# Patient Record
Sex: MALE | Race: WHITE | Employment: PART TIME | URBAN - METROPOLITAN AREA
[De-identification: names, ages, dates, MRNs, and addresses within clinical notes are randomized per-mention and may not be internally consistent; named-entity substitution may affect disease eponyms.]

---

## 2018-05-22 ENCOUNTER — OFFICE VISIT (OUTPATIENT)
Dept: FAMILY MEDICINE CLINIC | Age: 43
End: 2018-05-22

## 2018-05-22 VITALS
TEMPERATURE: 99.2 F | OXYGEN SATURATION: 96 % | DIASTOLIC BLOOD PRESSURE: 86 MMHG | HEART RATE: 98 BPM | HEIGHT: 69 IN | SYSTOLIC BLOOD PRESSURE: 130 MMHG | BODY MASS INDEX: 35.16 KG/M2 | RESPIRATION RATE: 12 BRPM | WEIGHT: 237.4 LBS

## 2018-05-22 DIAGNOSIS — M79.2 NEUROPATHIC PAIN: ICD-10-CM

## 2018-05-22 DIAGNOSIS — M51.26 HERNIATED LUMBAR INTERVERTEBRAL DISC: ICD-10-CM

## 2018-05-22 DIAGNOSIS — G89.21 CHRONIC PAIN DUE TO TRAUMA: ICD-10-CM

## 2018-05-22 DIAGNOSIS — M77.12 LATERAL EPICONDYLITIS OF LEFT ELBOW: ICD-10-CM

## 2018-05-22 DIAGNOSIS — R03.0 PRE-HYPERTENSION: Primary | ICD-10-CM

## 2018-05-22 DIAGNOSIS — Z00.00 ANNUAL PHYSICAL EXAM: ICD-10-CM

## 2018-05-22 LAB
ANION GAP SERPL CALCULATED.3IONS-SCNC: 14 MMOL/L (ref 3–16)
BUN BLDV-MCNC: 7 MG/DL (ref 7–20)
CALCIUM SERPL-MCNC: 9.6 MG/DL (ref 8.3–10.6)
CHLORIDE BLD-SCNC: 101 MMOL/L (ref 99–110)
CHOLESTEROL, TOTAL: 218 MG/DL (ref 0–199)
CO2: 27 MMOL/L (ref 21–32)
CREAT SERPL-MCNC: 0.7 MG/DL (ref 0.9–1.3)
GFR AFRICAN AMERICAN: >60
GFR NON-AFRICAN AMERICAN: >60
GLUCOSE BLD-MCNC: 107 MG/DL (ref 70–99)
HCT VFR BLD CALC: 47.9 % (ref 40.5–52.5)
HDLC SERPL-MCNC: 34 MG/DL (ref 40–60)
HEMOGLOBIN: 16.5 G/DL (ref 13.5–17.5)
LDL CHOLESTEROL CALCULATED: ABNORMAL MG/DL
LDL CHOLESTEROL DIRECT: 127 MG/DL
MCH RBC QN AUTO: 30.3 PG (ref 26–34)
MCHC RBC AUTO-ENTMCNC: 34.5 G/DL (ref 31–36)
MCV RBC AUTO: 87.9 FL (ref 80–100)
PDW BLD-RTO: 13.3 % (ref 12.4–15.4)
PLATELET # BLD: 233 K/UL (ref 135–450)
PMV BLD AUTO: 8.6 FL (ref 5–10.5)
POTASSIUM SERPL-SCNC: 4.7 MMOL/L (ref 3.5–5.1)
RBC # BLD: 5.45 M/UL (ref 4.2–5.9)
SODIUM BLD-SCNC: 142 MMOL/L (ref 136–145)
TRIGL SERPL-MCNC: 350 MG/DL (ref 0–150)
TSH SERPL DL<=0.05 MIU/L-ACNC: 1.13 UIU/ML (ref 0.27–4.2)
VLDLC SERPL CALC-MCNC: ABNORMAL MG/DL
WBC # BLD: 5.6 K/UL (ref 4–11)

## 2018-05-22 PROCEDURE — G8417 CALC BMI ABV UP PARAM F/U: HCPCS | Performed by: FAMILY MEDICINE

## 2018-05-22 PROCEDURE — 4004F PT TOBACCO SCREEN RCVD TLK: CPT | Performed by: FAMILY MEDICINE

## 2018-05-22 PROCEDURE — G8427 DOCREV CUR MEDS BY ELIG CLIN: HCPCS | Performed by: FAMILY MEDICINE

## 2018-05-22 PROCEDURE — 99204 OFFICE O/P NEW MOD 45 MIN: CPT | Performed by: FAMILY MEDICINE

## 2018-05-22 RX ORDER — MELOXICAM 15 MG/1
15 TABLET ORAL DAILY
Qty: 30 TABLET | Refills: 3 | Status: SHIPPED | OUTPATIENT
Start: 2018-05-22 | End: 2022-01-18

## 2018-05-22 RX ORDER — OXYCODONE AND ACETAMINOPHEN 7.5; 325 MG/1; MG/1
1 TABLET ORAL
COMMUNITY
Start: 2017-08-24 | End: 2022-01-18

## 2018-05-22 ASSESSMENT — PATIENT HEALTH QUESTIONNAIRE - PHQ9
SUM OF ALL RESPONSES TO PHQ QUESTIONS 1-9: 0
2. FEELING DOWN, DEPRESSED OR HOPELESS: 0
SUM OF ALL RESPONSES TO PHQ9 QUESTIONS 1 & 2: 0
1. LITTLE INTEREST OR PLEASURE IN DOING THINGS: 0

## 2018-05-23 LAB
ESTIMATED AVERAGE GLUCOSE: 116.9 MG/DL
HBA1C MFR BLD: 5.7 %

## 2018-05-24 ASSESSMENT — ENCOUNTER SYMPTOMS
RESPIRATORY NEGATIVE: 1
ALLERGIC/IMMUNOLOGIC NEGATIVE: 1
EYES NEGATIVE: 1
GASTROINTESTINAL NEGATIVE: 1
BACK PAIN: 1

## 2019-02-28 ENCOUNTER — OFFICE VISIT (OUTPATIENT)
Dept: FAMILY MEDICINE CLINIC | Age: 44
End: 2019-02-28
Payer: COMMERCIAL

## 2019-02-28 VITALS
OXYGEN SATURATION: 100 % | BODY MASS INDEX: 35 KG/M2 | DIASTOLIC BLOOD PRESSURE: 70 MMHG | HEART RATE: 104 BPM | WEIGHT: 237 LBS | SYSTOLIC BLOOD PRESSURE: 120 MMHG

## 2019-02-28 DIAGNOSIS — F41.0 PANIC ATTACK: Primary | ICD-10-CM

## 2019-02-28 DIAGNOSIS — R73.9 HYPERGLYCEMIA: ICD-10-CM

## 2019-02-28 PROCEDURE — G8417 CALC BMI ABV UP PARAM F/U: HCPCS | Performed by: FAMILY MEDICINE

## 2019-02-28 PROCEDURE — 99214 OFFICE O/P EST MOD 30 MIN: CPT | Performed by: FAMILY MEDICINE

## 2019-02-28 PROCEDURE — G8427 DOCREV CUR MEDS BY ELIG CLIN: HCPCS | Performed by: FAMILY MEDICINE

## 2019-02-28 PROCEDURE — G8484 FLU IMMUNIZE NO ADMIN: HCPCS | Performed by: FAMILY MEDICINE

## 2019-02-28 PROCEDURE — 4004F PT TOBACCO SCREEN RCVD TLK: CPT | Performed by: FAMILY MEDICINE

## 2019-02-28 RX ORDER — BUSPIRONE HYDROCHLORIDE 5 MG/1
5 TABLET ORAL 2 TIMES DAILY PRN
Qty: 60 TABLET | Refills: 1 | Status: SHIPPED | OUTPATIENT
Start: 2019-02-28 | End: 2019-03-30

## 2019-02-28 ASSESSMENT — PATIENT HEALTH QUESTIONNAIRE - PHQ9
SUM OF ALL RESPONSES TO PHQ QUESTIONS 1-9: 0
1. LITTLE INTEREST OR PLEASURE IN DOING THINGS: 0
2. FEELING DOWN, DEPRESSED OR HOPELESS: 0
SUM OF ALL RESPONSES TO PHQ QUESTIONS 1-9: 0
SUM OF ALL RESPONSES TO PHQ9 QUESTIONS 1 & 2: 0

## 2019-02-28 ASSESSMENT — ENCOUNTER SYMPTOMS
RESPIRATORY NEGATIVE: 1
GASTROINTESTINAL NEGATIVE: 1
EYES NEGATIVE: 1

## 2019-03-01 DIAGNOSIS — R73.9 HYPERGLYCEMIA: ICD-10-CM

## 2019-03-01 LAB
ANION GAP SERPL CALCULATED.3IONS-SCNC: 15 MMOL/L (ref 3–16)
BUN BLDV-MCNC: 11 MG/DL (ref 7–20)
CALCIUM SERPL-MCNC: 9.6 MG/DL (ref 8.3–10.6)
CHLORIDE BLD-SCNC: 101 MMOL/L (ref 99–110)
CO2: 26 MMOL/L (ref 21–32)
CREAT SERPL-MCNC: 0.9 MG/DL (ref 0.9–1.3)
GFR AFRICAN AMERICAN: >60
GFR NON-AFRICAN AMERICAN: >60
GLUCOSE BLD-MCNC: 111 MG/DL (ref 70–99)
POTASSIUM SERPL-SCNC: 5.1 MMOL/L (ref 3.5–5.1)
SODIUM BLD-SCNC: 142 MMOL/L (ref 136–145)

## 2019-03-01 PROCEDURE — 36415 COLL VENOUS BLD VENIPUNCTURE: CPT | Performed by: FAMILY MEDICINE

## 2019-03-02 LAB
ESTIMATED AVERAGE GLUCOSE: 105.4 MG/DL
HBA1C MFR BLD: 5.3 %

## 2019-05-02 NOTE — TELEPHONE ENCOUNTER
Physical therapist( [facility])     called   Stated that the patient has   Lateral epicondylitis ,left elbow needs iontophoresis and needs a script sent to pharmacy for dexamethasone liquid 120 mg.per 30 mil   Please advise.         [facility], [address] [place] [address] [address] [facility] [phone]

## 2020-06-02 ENCOUNTER — VIRTUAL VISIT (OUTPATIENT)
Dept: PRIMARY CARE CLINIC | Age: 45
End: 2020-06-02
Payer: COMMERCIAL

## 2020-06-02 VITALS — BODY MASS INDEX: 35.44 KG/M2 | WEIGHT: 240 LBS

## 2020-06-02 PROCEDURE — 4004F PT TOBACCO SCREEN RCVD TLK: CPT | Performed by: FAMILY MEDICINE

## 2020-06-02 PROCEDURE — 99212 OFFICE O/P EST SF 10 MIN: CPT | Performed by: FAMILY MEDICINE

## 2020-06-02 PROCEDURE — G8417 CALC BMI ABV UP PARAM F/U: HCPCS | Performed by: FAMILY MEDICINE

## 2020-06-02 PROCEDURE — G8427 DOCREV CUR MEDS BY ELIG CLIN: HCPCS | Performed by: FAMILY MEDICINE

## 2020-06-02 SDOH — ECONOMIC STABILITY: FOOD INSECURITY: WITHIN THE PAST 12 MONTHS, THE FOOD YOU BOUGHT JUST DIDN'T LAST AND YOU DIDN'T HAVE MONEY TO GET MORE.: NEVER TRUE

## 2020-06-02 SDOH — ECONOMIC STABILITY: FOOD INSECURITY: WITHIN THE PAST 12 MONTHS, YOU WORRIED THAT YOUR FOOD WOULD RUN OUT BEFORE YOU GOT MONEY TO BUY MORE.: NEVER TRUE

## 2020-06-02 SDOH — ECONOMIC STABILITY: INCOME INSECURITY: HOW HARD IS IT FOR YOU TO PAY FOR THE VERY BASICS LIKE FOOD, HOUSING, MEDICAL CARE, AND HEATING?: NOT HARD AT ALL

## 2020-06-02 SDOH — ECONOMIC STABILITY: TRANSPORTATION INSECURITY
IN THE PAST 12 MONTHS, HAS LACK OF TRANSPORTATION KEPT YOU FROM MEETINGS, WORK, OR FROM GETTING THINGS NEEDED FOR DAILY LIVING?: NO

## 2020-06-02 SDOH — ECONOMIC STABILITY: TRANSPORTATION INSECURITY
IN THE PAST 12 MONTHS, HAS THE LACK OF TRANSPORTATION KEPT YOU FROM MEDICAL APPOINTMENTS OR FROM GETTING MEDICATIONS?: NO

## 2020-06-02 ASSESSMENT — PATIENT HEALTH QUESTIONNAIRE - PHQ9
1. LITTLE INTEREST OR PLEASURE IN DOING THINGS: 0
SUM OF ALL RESPONSES TO PHQ QUESTIONS 1-9: 0
SUM OF ALL RESPONSES TO PHQ QUESTIONS 1-9: 0
2. FEELING DOWN, DEPRESSED OR HOPELESS: 0
SUM OF ALL RESPONSES TO PHQ9 QUESTIONS 1 & 2: 0

## 2020-06-02 ASSESSMENT — ENCOUNTER SYMPTOMS
DIARRHEA: 0
CONSTIPATION: 0
BLOOD IN STOOL: 0
ANAL BLEEDING: 0
ABDOMINAL PAIN: 1
EYES NEGATIVE: 1
NAUSEA: 0
VOMITING: 0
BACK PAIN: 1
ABDOMINAL DISTENTION: 0
RECTAL PAIN: 0

## 2020-06-02 NOTE — PROGRESS NOTES
Allergies   Allergen Reactions    Amoxicillin Hives    Penicillins Rash   ,   Past Medical History:   Diagnosis Date    Chronic back pain     Neuropathy    ,   Past Surgical History:   Procedure Laterality Date    APPENDECTOMY      BACK SURGERY  2007    L5-S1 discectomy    BACK SURGERY  2014    [facility]ERY  2010   ,   Social History     Tobacco Use    Smoking status: Former Smoker     Last attempt to quit: 3/28/1989     Years since quittin.2    Smokeless tobacco: Current User     Types: Snuff   Substance Use Topics    Alcohol use: Yes     Alcohol/week: 0.0 standard drinks     Comment: RARE    Drug use: No   ,   Family History   Adopted: Yes   ,   There is no immunization history on file for this patient.,   Health Maintenance   Topic Date Due    HIV screen  1988    DTaP/Tdap/Td vaccine (1 - Tdap) 1992    Flu vaccine (Season Ended) 2020    Lipid screen  2023    Hepatitis A vaccine  Aged Out    Hepatitis B vaccine  Aged Out    Hib vaccine  Aged Out    Meningococcal (ACWY) vaccine  Aged Out    Pneumococcal 0-64 years Vaccine  Aged Out       PHYSICAL EXAMINATION:  [ INSTRUCTIONS:  \"[x]\" Indicates a positive item  \"[]\" Indicates a negative item  -- DELETE ALL ITEMS NOT EXAMINED]  Vital Signs: (As obtained by patient/caregiver or practitioner observation)    Blood pressure-  Heart rate-    Respiratory rate-    Temperature-  Pulse oximetry-     Constitutional: [x] Appears well-developed and well-nourished [x] No apparent distress      [] Abnormal-   Mental status  [x] Alert and awake  [x] Oriented to person/place/time [x]Able to follow commands      Eyes:  EOM    [x]  Normal  [] Abnormal-  Sclera  [x]  Normal  [] Abnormal -         Discharge [x]  None visible  [] Abnormal -    HENT:   [x] Normocephalic, atraumatic.   [] Abnormal   [x] Mouth/Throat: Mucous membranes are moist.     External Ears [x] Normal  [] Abnormal-     Neck: [x] No

## 2022-01-18 ENCOUNTER — OFFICE VISIT (OUTPATIENT)
Dept: PRIMARY CARE CLINIC | Age: 47
End: 2022-01-18
Payer: COMMERCIAL

## 2022-01-18 VITALS
HEIGHT: 69 IN | WEIGHT: 254.2 LBS | SYSTOLIC BLOOD PRESSURE: 126 MMHG | HEART RATE: 110 BPM | OXYGEN SATURATION: 95 % | DIASTOLIC BLOOD PRESSURE: 86 MMHG | BODY MASS INDEX: 37.65 KG/M2 | TEMPERATURE: 97.5 F

## 2022-01-18 DIAGNOSIS — M25.521 RIGHT ELBOW PAIN: ICD-10-CM

## 2022-01-18 DIAGNOSIS — M25.511 CHRONIC RIGHT SHOULDER PAIN: Primary | ICD-10-CM

## 2022-01-18 DIAGNOSIS — G89.29 CHRONIC RIGHT SHOULDER PAIN: Primary | ICD-10-CM

## 2022-01-18 PROCEDURE — G8427 DOCREV CUR MEDS BY ELIG CLIN: HCPCS | Performed by: FAMILY MEDICINE

## 2022-01-18 PROCEDURE — 99214 OFFICE O/P EST MOD 30 MIN: CPT | Performed by: FAMILY MEDICINE

## 2022-01-18 PROCEDURE — 4004F PT TOBACCO SCREEN RCVD TLK: CPT | Performed by: FAMILY MEDICINE

## 2022-01-18 PROCEDURE — G8417 CALC BMI ABV UP PARAM F/U: HCPCS | Performed by: FAMILY MEDICINE

## 2022-01-18 PROCEDURE — G8484 FLU IMMUNIZE NO ADMIN: HCPCS | Performed by: FAMILY MEDICINE

## 2022-01-18 RX ORDER — CELECOXIB 200 MG/1
200 CAPSULE ORAL DAILY
Qty: 30 CAPSULE | Refills: 2 | Status: SHIPPED | OUTPATIENT
Start: 2022-01-18

## 2022-01-18 SDOH — ECONOMIC STABILITY: FOOD INSECURITY: WITHIN THE PAST 12 MONTHS, YOU WORRIED THAT YOUR FOOD WOULD RUN OUT BEFORE YOU GOT MONEY TO BUY MORE.: NEVER TRUE

## 2022-01-18 SDOH — ECONOMIC STABILITY: FOOD INSECURITY: WITHIN THE PAST 12 MONTHS, THE FOOD YOU BOUGHT JUST DIDN'T LAST AND YOU DIDN'T HAVE MONEY TO GET MORE.: NEVER TRUE

## 2022-01-18 ASSESSMENT — ENCOUNTER SYMPTOMS
RESPIRATORY NEGATIVE: 1
GASTROINTESTINAL NEGATIVE: 1
EYES NEGATIVE: 1

## 2022-01-18 ASSESSMENT — PATIENT HEALTH QUESTIONNAIRE - PHQ9
SUM OF ALL RESPONSES TO PHQ9 QUESTIONS 1 & 2: 0
SUM OF ALL RESPONSES TO PHQ QUESTIONS 1-9: 0
1. LITTLE INTEREST OR PLEASURE IN DOING THINGS: 0
SUM OF ALL RESPONSES TO PHQ QUESTIONS 1-9: 0
SUM OF ALL RESPONSES TO PHQ QUESTIONS 1-9: 0
2. FEELING DOWN, DEPRESSED OR HOPELESS: 0
SUM OF ALL RESPONSES TO PHQ QUESTIONS 1-9: 0

## 2022-01-18 ASSESSMENT — SOCIAL DETERMINANTS OF HEALTH (SDOH): HOW HARD IS IT FOR YOU TO PAY FOR THE VERY BASICS LIKE FOOD, HOUSING, MEDICAL CARE, AND HEATING?: NOT HARD AT ALL

## 2022-01-18 NOTE — PROGRESS NOTES
 Not on file   Social History Narrative    Not on file     Social Determinants of Health     Financial Resource Strain: Low Risk     Difficulty of Paying Living Expenses: Not hard at all   Food Insecurity: No Food Insecurity    Worried About Running Out of Food in the Last Year: Never true    [address] in the Last Year: Never true   Transportation Needs:     Lack of Transportation (Medical): Not on file    Lack of Transportation (Non-Medical): Not on file   Physical Activity:     Days of Exercise per Week: Not on file    Minutes of Exercise per Session: Not on file   Stress:     Feeling of Stress : Not on file   Social Connections:     Frequency of Communication with Friends and Family: Not on file    Frequency of Social Gatherings with Friends and Family: Not on file    Attends Orthodoxy Services: Not on file    Active Member of [address] [place] [place] [address] or Organizations: Not on file    Attends Club or Organization Meetings: Not on file    Marital Status: Not on file   Intimate Partner Violence:     Fear of Current or Ex-Partner: Not on file    Emotionally Abused: Not on file    Physically Abused: Not on file    Sexually Abused: Not on file   Housing Stability:     Unable to Pay for Housing in the Last Year: Not on file    Number of [place] in the Last Year: Not on file    Unstable Housing in the Last Year: Not on file     Current Outpatient Medications   Medication Sig Dispense Refill    oxyCODONE-acetaminophen (PERCOCET) 7.5-325 MG per tablet Take 1 tablet by mouth every 6-8 hours as needed. . (Patient not taking: Reported on 1/18/2022)      meloxicam (MOBIC) 15 MG tablet Take 1 tablet by mouth daily (Patient not taking: Reported on 6/2/2020) 30 tablet 3    oxyCODONE-acetaminophen (PERCOCET) 5-325 MG per tablet 1 PO BID for pain Syndrome, MED Reduction. (Patient not taking: Reported on 6/2/2020) 60 tablet 0     No current facility-administered medications for this visit.      Allergies   Allergen Reactions    Amoxicillin Hives    Penicillins Rash       Review of Systems   Constitutional: Negative. HENT: Negative. Eyes: Negative. Respiratory: Negative. Cardiovascular: Negative. Gastrointestinal: Negative. Musculoskeletal: Positive for arthralgias and myalgias. All other systems reviewed and are negative. OBJECTIVE:  /86 (Site: Right Upper Arm, Position: Sitting, Cuff Size: Large Adult)   Pulse 110   Temp 97.5 °F (36.4 °C) (Infrared)   Ht 5' 9\" (1.753 m)   Wt 254 lb 3.2 oz (115.3 kg)   SpO2 95%   BMI 37.54 kg/m²     Physical Exam  Vitals reviewed. Musculoskeletal:      Right shoulder: Tenderness present. No swelling, deformity, effusion or laceration. Decreased range of motion. Decreased strength. Right elbow: Decreased range of motion. Tenderness present. ASSESSMENT:   Diagnosis Orders   1. Chronic right shoulder pain  celecoxib (CELEBREX) 200 MG capsule    lAexis Jhaveri MD, Orthopedic Surgery, [facility]   2.  Right elbow pain  celecoxib (CELEBREX) 200 MG capsule         PLAN:  See orders

## 2022-01-18 NOTE — PATIENT INSTRUCTIONS
Patient Education        Shoulder Arthritis: Exercises  Introduction  Here are some examples of exercises for you to try. The exercises may be suggested for a condition or for rehabilitation. Start each exercise slowly. Ease off the exercises if you start to have pain. You will be told when to start these exercises and which ones will work best for you. How to do the exercises  Shoulder flexion (lying down)    To make a wand for this exercise, use a piece of PVC pipe or a broom handle with the broom removed. Make the wand about a foot wider than your shoulders. 1. Lie on your back, holding a wand with both hands. Your palms should face down as you hold the wand. 2. Keeping your elbows straight, slowly raise your arms over your head. Raise them until you feel a stretch in your shoulders, upper back, and chest.  3. Hold for 15 to 30 seconds. 4. Repeat 2 to 4 times. Shoulder rotation (lying down)    To make a wand for this exercise, use a piece of PVC pipe or a broom handle with the broom removed. Make the wand about a foot wider than your shoulders. 1. Lie on your back. Hold a wand with both hands with your elbows bent and palms up. 2. Keep your elbows close to your body, and move the wand across your body toward the sore arm. 3. Hold for 8 to 12 seconds. 4. Repeat 2 to 4 times. Shoulder internal rotation with towel    1. Hold a towel above and behind your head with the arm that is not sore. 2. With your sore arm, reach behind your back and grasp the towel. 3. With the arm above your head, pull the towel upward. Do this until you feel a stretch on the front and outside of your sore shoulder. 4. Hold 15 to 30 seconds. 5. Repeat 2 to 4 times. Shoulder blade squeeze    1. Stand with your arms at your sides, and squeeze your shoulder blades together. Do not raise your shoulders up as you squeeze. 2. Hold 6 seconds. 3. Repeat 8 to 12 times.   Resisted rows    For this exercise, you will need elastic exercise material, such as surgical tubing or Thera-Band. 1. Put the band around a solid object at about waist level. (A bedpost will work well.) Each hand should hold an end of the band. 2. With your elbows at your sides and bent to 90 degrees, pull the band back. Your shoulder blades should move toward each other. Return to the starting position. 3. Repeat 8 to 12 times. External rotator strengthening exercise    1. Start by tying a piece of elastic exercise material to a doorknob. You can use surgical tubing or Thera-Band. (You may also hold one end of the band in each hand.)  2. Stand or sit with your shoulder relaxed and your elbow bent 90 degrees. Your upper arm should rest comfortably against your side. Squeeze a rolled towel between your elbow and your body for comfort. This will help keep your arm at your side. 3. Hold one end of the elastic band with the hand of the painful arm. 4. Start with your forearm across your belly. Slowly rotate the forearm out away from your body. Keep your elbow and upper arm tucked against the towel roll or the side of your body until you begin to feel tightness in your shoulder. Slowly move your arm back to where you started. 5. Repeat 8 to 12 times. Internal rotator strengthening exercise    1. Start by tying a piece of elastic exercise material to a doorknob. You can use surgical tubing or Thera-Band. 2. Stand or sit with your shoulder relaxed and your elbow bent 90 degrees. Your upper arm should rest comfortably against your side. Squeeze a rolled towel between your elbow and your body for comfort. This will help keep your arm at your side. 3. Hold one end of the elastic band in the hand of the painful arm. 4. Slowly rotate your forearm toward your body until it touches your belly. Slowly move it back to where you started. 5. Keep your elbow and upper arm firmly tucked against the towel roll or at your side. 6. Repeat 8 to 12 times.   Pendulum swing    If you have pain. You will be told when to start these exercises and which ones will work best for you. How to do the exercises  Pendulum swing    If you have pain in your back, do not do this exercise. 1. Hold on to a table or the back of a chair with your good arm. Then bend forward a little and let your sore arm hang straight down. This exercise does not use the arm muscles. Rather, use your legs and your hips to create movement that makes your arm swing freely. 2. Use the movement from your hips and legs to guide the slightly swinging arm back and forth like a pendulum (or elephant trunk). Then guide it in circles that start small (about the size of a dinner plate). Make the circles a bit larger each day, as your pain allows. 3. Do this exercise for 5 minutes, 5 to 7 times each day. 4. As you have less pain, try bending over a little farther to do this exercise. This will increase the amount of movement at your shoulder. Posterior stretching exercise    1. Hold the elbow of your injured arm with your other hand. 2. Use your hand to pull your injured arm gently up and across your body. You will feel a gentle stretch across the back of your injured shoulder. 3. Hold for at least 15 to 30 seconds. Then slowly lower your arm. 4. Repeat 2 to 4 times. Up-the-back stretch    Your doctor or physical therapist may want you to wait to do this stretch until you have regained most of your range of motion and strength. You can do this stretch in different ways. Hold any of these stretches for at least 15 to 30 seconds. Repeat them 2 to 4 times. 1. Light stretch: Put your hand in your back pocket. Let it rest there to stretch your shoulder. 2. Moderate stretch: With your other hand, hold your injured arm (palm outward) behind your back by the wrist. Pull your arm up gently to stretch your shoulder. 3. Advanced stretch: Put a towel over your other shoulder. Put the hand of your injured arm behind your back.  Now hold the back end of the towel. With the other hand, hold the front end of the towel in front of your body. Pull gently on the front end of the towel. This will bring your hand farther up your back to stretch your shoulder. Overhead stretch    1. Standing about an arm's length away, grasp onto a solid surface. You could use a countertop, a doorknob, or the back of a sturdy chair. 2. With your knees slightly bent, bend forward with your arms straight. Lower your upper body, and let your shoulders stretch. 3. As your shoulders are able to stretch farther, you may need to take a step or two backward. 4. Hold for at least 15 to 30 seconds. Then stand up and relax. If you had stepped back during your stretch, step forward so you can keep your hands on the solid surface. 5. Repeat 2 to 4 times. Shoulder flexion (lying down)    To make a wand for this exercise, use a piece of PVC pipe or a broom handle with the broom removed. Make the wand about a foot wider than your shoulders. 1. Lie on your back, holding a wand with both hands. Your palms should face down as you hold the wand. 2. Keeping your elbows straight, slowly raise your arms over your head. Raise them until you feel a stretch in your shoulders, upper back, and chest.  3. Hold for 15 to 30 seconds. 4. Repeat 2 to 4 times. Shoulder rotation (lying down)    To make a wand for this exercise, use a piece of PVC pipe or a broom handle with the broom removed. Make the wand about a foot wider than your shoulders. 1. Lie on your back. Hold a wand with both hands with your elbows bent and palms up. 2. Keep your elbows close to your body, and move the wand across your body toward the sore arm. 3. Hold for 8 to 12 seconds. 4. Repeat 2 to 4 times. Wall climbing (to the side)    Avoid any movement that is straight to your side, and be careful not to arch your back. Your arm should stay about 30 degrees to the front of your side.   1. Stand with your side to a wall so that your fingers can just touch it at an angle about 30 degrees toward the front of your body. 2. Walk the fingers of your injured arm up the wall as high as pain permits. Try not to shrug your shoulder up toward your ear as you move your arm up. 3. Hold that position for a count of at least 15 to 20.  4. Walk your fingers back down to the starting position. 5. Repeat at least 2 to 4 times. Try to reach higher each time. Wall climbing (to the front)    During this stretching exercise, be careful not to arch your back. 1. Face a wall, and stand so your fingers can just touch it. 2. Keeping your shoulder down, walk the fingers of your injured arm up the wall as high as pain permits. (Don't shrug your shoulder up toward your ear.)  3. Hold your arm in that position for at least 15 to 30 seconds. 4. Slowly walk your fingers back down to where you started. 5. Repeat at least 2 to 4 times. Try to reach higher each time. Shoulder blade squeeze    1. Stand with your arms at your sides, and squeeze your shoulder blades together. Do not raise your shoulders up as you squeeze. 2. Hold 6 seconds. 3. Repeat 8 to 12 times. Scapular exercise: Arm reach    1. Lie flat on your back. This exercise is a very slight motion that starts with your arms raised (elbows straight, arms straight). 2. From this position, reach higher toward the jona or ceiling. Keep your elbows straight. All motion should be from your shoulder blade only. 3. Relax your arms back to where you started. 4. Repeat 8 to 12 times. Arm raise to the side    During this strengthening exercise, your arm should stay about 30 degrees to the front of your side. 1. Slowly raise your injured arm to the side, with your thumb facing up. Raise your arm 60 degrees at the most (shoulder level is 90 degrees). 2. Hold the position for 3 to 5 seconds. Then lower your arm back to your side.  If you need to, bring your \"good\" arm across your body and place it under the elbow as you lower your injured arm. Use your good arm to keep your injured arm from dropping down too fast.  3. Repeat 8 to 12 times. 4. When you first start out, don't hold any extra weight in your hand. As you get stronger, you may use a 1-pound to 2-pound dumbbell or a small can of food. Shoulder flexor and extensor exercise    These are isometric exercises. That means you contract your muscles without actually moving. 1. Push forward (flex): Stand facing a wall or doorjamb, about 6 inches or less back. Hold your injured arm against your body. Make a closed fist with your thumb on top. Then gently push your hand forward into the wall with about 25% to 50% of your strength. Don't let your body move backward as you push. Hold for about 6 seconds. Relax for a few seconds. Repeat 8 to 12 times. 2. Push backward (extend): Stand with your back flat against a wall. Your upper arm should be against the wall, with your elbow bent 90 degrees (your hand straight ahead). Push your elbow gently back against the wall with about 25% to 50% of your strength. Don't let your body move forward as you push. Hold for about 6 seconds. Relax for a few seconds. Repeat 8 to 12 times. Scapular exercise: Wall push-ups    This exercise is best done with your fingers somewhat turned out, rather than straight up and down. 1. Stand facing a wall, about 12 inches to 18 inches away. 2. Place your hands on the wall at shoulder height. 3. Slowly bend your elbows and bring your face to the wall. Keep your back and hips straight. 4. Push back to where you started. 5. Repeat 8 to 12 times. 6. When you can do this exercise against a wall comfortably, you can try it against a counter. You can then slowly progress to the end of a couch, then to a sturdy chair, and finally to the floor. Scapular exercise: Retraction    For this exercise, you will need elastic exercise material, such as surgical tubing or Thera-Band.   1. Put the band around a solid object at about waist level. (A bedpost will work well.) Each hand should hold an end of the band. 2. With your elbows at your sides and bent to 90 degrees, pull the band back. Your shoulder blades should move toward each other. Then move your arms back where you started. 3. Repeat 8 to 12 times. 4. If you have good range of motion in your shoulders, try this exercise with your arms lifted out to the sides. Keep your elbows at a 90-degree angle. Raise the elastic band up to about shoulder level. Pull the band back to move your shoulder blades toward each other. Then move your arms back where you started. Internal rotator strengthening exercise    1. Start by tying a piece of elastic exercise material to a doorknob. You can use surgical tubing or Thera-Band. 2. Stand or sit with your shoulder relaxed and your elbow bent 90 degrees. Your upper arm should rest comfortably against your side. Squeeze a rolled towel between your elbow and your body for comfort. This will help keep your arm at your side. 3. Hold one end of the elastic band in the hand of the painful arm. 4. Slowly rotate your forearm toward your body until it touches your belly. Slowly move it back to where you started. 5. Keep your elbow and upper arm firmly tucked against the towel roll or at your side. 6. Repeat 8 to 12 times. External rotator strengthening exercise    1. Start by tying a piece of elastic exercise material to a doorknob. You can use surgical tubing or Thera-Band. (You may also hold one end of the band in each hand.)  2. Stand or sit with your shoulder relaxed and your elbow bent 90 degrees. Your upper arm should rest comfortably against your side. Squeeze a rolled towel between your elbow and your body for comfort. This will help keep your arm at your side. 3. Hold one end of the elastic band with the hand of the painful arm. 4. Start with your forearm across your belly.  Slowly rotate the forearm out away from your body. Keep your elbow and upper arm tucked against the towel roll or the side of your body until you begin to feel tightness in your shoulder. Slowly move your arm back to where you started. 5. Repeat 8 to 12 times. Follow-up care is a key part of your treatment and safety. Be sure to make and go to all appointments, and call your doctor if you are having problems. It's also a good idea to know your test results and keep a list of the medicines you take. Where can you learn more? Go to https://[employer]. org and sign in to your [employer] account. Enter [employer] in the [employer] box to learn more about \"Rotator Cuff: Exercises. \"     If you do not have an account, please click on the \"Sign Up Now\" link. Current as of: July 1, 2021               Content Version: 13.1  © 9930-8941 [employer]. Care instructions adapted under license by [facility] ([facility]). If you have questions about a medical condition or this instruction, always ask your healthcare professional. [address] any warranty or liability for your use of this information.

## 2022-02-01 ENCOUNTER — OFFICE VISIT (OUTPATIENT)
Dept: ORTHOPEDIC SURGERY | Age: 47
End: 2022-02-01
Payer: COMMERCIAL

## 2022-02-01 VITALS — HEIGHT: 69 IN | WEIGHT: 254 LBS | BODY MASS INDEX: 37.62 KG/M2

## 2022-02-01 DIAGNOSIS — M75.21 BICEPS TENDINITIS OF RIGHT UPPER EXTREMITY: ICD-10-CM

## 2022-02-01 DIAGNOSIS — M75.41 IMPINGEMENT SYNDROME OF RIGHT SHOULDER: ICD-10-CM

## 2022-02-01 DIAGNOSIS — M25.511 RIGHT SHOULDER PAIN, UNSPECIFIED CHRONICITY: Primary | ICD-10-CM

## 2022-02-01 DIAGNOSIS — M19.011 ARTHRITIS OF RIGHT ACROMIOCLAVICULAR JOINT: ICD-10-CM

## 2022-02-01 PROCEDURE — 20551 NJX 1 TENDON ORIGIN/INSJ: CPT | Performed by: ORTHOPAEDIC SURGERY

## 2022-02-01 PROCEDURE — G8427 DOCREV CUR MEDS BY ELIG CLIN: HCPCS | Performed by: ORTHOPAEDIC SURGERY

## 2022-02-01 PROCEDURE — L3908 WHO COCK-UP NONMOLDE PRE OTS: HCPCS | Performed by: ORTHOPAEDIC SURGERY

## 2022-02-01 PROCEDURE — G8484 FLU IMMUNIZE NO ADMIN: HCPCS | Performed by: ORTHOPAEDIC SURGERY

## 2022-02-01 PROCEDURE — 99243 OFF/OP CNSLTJ NEW/EST LOW 30: CPT | Performed by: ORTHOPAEDIC SURGERY

## 2022-02-01 PROCEDURE — G8417 CALC BMI ABV UP PARAM F/U: HCPCS | Performed by: ORTHOPAEDIC SURGERY

## 2022-02-01 PROCEDURE — MISCD86 TENNIS ELBOW STRAP-BREG: Performed by: ORTHOPAEDIC SURGERY

## 2022-02-01 RX ORDER — LIDOCAINE HYDROCHLORIDE 10 MG/ML
1 INJECTION, SOLUTION INFILTRATION; PERINEURAL ONCE
Status: COMPLETED | OUTPATIENT
Start: 2022-02-01 | End: 2022-02-01

## 2022-02-01 RX ORDER — TRIAMCINOLONE ACETONIDE 40 MG/ML
40 INJECTION, SUSPENSION INTRA-ARTICULAR; INTRAMUSCULAR ONCE
Status: COMPLETED | OUTPATIENT
Start: 2022-02-01 | End: 2022-02-01

## 2022-02-01 RX ADMIN — LIDOCAINE HYDROCHLORIDE 1 ML: 10 INJECTION, SOLUTION INFILTRATION; PERINEURAL at 11:58

## 2022-02-01 RX ADMIN — TRIAMCINOLONE ACETONIDE 40 MG: 40 INJECTION, SUSPENSION INTRA-ARTICULAR; INTRAMUSCULAR at 11:59

## 2022-02-01 NOTE — PROGRESS NOTES
Chief Complaint    Shoulder Pain (NP RIGHT SHOULDER)      History of Present Illness:  David Chandler is a pleasant,  48 y.o. male here today on referral from Dr. Pat Layne for consultation and evaluation of right shoulder and elbow pain. The patient reports a few years history of right elbow pain, that was treated previously by pain specialist with an injection in his elbow a year ago and he has benefited from the steroid injection. He had recurrence of his symptoms over the past few months. He also started to have right shoulder pain of a couple of months duration. The pain is worse with overhead reaching out activities. 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 The right elbow pain is getting worse. He has been taking Celebrex without much benefit. He received a steroid injection into his biceps tendon a couple of months ago without much benefit. He denies numbness or tingling. Medical History:    No notes on file    Patient's medications, allergies, past medical, surgical, social and family histories were reviewed and updated as appropriate. Past Medical History:   Diagnosis Date    Chronic back pain     Neuropathy       Social History     Socioeconomic History    Marital status: Single     Spouse name: Not on file    Number of children: Not on file    Years of education: Not on file    Highest education level: Not on file   Occupational History    Occupation:      Employer: [employer]   Tobacco Use    Smoking status: Former Smoker     Quit date: 3/28/1989     Years since quittin.8    Smokeless tobacco: Current User     Types: Snuff   Substance and Sexual Activity    Alcohol use:  Yes     Alcohol/week: 0.0 standard drinks     Comment: RARE    Drug use: No    Sexual activity: Yes     Partners: Female   Other Topics Concern    Not on file   Social History Narrative    Not on file     Social Determinants of Health     Financial Resource Strain: Low Risk     Difficulty of Paying Living Expenses: Not hard at all   Food Insecurity: No Food Insecurity    Worried About Running Out of Food in the Last Year: Never true    Sundeep of Food in the Last Year: Never true   Transportation Needs:     Lack of Transportation (Medical): Not on file    Lack of Transportation (Non-Medical): Not on file   Physical Activity:     Days of Exercise per Week: Not on file    Minutes of Exercise per Session: Not on file   Stress:     Feeling of Stress : Not on file   Social Connections:     Frequency of Communication with Friends and Family: Not on file    Frequency of Social Gatherings with Friends and Family: Not on file    Attends Jehovah's witness Services: Not on file    Active Member of [address] [place] [address] [employer] or Organizations: Not on file    Attends Club or Organization Meetings: Not on file    Marital Status: Not on file   Intimate Partner Violence:     Fear of Current or Ex-Partner: Not on file    Emotionally Abused: Not on file    Physically Abused: Not on file    Sexually Abused: Not on file   Housing Stability:     Unable to Pay for Housing in the Last Year: Not on file    Number of [place] in the Last Year: Not on file    Unstable Housing in the Last Year: Not on file       Allergies   Allergen Reactions    Amoxicillin Hives    Penicillins Rash     Current Outpatient Medications on File Prior to Visit   Medication Sig Dispense Refill    celecoxib (CELEBREX) 200 MG capsule Take 1 capsule by mouth daily 30 capsule 2     No current facility-administered medications on file prior to visit. Review of Systems  A 14 point review of systems was completed by the patient on 2/1/2022 and is available in the media section of the scanned medical record and was reviewed on 2/1/2022. The review is negative with the exception of those things mentioned in the HPI and Past Medical History    Vital Signs: There were no vitals filed for this visit.     General Exam: Constitutional: Patient is adequately groomed with no evidence of malnutrition      Right shoulder Examination:    Inspection:  No skin lesions, no obvious deformity, no swelling. Palpation:  Tenderness over bicipital groove, AC joint    Active Range of Motion: Forward Elevation 90, Abduction 90, External Rotation 20, Internal Rotation L2    Passive Range of Motion: Forward Elevation 110, Abduction 110, External Rotation 20, Internal Rotation L2    Strength:  External Rotation 5/5, Internal Rotation 5/5, Champagne Toast 5/5, Supraspinatus 5/5    Special Tests: Positive Diana's, positive Hawkin's, No Aryan deformity. Neurovascular: Palpable radial pulse, normal sensation in the median, ulnar, radial nerve distributions        Comparison left shoulder Examination:    Inspection:  No skin lesions, no deformity, no swelling. Palpation: No tenderness    Active Range of Motion: Forward Elevation 150, Abduction 150, External Rotation 20, Internal Rotation L1    Passive Range of Motion: Forward Elevation 150, Abduction 150, External Rotation 20, Internal Rotation L1    Strength:  External Rotation 5/5, Internal Rotation 5/5, Champagne Toast 5/5, Supraspinatus 5/5    Special Tests:  No Aryan Deformity    Neurovascular:  Palpable radial pulse, normal sensation in the median, ulnar, radial nerve distributions    Right elbow Examination:    Inspection:    Normal alignment. No swelling. Palpation:     tenderness to palpation at the lateral epicondyle (tennis elbow)    Range of Motion:      0-135 degrees. Strength:       5/5 in all muscle groups. Pain with resisted wrist extension    Instability testing:  Stable to varus and valgus stress    Vascular exam:    Skin warm and well-perfused. Neurologic exam:     Sensation intact to light      Self assessment questionnaires were completed today.       Radiology:     Plain radiographs of the right shoulder, comprising 3 views: AP, Scapular Y and Axillary lateral were  obtained and reviewed in the office:    Impression: No signs of advanced osteoarthritis. Assessment :  Chidi Reid is a pleasant 48 y.o. male with pain related to right shoulder biceps tendinitis, subacromial impingement and AC joint arthritis. He also has recalcitrant right tennis elbow. Impression:  Encounter Diagnoses   Name Primary?  Right shoulder pain, unspecified chronicity Yes    Biceps tendinitis of right upper extremity     Arthritis of right acromioclavicular joint     Impingement syndrome of right shoulder        Office Procedures:  Orders Placed This Encounter   Procedures    TENNIS ELBOW STRAP-BREG     Patient was supplied a Aircast Arm Band. This retail item was supplied to provide functional support and assist in protecting the affected area. Verbal and written instructions for the use of and application of this item were provided. The patient was educated and fit by a healthcare professional with expert knowledge and specialization in brace application. They were instructed to contact the office immediately should the equipment result in increased pain, decreased sensation, increased swelling or worsening of the condition.  XR SHOULDER RIGHT (MIN 2 VIEWS)     Standing Status:   Future     Number of Occurrences:   1     Standing Expiration Date:   2/1/2023     Order Specific Question:   Reason for exam:     Answer:   PAIN    MRI SHOULDER RIGHT WO CONTRAST     Standing Status:   Future     Standing Expiration Date:   2/1/2023     Order Specific Question:   Reason for exam:     Answer:   [employer]scan    [address] - [place] DRAIN/INJECT INTERMEDIATE JOINT/BURSA   Jennifer Espinoza Quick Fit Wrist     Patient was prescribed a DJO Quick Fit Wrist brace. The right wrist will require stabilization / immobilization from this semi-rigid / rigid orthosis to improve their function.   The orthosis will assist in protecting the affected area, provide functional support and facilitate healing. The patient was educated and fit by a healthcare professional with expert knowledge and specialization in brace application while under the direct supervision of the treating physician. Verbal and written instructions for the use of and application of this item were provided. They were instructed to contact the office immediately should the brace result in increased pain, decreased sensation, increased swelling or worsening of the condition. Treatment Plan:  Pertinent imaging and exam findings was reviewed with Jack Skiff. The etiology, natural history, and treatment options for his elbow and shoulder were discussed. The roles of activity modifications, medications, cryotherapy and heat, injections, physical therapy, and surgical interventions were all described to the patient and questions were answered. We discussed the diagnosis of tennis elbow and the treatment options with the patient during today's encounter. Also, we discussed the right shoulder biceps tendinitis, AC joint arthritis and subacromial impingement. The patient has had a shoulder injection and anti-inflammatory treatment with and much benefit. We recommend MRI of the right shoulder to rule out rotator cuff tear. The patient has benefited from previous tennis elbow injection 1 year ago. We recommend a steroid injection for the tennis elbow, with tennis elbow band and wrist splint to be worn at night. The patient would like to proceed with the tennis elbow injection during today's visit. After discussing the risks and benefits of a corticosteroid injection, Jacqui Baker did state an understanding and gave verbal consent to proceed. After cleansing the injection site with Chlora-prep and using aseptic techniques,  2 CC of Kenalog 40mg/ml and 8 CC of 1% lidocaine were injected in the right elbow. He  tolerated the procedure well with no immediate adverse sequelae after the injection.   A bandage was placed over the injection site. Appropriate post injections instructions were given to the patient. Kendell Baker will follow up in after the right shoulder MRI, sooner if needed. They were in agreement with this plan and all questions were answered to the patient's satisfaction. They were encouraged to call with any questions. Carlos Prieto MD  [facility] Clinical fellow    10:05 AM  2/1/2022    During this examination, I, Carlos Prieto MD, functioned as a scribe for Dr. Nicole Daniels. The history taking and physical examination were performed by Dr. Patt Maya. All counseling during the appointment was performed between the patient and Dr. Patt Maya.   ______________________  I was physically present and personally supervised the Orthopaedic Sports Medicine Fellow in the evaluation and development of a treatment plan for this patient. I personally interviewed the patient and performed a physical examination. In addition, I discussed the patient's condition and treatment options with them. I have also reviewed and agree with the past medical, family and social history unless otherwise noted. All of the patient's questions were answered. Janee Maya MD, PhD  2/1/2022

## 2022-02-04 ENCOUNTER — TELEPHONE (OUTPATIENT)
Dept: ORTHOPEDIC SURGERY | Age: 47
End: 2022-02-04

## 2022-02-04 DIAGNOSIS — M25.511 RIGHT SHOULDER PAIN, UNSPECIFIED CHRONICITY: Primary | ICD-10-CM

## 2022-02-04 RX ORDER — DIAZEPAM 5 MG/1
5 TABLET ORAL PRN
Qty: 2 TABLET | Refills: 0 | Status: SHIPPED | OUTPATIENT
Start: 2022-02-04 | End: 2022-02-14

## 2022-02-23 ENCOUNTER — OFFICE VISIT (OUTPATIENT)
Dept: ORTHOPEDIC SURGERY | Age: 47
End: 2022-02-23
Payer: COMMERCIAL

## 2022-02-23 VITALS — BODY MASS INDEX: 37.62 KG/M2 | HEIGHT: 69 IN | WEIGHT: 254 LBS

## 2022-02-23 DIAGNOSIS — M75.21 BICEPS TENDINITIS OF RIGHT UPPER EXTREMITY: Primary | ICD-10-CM

## 2022-02-23 DIAGNOSIS — M75.111 NONTRAUMATIC INCOMPLETE TEAR OF RIGHT ROTATOR CUFF: ICD-10-CM

## 2022-02-23 DIAGNOSIS — M19.011 ARTHRITIS OF RIGHT ACROMIOCLAVICULAR JOINT: ICD-10-CM

## 2022-02-23 PROCEDURE — 99214 OFFICE O/P EST MOD 30 MIN: CPT | Performed by: ORTHOPAEDIC SURGERY

## 2022-02-23 PROCEDURE — G8427 DOCREV CUR MEDS BY ELIG CLIN: HCPCS | Performed by: ORTHOPAEDIC SURGERY

## 2022-02-23 PROCEDURE — 4004F PT TOBACCO SCREEN RCVD TLK: CPT | Performed by: ORTHOPAEDIC SURGERY

## 2022-02-23 PROCEDURE — 20610 DRAIN/INJ JOINT/BURSA W/O US: CPT | Performed by: ORTHOPAEDIC SURGERY

## 2022-02-23 PROCEDURE — G8484 FLU IMMUNIZE NO ADMIN: HCPCS | Performed by: ORTHOPAEDIC SURGERY

## 2022-02-23 PROCEDURE — G8417 CALC BMI ABV UP PARAM F/U: HCPCS | Performed by: ORTHOPAEDIC SURGERY

## 2022-02-23 RX ORDER — LIDOCAINE HYDROCHLORIDE 10 MG/ML
8 INJECTION, SOLUTION INFILTRATION; PERINEURAL ONCE
Status: COMPLETED | OUTPATIENT
Start: 2022-02-23 | End: 2022-02-23

## 2022-02-23 RX ORDER — METHYLPREDNISOLONE ACETATE 40 MG/ML
80 INJECTION, SUSPENSION INTRA-ARTICULAR; INTRALESIONAL; INTRAMUSCULAR; SOFT TISSUE ONCE
Status: COMPLETED | OUTPATIENT
Start: 2022-02-23 | End: 2022-02-23

## 2022-02-23 RX ADMIN — METHYLPREDNISOLONE ACETATE 80 MG: 40 INJECTION, SUSPENSION INTRA-ARTICULAR; INTRALESIONAL; INTRAMUSCULAR; SOFT TISSUE at 18:04

## 2022-02-23 RX ADMIN — LIDOCAINE HYDROCHLORIDE 8 ML: 10 INJECTION, SOLUTION INFILTRATION; PERINEURAL at 18:03

## 2022-02-23 NOTE — LETTER
Shoulder Elbow Rehabilitation Referral    Patient Name: Kassy Brewer      YOB: 1975    Diagnosis:   1. Biceps tendinitis of right upper extremity    2. Arthritis of right acromioclavicular joint    3. Nontraumatic incomplete tear of right rotator cuff        Precautions: None1    Post Op Instructions:  [] Continuous passive motion (CPM)  [] Elbow range of motion  [] Exercise in plane of scapula   []  Strengthening     [] Pulley and instruction    [x] Home exercise program (copy to patient)   [] Sling when arm at risk  [] Sling or brace at all times   [] AAROM: Forward elevation to 140            [] AAROM: External rotation to 100    [] Isometric external rotator strengthening [] AAROM: internal rotation: up the back  [] Isometric abductor strengthening  [] AAROM: Internal abduction     [] Isometric internal rotator strengthening [] AAROM: cross-body adduction             Stretching:     Strengthening:  [x] Four quadrant (FE, ER, IR, CBA)  [x] Rotator cuff (ER, IR, Abd)  [x] Forward Elevation    [] External Rotators     [x] External Rotation    [] Internal Rotators  [] Internal Rotation: up/back   [] Abductors     [] Internal Rotation: supine in abduction  [] Flexors  [] Cross-body abduction    [] Extensors  [] Pendulum (FE, Abd/Add, cw/ccw)  [x] Scapular Stabilizers   [] Wall-walking (FE, Abd)    [x] Shoulder shrugs     [] Table slides      [x] Rhomboid pinch  [] Elbow (flex, ext, pron, sup)    [] Lat.  Pull downs     [] Medial epicondylitis program    [] Forward punch   [x] Lateral epicondylitis program    [] Internal rotators     [] Progressive resistive exercises  [] Bench Press        [] Bench press plus  Activities:     [] Lateral pull-downs  [] Rowing     [] Progressive two-hand supine press  [] Stepper/Exercise bike   [] Biceps: curls/supination  [] Swimming  [] Water exercises    Modalities: PRN    Return to Sport:  [] Ultrasound     [] Plyometrics  [] Iontophoresis     [] Rhythmic stabilization  [] Moist heat     [] Core strengthening   [] Massage     [] Sports specific program:   [x] Cryotherapy      [] Electrical stimulation     [] Paraffin  [] Whirlpool  [] TENS    [x] Home exercise program (copy to patient). Perform exercises for:   15     minutes    2-3      times/day  [x] Supervised physical therapy  Frequency: [x]  1x week  [] 2x week  [] 3x week  [] Other:   Duration: [] 2 weeks   [] 4 weeks  [x] 6 weeks  [] Other:     Additional Instructions:          Janee Denson MD, PhD

## 2022-02-23 NOTE — PROGRESS NOTES
Chief Complaint    Follow-up (MRI results)      History of Present Illness: Follow-up for right shoulder MRI  Rudolph Meigs is a pleasant,  48 y.o. male here today on . He also started to have right shoulder pain of a couple of months duration. The pain is worse with overhead reaching out activities. 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 Last visit the patient was given steroid injection for lateral epicondylitis, we ordered shoulder MRI to assess his rotator cuff and biceps tendon. He is here today with MRI results. He also mentions that his right elbow pain improved remarkably after the injection. Medical History:    ROS done 20  Patient has had no medical changes since last evaluated          Patient's medications, allergies, past medical, surgical, social and family histories were reviewed and updated as appropriate. Past Medical History:   Diagnosis Date    Chronic back pain     Neuropathy       Social History     Socioeconomic History    Marital status: Single     Spouse name: Not on file    Number of children: Not on file    Years of education: Not on file    Highest education level: Not on file   Occupational History    Occupation:      Employer: [employer]   Tobacco Use    Smoking status: Former Smoker     Quit date: 3/28/1989     Years since quittin.9    Smokeless tobacco: Current User     Types: Snuff   Substance and Sexual Activity    Alcohol use:  Yes     Alcohol/week: 0.0 standard drinks     Comment: RARE    Drug use: No    Sexual activity: Yes     Partners: Female   Other Topics Concern    Not on file   Social History Narrative    Not on file     Social Determinants of Health     Financial Resource Strain: Low Risk     Difficulty of Paying Living Expenses: Not hard at all   Food Insecurity: No Food Insecurity    Worried About [address] in the Last Year: Never true    [address] [facility] in the Last Year: Never true Transportation Needs:     Lack of Transportation (Medical): Not on file    Lack of Transportation (Non-Medical): Not on file   Physical Activity:     Days of Exercise per Week: Not on file    Minutes of Exercise per Session: Not on file   Stress:     Feeling of Stress : Not on file   Social Connections:     Frequency of Communication with Friends and Family: Not on file    Frequency of Social Gatherings with Friends and Family: Not on file    Attends [employer] Services: Not on file    Active Member of [address] [place] [address] [employer] or Organizations: Not on file    Attends Club or Organization Meetings: Not on file    Marital Status: Not on file   Intimate Partner Violence:     Fear of Current or Ex-Partner: Not on file    Emotionally Abused: Not on file    Physically Abused: Not on file    Sexually Abused: Not on file   Housing Stability:     Unable to Pay for Housing in the Last Year: Not on file    Number of [place] in the Last Year: Not on file    Unstable Housing in the Last Year: Not on file       Allergies   Allergen Reactions    Amoxicillin Hives    Penicillins Rash     Current Outpatient Medications on File Prior to Visit   Medication Sig Dispense Refill    celecoxib (CELEBREX) 200 MG capsule Take 1 capsule by mouth daily 30 capsule 2     No current facility-administered medications on file prior to visit. Review of Systems  A 14 point review of systems was completed by the patient on 2/23/2022 and is available in the media section of the scanned medical record and was reviewed on 2/23/2022. The review is negative with the exception of those things mentioned in the HPI and Past Medical History    Vital Signs: There were no vitals filed for this visit. General Exam:   Constitutional: Patient is adequately groomed with no evidence of malnutrition      Right shoulder Examination:    Inspection:  No skin lesions, no obvious deformity, no swelling.     Palpation:  Tenderness over bicipital groove, AC joint    Active Range of Motion: Forward Elevation 90, Abduction 90, External Rotation 20, Internal Rotation L2    Passive Range of Motion: Forward Elevation 110, Abduction 110, External Rotation 20, Internal Rotation L2    Strength:  External Rotation 5/5, Internal Rotation 5/5, Champagne Toast 5/5, Supraspinatus 5/5    Special Tests: Positive Diana's, positive Hawkin's, No Aryan deformity. Neurovascular: Palpable radial pulse, normal sensation in the median, ulnar, radial nerve distributions        Comparison left shoulder Examination:    Inspection:  No skin lesions, no deformity, no swelling. Palpation: No tenderness    Active Range of Motion: Forward Elevation 150, Abduction 150, External Rotation 20, Internal Rotation L1    Passive Range of Motion: Forward Elevation 150, Abduction 150, External Rotation 20, Internal Rotation L1    Strength:  External Rotation 5/5, Internal Rotation 5/5, Champagne Toast 5/5, Supraspinatus 5/5    Special Tests:  No Aryan Deformity    Neurovascular:  Palpable radial pulse, normal sensation in the median, ulnar, radial nerve distributions    Radiology:     Right shoulder MRI reviewed with the patient today:  .       1. Cuff tendinopathy and peritendinobursitis. Under surface scuffing and partial-thickness    tearing distal supraspinatus tendon mid segment measuring 2.0 x 1.0 cm involving less than 50    percent depth of the tendon. 2. Moderate acromioclavicular joint arthrosis and osseous spurring with periarticular erosions.    Subacromial-subdeltoid bursal inflammation. 3. Biceps tendinitis and peritendinitis without macro tear. Assessment :  Izabel Merrill is a pleasant 48 y.o. male with pain related to right shoulder biceps tendinitis, subacromial impingement and AC joint arthritis. Impression:  Encounter Diagnoses   Name Primary?     Biceps tendinitis of right upper extremity Yes    Arthritis of right acromioclavicular joint     Nontraumatic incomplete tear of right rotator cuff        Office Procedures:  Orders Placed This Encounter   Procedures   [facility]     Referral Priority:   Routine     Referral Type:   Eval and Treat     Referral Reason:   Specialty Services Required     Requested Specialty:   Physical Therapy     Number of Visits Requested:   1    [place] ARTHROCENTESIS ASPIR&/INJ MAJOR JT/BURSA W/O US       Treatment Plan:    We discussed the MRI results with George Nunez today. Most of his pain is around his AC joint, biceps tendon. He still has good strength in the cuff, we will proceed with nonoperative treatment in the form of subacromial and AC steroid injection, followed by formal physical therapy. We will see the patient back after 4 weeks for reassessment, possible surgical intervention if nonoperative treatment will fail. After discussing the risks and benefits of a corticosteroid injection, Jose Denson did state an understanding and gave verbal consent to proceed. After cleansing the injection site with Chlora-prep and using aseptic techniques,  2 CC of Depo Medrol 40mg/ml and 8 CC of 1% lidocaine were injected in the right AC, subacromial space. He  tolerated the procedure well with no immediate adverse sequelae after the injection. A bandage was placed over the injection site. Appropriate post injections instructions were given to the patient. George Nunez will follow up after 4 weeks, sooner if needed. They were in agreement with this plan and all questions were answered to the patient's satisfaction. They were encouraged to call with any questions. Martine Angelucci, MD  [facility] Clinical fellow    5:47 PM  2/23/2022    During this examination, Sherre Lundborg, MD   functioned as a scribe for Dr. Maci Spencer. The history taking and physical examination were performed by Dr. Sylvain Field. All counseling during the appointment was performed between the patient and Dr. Sylvain Field. _______________  I was physically present and personally supervised the Orthopaedic Sports Medicine Fellow in the evaluation and development of a treatment plan for this patient. I personally interviewed the patient and performed a physical examination. In addition, I discussed the patient's condition and treatment options with them. I have also reviewed and agree with the past medical, family and social history unless otherwise noted. All of the patient's questions were answered. Janee Desai MD, PhD  2/23/2022

## 2022-02-28 ENCOUNTER — HOSPITAL ENCOUNTER (OUTPATIENT)
Dept: PHYSICAL THERAPY | Age: 47
Setting detail: THERAPIES SERIES
Discharge: HOME | End: 2022-02-28
Payer: COMMERCIAL

## 2022-02-28 PROCEDURE — 97140 MANUAL THERAPY 1/> REGIONS: CPT

## 2022-02-28 PROCEDURE — 97161 PT EVAL LOW COMPLEX 20 MIN: CPT

## 2022-02-28 NOTE — FLOWSHEET NOTE
[employer]    Physical Therapy Treatment Note/ Progress Report:     Date:  2022    Patient Name:  Rudolph Meigs    :  1975  MRN: [id number]  Medical/Treatment Diagnosis Information:  · Diagnosis: R shoulder pain M25.511  · Treatment Diagnosis: R shoulder pain  Insurance/Certification information:  PT Insurance Information: [address] [place] [address]  Physician Information:  Referring Practitioner: Phan Hicks MD  Plan of care signed (Y/N):     Date of Patient follow up with Physician:      Progress Report: []  Yes  [x]  No     Functional Scale:   22 SPADI = 46% disability    Date Range for reporting period:  Beginnin22  Ending:      Progress report due (10 Rx/or 30 days whichever is less):      Recertification due (POC duration/ or 90 days whichever is less): 22     Visit # Insurance Allowable Auth Needed   1 Per Kulwinder Arenas [x]Yes    []No     Pain level:  7/10 flexion     SUBJECTIVE:  See eval    OBJECTIVE: See eval   Observation:    Test measurements:      RESTRICTIONS/PRECAUTIONS: *agg signs = flexion/abduction, resisted ER/Ir    Exercises/Interventions:   Therapeutic Ex Wt / Resistance Sets/sec Reps Notes   IR iso  20\" 5x 1:2 work rest ratio   No money iso blue 20\" 5x                                                                                           Therapeutic Activities                                                                      Manual Intervention       Shld /GH Mobs  8 min Gr III Decreased pain with flexion to 3/10   Post Cap mobs       Thoracic/Rib manipulation       CT MT/Mobs       PROM MT                     NMR re-education                                                                   Therapeutic Exercise and NMR EXR  [x] (08574) Provided verbal/tactile cueing for activities related to strengthening, flexibility, endurance, ROM  for improvements in scapular, scapulothoracic and UE control with self care, reaching, carrying, lifting, house/yardwork, driving/computer work.    [] (62065) Provided verbal/tactile cueing for activities related to improving balance, coordination, kinesthetic sense, posture, motor skill, proprioception  to assist with  scapular, scapulothoracic and UE control with self care, reaching, carrying, lifting, house/yardwork, driving/computer work. Therapeutic Activities:    [x] (25764 or 17076) Provided verbal/tactile cueing for activities related to improving balance, coordination, kinesthetic sense, posture, motor skill, proprioception and motor activation to allow for proper function of scapular, scapulothoracic and UE control with self care, carrying, lifting, driving/computer work.      Home Exercise Program:    [x] (26670) Reviewed/Progressed HEP activities related to strengthening, flexibility, endurance, ROM of scapular, scapulothoracic and UE control with self care, reaching, carrying, lifting, house/yardwork, driving/computer work  [] (96770) Reviewed/Progressed HEP activities related to improving balance, coordination, kinesthetic sense, posture, motor skill, proprioception of scapular, scapulothoracic and UE control with self care, reaching, carrying, lifting, house/yardwork, driving/computer work      Manual Treatments:  PROM / STM / Oscillations-Mobs:  G-I, II, III, IV (PA's, Inf., Post.)  [x] (07580) Provided manual therapy to mobilize soft tissue/joints of cervical/CT, scapular GHJ and UE for the purpose of modulating pain, promoting relaxation,  increasing ROM, reducing/eliminating soft tissue swelling/inflammation/restriction, improving soft tissue extensibility and allowing for proper ROM for normal function with self care, reaching, carrying, lifting, house/yardwork, driving/computer work    Modalities:      Charges:  Timed Code Treatment Minutes: eval +15   Total Treatment Minutes: 45       [x] EVAL (LOW) 67579 (typically 20 minutes face-to-face)  [] EVAL (MOD) 02880 (typically 30 minutes face-to-face)  [] EVAL (HIGH) 09637 (typically 45 minutes face-to-face)  [] RE-EVAL     [] RZ(24310) x     [] IONTO (53649)  [] NMR (15247) x     [] VASO (85565)  [x] Manual (69438) x  1   [] Other:  [] TA (67551)x     [] Mech Traction (96335)  [] ES(attended) (41018)     [] ES (un) (59571):       GOALS:  Patient stated goal: decrease pain  []? Progressing: []? Met: []? Not Met: []? Adjusted     Therapist goals for Patient:   Short Term Goals: To be achieved in: 2 weeks  1. Independent in HEP and progression per patient tolerance, in order to prevent re-injury. []? Progressing: []? Met: []? Not Met: []? Adjusted  2. Patient will have a decrease in pain to facilitate improvement in movement, function, and ADLs as indicated by Functional Deficits. []? Progressing: []? Met: []? Not Met: []? Adjusted     Long Term Goals: To be achieved in: 6-8 weeks  1. Disability index score of 20% or less for the DASH to assist with reaching prior level of function. []? Progressing: []? Met: []? Not Met: []? Adjusted  2. Patient will demonstrate increased AROM to [facility] to allow for proper joint functioning as indicated by patients Functional Deficits. []? Progressing: []? Met: []? Not Met: []? Adjusted  3. Patient will demonstrate an increase in Strength to 4/5 to allow for proper functional mobility as indicated by patients Functional Deficits. []? Progressing: []? Met: []? Not Met: []? Adjusted  4. Patient will return to reaching [place] functional activities without increased symptoms or restriction. []? Progressing: []? Met: []? Not Met: []? Adjusted  5. Pt will tolerate working x 4 hours without pain greater than 4/10    []? Progressing: []? Met: []? Not Met: []? Adjusted         Progression Towards Functional goals:  [] Patient is progressing as expected towards functional goals listed. [] Progression is slowed due to complexities listed. [] Progression has been slowed due to co-morbidities.   [x] Plan just implemented, too soon to assess goals progression  [] Other:     ASSESSMENT:  See eval      Treatment/Activity Tolerance:  [x] Patient tolerated treatment well [] Patient limited by fatique  [] Patient limited by pain  [] Patient limited by other medical complications  [] Other:     Overall Progression Towards Functional goals/ Treatment Progress Update:  [] Patient is progressing as expected towards functional goals listed. [] Progression is slowed due to complexities/Impairments listed. [] Progression has been slowed due to co-morbidities. [x] Plan just implemented, too soon to assess goals progression <30days   [] Goals require adjustment due to lack of progress  [] Patient is not progressing as expected and requires additional follow up with physician  [] Other    Prognosis for POC: [x] Good [] Fair  [] Poor    Patient requires continued skilled intervention: [x] Yes  [] No      PLAN: See eval  [] Continue per plan of care [] Alter current plan (see comments)  [x] Plan of care initiated [] Hold pending MD visit [] Discharge    Electronically signed by: Cristobal Mari PT     Note: If patient does not return for scheduled/recommended follow up visits, this note will serve as a discharge from care along with the most recent update on progress.

## 2022-02-28 NOTE — PLAN OF CARE
[facility]  [address] [address]  Phone [phone]   Fax [phone]                                                       Physical Therapy Certification    Dear Referring Practitioner: Nish Billy MD,    We had the pleasure of evaluating the following patient for physical therapy services at [address] [place] [address]. A summary of our findings can be found in the initial assessment below. This includes our plan of care. If you have any questions or concerns regarding these findings, please do not hesitate to contact me at the office phone number checked above. Thank you for the referral.       Physician Signature:_______________________________Date:__________________  By signing above (or electronic signature), therapists plan is approved by physician      Patient: Carmel Sandoval   : 1975   MRN: [id number]  Referring Physician: Referring Practitioner: Nish Billy MD      Evaluation Date: 2022      Medical Diagnosis Information:  Diagnosis: R shoulder pain M25.511   Treatment Diagnosis: R shoulder pain                                         Insurance information: PT Insurance Information: [employer]    Precautions/ Contra-indications:     C-SSRS Triggered by Intake questionnaire (Past 2 wk assessment):   [x] No, Questionnaire did not trigger screening.   [] Yes, Patient intake triggered further evaluation      [] C-SSRS Screening completed  [] PCP notified via Plan of Care  [] Emergency services notified     Latex Allergy:  [x]NO      []YES  Preferred Language for Healthcare:   [x]English       []Other:      SUBJECTIVE: Patient stated complaint: Pt c/o pain over the superior/anterior/ and lateral GH joint line. Described as sharp pain with movement. Recently had cortisone injection in two locations, lasted two days.      Relevant Medical History: R tennis elbow, recent injection helping  Functional Disability Index: SPADI = 46% disability    Pain Scale: 0/10 at rest, all others see #s under provocative factors  Easing factors: rest, meloxicam for one week no noticeable change. Provocative factors: reaching forward (1/10), reaching overhead (7/10 but lingers at 1-2/10 NPRS for 30 sec). Working one hour (7/10), lying on the right side. 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 Golfing. OBJECTIVE:     CERV ROM     Cervical Flexion WFL    Cervical Extension WFL    Cervical SB WFL    Cervical rotation WFL         ROM Left Right   Shoulder Flex WFL 75%   Shoulder Abd WFL 50%   Functional  [facility] W[facility]   Functional IR WFL WFL*                  Strength  Left Right   Shoulder Flex 5 3-*   Shoulder Scap 5 3-*   Shoulder ER 5 4*   Shoulder IR 5 4*               Reflexes/Sensation:    [x]Dermatomes/Myotomes intact    [x]Reflexes equal and normal bilaterally   []Other:    Joint mobility: hypo AP glides on R   []Normal    []Hypo   []Hyper    Palpation: TTP AC joint, RTC insertions. No tenderness biceps mm belly or RTC muscle bellies    Functional Mobility/Transfers: WNL    Posture: rounded shoulders    Bandages/Dressings/Incisions: n/a    Gait: (include devices/WB status): WNL    Orthopedic Special Tests:    Normal Abnormal N/A Comments   Painful Arc [] [x] []    Resisted ER [] [x] []    V[place]ales-Geoffrey [] [x] []    Champagne Toast test [] [] []    Drop arm test [] [] []    ER lag sign [] [] []    IR lift off [] [] []    Jessy Test [] [] []    [place] [] [] []    Speed's [] [] []    Apprehension [] [] []                                  [x] Patient history, allergies, meds reviewed. Medical chart reviewed. See intake form.      Review Of Systems (ROS):  [x]Performed Review of systems (Integumentary, CardioPulmonary, Neurological) by intake and observation. Intake form has been scanned into medical record. Patient has been instructed to contact their primary care physician regarding ROS issues if not already being addressed at this time. Co-morbidities/Complexities (which will affect course of rehabilitation):   [x]None           Arthritic conditions   []Rheumatoid arthritis (M05.9)  []Osteoarthritis (M19.91)   Cardiovascular conditions   []Hypertension (I10)  []Hyperlipidemia (E78.5)  []Angina pectoris (I20)  []Atherosclerosis (I70)   Musculoskeletal conditions   []Disc pathology   []Congenital spine pathologies   []Prior surgical intervention  []Osteoporosis (M81.8)  []Osteopenia (M85.8)   Endocrine conditions   []Hypothyroid (E03.9)  []Hyperthyroid Gastrointestinal conditions   []Constipation (J04.73)   Metabolic conditions   []Morbid obesity (E66.01)  []Diabetes type 1(E10.65) or 2 (E11.65)   []Neuropathy (G60.9)     Pulmonary conditions   []Asthma (J45)  []Coughing   []COPD (J44.9)   Psychological Disorders  []Anxiety (F41.9)  []Depression (F32.9)   []Other:   []Other:          Barriers to/and or personal factors that will affect rehab potential:              []Age  []Sex              []Motivation/Lack of Motivation                        []Co-Morbidities              []Cognitive Function, education/learning barriers              []Environmental, home barriers              [x]profession/work barriers  []past PT/medical experience  []other:  Justification: work is an aggravating factor     Falls Risk Assessment (30 days):   [x] Falls Risk assessed and no intervention required.   [] Falls Risk assessed and Patient requires intervention due to being higher risk   TUG score (>12s at risk):     [] Falls education provided, including        ASSESSMENT: s/s consistent with rotator cuff tendinopathy  Functional Impairments   [x]Noted spinal or UE joint hypomobility   []Noted spinal or UE joint hypermobility   [x]Decreased UE functional ROM   [x]Decreased UE functional strength   []Abnormal reflexes/sensation/myotomal/dermatomal deficits   [x]Decreased RC/scapular/core strength and neuromuscular control   []other:      Functional Activity Limitations (from functional questionnaire and intake)   []Reduced ability to tolerate prolonged functional positions   [x]Reduced ability or difficulty with changes of positions or transfers between positions   []Reduced ability to maintain good posture and demonstrate good body mechanics with sitting, bending, and lifting   [] Reduced ability or tolerance with driving and/or computer work   []Reduced ability to sleep   [x]Reduced ability to perform lifting, reaching, carrying tasks   [x]Reduced ability to tolerate impact through UE   [x]Reduced ability to reach behind back   []Reduced ability to  or hold objects   []Reduced ability to throw or toss an object   []other:    Participation Restrictions   [x]Reduced participation in self care activities   [x]Reduced participation in home management activities   [x]Reduced participation in work activities   []Reduced participation in social activities. []Reduced participation in sport/recreation activities. Classification:   []Signs/symptoms consistent with post-surgical status including decreased ROM, strength and function.   []Signs/symptoms consistent with joint sprain/strain   []Signs/symptoms consistent with shoulder impingement   [x]Signs/symptoms consistent with shoulder/elbow/wrist tendinopathy   []Signs/symptoms consistent with Rotator cuff tear   []Signs/symptoms consistent with labral tear   []Signs/symptoms consistent with postural dysfunction    []Signs/symptoms consistent with Glenohumeral IR Deficit - <45 degrees   []Signs/symptoms consistent with facet dysfunction of cervical/thoracic spine    [x]Signs/symptoms consistent with pathology which may benefit from Dry needling     []other: Prognosis/Rehab Potential:      []Excellent   [x]Good    []Fair   []Poor    Tolerance of evaluation/treatment:    []Excellent   [x]Good    []Fair   []Poor    Physical Therapy Evaluation Complexity Justification  [x] A history of present problem with:  [x] no personal factors and/or comorbidities that impact the plan of care;  []1-2 personal factors and/or comorbidities that impact the plan of care  []3 personal factors and/or comorbidities that impact the plan of care  [x] An examination of body systems using standardized tests and measures addressing any of the following: body structures and functions (impairments), activity limitations, and/or participation restrictions;:  [x] a total of 1-2 or more elements   [] a total of 3 or more elements   [] a total of 4 or more elements   [x] A clinical presentation with:  [x] stable and/or uncomplicated characteristics   [] evolving clinical presentation with changing characteristics  [] unstable and unpredictable characteristics;   [x] Clinical decision making of [x] low, [] moderate, [] high complexity using standardized patient assessment instrument and/or measurable assessment of functional outcome. [x] EVAL (LOW) 74008 (typically 30 minutes face-to-face)  [] EVAL (MOD) 75488 (typically 30 minutes face-to-face)  [] EVAL (HIGH) 78908 (typically 45 minutes face-to-face)  [] RE-EVAL     PLAN:  Frequency/Duration:  1-2 days per week for 6-8 Weeks:  INTERVENTIONS:  [x] Therapeutic exercise including: strength training, ROM, for Upper extremity and core   [x]  NMR activation and proprioception for UE, scap and Core   [x] Manual therapy as indicated for shoulder, scapula and spine to include: Dry Needling/IASTM, STM, PROM, Gr I-IV mobilizations, manipulation.    [x] Modalities as needed that may include: thermal agents, E-stim, Biofeedback, US, iontophoresis as indicated  [x] Patient education on joint protection, postural re-education, activity modification, progression of HEP. HEP instruction: Patient instructed in, and demonstrated proper form of, exercises. Copy of exercises scanned into media file  (see scanned forms)    GOALS:  Patient stated goal: decrease pain  [] Progressing: [] Met: [] Not Met: [] Adjusted    Therapist goals for Patient:   Short Term Goals: To be achieved in: 2 weeks  1. Independent in HEP and progression per patient tolerance, in order to prevent re-injury. [] Progressing: [] Met: [] Not Met: [] Adjusted  2. Patient will have a decrease in pain to facilitate improvement in movement, function, and ADLs as indicated by Functional Deficits. [] Progressing: [] Met: [] Not Met: [] Adjusted    Long Term Goals: To be achieved in: 6-8 weeks  1. Disability index score of 20% or less for the DASH to assist with reaching prior level of function. [] Progressing: [] Met: [] Not Met: [] Adjusted  2. Patient will demonstrate increased AROM to [facility] to allow for proper joint functioning as indicated by patients Functional Deficits. [] Progressing: [] Met: [] Not Met: [] Adjusted  3. Patient will demonstrate an increase in Strength to 4/5 to allow for proper functional mobility as indicated by patients Functional Deficits. [] Progressing: [] Met: [] Not Met: [] Adjusted  4. Patient will return to reaching [place] functional activities without increased symptoms or restriction. [] Progressing: [] Met: [] Not Met: [] Adjusted  5. Pt will tolerate working x 4 hours without pain greater than 4/10    [] Progressing: [] Met: [] Not Met: [] Adjusted     Electronically signed by:   Barrett De Jesus PT

## 2022-03-07 ENCOUNTER — HOSPITAL ENCOUNTER (OUTPATIENT)
Dept: PHYSICAL THERAPY | Age: 47
Setting detail: THERAPIES SERIES
Discharge: HOME | End: 2022-03-07
Payer: COMMERCIAL

## 2022-03-07 PROCEDURE — 97140 MANUAL THERAPY 1/> REGIONS: CPT

## 2022-03-07 PROCEDURE — 97110 THERAPEUTIC EXERCISES: CPT

## 2022-03-07 NOTE — FLOWSHEET NOTE
[address], [facility]    Physical Therapy Treatment Note/ Progress Report:     Date:  3/7/2022    Patient Name:  Consuelo Huertas    :  1975  MRN: [id number]  Medical/Treatment Diagnosis Information:  · Diagnosis: R shoulder pain M25.511  · Treatment Diagnosis: R shoulder pain  Insurance/Certification information:  PT Insurance Information: [employer] [place]  Physician Information:  Referring Practitioner: Frank Perez MD  Plan of care signed (Y/N):     Date of Patient follow up with Physician:      Progress Report: []  Yes  [x]  No     Functional Scale:   22 SPADI = 46% disability    Date Range for reporting period:  Beginnin22  Ending:      Progress report due (10 Rx/or 30 days whichever is less): 04     Recertification due (POC duration/ or 90 days whichever is less): 22     Visit # Insurance Allowable Auth Needed   2 Per Jessica Vazquez [x]Yes    []No     Pain level:  5/10 flexion/abduction,      SUBJECTIVE:  Patient report ongoing right shoulder pain with work work related movements, forward elevation and abduction. OBJECTIVE: See eval   Observation: scapular substitution with elevation past 90 deg. Mod posterior cuff restriction present. Pain present during thera-ex. Verbal and tactile cueing provided to decrease pain and improving mechanics.     Test measurements:      RESTRICTIONS/PRECAUTIONS: *agg signs = flexion/abduction, resisted ER/Ir    Exercises/Interventions:   Therapeutic Ex  30 min Wt / Resistance Sets/sec Reps Notes    20\" 5x 1:2 work rest ratio   blue 20\" 5x    SL ER 3# 3 8    SL flexion 2# 2 8    Prone row 10# 3 12    Prone ext 5# 3 10                                                               Therapeutic Activities                                                                      Manual Intervention  15min       Shld /GH Mobs  10 min Gr III Decreased pain with flexion to 2/10   Myofascial decompression with MWM  5 min Thoracic/Rib manipulation       CT MT/Mobs       PROM MT                     NMR re-education                                                                   Therapeutic Exercise and NMR EXR  [x] (05593) Provided verbal/tactile cueing for activities related to strengthening, flexibility, endurance, ROM  for improvements in scapular, scapulothoracic and UE control with self care, reaching, carrying, lifting, house/yardwork, driving/computer work.    [] (83676) Provided verbal/tactile cueing for activities related to improving balance, coordination, kinesthetic sense, posture, motor skill, proprioception  to assist with  scapular, scapulothoracic and UE control with self care, reaching, carrying, lifting, house/yardwork, driving/computer work. Therapeutic Activities:    [x] (22801 or 78226) Provided verbal/tactile cueing for activities related to improving balance, coordination, kinesthetic sense, posture, motor skill, proprioception and motor activation to allow for proper function of scapular, scapulothoracic and UE control with self care, carrying, lifting, driving/computer work.      Home Exercise Program:    [x] (23828) Reviewed/Progressed HEP activities related to strengthening, flexibility, endurance, ROM of scapular, scapulothoracic and UE control with self care, reaching, carrying, lifting, house/yardwork, driving/computer work  [] (61020) Reviewed/Progressed HEP activities related to improving balance, coordination, kinesthetic sense, posture, motor skill, proprioception of scapular, scapulothoracic and UE control with self care, reaching, carrying, lifting, house/yardwork, driving/computer work      Manual Treatments:  PROM / STM / Oscillations-Mobs:  G-I, II, III, IV (PA's, Inf., Post.)  [x] (53542) Provided manual therapy to mobilize soft tissue/joints of cervical/CT, scapular GHJ and UE for the purpose of modulating pain, promoting relaxation,  increasing ROM, reducing/eliminating soft tissue swelling/inflammation/restriction, improving soft tissue extensibility and allowing for proper ROM for normal function with self care, reaching, carrying, lifting, house/yardwork, driving/computer work    Modalities:      Charges:  Timed Code Treatment Minutes: 45   Total Treatment Minutes: 45       [] EVAL (LOW) 78198 (typically 20 minutes face-to-face)  [] EVAL (MOD) 92743 (typically 30 minutes face-to-face)  [] EVAL (HIGH) 72188 (typically 45 minutes face-to-face)  [] RE-EVAL     [x] EH(38917) x   2  [] IONTO (31113)  [] NMR (20565) x     [] VASO (71778)  [x] Manual (85350) x  1   [] Other:  [] TA (16420)x     [] Mech Traction (72553)  [] ES(attended) (94068)     [] ES (un) (13119):       GOALS:  Patient stated goal: decrease pain  []? Progressing: []? Met: []? Not Met: []? Adjusted     Therapist goals for Patient:   Short Term Goals: To be achieved in: 2 weeks  1. Independent in HEP and progression per patient tolerance, in order to prevent re-injury. []? Progressing: []? Met: []? Not Met: []? Adjusted  2. Patient will have a decrease in pain to facilitate improvement in movement, function, and ADLs as indicated by Functional Deficits. []? Progressing: []? Met: []? Not Met: []? Adjusted     Long Term Goals: To be achieved in: 6-8 weeks  1. Disability index score of 20% or less for the DASH to assist with reaching prior level of function. []? Progressing: []? Met: []? Not Met: []? Adjusted  2. Patient will demonstrate increased AROM to [facility] to allow for proper joint functioning as indicated by patients Functional Deficits. []? Progressing: []? Met: []? Not Met: []? Adjusted  3. Patient will demonstrate an increase in Strength to 4/5 to allow for proper functional mobility as indicated by patients Functional Deficits. []? Progressing: []? Met: []? Not Met: []? Adjusted  4. Patient will return to reaching [place] functional activities without increased symptoms or restriction. []? Progressing: []? Met: []?  Not Met: []? Adjusted  5. Pt will tolerate working x 4 hours without pain greater than 4/10    []? Progressing: []? Met: []? Not Met: []? Adjusted         Progression Towards Functional goals:  [] Patient is progressing as expected towards functional goals listed. [] Progression is slowed due to complexities listed. [] Progression has been slowed due to co-morbidities. [x] Plan just implemented, too soon to assess goals progression  [] Other:     ASSESSMENT:  Good within session change noted with shoulder flexion, but still very painful at 90 deg abduction. Tolerated loading work well but requires tactile cues for scapular mechanics. Treatment/Activity Tolerance:  [x] Patient tolerated treatment well [] Patient limited by fatique  [] Patient limited by pain  [] Patient limited by other medical complications  [] Other:     Overall Progression Towards Functional goals/ Treatment Progress Update:  [] Patient is progressing as expected towards functional goals listed. [] Progression is slowed due to complexities/Impairments listed. [] Progression has been slowed due to co-morbidities. [x] Plan just implemented, too soon to assess goals progression <30days   [] Goals require adjustment due to lack of progress  [] Patient is not progressing as expected and requires additional follow up with physician  [] Other    Prognosis for POC: [x] Good [] Fair  [] Poor    Patient requires continued skilled intervention: [x] Yes  [] No      PLAN:   [x] Continue per plan of care [] Alter current plan (see comments)  [] Plan of care initiated [] Hold pending MD visit [] Discharge    Electronically signed by: Adama Urban PT     Note: If patient does not return for scheduled/recommended follow up visits, this note will serve as a discharge from care along with the most recent update on progress.

## 2022-03-16 ENCOUNTER — HOSPITAL ENCOUNTER (OUTPATIENT)
Dept: PHYSICAL THERAPY | Age: 47
Setting detail: THERAPIES SERIES
Discharge: HOME | End: 2022-03-16
Payer: COMMERCIAL

## 2022-03-16 PROCEDURE — 97110 THERAPEUTIC EXERCISES: CPT

## 2022-03-16 PROCEDURE — 97016 VASOPNEUMATIC DEVICE THERAPY: CPT

## 2022-03-16 PROCEDURE — 97140 MANUAL THERAPY 1/> REGIONS: CPT

## 2022-03-16 NOTE — FLOWSHEET NOTE
[employer]    Physical Therapy Treatment Note/ Progress Report:     Date:  3/16/2022    Patient Name:  David Chandler    :  1975  MRN: [id number]  Medical/Treatment Diagnosis Information:  · Diagnosis: R shoulder pain M25.511  · Treatment Diagnosis: R shoulder pain  Insurance/Certification information:  PT Insurance Information: [facility]  Physician Information:  Referring Practitioner: Sania Butler MD  Plan of care signed (Y/N):     Date of Patient follow up with Physician:      Progress Report: []  Yes  [x]  No     Functional Scale:   22 SPADI = 46% disability    Date Range for reporting period:  Beginnin22  Ending:      Progress report due (10 Rx/or 30 days whichever is less):      Recertification due (POC duration/ or 90 days whichever is less): 22     Visit # Insurance Allowable Auth Needed   3 Per Ed Begin [x]Yes    []No     Pain level:  6-7/10 with movement      SUBJECTIVE:  Patient reports on going right shoulder pain which increases with activity. States acute improvement in symptoms following last therapy session. OBJECTIVE: See eval   Observation: [address] mobility to PA: 2+, inferior: 2+/3+   Empty end feels    TTP throughout larger muscle groups of the shoulder. Difficulty with all there-ex due to pain   Poor scapular and GH control during active shoulder motion.        Test measurements:    DATE 3/16/22         PROM Flex: 158  Abd: 150  ER: 90  IR: 75             RESTRICTIONS/PRECAUTIONS: *agg signs = flexion/abduction, resisted ER/Ir    Exercises/Interventions:   Therapeutic Ex  20 min Wt / Resistance Sets/sec Reps Notes               SL ER 0# 2 10    SL abd 0# 2 10    Supine DB press up 3# 2 x10    Supine DB Oh flexion 2# 2 x10                                                 Therapeutic Activities                                                                      Manual Intervention  24 min       Shld /GH Mobs  8 min Gr III- IV Inferior and PA, while in neutral, ER, IR, flex and abd   Myofascial release, STM  8 min  Pec, anterior deltoid, long head of biceps, teres, subscap   Thoracic/Rib manipulation       CT MT/Mobs       Shoulder PROM   8 min  All planes                  NMR re-education                                                                   Therapeutic Exercise and NMR EXR  [x] (48569) Provided verbal/tactile cueing for activities related to strengthening, flexibility, endurance, ROM  for improvements in scapular, scapulothoracic and UE control with self care, reaching, carrying, lifting, house/yardwork, driving/computer work.    [] (06220) Provided verbal/tactile cueing for activities related to improving balance, coordination, kinesthetic sense, posture, motor skill, proprioception  to assist with  scapular, scapulothoracic and UE control with self care, reaching, carrying, lifting, house/yardwork, driving/computer work. Therapeutic Activities:    [x] (34419 or 87405) Provided verbal/tactile cueing for activities related to improving balance, coordination, kinesthetic sense, posture, motor skill, proprioception and motor activation to allow for proper function of scapular, scapulothoracic and UE control with self care, carrying, lifting, driving/computer work.      Home Exercise Program:    [x] (54944) Reviewed/Progressed HEP activities related to strengthening, flexibility, endurance, ROM of scapular, scapulothoracic and UE control with self care, reaching, carrying, lifting, house/yardwork, driving/computer work  [] (97309) Reviewed/Progressed HEP activities related to improving balance, coordination, kinesthetic sense, posture, motor skill, proprioception of scapular, scapulothoracic and UE control with self care, reaching, carrying, lifting, house/yardwork, driving/computer work      Manual Treatments:  PROM / STM / Oscillations-Mobs:  G-I, II, III, IV (PA's, Inf., Post.)  [x] (98630) Provided manual therapy to mobilize soft tissue/joints of cervical/CT, scapular GHJ and UE for the purpose of modulating pain, promoting relaxation,  increasing ROM, reducing/eliminating soft tissue swelling/inflammation/restriction, improving soft tissue extensibility and allowing for proper ROM for normal function with self care, reaching, carrying, lifting, house/yardwork, driving/computer work    Modalities: vasopneumatic compress for pain and swelling: 15 min    Charges:  Timed Code Treatment Minutes: 44   Total Treatment Minutes: 59       [] EVAL (LOW) 70297 (typically 20 minutes face-to-face)  [] EVAL (MOD) 92240 (typically 30 minutes face-to-face)  [] EVAL (HIGH) 59224 (typically 45 minutes face-to-face)  [] RE-EVAL     [x] LG(13569) x   2  [] IONTO (88195)  [] NMR (79003) x     [] VASO (91135) x1  [x] Manual (88340) x  1   [] Other:  [] TA (94489)x     [] Mech Traction (71615)  [] ES(attended) (69057)     [] ES (un) (36487):     GOALS:  Patient stated goal: decrease pain  []? Progressing: []? Met: []? Not Met: []? Adjusted     Therapist goals for Patient:   Short Term Goals: To be achieved in: 2 weeks  1. Independent in HEP and progression per patient tolerance, in order to prevent re-injury. []? Progressing: []? Met: []? Not Met: []? Adjusted  2. Patient will have a decrease in pain to facilitate improvement in movement, function, and ADLs as indicated by Functional Deficits. []? Progressing: []? Met: []? Not Met: []? Adjusted     Long Term Goals: To be achieved in: 6-8 weeks  1. Disability index score of 20% or less for the DASH to assist with reaching prior level of function. []? Progressing: []? Met: []? Not Met: []? Adjusted  2. Patient will demonstrate increased AROM to [facility] to allow for proper joint functioning as indicated by patients Functional Deficits. []? Progressing: []? Met: []? Not Met: []? Adjusted  3.  Patient will demonstrate an increase in Strength to 4/5 to allow for proper functional mobility as indicated by patients Functional Deficits. []? Progressing: []? Met: []? Not Met: []? Adjusted  4. Patient will return to reaching [place] functional activities without increased symptoms or restriction. []? Progressing: []? Met: []? Not Met: []? Adjusted  5. Pt will tolerate working x 4 hours without pain greater than 4/10    []? Progressing: []? Met: []? Not Met: []? Adjusted         Progression Towards Functional goals:  [] Patient is progressing as expected towards functional goals listed. [] Progression is slowed due to complexities listed. [] Progression has been slowed due to co-morbidities. [x] Plan just implemented, too soon to assess goals progression  [] Other:     ASSESSMENT:  Patient with great difficult controlling humeral head and scapula during active ROM. GH hypomobility present. Manual therapy provided followed by low load RTC strengthening. Patient with intermittent periods of pain during session. Ended session with vaso due to pain and mild swelling. Treatment/Activity Tolerance:  [x] Patient tolerated treatment well [] Patient limited by fatique  [] Patient limited by pain  [] Patient limited by other medical complications  [] Other:     Overall Progression Towards Functional goals/ Treatment Progress Update:  [] Patient is progressing as expected towards functional goals listed. [] Progression is slowed due to complexities/Impairments listed. [] Progression has been slowed due to co-morbidities. [x] Plan just implemented, too soon to assess goals progression <30days   [] Goals require adjustment due to lack of progress  [] Patient is not progressing as expected and requires additional follow up with physician  [] Other    Prognosis for POC: [x] Good [] Fair  [] Poor    Patient requires continued skilled intervention: [x] Yes  [] No      PLAN: Continue R shoulder PROM and [address] mobs. Progress RTC strengthening per patient tolerance. Continue low load.    [x] Continue per plan of care [] Alter current plan (see comments)  [] Plan of care initiated [] Hold pending MD visit [] Discharge    Electronically signed by: Mesha Burton PT     Note: If patient does not return for scheduled/recommended follow up visits, this note will serve as a discharge from care along with the most recent update on progress.

## 2022-03-23 ENCOUNTER — HOSPITAL ENCOUNTER (OUTPATIENT)
Dept: PHYSICAL THERAPY | Age: 47
Setting detail: THERAPIES SERIES
Discharge: HOME | End: 2022-03-23
Payer: COMMERCIAL

## 2022-03-23 PROCEDURE — 97110 THERAPEUTIC EXERCISES: CPT

## 2022-03-23 PROCEDURE — 97140 MANUAL THERAPY 1/> REGIONS: CPT

## 2022-03-23 NOTE — FLOWSHEET NOTE
[employer]    Physical Therapy Treatment Note/ Progress Report:     Date:  3/23/2022    Patient Name:  Rudolph Meigs  \"BRADY\"  :  1975  MRN: [id number]  Medical/Treatment Diagnosis Information:  · Diagnosis: R shoulder pain M25.511  · Treatment Diagnosis: R shoulder pain  Insurance/Certification information:  PT Insurance Information: [employer]  Physician Information:  Referring Practitioner: Phan Hicks MD  Plan of care signed (Y/N):      Date of Patient follow up with Physician:      Progress Report: []  Yes  [x]  No     Functional Scale:   22 SPADI = 46% disability    Date Range for reporting period:  Beginnin22  Ending:      Progress report due (10 Rx/or 30 days whichever is less): 28     Recertification due (POC duration/ or 90 days whichever is less): 22     Visit # Insurance Allowable Auth Needed   3 Per [facility] [x]Yes    []No     Pain level:  6-7/10 with movement      SUBJECTIVE:  Patient continues to complain of pain during forward flexion or reaching outside his ZO[place]. Symptoms are especially painful when forward flexion is paired with rotation    OBJECTIVE: See eval   Observation: On going [address] mobility deficits. Decreased PROM. Instability during thera-ex.  Discussed HEP progression and provided handout     Test measurements:    DATE 3/16/22 3/23/22        PROM Flex: 158  Abd: 150  ER: 90  IR: 75 Flex: 150  Abd: 145  ER: 85  IR: 70            RESTRICTIONS/PRECAUTIONS: *agg signs = flexion/abduction, resisted ER/Ir    Home exercises Program:  3/23/2022: tband row, supine DB press, supine DB OH raise, SL ER, standing doorway pec stretch (Needs to be provided HEP)    Exercises/Interventions:   Therapeutic Ex  x8 min Wt / Resistance Sets/sec Reps Notes   Tband row Black 3 x15    Standing doorway stretch  1 min 3                                   Therapeutic Activities Manual Intervention  x30 min       Shld /GH Mobs  8 min Gr 3 Inferior and PA, while in neutral, ER, IR, flex and abd   Myofascial release, STM  10 min  STM to pec, anterior deltoid, long head of biceps, MFR to teres, subscap   Thoracic/Rib manipulation  5 Gr 3-5 Prone and supine mid and upper thoracic manip, prone thoracic PA's   CT MT/Mobs  2 min Gr 3-4 Bilateral CT junction mobs   Shoulder PROM   5 min  All planes                  NMR re-education                                                                   Therapeutic Exercise and NMR EXR  [x] (94753) Provided verbal/tactile cueing for activities related to strengthening, flexibility, endurance, ROM  for improvements in scapular, scapulothoracic and UE control with self care, reaching, carrying, lifting, house/yardwork, driving/computer work.    [] (28961) Provided verbal/tactile cueing for activities related to improving balance, coordination, kinesthetic sense, posture, motor skill, proprioception  to assist with  scapular, scapulothoracic and UE control with self care, reaching, carrying, lifting, house/yardwork, driving/computer work. Therapeutic Activities:    [x] (69207 or 71121) Provided verbal/tactile cueing for activities related to improving balance, coordination, kinesthetic sense, posture, motor skill, proprioception and motor activation to allow for proper function of scapular, scapulothoracic and UE control with self care, carrying, lifting, driving/computer work.      Home Exercise Program:    [x] (64080) Reviewed/Progressed HEP activities related to strengthening, flexibility, endurance, ROM of scapular, scapulothoracic and UE control with self care, reaching, carrying, lifting, house/yardwork, driving/computer work  [] (22938) Reviewed/Progressed HEP activities related to improving balance, coordination, kinesthetic sense, posture, motor skill, proprioception of scapular, scapulothoracic and UE control with self care, reaching, carrying, lifting, house/yardwork, driving/computer work      Manual Treatments:  PROM / STM / Oscillations-Mobs:  G-I, II, III, IV (PA's, Inf., Post.)  [x] (12667) Provided manual therapy to mobilize soft tissue/joints of cervical/CT, scapular GHJ and UE for the purpose of modulating pain, promoting relaxation,  increasing ROM, reducing/eliminating soft tissue swelling/inflammation/restriction, improving soft tissue extensibility and allowing for proper ROM for normal function with self care, reaching, carrying, lifting, house/yardwork, driving/computer work    Modalities: Vasopneumatic compress for pain: 15 min    Charges:  Timed Code Treatment Minutes: 38   Total Treatment Minutes: 55       [] EVAL (LOW) 79802 (typically 20 minutes face-to-face)  [] EVAL (MOD) 92660 (typically 30 minutes face-to-face)  [] EVAL (HIGH) 41192 (typically 45 minutes face-to-face)  [] RE-EVAL     [x] IR(47532) x   1  [] IONTO (14918)  [] NMR (72032) x     [] VASO (44375) (not billable)  [x] Manual (48588) x  2   [] Other:  [] TA (80036)x     [] Mech Traction (75976)  [] ES(attended) (60014)     [] ES (un) (12199):     GOALS:  Patient stated goal: decrease pain  []? Progressing: []? Met: []? Not Met: []? Adjusted     Therapist goals for Patient:   Short Term Goals: To be achieved in: 2 weeks  1. Independent in HEP and progression per patient tolerance, in order to prevent re-injury. [x]? Progressing: []? Met: []? Not Met: []? Adjusted  2. Patient will have a decrease in pain to facilitate improvement in movement, function, and ADLs as indicated by Functional Deficits. [x]? Progressing: []? Met: []? Not Met: []? Adjusted     Long Term Goals: To be achieved in: 6-8 weeks  1. Disability index score of 20% or less for the DASH to assist with reaching prior level of function. [x]? Progressing: []? Met: []? Not Met: []? Adjusted  2.  Patient will demonstrate increased AROM to [facility] to allow for proper joint functioning as indicated by patients Functional Deficits. [x]? Progressing: []? Met: []? Not Met: []? Adjusted  3. Patient will demonstrate an increase in Strength to 4/5 to allow for proper functional mobility as indicated by patients Functional Deficits. [x]? Progressing: []? Met: []? Not Met: []? Adjusted  4. Patient will return to reaching [place] functional activities without increased symptoms or restriction. [x]? Progressing: []? Met: []? Not Met: []? Adjusted  5. Pt will tolerate working x 4 hours without pain greater than 4/10    [x]? Progressing: []? Met: []? Not Met: []? Adjusted         Progression Towards Functional goals:  [] Patient is progressing as expected towards functional goals listed. [] Progression is slowed due to complexities listed. [] Progression has been slowed due to co-morbidities. [] Plan just implemented, too soon to assess goals progression  [x] Other: Patient progressing slowly due to pain    ASSESSMENT:  Patient with reduction in PROM this date. Ongoing pain due to dynamic weakness, thoracic hypomobility, GH joint hypomobility and myofascial mobility restrictions. Patient would benefit from dry needling to decrease hypertonicity. Treatment/Activity Tolerance:  [x] Patient tolerated treatment well [] Patient limited by fatique  [] Patient limited by pain  [] Patient limited by other medical complications  [] Other:     Overall Progression Towards Functional goals/ Treatment Progress Update:  [] Patient is progressing as expected towards functional goals listed. [x] Progression is slowed due to complexities/Impairments listed. [] Progression has been slowed due to co-morbidities.   [] Plan just implemented, too soon to assess goals progression <30days   [] Goals require adjustment due to lack of progress  [] Patient is not progressing as expected and requires additional follow up with physician  [] Other    Prognosis for POC: [x] Good [] Fair  [] Poor    Patient requires continued skilled intervention: [x] Yes  [] No      PLAN: Dry needling / continue to relax muscular tissue. Frequency/Duration:  1-2 days per week for 6-8 Weeks:  [x] Continue per plan of care [] Alter current plan (see comments)  [] Plan of care initiated [] Hold pending MD visit [] Discharge    Electronically signed by: Semaj Salazar PT     Note: If patient does not return for scheduled/recommended follow up visits, this note will serve as a discharge from care along with the most recent update on progress.

## 2022-03-24 DIAGNOSIS — M75.21 BICEPS TENDINITIS OF RIGHT UPPER EXTREMITY: Primary | ICD-10-CM

## 2022-03-24 DIAGNOSIS — M19.011 ARTHRITIS OF RIGHT ACROMIOCLAVICULAR JOINT: ICD-10-CM

## 2022-03-28 ENCOUNTER — HOSPITAL ENCOUNTER (OUTPATIENT)
Dept: PHYSICAL THERAPY | Age: 47
Setting detail: THERAPIES SERIES
Discharge: HOME | End: 2022-03-28
Payer: COMMERCIAL

## 2022-03-28 PROCEDURE — 97032 APPL MODALITY 1+ESTIM EA 15: CPT

## 2022-03-28 PROCEDURE — 97140 MANUAL THERAPY 1/> REGIONS: CPT

## 2022-03-28 PROCEDURE — 20560 NDL INSJ W/O NJX 1 OR 2 MUSC: CPT

## 2022-03-28 NOTE — FLOWSHEET NOTE
[employer]    Physical Therapy Treatment Note/ Progress Report:     Date:  3/28/2022    Patient Name:  Irineo Mejía  \"BRADY\"  :  1975  MRN: [id number]  Medical/Treatment Diagnosis Information:  · Diagnosis: R shoulder pain M25.511  · Treatment Diagnosis: R shoulder pain  Insurance/Certification information:  PT Insurance Information: Kenna Salazar  Physician Information:  Referring Practitioner: Ken Reyes MD  Plan of care signed (Y/N):      Date of Patient follow up with Physician:      Progress Report: []  Yes  [x]  No     Functional Scale:   22 SPADI = 46% disability    Date Range for reporting period:  Beginnin22  Ending:      Progress report due (10 Rx/or 30 days whichever is less):      Recertification due (POC duration/ or 90 days whichever is less): 22     Visit # Insurance Allowable Auth Needed   4 Per [facility] [x]Yes    []No     Pain level:  7/10 with movement      SUBJECTIVE:  Patient voices increased soreness and pain entering therapy today. OBJECTIVE:    Observation: Patient continues to have global TTP surrounding right shoulder. exquisite TTP along the long head of biceps, anterior and mid deltoid, infraspinatus muscle belly, teres minor and major.  Discussed dry needling today.       Test measurements:    DATE 3/16/22 3/23/22 3/28/22       PROM Flex: 158  Abd: 150  ER: 90  IR: 75 Flex: 150  Abd: 145  ER: 85  IR: 70 Flex: 154  Abd: 145  ER: 80  IR: 70           RESTRICTIONS/PRECAUTIONS: *agg signs = flexion/abduction, resisted ER/Ir    Home exercises Program:  3/23/2022: tband row, supine DB press, supine DB OH raise, SL ER, standing doorway pec stretch (Handout to be provided [place])    Exercises/Interventions:   Therapeutic Ex   Wt / Resistance Sets/sec Reps Notes                                  Therapeutic Activities                                                                      Manual Intervention  x33 min       Shld /GH Mobs  5 min Gr 2-3 Inferior and PA, while in neutral, mid range abd and end range flex   Myofascial release, STM  10 min  STM to pec, deltoid, long head of biceps and biceps pre and post dry needling   Thoracic/Rib manipulation       CT MT/Mobs       Shoulder PROM   5 min  All planes    Dry needling  5 min  Refer to chart below   Attended Stim  8 min  Adjusted volts from 1-4 throughout entire stim treatment   NMR re-education                                                                   Therapeutic Exercise and NMR EXR  [x] (45886) Provided verbal/tactile cueing for activities related to strengthening, flexibility, endurance, ROM  for improvements in scapular, scapulothoracic and UE control with self care, reaching, carrying, lifting, house/yardwork, driving/computer work.    [] (20088) Provided verbal/tactile cueing for activities related to improving balance, coordination, kinesthetic sense, posture, motor skill, proprioception  to assist with  scapular, scapulothoracic and UE control with self care, reaching, carrying, lifting, house/yardwork, driving/computer work. Therapeutic Activities:    [x] (45930 or 81890) Provided verbal/tactile cueing for activities related to improving balance, coordination, kinesthetic sense, posture, motor skill, proprioception and motor activation to allow for proper function of scapular, scapulothoracic and UE control with self care, carrying, lifting, driving/computer work.      Home Exercise Program:    [x] (16023) Reviewed/Progressed HEP activities related to strengthening, flexibility, endurance, ROM of scapular, scapulothoracic and UE control with self care, reaching, carrying, lifting, house/yardwork, driving/computer work  [] (03398) Reviewed/Progressed HEP activities related to improving balance, coordination, kinesthetic sense, posture, motor skill, proprioception of scapular, scapulothoracic and UE control with self care, reaching, carrying, lifting, house/yardwork, driving/computer work      Manual Treatments:  PROM / STM / Oscillations-Mobs:  G-I, II, III, IV (PA's, Inf., Post.)  [x] (87386) Provided manual therapy to mobilize soft tissue/joints of cervical/CT, scapular GHJ and UE for the purpose of modulating pain, promoting relaxation,  increasing ROM, reducing/eliminating soft tissue swelling/inflammation/restriction, improving soft tissue extensibility and allowing for proper ROM for normal function with self care, reaching, carrying, lifting, house/yardwork, driving/computer work    Spoke with   regarding the use of Dry Needling     Dry needling manual therapy: consisted on the placement of 5 needles in the following muscles:  anterior and mid deltoid, long head of biceps and subacromial space. A 50 mm needle was inserted, piston, rotated, and coned to produce intramuscular mobilization. These techniques were used to restore functional range of motion, reduce muscle spasm and induce healing in the corresponding musculature. (97700)    Clean Technique was utilized today while applying Dry needling treatment. The treatment sites where cleaned with 70% solution of  isopropyl alcohol . The PT washed their hands and utilized treatment gloves along with hand  prior to inserting the needles. All needles where removed and discarded in the appropriate sharps container. MD has given verbal and/or written approval for this treatment. Attended low frequency (1-20Hz) electrical stimulation was utilized in conjunction with Dry Needling:  the Estim was manipulated between all above needles for a period of 8 min. at 1-4 volts (adjustment of stim placement and volts performed throughout 8 min treatment). The low frequency electrical stimulation was used to help reduce muscle spasm and help to interrupt /[place] the pain cycle.  (92406)     Modalities: See dry needling statement above    Charges:  Timed Code Treatment Minutes: 20 min   Total Treatment Minutes: 40 min       [] EVAL (LOW) 20470 (typically 20 minutes face-to-face)  [] EVAL (MOD) 87413 (typically 30 minutes face-to-face)  [] EVAL (HIGH) 65104 (typically 45 minutes face-to-face)  [] RE-EVAL     [] YF(02049) x  1  [] IONTO (32183)  [] NMR (48542) x     [] VASO (45237) (not billable)  [x] Manual (10607) x 1   [x] Other: (72412) x 1  [] TA (56927)x     [] Mech Traction (22398)  [x] ES(attended) (06756)     [] ES (un) (79655):     GOALS:  Patient stated goal: decrease pain  []? Progressing: []? Met: []? Not Met: []? Adjusted     Therapist goals for Patient:   Short Term Goals: To be achieved in: 2 weeks  1. Independent in HEP and progression per patient tolerance, in order to prevent re-injury. [x]? Progressing: []? Met: []? Not Met: []? Adjusted  2. Patient will have a decrease in pain to facilitate improvement in movement, function, and ADLs as indicated by Functional Deficits. [x]? Progressing: []? Met: []? Not Met: []? Adjusted     Long Term Goals: To be achieved in: 6-8 weeks  1. Disability index score of 20% or less for the DASH to assist with reaching prior level of function. [x]? Progressing: []? Met: []? Not Met: []? Adjusted  2. Patient will demonstrate increased AROM to [facility] to allow for proper joint functioning as indicated by patients Functional Deficits. [x]? Progressing: []? Met: []? Not Met: []? Adjusted  3. Patient will demonstrate an increase in Strength to 4/5 to allow for proper functional mobility as indicated by patients Functional Deficits. [x]? Progressing: []? Met: []? Not Met: []? Adjusted  4. Patient will return to [facility] functional activities without increased symptoms or restriction. [x]? Progressing: []? Met: []? Not Met: []? Adjusted  5. Pt will tolerate working x 4 hours without pain greater than 4/10    [x]? Progressing: []? Met: []? Not Met: []?  Adjusted         Progression Towards Functional goals:  [] Patient is progressing as expected towards functional goals listed. [] Progression is slowed due to complexities listed. [] Progression has been slowed due to co-morbidities. [] Plan just implemented, too soon to assess goals progression  [x] Other: Patient progressing slowly due to pain    ASSESSMENT:  Patient continue to present to therapy with uncontrolled pain in the right shoulder. Symptoms are aggravated by his job and are consistent with subacromial impingement of the biceps and RTC. His overall stability and strength of the right RTC is poor. PROM is nearly full but active motion again is limited due to pain and strength. Use of dry needling with stim to control pain and decrease global hypertonicity. Patient voice mild improvement after treatment. Patient would continue to benefit from therapy to improve overall RTC strength, decrease pain and improve overall function. Treatment/Activity Tolerance:  [] Patient tolerated treatment well [] Patient limited by fatique  [x] Patient limited by pain  [] Patient limited by other medical complications  [] Other:     Overall Progression Towards Functional goals/ Treatment Progress Update:  [] Patient is progressing as expected towards functional goals listed. [x] Progression is slowed due to complexities/Impairments listed. [] Progression has been slowed due to co-morbidities. [] Plan just implemented, too soon to assess goals progression <30days   [] Goals require adjustment due to lack of progress  [] Patient is not progressing as expected and requires additional follow up with physician  [] Other    Prognosis for POC: [x] Good [] Fair  [] Poor    Patient requires continued skilled intervention: [x] Yes  [] No      PLAN: Dry needling / continue to improve strength within pain free ranges.    Frequency/Duration:  1-2 days per week for 6-8 Weeks:    [x] Continue per plan of care [] Alter current plan (see comments)  [] Plan of care initiated [] Hold pending MD visit [] Discharge    Electronically signed by: Carla Bentley PT     Note: If patient does not return for scheduled/recommended follow up visits, this note will serve as a discharge from care along with the most recent update on progress.

## 2022-03-29 ENCOUNTER — OFFICE VISIT (OUTPATIENT)
Dept: ORTHOPEDIC SURGERY | Age: 47
End: 2022-03-29
Payer: COMMERCIAL

## 2022-03-29 VITALS — WEIGHT: 254 LBS | HEIGHT: 69 IN | BODY MASS INDEX: 37.62 KG/M2

## 2022-03-29 DIAGNOSIS — M75.21 BICEPS TENDINITIS OF RIGHT UPPER EXTREMITY: Primary | ICD-10-CM

## 2022-03-29 DIAGNOSIS — M19.011 ARTHRITIS OF RIGHT ACROMIOCLAVICULAR JOINT: ICD-10-CM

## 2022-03-29 DIAGNOSIS — M75.111 NONTRAUMATIC INCOMPLETE TEAR OF RIGHT ROTATOR CUFF: ICD-10-CM

## 2022-03-29 PROCEDURE — L3670 SO ACRO/CLAV CAN WEB PRE OTS: HCPCS | Performed by: ORTHOPAEDIC SURGERY

## 2022-03-29 PROCEDURE — 99214 OFFICE O/P EST MOD 30 MIN: CPT | Performed by: ORTHOPAEDIC SURGERY

## 2022-03-29 PROCEDURE — G8417 CALC BMI ABV UP PARAM F/U: HCPCS | Performed by: ORTHOPAEDIC SURGERY

## 2022-03-29 PROCEDURE — G8427 DOCREV CUR MEDS BY ELIG CLIN: HCPCS | Performed by: ORTHOPAEDIC SURGERY

## 2022-03-29 PROCEDURE — 4004F PT TOBACCO SCREEN RCVD TLK: CPT | Performed by: ORTHOPAEDIC SURGERY

## 2022-03-29 PROCEDURE — G8484 FLU IMMUNIZE NO ADMIN: HCPCS | Performed by: ORTHOPAEDIC SURGERY

## 2022-03-29 NOTE — PROGRESS NOTES
Chief Complaint    Shoulder Pain (F/U RIGHT SHOULDER)      History of Present Illness:  Velma Velázquez is a pleasant, 48 y.o., male, here today for follow up of his right shoulder. We have had this patient in conservative management for pain related to right shoulder impingement as well as AC joint arthritis. A recent MRI ruled out a discrete tear. We proceeded with an intra-articular corticosteroid injection for the right shoulder coupled with formal physical therapy. He has been doing this at the [place] office. Overall he has not noticed a significant improvement in symptoms. He continues to have persistent pain in the right shoulder. He reports no new injuries or setbacks. Pain Assessment  Location of Pain: Shoulder  Location Modifiers: Right  Severity of Pain: 5  Quality of Pain: Throbbing,Aching  Duration of Pain: Persistent  Frequency of Pain: Intermittent  Aggravating Factors: Other (Comment)  Limiting Behavior: Yes  Relieving Factors: Rest,Nsaids,Ice  Work-Related Injury: No  Are there other pain locations you wish to document?: No      Medical History:  Patient's medications, allergies, past medical, surgical, social and family histories were reviewed and updated as appropriate. No notes on file    Review of Systems  A 14 point review of systems was completed by the on 2/1/22 patient and is available in the media section of the scanned medical record and was reviewed on 3/29/2022. The review is negative with the exception of those things mentioned in the HPI and Past Medical History    Vital Signs: There were no vitals filed for this visit. General/Appearance: Alert and oriented and in no apparent distress. Skin:  There are no skin lesions, cellulitis, or extreme edema. The patient has warm and well-perfused Bilateral upper extremities with brisk capillary refill. Right Shoulder Exam:  Inspection: No gross deformities, no signs of infection.     Palpation: Tenderness over the [facility] joint    Active Range of Motion: Forward Elevation 100 self limited, External Rotation 50, Internal Rotation L2    Passive Range of Motion: Anterior pain with cross arm adduction    Strength:  External Rotation 5/5, Internal Rotation 5/5    Special Tests: No Aryan muscle deformity. Neurovascular: Sensation to light touch is intact, no motor deficits, palpable radial pulses 2+      Radiology:     No new XR obtained at this time. Assessment : Irineo Mejía is a pleasant 48 y.o. male with pain related to right shoulder biceps tendinitis, subacromial impingement and AC joint arthritis. Impression:  Encounter Diagnoses   Name Primary?  Biceps tendinitis of right upper extremity Yes    Arthritis of right acromioclavicular joint     Nontraumatic incomplete tear of right rotator cuff        Office Procedures:  Orders Placed This Encounter   Procedures    DJO ultrasling IV Shoulder Sling     Patient was prescribed a DJO Ultrasling IV Shoulder Brace. The right shoulder will require stabilization / immobilization from this orthosis. The orthosis will assist in protecting the affected area, provide functional support and facilitate healing. The device was ordered and fit on 3/29/2022. The patient was educated and fit by a healthcare professional with expert knowledge and specialization in brace application while under the direct supervision of the treating physician. Verbal and written instructions for the use of and application of this item were provided. They were instructed to contact the office immediately should the brace result in increased pain, decreased sensation, increased swelling or worsening of the condition. Treatment Plan:  Irineo Mejía has not responded as well as we had hoped to multimodal conservative modalities. We discussed several options moving forward including both conservative vs surgical treatment management.  Conservatively we may stay the course in therapy and consider a repeat injection in the future. Surgically we could offer this patient a right shoulder arthroscopy for distal clavicle excision and open biceps tenodesis with possible rotator cuff repair should a tear be found intra-operatively. We discussed in detail risks, benefits and expectations postoperatively. We also discussed a recovery timeline following this operation. After discussing these options he would like to go ahead and proceed with an operation. He does have a teenage daughter who is able to help during the postoperative period. He may go ahead and schedule surgery at his convenience. We will see Doris Lui back for surgery and/or as needed. All questions were answered to patient's satisfaction and He was encouraged to call with any further questions or concerns. Kimber Nolasco is in agreement with this plan. Risks, benefits and potential complications of arthroscopic shoulder surgery were discussed with the patient. Risks discussed include but are not limited to bleeding, infection, anesthetic risk, injury to nerves and blood vessels, deep vein thrombosis, residual stiffness and weakness, and the need for revision surgery. The patient also understands that anesthetic risks include cardiopulmonary issues, drug reactions and even death. The patient voices an understanding of the importance of physical therapy and home exercises after surgery. All questions were answered and written informed consent for surgery was obtained today. The patient was counseled at length about the risks of bryson Covid-19 during their perioperative period and any recovery window from their procedure. The patient was made aware that bryson Covid-19  may worsen their prognosis for recovering from their procedure  and lend to a higher morbidity and/or mortality risk. All material risks, benefits, and reasonable alternatives including postponing the procedure were discussed.  The patient does wish to proceed with the procedure at this time. 3/29/2022  12:07 PM    Kermit Jones ATC  [address]    During this examination, I, Tomy Carballo, functioned as a scribe for Dr. Ayo Khan. The history taking and physical examination were performed by Dr. Adina Baumann. All counseling during the appointment was performed between the patient and Dr. Adina Baumann. 3/29/22  ______________  I, Dr. Ayo Khan, personally performed the services described in this documentation as described by Kermit Jones ATC in my presence, and it is both accurate and complete. Samer FREDDIE Baumann MD, PhD  3/29/2022

## 2022-04-04 ENCOUNTER — OFFICE VISIT (OUTPATIENT)
Dept: PRIMARY CARE CLINIC | Age: 47
End: 2022-04-04
Payer: COMMERCIAL

## 2022-04-04 VITALS
DIASTOLIC BLOOD PRESSURE: 80 MMHG | TEMPERATURE: 98.2 F | HEART RATE: 104 BPM | OXYGEN SATURATION: 98 % | BODY MASS INDEX: 37.12 KG/M2 | HEIGHT: 69 IN | SYSTOLIC BLOOD PRESSURE: 126 MMHG | WEIGHT: 250.6 LBS

## 2022-04-04 DIAGNOSIS — M25.511 CHRONIC RIGHT SHOULDER PAIN: ICD-10-CM

## 2022-04-04 DIAGNOSIS — G89.29 CHRONIC RIGHT SHOULDER PAIN: ICD-10-CM

## 2022-04-04 DIAGNOSIS — Z01.818 PRE-OP EXAMINATION: ICD-10-CM

## 2022-04-04 DIAGNOSIS — Z01.818 PRE-OP EXAMINATION: Primary | ICD-10-CM

## 2022-04-04 LAB
ANION GAP SERPL CALCULATED.3IONS-SCNC: 14 MMOL/L (ref 3–16)
BUN BLDV-MCNC: 9 MG/DL (ref 7–20)
CALCIUM SERPL-MCNC: 9.7 MG/DL (ref 8.3–10.6)
CHLORIDE BLD-SCNC: 102 MMOL/L (ref 99–110)
CO2: 24 MMOL/L (ref 21–32)
CREAT SERPL-MCNC: 0.8 MG/DL (ref 0.9–1.3)
GFR AFRICAN AMERICAN: >60
GFR NON-AFRICAN AMERICAN: >60
GLUCOSE BLD-MCNC: 100 MG/DL (ref 70–99)
HCT VFR BLD CALC: 51.4 % (ref 40.5–52.5)
HEMOGLOBIN: 17 G/DL (ref 13.5–17.5)
MCH RBC QN AUTO: 29.2 PG (ref 26–34)
MCHC RBC AUTO-ENTMCNC: 33.1 G/DL (ref 31–36)
MCV RBC AUTO: 88 FL (ref 80–100)
PDW BLD-RTO: 13.9 % (ref 12.4–15.4)
PLATELET # BLD: 236 K/UL (ref 135–450)
PMV BLD AUTO: 8.3 FL (ref 5–10.5)
POTASSIUM SERPL-SCNC: 4.5 MMOL/L (ref 3.5–5.1)
RBC # BLD: 5.84 M/UL (ref 4.2–5.9)
SODIUM BLD-SCNC: 140 MMOL/L (ref 136–145)
WBC # BLD: 10.2 K/UL (ref 4–11)

## 2022-04-04 PROCEDURE — G8427 DOCREV CUR MEDS BY ELIG CLIN: HCPCS | Performed by: FAMILY MEDICINE

## 2022-04-04 PROCEDURE — G8417 CALC BMI ABV UP PARAM F/U: HCPCS | Performed by: FAMILY MEDICINE

## 2022-04-04 PROCEDURE — 99242 OFF/OP CONSLTJ NEW/EST SF 20: CPT | Performed by: FAMILY MEDICINE

## 2022-04-04 ASSESSMENT — ENCOUNTER SYMPTOMS
ALLERGIC/IMMUNOLOGIC NEGATIVE: 1
EYES NEGATIVE: 1
GASTROINTESTINAL NEGATIVE: 1
RESPIRATORY NEGATIVE: 1

## 2022-04-04 NOTE — PROGRESS NOTES
SUBJECTIVE:  Marly Dan is a 48 y.o. male who presents for evaluation for pre op for right shoulder surgery. The pain is described as sharp pain and is 6/10 in intensity. Onset was several months ago. Symptoms have been gradually worsening since then. Aggravating factors:any movements. Alleviating factors: rest. Associated symptoms: pain and limited mobility. The patient denies chest pain , no SOB, no fever or chills, no cough, no N/V/D. Review of Systems   Constitutional: Negative. HENT: Negative. Eyes: Negative. Respiratory: Negative. Cardiovascular: Negative. Gastrointestinal: Negative. Endocrine: Negative. Genitourinary: Negative. Musculoskeletal: Positive for arthralgias and myalgias. Allergic/Immunologic: Negative. Neurological: Negative. Hematological: Negative. Psychiatric/Behavioral: Negative. All other systems reviewed and are negative. Past Medical History:   Diagnosis Date    Chronic back pain     Neuropathy       Patient Active Problem List    Diagnosis Date Noted    Herniated lumbar intervertebral disc 2016    Chronic pain due to trauma 2016      Past Surgical History:   Procedure Laterality Date    APPENDECTOMY      BACK SURGERY  2007    L5-S1 discectomy    BACK SURGERY  2014     BACK SURGERY  2010     Family History   Adopted: Yes     Social History     Socioeconomic History    Marital status: Single     Spouse name: None    Number of children: None    Years of education: None    Highest education level: None   Occupational History    Occupation:      Employer: [employer]   Tobacco Use    Smoking status: Former Smoker     Quit date: 3/28/1989     Years since quittin.0    Smokeless tobacco: Current User     Types: Snuff   Substance and Sexual Activity    Alcohol use:  Yes     Alcohol/week: 0.0 standard drinks     Comment: RARE    Drug use: No    Sexual activity: Yes     Partners: Left Upper Arm, Position: Sitting, Cuff Size: Large Adult)   Pulse 104   Temp 98.2 °F (36.8 °C) (Temporal)   Ht 5' 9\" (1.753 m)   Wt 250 lb 9.6 oz (113.7 kg)   SpO2 98%   BMI 37.01 kg/m²   Physical Exam  Vitals reviewed. Constitutional:       Appearance: Normal appearance. He is well-developed. HENT:      Head: Normocephalic and atraumatic. Right Ear: Tympanic membrane normal.      Left Ear: Tympanic membrane normal.      Nose: Nose normal.   Eyes:      General: No scleral icterus. Conjunctiva/sclera: Conjunctivae normal.   Neck:      Thyroid: No thyromegaly. Vascular: No carotid bruit or JVD. Cardiovascular:      Rate and Rhythm: Normal rate and regular rhythm. Heart sounds: Normal heart sounds. No murmur heard. Pulmonary:      Effort: Pulmonary effort is normal. No respiratory distress. Breath sounds: Normal breath sounds. No wheezing or rales. Chest:      Chest wall: No tenderness. Abdominal:      General: Bowel sounds are normal. There is no distension. Palpations: Abdomen is soft. There is no mass. Tenderness: There is no abdominal tenderness. There is no guarding or rebound. Musculoskeletal:         General: No tenderness. Cervical back: Normal range of motion and neck supple. Skin:     Findings: No rash. Neurological:      Mental Status: He is alert and oriented to person, place, and time. ASSESSMENT/PLAN:   Diagnosis Orders   1. Pre-op examination  Basic Metabolic Panel    CBC   2. Chronic right shoulder pain         1. Discussed the risk of surgery including bleeding and infection, and the risks of general anesthetic including MI, CVA, sudden death or even reaction to anesthetic medications. The patient understands the risks, any and all questions were answered to the patient's satisfaction. 2. Okay for the planned surgery.        Date of Surgery Update (To be completed by Attending Surgeon day of surgery)  Copy is given to the pt and one is faxed to the surgeon.

## 2022-04-13 ENCOUNTER — TELEPHONE (OUTPATIENT)
Dept: ORTHOPEDIC SURGERY | Age: 47
End: 2022-04-13

## 2022-04-13 NOTE — TELEPHONE ENCOUNTER
General Question     Subject: PT IS HAVING SX ON 04/29 AND HE WANTS TO KNOW THE TIME. ALSO HAS INSURANCE APPROVED HIS SX?   Patient and /or Facility Request: [facility] Number: [phone]

## 2022-04-14 ENCOUNTER — TELEPHONE (OUTPATIENT)
Dept: ORTHOPEDIC SURGERY | Age: 47
End: 2022-04-14

## 2022-04-14 DIAGNOSIS — M19.011 ARTHRITIS OF RIGHT ACROMIOCLAVICULAR JOINT: ICD-10-CM

## 2022-04-14 DIAGNOSIS — M75.21 BICEPS TENDINITIS OF RIGHT UPPER EXTREMITY: Primary | ICD-10-CM

## 2022-04-19 NOTE — TELEPHONE ENCOUNTER
Auth: # [id number]    Date: 04/28/22 thru 07/28/22  Type of SX:  Outpatient  Location: [facility]  CPT: 28150, [id number], [id number]  DX Code: M75.21  SX area:  bic  Insurance: [employer]

## 2022-04-25 NOTE — PROGRESS NOTES
[facility] PRE-SURGICAL TESTING INSTRUCTIONS                                  PRIOR TO PROCEDURE DATE:        1. PLEASE FOLLOW ANY  GUIDELINES/ INSTRUCTIONS PRIOR TO YOUR PROCEDURE AS ADVISED BY YOUR SURGEON. 2. Arrange for someone to drive you home and be with you for the first 24 hours after discharge for your safety after your procedure for which you received sedation. Ensure it is someone we can share information with regarding your discharge. 3. You must contact your surgeon for instructions IF:   You are taking any blood thinners, aspirin, anti-inflammatory or vitamin E.   There is a change in your physical condition such as a cold, fever, rash, cuts, sores or any other infection, especially near your surgical site. 4. Do not drink alcohol the day before or day of your procedure. 5. A Pre-op History and Physical for surgery MUST be completed by your Physician or Urgent Care within 30 days of your procedure date. Please bring a copy with you on the day of your procedure and along with any other testing performed. THE DAY OF YOUR PROCEDURE:  1. Follow instructions for ARRIVAL TIME as DIRECTED BY YOUR SURGEON. 2. Enter the MAIN entrance from [employer] and follow the signs to the free [employer] or [employer] parking (offered free of charge 6am-5pm). 3. Enter the Main Entrance of the hospital (do not enter from the lower level of the parking garage). Upon entrance, check in with the  at the main desk on your left. If no one is available at the desk, proceed into the [facility] Waiting Room and go through the door directly into the [facility]. There is a Check-in desk ACROSS from Room 5 (marked with a sign hanging from the ceiling). The phone number for the surgery center is [phone]. 4. Please call [phone] option #2 option #2 if you have not been preregistered yet.   On the day of your procedure bring your insurance card and photo ID. You will be registered at your bedside once brought back to your room. 5. DO NOT EAT ANYTHING eight hours prior to your arrival for surgery. May have 8 ounces of water 4 hours prior to your arrival for surgery. NOTE: ALL Gastric, Bariatric and Bowel surgery patients MUST follow their surgeon's instructions. 6. MEDICATIONS    Take the following medications with a SMALL sip of water: NONE   Bariatric patient's call surgeon if on diabetic medications as some need to be stopped 1 week preop   Use your usual dose of inhalers the morning of surgery. BRING your rescue inhaler with you to hospital.    Anesthesia does NOT want you to take insulin the morning of surgery. They will control your blood sugar while you are at the hospital. Please contact your ordering physician for instructions regarding your insulin the night before your procedure. If you have an insulin pump, please keep it set on basal rate. 7. Do not swallow water when brushing teeth. No gum, candy, mints or ice chips. Refrain from smoking or at least decrease the amount. 8. Dress in loose, comfortable clothing appropriate for redressing after your procedure. Do not wear jewelry (including body piercings), make-up (especially NO eye make-up), fingernail polish (NO toenail polish if foot/leg surgery), lotion, powders or metal hairclips. 9. Dentures, glasses, or contacts will need to be removed before your procedure. Bring cases for your glasses, contacts, dentures, or hearing aids to protect them while you are in surgery. 10. If you use a CPAP, please bring it with you on the day of your procedure. 11. We recommend that valuable personal  belongings such as cash, cell phones, e-tablets or jewelry, be left at home during your stay. The hospital will not be responsible for valuables that are not secured in the hospital safe.  However, if your insurance requires a co-pay, you may want to bring a method of payment, i.e. Check or credit card, if you wish to pay your co-pay the day of surgery. 12. If you are to stay overnight, you may bring a bag with personal items. Please have any large items you may need brought in by your family after your arrival to your hospital room. 15. If you have a Living Will or Durable Power of , please bring a copy on the day of your procedure. 15. With your permission, one family member may accompany you while you are being prepared for surgery. Once you are ready, additional family members may join you. HOW WE KEEP YOU SAFE and WORK TO PREVENT SURGICAL SITE INFECTIONS:  1. Health care workers should always check your ID bracelet to verify your name and birth date. You will be asked many times to state your name, date of birth, and allergies. 2. Health care workers should always clean their hands with soap or alcohol gel before providing care to you. It is okay to ask anyone if they cleaned their hands before they touch you. 3. You will be actively involved in verifying the type of procedure you are having and ensuring the correct surgical site. This will be confirmed multiple times prior to your procedure. Do NOT lilly your surgery site UNLESS instructed to by your surgeon. 4. Do not shave or wax for 72 hours prior to procedure near your operative site. Shaving with a razor can irritate your skin and make it easier to develop an infection. On the day of your procedure, any hair that needs to be removed near the surgical site will be clipped by a healthcare worker using a special clippers designed to avoid skin irritation. 5. When you are in the operating room, your surgical site will be cleansed with a special soap, and in most cases, you will be given an antibiotic before the surgery begins. What to expect AFTER YOUR PROCEDURE:  1. Immediately following your procedure, your will be taken to the PACU for the first phase of your recovery.   Your nurse will help you recover from any potential side effects of anesthesia, such as extreme drowsiness, changes in your vital signs or breathing patterns. Nausea, headache, muscle aches, or sore throat may also occur after anesthesia. Your nurse will help you manage these potential side effects. 2. For comfort and safety, arrange to have someone at home with you for the first 24 hours after discharge. 3. You and your family will be given written instructions about your diet, activity, dressing care, medications, and return visits. 4. Once at home, should issues with nausea, pain, or bleeding occur, or should you notice any signs of infection, you should call your surgeon. 5. Always clean your hands before and after caring for your wound. Do not let your family touch your surgery site without cleaning their hands. 6. Narcotic pain medications can cause significant constipation. You may want to add a stool softener to your postoperative medication schedule or speak to your surgeon on how best to manage this SIDE EFFECT. SPECIAL INSTRUCTIONS BRING SLING, PATIENT ACKNOWLEDGES UNDERSTANDING OF THIS ALONG WITH PRE OP INSTRUCTIONS. Thank you for allowing us to care for you. We strive to exceed your expectations in the delivery of care and service provided to you and your family. If you need to contact the [place] [address] staff for any reason, please call us at [phone]    Instructions reviewed with patient during preadmission testing phone interview.   Korin Krause RN.4/25/2022 .11:01 AM      ADDITIONAL EDUCATIONAL INFORMATION REVIEWED PER PHONE WITH YOU AND/OR YOUR FAMILY:  No Hibiclens® Bathing Instructions   Yes Antibacterial Soap

## 2022-04-25 NOTE — PROGRESS NOTES
Place patient label inside box (if no patient label, complete below)  Name:  :  MR#:   Angel Lewis / PROCEDURE  1. I (we), Jia Galeas (Patient Name) authorize Esther Torres MD (Provider / [place]) and/or such assistants as may be selected by him/her, to perform the following operation/procedure(s): RIGHT SHOULDER ARTHROSCOPY FOR DISTAL CLAVICLE EXCISION AND OPEN BICEPS TENODESIS, POSSIBLE ROTATOR CUFF REPAIR        Note: If unable to obtain consent prior to an emergent procedure, document the emergent reason in the medical record. This procedure has been explained to my (our) satisfaction and included in the explanation was:  A) The intended benefit, nature, and extent of the procedure to be performed;  B) The significant risks involved and the probability of success;  C) Alternative procedures and methods of treatment;  D) The dangers and probable consequences of such alternatives (including no procedure or treatment); E) The expected consequences of the procedure on my future health;  F) Whether other qualified individuals would be performing important surgical tasks and/or whether  would be present to advise or support the procedure. I (we) understand that there are other risks of infection and other serious complications in the pre-operative/procedural and postoperative/procedural stages of my (our) care. I (we) have asked all of the questions which I (we) thought were important in deciding whether or not to undergo treatment or diagnosis. These questions have been answered to my (our) satisfaction. I (we) understand that no assurance can be given that the procedure will be a success, and no guarantee or warranty of success has been given to me (us).     2. It has been explained to me (us) that during the course of the operation/procedure, unforeseen conditions may be revealed that necessitate extension of the original procedure(s) or different procedure(s) than those set forth in Paragraph 1. I (we) authorize and request that the above-named physician, his/her assistants or his/her designees, perform procedures as necessary and desirable if deemed to be in my (our) best interest.     Revised 8/2/2021                                                                          Page 1 of 2       3. I acknowledge that health care personnel may be observing this procedure for the purpose of medical education or other specified purposes as may be necessary as requested and/or approved by my (our) physician. 4. I (we) consent to the disposal by the hospital Pathologist of the removed tissue, parts or organs in accordance with hospital policy. 5. I do ____ do not ____ consent to the use of a local infiltration pain blocking agent that will be used by my provider/surgical provider to help alleviate pain during my procedure. 6. I do ____ do not ____ consent to an emergent blood transfusion in the case of a life-threatening situation that requires blood components to be administered. This consent is valid for 24 hours from the beginning of the procedure. 7. This patient does ____ or does not ____ currently have a DNR status/order. If DNR order is in place, obtain Addendum to the Surgical Consent for ALL Patients with a DNR Order to address rah-operative status for limited intervention or DNR suspension.      8. I have read and fully understand the above Consent for Operation/Procedure and that all blanks were completed before I signed the consent.   _____________________________       _____________________      ____/____am/pm  Signature of Patient or legal representative      Printed Name / Relationship            Date / Time   ____________________________       _____________________      ____/____am/pm  Witness to Signature                                    Printed Name                    Date / Time     If patient is unable to sign or is a minor, complete the following)  Patient is a minor, ____ years of age, or unable to sign because:   ______________________________________________________________________________________________    [place] If a phone consent is obtained, consent will be documented by using two health care professionals, each affirming that the consenting party has no questions and gives consent for the procedure discussed with the physician/provider.   _____________________          ____________________       _____/_____am/pm   2nd witness to phone consent        Printed name           Date / Time    Informed Consent:  I have provided the explanation described above in section 1 to the patient and/or legal representative.  I have provided the patient and/or legal representative with an opportunity to ask any questions about the proposed operation/procedure.   ___________________________          ____________________         ____/____am/pm  Provider / Proceduralist                            Printed name            Date / Time  Revised 8/2/2021                                                                      Page 2 of 2

## 2022-04-26 ENCOUNTER — TELEPHONE (OUTPATIENT)
Dept: ORTHOPEDIC SURGERY | Age: 47
End: 2022-04-26

## 2022-04-26 NOTE — TELEPHONE ENCOUNTER
General Question     Subject: Patient requesting a call back for surgery time on Friday.   Patient: Edel Chambers \"GMS\"  Contact Number: [phone]

## 2022-04-29 ENCOUNTER — HOSPITAL ENCOUNTER (OUTPATIENT)
Age: 47
Setting detail: OUTPATIENT SURGERY
Discharge: HOME | End: 2022-04-29
Attending: ORTHOPAEDIC SURGERY | Admitting: ORTHOPAEDIC SURGERY
Payer: COMMERCIAL

## 2022-04-29 ENCOUNTER — ANESTHESIA EVENT (OUTPATIENT)
Dept: OPERATING ROOM | Age: 47
End: 2022-04-29
Payer: COMMERCIAL

## 2022-04-29 ENCOUNTER — ANESTHESIA (OUTPATIENT)
Dept: OPERATING ROOM | Age: 47
End: 2022-04-29
Payer: COMMERCIAL

## 2022-04-29 VITALS
HEIGHT: 69 IN | DIASTOLIC BLOOD PRESSURE: 95 MMHG | HEART RATE: 87 BPM | TEMPERATURE: 97 F | WEIGHT: 242.8 LBS | RESPIRATION RATE: 17 BRPM | BODY MASS INDEX: 35.96 KG/M2 | OXYGEN SATURATION: 93 % | SYSTOLIC BLOOD PRESSURE: 139 MMHG

## 2022-04-29 VITALS — OXYGEN SATURATION: 97 % | DIASTOLIC BLOOD PRESSURE: 86 MMHG | TEMPERATURE: 96.4 F | SYSTOLIC BLOOD PRESSURE: 129 MMHG

## 2022-04-29 DIAGNOSIS — Z98.890 S/P SHOULDER SURGERY: Primary | ICD-10-CM

## 2022-04-29 PROCEDURE — 3600000014 HC SURGERY LEVEL 4 ADDTL 15MIN: Performed by: ORTHOPAEDIC SURGERY

## 2022-04-29 PROCEDURE — 7100000000 HC PACU RECOVERY - FIRST 15 MIN: Performed by: ORTHOPAEDIC SURGERY

## 2022-04-29 PROCEDURE — 6360000002 HC RX W HCPCS: Performed by: ANESTHESIOLOGY

## 2022-04-29 PROCEDURE — 2580000003 HC RX 258: Performed by: ORTHOPAEDIC SURGERY

## 2022-04-29 PROCEDURE — 2580000003 HC RX 258: Performed by: ANESTHESIOLOGY

## 2022-04-29 PROCEDURE — 3700000000 HC ANESTHESIA ATTENDED CARE: Performed by: ORTHOPAEDIC SURGERY

## 2022-04-29 PROCEDURE — 3700000001 HC ADD 15 MINUTES (ANESTHESIA): Performed by: ORTHOPAEDIC SURGERY

## 2022-04-29 PROCEDURE — 2500000003 HC RX 250 WO HCPCS: Performed by: ANESTHESIOLOGY

## 2022-04-29 PROCEDURE — 6360000002 HC RX W HCPCS: Performed by: NURSE ANESTHETIST, CERTIFIED REGISTERED

## 2022-04-29 PROCEDURE — 3600000004 HC SURGERY LEVEL 4 BASE: Performed by: ORTHOPAEDIC SURGERY

## 2022-04-29 PROCEDURE — 2500000003 HC RX 250 WO HCPCS: Performed by: NURSE ANESTHETIST, CERTIFIED REGISTERED

## 2022-04-29 PROCEDURE — 6370000000 HC RX 637 (ALT 250 FOR IP): Performed by: ANESTHESIOLOGY

## 2022-04-29 PROCEDURE — 7100000010 HC PHASE II RECOVERY - FIRST 15 MIN: Performed by: ORTHOPAEDIC SURGERY

## 2022-04-29 PROCEDURE — 2720000010 HC SURG SUPPLY STERILE: Performed by: ORTHOPAEDIC SURGERY

## 2022-04-29 PROCEDURE — 6360000002 HC RX W HCPCS: Performed by: ORTHOPAEDIC SURGERY

## 2022-04-29 PROCEDURE — 64415 NJX AA&/STRD BRCH PLXS IMG: CPT | Performed by: ANESTHESIOLOGY

## 2022-04-29 PROCEDURE — 2500000003 HC RX 250 WO HCPCS: Performed by: ORTHOPAEDIC SURGERY

## 2022-04-29 PROCEDURE — 2709999900 HC NON-CHARGEABLE SUPPLY: Performed by: ORTHOPAEDIC SURGERY

## 2022-04-29 PROCEDURE — 7100000011 HC PHASE II RECOVERY - ADDTL 15 MIN: Performed by: ORTHOPAEDIC SURGERY

## 2022-04-29 PROCEDURE — 7100000001 HC PACU RECOVERY - ADDTL 15 MIN: Performed by: ORTHOPAEDIC SURGERY

## 2022-04-29 RX ORDER — PHENYLEPHRINE HYDROCHLORIDE 10 MG/ML
INJECTION INTRAVENOUS PRN
Status: DISCONTINUED | OUTPATIENT
Start: 2022-04-29 | End: 2022-04-29 | Stop reason: SDUPTHER

## 2022-04-29 RX ORDER — BUPIVACAINE HYDROCHLORIDE 5 MG/ML
INJECTION, SOLUTION EPIDURAL; INTRACAUDAL
Status: DISCONTINUED
Start: 2022-04-29 | End: 2022-04-29 | Stop reason: HOSPADM

## 2022-04-29 RX ORDER — OXYCODONE HYDROCHLORIDE AND ACETAMINOPHEN 5; 325 MG/1; MG/1
1 TABLET ORAL EVERY 4 HOURS PRN
Qty: 40 TABLET | Refills: 0 | Status: SHIPPED | OUTPATIENT
Start: 2022-04-29 | End: 2022-05-06

## 2022-04-29 RX ORDER — METHYLPREDNISOLONE ACETATE 80 MG/ML
INJECTION, SUSPENSION INTRA-ARTICULAR; INTRALESIONAL; INTRAMUSCULAR; SOFT TISSUE PRN
Status: DISCONTINUED | OUTPATIENT
Start: 2022-04-29 | End: 2022-04-29 | Stop reason: ALTCHOICE

## 2022-04-29 RX ORDER — SODIUM CHLORIDE 0.9 % (FLUSH) 0.9 %
5-40 SYRINGE (ML) INJECTION EVERY 12 HOURS SCHEDULED
Status: DISCONTINUED | OUTPATIENT
Start: 2022-04-29 | End: 2022-04-29 | Stop reason: HOSPADM

## 2022-04-29 RX ORDER — MIDAZOLAM HYDROCHLORIDE 1 MG/ML
INJECTION INTRAMUSCULAR; INTRAVENOUS PRN
Status: DISCONTINUED | OUTPATIENT
Start: 2022-04-29 | End: 2022-04-29 | Stop reason: SDUPTHER

## 2022-04-29 RX ORDER — BUPIVACAINE HYDROCHLORIDE 5 MG/ML
INJECTION, SOLUTION EPIDURAL; INTRACAUDAL
Status: COMPLETED
Start: 2022-04-29 | End: 2022-04-29

## 2022-04-29 RX ORDER — MEPERIDINE HYDROCHLORIDE 25 MG/ML
12.5 INJECTION INTRAMUSCULAR; INTRAVENOUS; SUBCUTANEOUS EVERY 5 MIN PRN
Status: DISCONTINUED | OUTPATIENT
Start: 2022-04-29 | End: 2022-04-29 | Stop reason: HOSPADM

## 2022-04-29 RX ORDER — LIDOCAINE HYDROCHLORIDE 20 MG/ML
INJECTION, SOLUTION INFILTRATION; PERINEURAL PRN
Status: DISCONTINUED | OUTPATIENT
Start: 2022-04-29 | End: 2022-04-29 | Stop reason: SDUPTHER

## 2022-04-29 RX ORDER — BUPIVACAINE HYDROCHLORIDE 5 MG/ML
INJECTION, SOLUTION EPIDURAL; INTRACAUDAL PRN
Status: DISCONTINUED | OUTPATIENT
Start: 2022-04-29 | End: 2022-04-29 | Stop reason: SDUPTHER

## 2022-04-29 RX ORDER — GLYCOPYRROLATE 1 MG/5 ML
SYRINGE (ML) INTRAVENOUS PRN
Status: DISCONTINUED | OUTPATIENT
Start: 2022-04-29 | End: 2022-04-29 | Stop reason: SDUPTHER

## 2022-04-29 RX ORDER — ROCURONIUM BROMIDE 10 MG/ML
INJECTION, SOLUTION INTRAVENOUS PRN
Status: DISCONTINUED | OUTPATIENT
Start: 2022-04-29 | End: 2022-04-29 | Stop reason: SDUPTHER

## 2022-04-29 RX ORDER — DOXYCYCLINE HYCLATE 100 MG
100 TABLET ORAL 2 TIMES DAILY
Qty: 10 TABLET | Refills: 0 | Status: SHIPPED | OUTPATIENT
Start: 2022-04-29 | End: 2022-05-04

## 2022-04-29 RX ORDER — ONDANSETRON 2 MG/ML
INJECTION INTRAMUSCULAR; INTRAVENOUS PRN
Status: DISCONTINUED | OUTPATIENT
Start: 2022-04-29 | End: 2022-04-29 | Stop reason: SDUPTHER

## 2022-04-29 RX ORDER — SODIUM CHLORIDE, SODIUM LACTATE, POTASSIUM CHLORIDE, CALCIUM CHLORIDE 600; 310; 30; 20 MG/100ML; MG/100ML; MG/100ML; MG/100ML
INJECTION, SOLUTION INTRAVENOUS CONTINUOUS
Status: DISCONTINUED | OUTPATIENT
Start: 2022-04-29 | End: 2022-04-29 | Stop reason: HOSPADM

## 2022-04-29 RX ORDER — HYDRALAZINE HYDROCHLORIDE 20 MG/ML
10 INJECTION INTRAMUSCULAR; INTRAVENOUS
Status: DISCONTINUED | OUTPATIENT
Start: 2022-04-29 | End: 2022-04-29 | Stop reason: HOSPADM

## 2022-04-29 RX ORDER — OXYCODONE HYDROCHLORIDE AND ACETAMINOPHEN 5; 325 MG/1; MG/1
1 TABLET ORAL ONCE
Status: COMPLETED | OUTPATIENT
Start: 2022-04-29 | End: 2022-04-29

## 2022-04-29 RX ORDER — ASPIRIN 325 MG
325 TABLET, DELAYED RELEASE (ENTERIC COATED) ORAL 2 TIMES DAILY
Qty: 30 TABLET | Refills: 0 | Status: SHIPPED | OUTPATIENT
Start: 2022-04-29 | End: 2022-05-13

## 2022-04-29 RX ORDER — SODIUM CHLORIDE 9 MG/ML
25 INJECTION, SOLUTION INTRAVENOUS PRN
Status: DISCONTINUED | OUTPATIENT
Start: 2022-04-29 | End: 2022-04-29 | Stop reason: HOSPADM

## 2022-04-29 RX ORDER — SODIUM CHLORIDE 0.9 % (FLUSH) 0.9 %
5-40 SYRINGE (ML) INJECTION PRN
Status: DISCONTINUED | OUTPATIENT
Start: 2022-04-29 | End: 2022-04-29 | Stop reason: HOSPADM

## 2022-04-29 RX ORDER — ONDANSETRON 4 MG/1
4 TABLET, FILM COATED ORAL EVERY 8 HOURS PRN
Qty: 20 TABLET | Refills: 0 | Status: SHIPPED | OUTPATIENT
Start: 2022-04-29

## 2022-04-29 RX ORDER — MIDAZOLAM HYDROCHLORIDE 1 MG/ML
INJECTION INTRAMUSCULAR; INTRAVENOUS
Status: COMPLETED
Start: 2022-04-29 | End: 2022-04-29

## 2022-04-29 RX ORDER — FENTANYL CITRATE 50 UG/ML
INJECTION, SOLUTION INTRAMUSCULAR; INTRAVENOUS PRN
Status: DISCONTINUED | OUTPATIENT
Start: 2022-04-29 | End: 2022-04-29 | Stop reason: SDUPTHER

## 2022-04-29 RX ORDER — SENNA PLUS 8.6 MG/1
1 TABLET ORAL 2 TIMES DAILY PRN
Qty: 14 TABLET | Refills: 0 | Status: SHIPPED | OUTPATIENT
Start: 2022-04-29 | End: 2022-05-06

## 2022-04-29 RX ORDER — FENTANYL CITRATE 50 UG/ML
INJECTION, SOLUTION INTRAMUSCULAR; INTRAVENOUS
Status: COMPLETED
Start: 2022-04-29 | End: 2022-04-29

## 2022-04-29 RX ORDER — BUPIVACAINE HYDROCHLORIDE 5 MG/ML
INJECTION, SOLUTION EPIDURAL; INTRACAUDAL PRN
Status: DISCONTINUED | OUTPATIENT
Start: 2022-04-29 | End: 2022-04-29 | Stop reason: ALTCHOICE

## 2022-04-29 RX ORDER — SUCCINYLCHOLINE/SOD CL,ISO/PF 200MG/10ML
SYRINGE (ML) INTRAVENOUS PRN
Status: DISCONTINUED | OUTPATIENT
Start: 2022-04-29 | End: 2022-04-29 | Stop reason: SDUPTHER

## 2022-04-29 RX ORDER — PROPOFOL 10 MG/ML
INJECTION, EMULSION INTRAVENOUS PRN
Status: DISCONTINUED | OUTPATIENT
Start: 2022-04-29 | End: 2022-04-29 | Stop reason: SDUPTHER

## 2022-04-29 RX ORDER — SODIUM CHLORIDE 9 MG/ML
INJECTION, SOLUTION INTRAVENOUS PRN
Status: DISCONTINUED | OUTPATIENT
Start: 2022-04-29 | End: 2022-04-29 | Stop reason: HOSPADM

## 2022-04-29 RX ORDER — LIDOCAINE HYDROCHLORIDE 10 MG/ML
1 INJECTION, SOLUTION EPIDURAL; INFILTRATION; INTRACAUDAL; PERINEURAL
Status: DISCONTINUED | OUTPATIENT
Start: 2022-04-29 | End: 2022-04-29 | Stop reason: HOSPADM

## 2022-04-29 RX ORDER — DEXAMETHASONE SODIUM PHOSPHATE 4 MG/ML
INJECTION, SOLUTION INTRA-ARTICULAR; INTRALESIONAL; INTRAMUSCULAR; INTRAVENOUS; SOFT TISSUE PRN
Status: DISCONTINUED | OUTPATIENT
Start: 2022-04-29 | End: 2022-04-29 | Stop reason: SDUPTHER

## 2022-04-29 RX ADMIN — ONDANSETRON 4 MG: 2 INJECTION INTRAMUSCULAR; INTRAVENOUS at 14:17

## 2022-04-29 RX ADMIN — PHENYLEPHRINE HYDROCHLORIDE 100 MCG: 10 INJECTION INTRAVENOUS at 14:43

## 2022-04-29 RX ADMIN — PROPOFOL 50 MG: 10 INJECTION, EMULSION INTRAVENOUS at 15:11

## 2022-04-29 RX ADMIN — SODIUM CHLORIDE, POTASSIUM CHLORIDE, SODIUM LACTATE AND CALCIUM CHLORIDE: 600; 310; 30; 20 INJECTION, SOLUTION INTRAVENOUS at 15:05

## 2022-04-29 RX ADMIN — MIDAZOLAM HYDROCHLORIDE 2 MG: 2 INJECTION, SOLUTION INTRAMUSCULAR; INTRAVENOUS at 12:58

## 2022-04-29 RX ADMIN — FENTANYL CITRATE 50 MCG: 50 INJECTION, SOLUTION INTRAMUSCULAR; INTRAVENOUS at 16:18

## 2022-04-29 RX ADMIN — DEXAMETHASONE SODIUM PHOSPHATE 8 MG: 4 INJECTION, SOLUTION INTRAMUSCULAR; INTRAVENOUS at 14:17

## 2022-04-29 RX ADMIN — FENTANYL CITRATE 100 MCG: 50 INJECTION, SOLUTION INTRAMUSCULAR; INTRAVENOUS at 11:20

## 2022-04-29 RX ADMIN — ROCURONIUM BROMIDE 5 MG: 10 INJECTION INTRAVENOUS at 14:28

## 2022-04-29 RX ADMIN — SUGAMMADEX 400 MG: 100 INJECTION, SOLUTION INTRAVENOUS at 16:00

## 2022-04-29 RX ADMIN — FENTANYL CITRATE 100 MCG: 50 INJECTION, SOLUTION INTRAMUSCULAR; INTRAVENOUS at 12:58

## 2022-04-29 RX ADMIN — Medication 0.2 MG: at 14:12

## 2022-04-29 RX ADMIN — HYDROMORPHONE HYDROCHLORIDE 0.25 MG: 1 INJECTION, SOLUTION INTRAMUSCULAR; INTRAVENOUS; SUBCUTANEOUS at 17:02

## 2022-04-29 RX ADMIN — FENTANYL CITRATE 50 MCG: 50 INJECTION, SOLUTION INTRAMUSCULAR; INTRAVENOUS at 15:06

## 2022-04-29 RX ADMIN — LIDOCAINE HYDROCHLORIDE 100 MG: 20 INJECTION, SOLUTION INFILTRATION; PERINEURAL at 14:26

## 2022-04-29 RX ADMIN — OXYCODONE AND ACETAMINOPHEN 1 TABLET: 5; 325 TABLET ORAL at 17:18

## 2022-04-29 RX ADMIN — PROPOFOL 200 MG: 10 INJECTION, EMULSION INTRAVENOUS at 14:28

## 2022-04-29 RX ADMIN — ROCURONIUM BROMIDE 30 MG: 10 INJECTION INTRAVENOUS at 14:49

## 2022-04-29 RX ADMIN — VANCOMYCIN HYDROCHLORIDE 1750 MG: 10 INJECTION, POWDER, LYOPHILIZED, FOR SOLUTION INTRAVENOUS at 14:40

## 2022-04-29 RX ADMIN — MIDAZOLAM HYDROCHLORIDE 2 MG: 2 INJECTION, SOLUTION INTRAMUSCULAR; INTRAVENOUS at 11:20

## 2022-04-29 RX ADMIN — Medication 200 MG: at 14:28

## 2022-04-29 RX ADMIN — SODIUM CHLORIDE, POTASSIUM CHLORIDE, SODIUM LACTATE AND CALCIUM CHLORIDE: 600; 310; 30; 20 INJECTION, SOLUTION INTRAVENOUS at 10:39

## 2022-04-29 RX ADMIN — BUPIVACAINE HYDROCHLORIDE 30 ML: 5 INJECTION, SOLUTION EPIDURAL; INTRACAUDAL; PERINEURAL at 12:58

## 2022-04-29 RX ADMIN — ROCURONIUM BROMIDE 45 MG: 10 INJECTION INTRAVENOUS at 14:32

## 2022-04-29 ASSESSMENT — PULMONARY FUNCTION TESTS
PIF_VALUE: 26
PIF_VALUE: 27
PIF_VALUE: 27
PIF_VALUE: 26
PIF_VALUE: 27
PIF_VALUE: 26
PIF_VALUE: 27
PIF_VALUE: 0
PIF_VALUE: 26
PIF_VALUE: 27
PIF_VALUE: 24
PIF_VALUE: 27
PIF_VALUE: 26
PIF_VALUE: 27
PIF_VALUE: 27
PIF_VALUE: 7
PIF_VALUE: 35
PIF_VALUE: 0
PIF_VALUE: 1
PIF_VALUE: 12
PIF_VALUE: 0
PIF_VALUE: 26
PIF_VALUE: 27
PIF_VALUE: 24
PIF_VALUE: 25
PIF_VALUE: 0
PIF_VALUE: 7
PIF_VALUE: 9
PIF_VALUE: 26
PIF_VALUE: 27
PIF_VALUE: 25
PIF_VALUE: 27
PIF_VALUE: 26
PIF_VALUE: 25
PIF_VALUE: 0
PIF_VALUE: 27
PIF_VALUE: 21
PIF_VALUE: 26
PIF_VALUE: 12
PIF_VALUE: 27
PIF_VALUE: 28
PIF_VALUE: 25
PIF_VALUE: 2
PIF_VALUE: 0
PIF_VALUE: 25
PIF_VALUE: 22
PIF_VALUE: 26
PIF_VALUE: 25
PIF_VALUE: 24
PIF_VALUE: 25
PIF_VALUE: 26
PIF_VALUE: 7
PIF_VALUE: 26
PIF_VALUE: 27
PIF_VALUE: 25
PIF_VALUE: 27
PIF_VALUE: 22
PIF_VALUE: 27
PIF_VALUE: 26
PIF_VALUE: 25
PIF_VALUE: 27
PIF_VALUE: 24
PIF_VALUE: 28
PIF_VALUE: 25
PIF_VALUE: 26
PIF_VALUE: 0
PIF_VALUE: 23
PIF_VALUE: 24
PIF_VALUE: 3
PIF_VALUE: 28
PIF_VALUE: 27
PIF_VALUE: 27
PIF_VALUE: 25
PIF_VALUE: 31
PIF_VALUE: 27
PIF_VALUE: 27
PIF_VALUE: 25
PIF_VALUE: 27
PIF_VALUE: 27
PIF_VALUE: 26
PIF_VALUE: 30
PIF_VALUE: 27
PIF_VALUE: 27
PIF_VALUE: 26
PIF_VALUE: 27
PIF_VALUE: 26
PIF_VALUE: 27
PIF_VALUE: 25
PIF_VALUE: 1
PIF_VALUE: 25
PIF_VALUE: 26
PIF_VALUE: 27
PIF_VALUE: 0
PIF_VALUE: 23
PIF_VALUE: 27
PIF_VALUE: 25
PIF_VALUE: 3
PIF_VALUE: 27
PIF_VALUE: 26
PIF_VALUE: 27
PIF_VALUE: 8
PIF_VALUE: 24
PIF_VALUE: 27
PIF_VALUE: 26
PIF_VALUE: 27
PIF_VALUE: 25
PIF_VALUE: 26
PIF_VALUE: 27
PIF_VALUE: 27
PIF_VALUE: 25

## 2022-04-29 ASSESSMENT — PAIN DESCRIPTION - FREQUENCY
FREQUENCY: CONTINUOUS
FREQUENCY: CONTINUOUS

## 2022-04-29 ASSESSMENT — PAIN SCALES - GENERAL
PAINLEVEL_OUTOF10: 3
PAINLEVEL_OUTOF10: 5
PAINLEVEL_OUTOF10: 3
PAINLEVEL_OUTOF10: 3

## 2022-04-29 ASSESSMENT — PAIN DESCRIPTION - PAIN TYPE
TYPE: ACUTE PAIN;SURGICAL PAIN
TYPE: ACUTE PAIN;SURGICAL PAIN

## 2022-04-29 ASSESSMENT — PAIN DESCRIPTION - DESCRIPTORS
DESCRIPTORS: ACHING

## 2022-04-29 ASSESSMENT — PAIN DESCRIPTION - ORIENTATION
ORIENTATION: RIGHT

## 2022-04-29 ASSESSMENT — PAIN DESCRIPTION - LOCATION
LOCATION: INCISION;SHOULDER
LOCATION: INCISION;SHOULDER

## 2022-04-29 ASSESSMENT — PAIN - FUNCTIONAL ASSESSMENT: PAIN_FUNCTIONAL_ASSESSMENT: 0-10

## 2022-04-29 NOTE — ANESTHESIA PRE PROCEDURE
Department of Anesthesiology  Preprocedure Note       Name:  Antoinette Cheema   Age:  48 y.o.  :  1975                                          MRN:  [id number]         Date:  2022      Surgeon: Raven Nichole):  Parminder Steve MD    Procedure: Procedure(s):  RIGHT SHOULDER ARTHROSCOPY FOR DISTAL CLAVICLE EXCISION AND OPEN BICEPS TENODESIS , POSSIBLE ROTATOR CUFF REPAIR    Medications prior to admission:   Prior to Admission medications    Medication Sig Start Date End Date Taking? Authorizing Provider   celecoxib (CELEBREX) 200 MG capsule Take 1 capsule by mouth daily 22   Beatriz Alfredo MD       Current medications:    Current Facility-Administered Medications   Medication Dose Route Frequency Provider Last Rate Last Admin    vancomycin (VANCOCIN) 1,750 mg in dextrose 5 % 500 mL IVPB  15 mg/kg IntraVENous Once Parminder Steve MD        lidocaine PF 1 % injection 1 mL  1 mL IntraDERmal Once PRN Eliodoro Nageotte, MD        lactated ringers infusion   IntraVENous Continuous Eliodoro Nageotte,  mL/hr at 22 1039 New Bag at 22 1039    sodium chloride flush 0.9 % injection 5-40 mL  5-40 mL IntraVENous 2 times per day Eliodoro Nageotte, MD        sodium chloride flush 0.9 % injection 5-40 mL  5-40 mL IntraVENous PRN Eliodoro Nageotte, MD        0.9 % sodium chloride infusion   IntraVENous PRN Eliodoro Nageotte, MD           Allergies:     Allergies   Allergen Reactions    Amoxicillin Hives    Penicillins Rash       Problem List:    Patient Active Problem List   Diagnosis Code    Herniated lumbar intervertebral disc M51.26    Chronic pain due to trauma G89.21       Past Medical History:        Diagnosis Date    Chronic back pain     Neuropathy     both feet; right leg       Past Surgical History:        Procedure Laterality Date    APPENDECTOMY      BACK SURGERY  2007    L5-S1 discectomy    BACK SURGERY  2014     BACK SURGERY  2010       Social History:    Social History Tobacco Use    Smoking status: Former Smoker     Packs/day: 0.50     Years: 2.00     Pack years: 1.00     Types: Cigarettes     Quit date: 3/28/1989     Years since quittin.1    Smokeless tobacco: Current User     Types: Snuff   Substance Use Topics    Alcohol use: Yes     Alcohol/week: 0.0 standard drinks     Comment: RARE                                Ready to quit: No  Counseling given: Yes      Vital Signs (Current):   Vitals:    22 1037 22 1008   BP:  (!) 133/99   Pulse:  95   Resp:  18   Temp:  98.1 °F (36.7 °C)   TempSrc:  Temporal   SpO2:  98%   Weight: 250 lb (113.4 kg) 242 lb 12.8 oz (110.1 kg)   Height: 5' 9\" (1.753 m) 5' 9\" (1.753 m)                                              BP Readings from Last 3 Encounters:   22 (!) 133/99   22 126/80   22 126/86       NPO Status: Time of last liquid consumption:                         Time of last solid consumption:                         Date of last liquid consumption: 22                        Date of last solid food consumption: 22    BMI:   Wt Readings from Last 3 Encounters:   22 242 lb 12.8 oz (110.1 kg)   22 250 lb 9.6 oz (113.7 kg)   22 254 lb (115.2 kg)     Body mass index is 35.86 kg/m². CBC:   Lab Results   Component Value Date    WBC 10.2 2022    RBC 5.84 2022    HGB 17.0 2022    HCT 51.4 2022    MCV 88.0 2022    RDW 13.9 2022     2022       CMP:   Lab Results   Component Value Date     2022    K 4.5 2022     2022    CO2 24 2022    BUN 9 2022    CREATININE 0.8 2022    GFRAA >60 2022    LABGLOM >60 2022    GLUCOSE 100 2022    CALCIUM 9.7 2022       POC Tests: No results for input(s): POCGLU, POCNA, POCK, POCCL, POCBUN, POCHEMO, POCHCT in the last 72 hours.     Coags: No results found for: PROTIME, INR, APTT    HCG (If Applicable): No results found for: PREGTESTUR, PREGSERUM, HCG, HCGQUANT     ABGs: No results found for: PHART, PO2ART, ODK6DJS, GKI0NKN, BEART, Q0LKCJQB     Type & Screen (If Applicable):  No results found for: LABABO, LABRH    Drug/Infectious Status (If Applicable):  No results found for: HIV, HEPCAB    COVID-19 Screening (If Applicable): No results found for: COVID19        Anesthesia Evaluation  Patient summary reviewed and Nursing notes reviewed no history of anesthetic complications:   Airway: Mallampati: IV  TM distance: >3 FB   Neck ROM: full  Comment: Large beard  Mouth opening: > = 3 FB Dental: normal exam   (+) poor dentition      Pulmonary:Negative Pulmonary ROS and normal exam    (+) decreased breath sounds,                             Cardiovascular:  Exercise tolerance: good (>4 METS),       (-)  angina, orthopnea and PND    ECG reviewed  Rhythm: regular  Rate: normal           Beta Blocker:  Not on Beta Blocker         Neuro/Psych:   Negative Neuro/Psych ROS              GI/Hepatic/Renal: Neg GI/Hepatic/Renal ROS            Endo/Other: Negative Endo/Other ROS                    Abdominal:   (+) obese,     Abdomen: soft. Vascular: negative vascular ROS. Other Findings:             Anesthesia Plan      general     ASA 3     (Interscalene nerve block was attempted, however due to extreme body habitus there was no clear view of the nerve structures and nerve stimulator was not able to obtain a reliable nerve stimulation pattern. This lack of consistent nerve stimulation could represent an undiagnosed neuropathy, therefore a blind approach was ill advised and the block attempt was terminated. No local anesthetic was injected during the process.)  Induction: intravenous. MIPS: Postoperative opioids intended and Prophylactic antiemetics administered. Anesthetic plan and risks discussed with patient. Plan discussed with CRNA and attending.                 Kevyn Zaidi DO   4/29/2022

## 2022-04-29 NOTE — H&P
Loren Downey    [id number]    [facility] [employer] Same Day Surgery Update H & P  Department of General Surgery   Surgical Service   Pre-operative History and Physical  Last H & P within the last 30 days. DIAGNOSIS:   Bicipital tendinitis of right shoulder [M75.21]    Procedure(s):  RIGHT SHOULDER ARTHROSCOPY FOR DISTAL CLAVICLE EXCISION AND OPEN BICEPS TENODESIS , POSSIBLE ROTATOR CUFF REPAIR     History obtained from: Patient interview and EHR     HISTORY OF PRESENT ILLNESS:   The patient is a 48 y.o. male with c/o right shoulder pain, weakness and limited ROM in the setting of right shoulder biceps tendinitis, subacromial impingement and AC joint arthritis. Their symptoms have been recalcitrant to conservative treatment and the patient presents today for the above procedure. Illness Screening: Patient denies fever, chills, worsening cough, or close contact with sick individuals. Past Medical History:        Diagnosis Date    Chronic back pain     Neuropathy      Past Surgical History:        Procedure Laterality Date    APPENDECTOMY  1989    BACK SURGERY  4/2007    L5-S1 discectomy    BACK SURGERY  06/02/2014     BACK SURGERY  2010 2012 2016       Medications Prior to Admission:      Prior to Admission medications    Medication Sig Start Date End Date Taking?  Authorizing Provider   celecoxib (CELEBREX) 200 MG capsule Take 1 capsule by mouth daily 1/18/22   Salma Boone MD         Allergies:  Amoxicillin and Penicillins    PHYSICAL EXAM:      BP (!) 133/99   Pulse 95   Temp 98.1 °F (36.7 °C) (Temporal)   Resp 18   Ht 5' 9\" (1.753 m)   Wt 242 lb 12.8 oz (110.1 kg)   SpO2 98%   BMI 35.86 kg/m²      Airway:  Airway patent with no audible stridor    Heart:  Regular rate and rhythm, No murmur noted    Lungs:  No increased work of breathing, good air exchange, clear to auscultation bilaterally, no crackles or wheezing    Abdomen:  Soft, non-distended, non-tender, no masses palpated    ASSESSMENT AND PLAN    Patient is a 48 y.o. male with above specified procedure planned. 1.  The patients history and physical was obtained and signed off by the pre-admission testing department. Patient seen and focused exam done today- no new changes since last physical exam on 4/4/22    2. Access to ancillary services are available per request of the provider.     Ada Negron, SANDHYA - CNP     4/29/2022

## 2022-04-29 NOTE — ANESTHESIA POSTPROCEDURE EVALUATION
Department of Anesthesiology  Postprocedure Note    Patient: Kassidy Allison  MRN: [id number]  YOB: 1975  Date of evaluation: 4/29/2022  Time:  4:34 PM     Procedure Summary     Date: 04/29/22 Room / Location: [facility] OR [address] [place] [address]    Anesthesia Start: 7300 Anesthesia Stop: 1622    Procedure: RIGHT SHOULDER ARTHROSCOPY FOR DISTAL CLAVICLE EXCISION, SUBACROMIAL DECOMPRESSION, EXTENSIVE DEBRIDEMENT, SWATHI DECOMPRESSION, INJECTION OF CORTICOSTEROID (Right Shoulder) Diagnosis:       Bicipital tendinitis of right shoulder      (Biceps Tendinitis Of Right Upper Extremity)    Surgeons: Nita Murillo MD Responsible Provider: Zhang Rowe DO    Anesthesia Type: general ASA Status: 3          Anesthesia Type: general    Geno Phase I: Geno Score: 8    Geno Phase II:      Last vitals: Reviewed and per EMR flowsheets.        Anesthesia Post Evaluation    Patient location during evaluation: PACU  Patient participation: complete - patient participated  Level of consciousness: awake and alert  Pain score: 0  Airway patency: patent  Nausea & Vomiting: no nausea and no vomiting  Complications: no  Cardiovascular status: hemodynamically stable  Respiratory status: acceptable  Hydration status: stable

## 2022-04-29 NOTE — PROGRESS NOTES
Ambulatory Surgery/Procedure Discharge Note    Vitals:    04/29/22 1732   BP: (!) 139/95   Pulse: 87   Resp: 17   Temp: 97 °F (36.1 °C)   SpO2: 93%   pt met criteria for discharge per Geno score. In: 2085 [P.O.:200; I.V.:1385]  Out: 47     Restroom use offered before discharge. Yes    Pain assessment:  present - adequately treated  Pain Level: 3    Pt and S.O./family states \"ready to go home\". Pt alert and oriented x4. IV removed. Denies N/V or pain. Dressing R shoulder CDI. Voided prior to discharge. Discharge instructions given to pt and cousin Alba Reagan with pt permission. Pt and cousin verbalized understanding of all instructions. Left with all belongings, escribed prescriptions, and discharge instructions. Patient discharged to home/self care.  Patient discharged via wheel chair by transporter to waiting family/S.O.       4/29/2022 6:18 PM

## 2022-04-29 NOTE — ANESTHESIA PROCEDURE NOTES
Peripheral Block    Patient location during procedure: pre-op  Start time: 4/29/2022 12:50 PM  End time: 4/29/2022 12:59 PM  Staffing  Performed: anesthesiologist   Anesthesiologist: Lexi Park MD  Other anesthesia staff: Shayne Galvan DO  Preanesthetic Checklist  Completed: patient identified, IV checked, site marked, risks and benefits discussed, surgical consent, monitors and equipment checked, pre-op evaluation, timeout performed, anesthesia consent given, oxygen available and patient being monitored  Peripheral Block  Patient position: supine  Prep: ChloraPrep  Patient monitoring: cardiac monitor, continuous pulse ox, continuous capnometry, frequent blood pressure checks, IV access, oxygen and responsive to questions  Block type: Brachial plexus  Laterality: right  Injection technique: single-shot  Guidance: nerve stimulator and ultrasound guided  Interscalene  Provider prep: mask  Needle  Needle type: insulated echogenic nerve stimulator needle   Needle gauge: 20 G  Needle length: 8 cm  Needle localization: nerve stimulator and ultrasound guidance  Assessment  Injection assessment: negative aspiration for heme, no paresthesia on injection, local visualized surrounding nerve on ultrasound and no intravascular symptoms  Paresthesia pain: none  Slow fractionated injection: yes  Hemodynamics: stableOutcomes: uncomplicated  Additional Notes  Prior attempt by Dr Cornell Wagoner who could not positively ID nerve bundle and made no attempt at peripheral nerve block.  Subsequently, Dr Araseli Hoffman placed nerve block on second attempt and was able to stimulated plexus with loss of stimulation @ 0.5mA  Reason for block: post-op pain management and at surgeon's request

## 2022-04-29 NOTE — PROGRESS NOTES
PACU Transfer to [facility]    Vitals:    04/29/22 1715   BP: (!) 133/95   Pulse: 84   Resp: 15   Temp: 97.9 °F (36.6 °C)   SpO2: 94%     BP is at baseline value. Intake/Output Summary (Last 24 hours) at 4/29/2022 1733  Last data filed at 4/29/2022 1715  Gross per 24 hour   Intake 2085 ml   Output 47 ml   Net 2038 ml       Pain assessment:  present - adequately treated, pain pill given prior to leaving pacu  Pain Level: 3    Patient transferred to care of [facility] RN. Transferred with all belongings including cell phone in hand, glasses on face. Transferred per pacu transport. Miguel Mcclellan is in the [facility] awaiting discharge instructions.     4/29/2022 5:33 PM

## 2022-04-29 NOTE — PROGRESS NOTES
Patient received from the OR to PACU #10 post  RIGHT SHOULDER ARTHROSCOPY FOR DISTAL CLAVICLE EXCISION, SUBACROMIAL DECOMPRESSION, EXTENSIVE DEBRIDEMENT, SWATHI DECOMPRESSION, INJECTION OF CORTICOSTEROID - Right of Dr. Leonel Arriola. Placed on PACU monitoring equipment. Report given per CRNA and OR SA. Per report patient was stable during the procedure. Medicated on arrival per crna for pain. Had pre op block. 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 Has full sensation right arm.

## 2022-04-29 NOTE — FLOWSHEET NOTE
Sitting up, taking ice chips and sips of coffee well. States that is having \"a little pain\", declines pain medicine. Call placed to patient's cousin for update. He is in the [facility] awaiting discharge instructions.

## 2022-04-30 NOTE — OP NOTE
[facility]               [address]                                OPERATIVE REPORT    PATIENT NAME: Arnulfo Hall                     :        1975  MED REC NO:   [id number]                          ROOM:  ACCOUNT NO:   [de-identified]                           ADMIT DATE: 2022  PROVIDER:     Rodriguez Gonzalez MD    DATE OF PROCEDURE:  2022    PREOPERATIVE DIAGNOSES:  Right shoulder impingement, AC joint arthritis,  possible biceps tendon tear, possible rotator cuff tear. POSTOPERATIVE DIAGNOSES:  Right shoulder severe labrum tear; partial  thickness rotator cuff tear, bursal and articular sided; subacromial  impingement and AC joint arthritis, severe. PROCEDURES:  Right shoulder examination under anesthesia; diagnostic  arthroscopy; arthroscopic extensive debridement of labrum, rotator cuff,  rotator interval, subacromial bursa; arthroscopic subacromial  decompression with acromioplasty; arthroscopic distal clavicle excision;  injection of corticosteroid. ANESTHESIA:  General anesthesia, interscalene block. IV FLUIDS:  1000 mL of crystalloid. ESTIMATED BLOOD LOSS:  25 mL. COMPLICATIONS:  None. SURGEON:  Rodriguez Gonzalez MD    ASSISTANT:  Ender Garces MD    IMPLANTS:  None. COMPLICATIONS:  None. FINDINGS:  Examination under anesthesia revealed no full motion deficit  but decreased anterior posterior transition. Diagnostic arthroscopy  revealed no glenohumeral arthritis, type 1 SLAP tear amenable to  debridement. There was low-grade articular sided partial thickness  rotator cuff tearing. The biceps itself looked good. There was rotator  interval scarring. Exploration of the subacromial space revealed dense  subdeltoid subacromial bursitis. There were bursal-sided adhesions  versus some low-graded bursal-sided frame, but quite diffuse from front  to back amenable to debridement.   Still this does not meet criteria for  suture anchor repair. Type 2 almost type 3 acromion amenable to  subacromial decompression with acromioplasty. There was marked  arthrosis across the [facility] joint amenable to distal clavicle excision. AC  joint was quite tight. There were no other obvious anomalous findings. BRIEF HISTORY AND PRESENTING ILLNESS:  As follows: The patient is a  49-year-old gentleman with longstanding history of right shoulder pain,  most of it is over the [facility] joint. He had some positive impingement signs,   strength was well preserved and he had some pain over the rotator cuff. MRI did not show an obvious rotator cuff tear and there was some  positive provocative biceps tendon. I have recommended arthroscopic  decompression, distal clavicle excision, possible rotator cuff repair,  biceps tenodesis depending on what we encountered at surgery. Understood timeline of recovery would be hard to anticipate based on  this. He understood there were risks such as bleeding; infection;  anesthetic risks; injury to nerves, blood vessels, stiffness; weakness;  incomplete pain relief; need for further surgery. He understood all of  this and wished to proceed, gave informed consent, and was scheduled on  an elective basis after appropriate preoperative medical clearance. DESCRIPTION OF PROCEDURE:  On the date of procedure, brought back to the  operating room and placed supine on the operating table. General  anesthesia was established. Preoperative antibiotics and interscalene  block were given in the holding area. Under anesthesia, exam was  carried out with the findings as noted above. The patient was  positioned using a [employer] beach-chair positioner in sitting position. All pressure points were padded. The patient's right shoulder and arm  were prepped and draped in a standard manner for arthroscopic shoulder  surgery. We used a [employer] Spider for arm position. We began diagnostic  arthroscopy.   The arthroscope was introduced into the glenohumeral joint  through a standard posterior portal.  Systematic diagnostic arthroscopy  ensued with findings as noted above. We established an anterior portal  in the rotator interval and arthroscopic shaver across the metal  cannula. This was used to debride some rotator interval fraying. We  also debrided some superior iliac fraying. We debrided some rotator  cuff fraying in the articular side. We went underneath the biceps with  the shaver for this. We used cautery to lilly the amount of footprint  exposed. It was about 3 mm. We confirmed this with the probe. The  biceps was left alone. We opened up the rotator interval tissue a  little bit more. There was quite a bit of bursitis and coracoid humeral  ligament scarring. This was all resected. We then redirected the  arthroscope through the same posterior portal above the rotator cuff and  subacromial space. We established a lateral portal under direct  visualization and dilated the portal with arthroscopic shaver and  performed a thorough bursectomy. There was bursal sided fraying of the  rotator cuff and this was debrided smoothly. We placed arthroscope  laterally and completed the bursectomy posterior and posterolaterally  using a shaver and a cautery device. Cautery was used to resect the  periosteum and acromion and teased off the coracoacromial ligament. There was a large embedded osteophyte type 3. We used an acromionizer  heather to remove the osteophyte. We started with the arthroscope  posteriorly and beveled the lateral edge with a heather. We then placed  the arthroscope laterally and completed the acromioplasty bringing the  heather from posterior to anterior. Cutting block technique was used and  we removed the anterior medial osteophyte. We smoothed out the cut  surface of the bone after heather in reverse. We re-assessed the  coracoacromial ligament to facilitate this.   While viewing through the  lateral portal, we exposed the distal clavicle with cautery. We used  acromionizer heather to perform a distal clavicle excision. 1.2 cm distal  clavicle was resected. We started from anterior to posterior and made  sure we had adequate bone resected superiorly and posteriorly. We just  teased off the posterior capsule to make sure most of it was left  attached. We smoothed out the cut surface of the bone with operating  heather and reversed and removed a couple of subchondral space that we  encountered. We removed all of the bony debris. We inspected the  bursal side of the rotator cuff and there was no tearing. We irrigated  copiously and closed the arthroscopic portals using 4-0 Monocryl and  Prineo dressing. We infiltrated 80 mg of Depo-Medrol and 5 mL of 0.5%  Marcaine in the subacromial space and a needle that we placed directly  under direct visualization. Withdrew the needle and a sterile  compressive dressing was applied. The arm was placed in padded soft  brace. The patient was repositioned in supine position before this and  promptly awoken from anesthesia, having tolerated the procedure well and  taken from the operating room to the recovery room in satisfactory  condition. PLAN:  The patient will be discharged on oral analgesics with  instructions to continue outpatient physical therapy and follow up in  the office in one week and remove the sling and ramp up the activity as  tolerated and prevent stiffness.         Quinton Solomon MD    D: 04/29/2022 15:41:46       T: 04/30/2022 1:54:13     [email or web address]  Job#: [id number]     Doc#: [id number]    CC:

## 2022-05-04 ENCOUNTER — OFFICE VISIT (OUTPATIENT)
Dept: ORTHOPEDIC SURGERY | Age: 47
End: 2022-05-04

## 2022-05-04 ENCOUNTER — TELEPHONE (OUTPATIENT)
Dept: PRIMARY CARE CLINIC | Age: 47
End: 2022-05-04

## 2022-05-04 VITALS — BODY MASS INDEX: 35.84 KG/M2 | WEIGHT: 242 LBS | HEIGHT: 69 IN

## 2022-05-04 DIAGNOSIS — Z98.890 S/P SHOULDER SURGERY: ICD-10-CM

## 2022-05-04 DIAGNOSIS — M25.511 RIGHT SHOULDER PAIN, UNSPECIFIED CHRONICITY: Primary | ICD-10-CM

## 2022-05-04 DIAGNOSIS — M75.21 BICEPS TENDINITIS OF RIGHT UPPER EXTREMITY: Primary | ICD-10-CM

## 2022-05-04 PROCEDURE — 99024 POSTOP FOLLOW-UP VISIT: CPT | Performed by: PHYSICIAN ASSISTANT

## 2022-05-04 NOTE — PROGRESS NOTES
History of Present Illness:  Loren Downey is a 48 y.o. male who presents for a post operative visit. The patient underwent a right shoulder arthroscopy for distal clavicle excision, subacromial decompression on 4/29/2022 by Dr. Camilla Dawson. Overall he is doing okay and feels that their pain is well controlled with current pain medications. He has been compliant with wearing the UltraSling brace at all times. The patient deny fevers, chills, numbness, tingling, and shortness of breath. Medical History:  Patient's medications, allergies, past medical, surgical, social and family histories were reviewed and updated as appropriate. No notes on file    Review of Systems  A 14 point review of systems was completed by the patient is available in the media section of the scanned medical record and was reviewed on 5/4/2022. Vital Signs: There were no vitals filed for this visit. General/Appearance: Alert and oriented and in no apparent distress. Skin:  There are no skin lesions, cellulitis, or extreme edema. The patient has warm and well-perfused Bilateral upper extremities with brisk capillary refill. He Shoulder Exam:    Inspection: right shoulder incision that is clean, dry and intact and well approximated. The Prineo dressing is still in place. Mild ecchymosis and swelling are present as can be expected. There is no erythema, drainage or other signs of infection    Palpation:  No crepitus to gentle motion    Active Range of Motion: Deferred    Passive Range of Motion: Forward elevation of 30 degrees and tolerated pendulum movements. Strength:  Deferred    Special Tests:  Deferred. Neurovascular: Sensation to light touch is intact, no motor deficits, palpable radial pulses 2+    Radiology:     Plain radiographs not obtained today.          Assessment :  Mr. Loren Downey is a 48 y.o. patient who underwent a right shoulder arthroscopy for distal clavicle excision, acromioplasty and subacromial decompression on 4/29/2022      Impression:  Encounter Diagnoses   Name Primary?  Right shoulder pain, unspecified chronicity Yes    S/P shoulder surgery        Office Procedures:  No orders of the defined types were placed in this encounter. Treatment Plan:    Overall the patient is doing well. The pain is well-controlled. We recommend that he wear the UltraSling brace at all times with the exception of clothing, bathing of physical therapy for the first week and then go ahead and discontinue it at home and wear it primarily when he is outdoors in big crowds for 2 weeks. The patient was told that he is restricted from driving for at least 1 weeks postop. We will give a print out of their physical therapy order. All of his questions were fully answered today. We would like to see him back in 2 weeks for follow-up visit. 1:26 PM      Gavi Ramirez PA-C  Orthopaedic Sports Medicine Physician Assistant      This dictation was performed with a verbal recognition program [facility]) and it was checked for errors. It is possible that there are still dictated errors within this office note. If so, please bring any errors to my attention for an addendum. All efforts were made to ensure that this office note is accurate.

## 2022-05-04 NOTE — TELEPHONE ENCOUNTER
[facility] Transitions Initial Follow Up Call    Outreach made within 2 business days of discharge: No    Patient: Mehreen Kitchen Patient : 1975   MRN: [id number]  Reason for Admission: There are no discharge diagnoses documented for the most recent discharge.   Discharge Date: 22     Patient being followed By Ortho   Follow Up  Future Appointments   Date Time Provider Iván Griffiths   5/10/2022  1:15 PM Yanick Lozano PT [facility] BRIGITTE MORALES   2022 10:30 AM Naseem Ricks PA-C [place], [place]

## 2022-05-10 ENCOUNTER — HOSPITAL ENCOUNTER (OUTPATIENT)
Dept: PHYSICAL THERAPY | Age: 47
Setting detail: THERAPIES SERIES
Discharge: HOME | End: 2022-05-10
Payer: COMMERCIAL

## 2022-05-10 PROCEDURE — 97161 PT EVAL LOW COMPLEX 20 MIN: CPT

## 2022-05-10 PROCEDURE — 97110 THERAPEUTIC EXERCISES: CPT

## 2022-05-10 PROCEDURE — 97140 MANUAL THERAPY 1/> REGIONS: CPT

## 2022-05-10 NOTE — PLAN OF CARE
[facility]  [address] [address]  Phone [phone]   Fax [phone]                                                       Physical Therapy Certification    Dear Clarita Lin MD    We had the pleasure of evaluating the following patient for physical therapy services at [address] [place] [address]. A summary of our findings can be found in the initial assessment below. This includes our plan of care. If you have any questions or concerns regarding these findings, please do not hesitate to contact me at the office phone number checked above.   Thank you for the referral.       Physician Signature:_______________________________Date:__________________  By signing above (or electronic signature), therapists plan is approved by physician      Patient: Veda Blevins \"HALIE\"   : 1975   MRN: [id number]  Referring Physician: Clarita Lin MD     Evaluation Date: 5/10/2022      Medical Diagnosis Information:  Diagnosis: Biceps tendinitis or right upper extremity (M75.21)   Treatment Diagnosis: Extensive debridement of labrum, rotator cuff, rotator interval, subacromial bursa; Subacromial decompression with acromioplasty; arthroscopic distal clavicle excision; injection of corticosteroid                                         Insurance information:  Insurance Information: [facility], NO COPAY, NO DED, HAS USEd 5 visits to date    Precautions/ Contra-indications: NONE    C-SSRS Triggered by Intake questionnaire (Past 2 wk assessment):   [x] No, Questionnaire did not trigger screening.   [] Yes, Patient intake triggered further evaluation      [] C-SSRS Screening completed  [] PCP notified via Plan of Care  [] Emergency services notified     Latex Allergy:  [x]NO      []YES  Preferred Language for Healthcare:   [x]English       []Other:      SUBJECTIVE: Veda Blevins, \"Halie\" is a 48 y.o. male who is a previous patient of mine who failed conservative treatment. Patient is returning to therapy following: Extensive debridement of labrum, rotator cuff, rotator interval, subacromial bursa; Subacromial decompression with acromioplasty; arthroscopic distal clavicle excision; injection of corticosteroid on 4/29/22. Relevant Medical History: None  Functional Disability Index: FOTO: 39    Pain Scale: 4/10  Easing factors: Rest, medication  Provocative factors: reaching, sleeping, self care, driving, lifting, OH movements      Type: []Constant   [x]Intermittent  []Radiating []Localized []other:     Numbness/Tingling: denies    Occupation/School: [employer]    Living Status/Prior Level of Function: Independent with ADLs and IADLs,     OBJECTIVE:     CERV ROM     Cervical Flexion WFL    Cervical Extension WFL    Cervical SB WFL    Cervical rotation WFL         PROM Operative Non-operative   Shoulder Flex 120 155   Shoulder Abd 150 160   Shoulder ER 82 90   Shoulder IR 60 70                  Strength (MMT) Operative Non-operative   Shoulder Flex nt 5   Shoulder Scap nt 5   Shoulder ER nt 5   Shoulder IR nt 5               Reflexes/Sensation:    [x]Dermatomes/Myotomes intact    [x]Reflexes equal and normal bilaterally   []Other:    Joint mobility:    []Normal    [x]Hypo   []Hyper    Palpation: Patient with exquisite tenderness to the biceps tendon and rtc interval     Functional Mobility/Transfers: compliant with precautions during bed mobility    Posture: Rounded posturing    Bandages/Dressings/Incisions: incisions clean, healing well    Gait: (include devices/WB status): WNL    Orthopedic Special Tests: deferred                       [x] Patient history, allergies, meds reviewed. Medical chart reviewed. See intake form. Review Of Systems (ROS):  [x]Performed Review of systems (Integumentary, CardioPulmonary, Neurological) by intake and observation. Intake form has been scanned into medical record.  Patient has been instructed to contact their primary care physician regarding ROS issues if not already being addressed at this time. Co-morbidities/Complexities (which will affect course of rehabilitation):   [x]None           Arthritic conditions   []Rheumatoid arthritis (M05.9)  []Osteoarthritis (M19.91)   Cardiovascular conditions   []Hypertension (I10)  []Hyperlipidemia (E78.5)  []Angina pectoris (I20)  []Atherosclerosis (I70)   Musculoskeletal conditions   []Disc pathology   []Congenital spine pathologies   []Prior surgical intervention  []Osteoporosis (M81.8)  []Osteopenia (M85.8)   Endocrine conditions   []Hypothyroid (E03.9)  []Hyperthyroid Gastrointestinal conditions   []Constipation (T78.77)   Metabolic conditions   []Morbid obesity (E66.01)  []Diabetes type 1(E10.65) or 2 (E11.65)   []Neuropathy (G60.9)     Pulmonary conditions   []Asthma (J45)  []Coughing   []COPD (J44.9)   Psychological Disorders  []Anxiety (F41.9)  []Depression (F32.9)   []Other:   []Other:          Barriers to/and or personal factors that will affect rehab potential:              []Age  []Sex              []Motivation/Lack of Motivation                        []Co-Morbidities              []Cognitive Function, education/learning barriers              []Environmental, home barriers              []profession/work barriers  [x]past PT/medical experience  []other:  Justification:      Falls Risk Assessment (30 days):   [x] Falls Risk assessed and no intervention required. [] Falls Risk assessed and Patient requires intervention due to being higher risk   TUG score (>12s at risk):     [] Falls education provided, including        ASSESSMENT:  Patient presents with signs and symptoms consistent with right shoulder pain following; extensive debridement of labrum, rotator cuff, rotator interval, subacromial bursa; Subacromial decompression with acromioplasty; arthroscopic distal clavicle excision; injection of corticosteroid on 4/29/22.  Patient demonstrates impairments and functional limitations listed below. Patient would benefit from skilled therapy according to POC below to assist with return to prior level of function and improve quality of life. Functional Impairments   [x]Noted spinal or UE joint hypomobility   []Noted spinal or UE joint hypermobility   [x]Decreased UE functional ROM   [x]Decreased UE functional strength   []Abnormal reflexes/sensation/myotomal/dermatomal deficits   [x]Decreased RC/scapular/core strength and neuromuscular control   []other:      Functional Activity Limitations (from functional questionnaire and intake)   [x]Reduced ability to tolerate prolonged functional positions   [x]Reduced ability or difficulty with changes of positions or transfers between positions   [x]Reduced ability to maintain good posture and demonstrate good body mechanics with sitting, bending, and lifting   [x] Reduced ability or tolerance with driving and/or computer work   [x]Reduced ability to sleep   [x]Reduced ability to perform lifting, reaching, carrying tasks   [x]Reduced ability to tolerate impact through UE   [x]Reduced ability to reach behind back   [x]Reduced ability to  or hold objects   [x]Reduced ability to throw or toss an object   []other:    Participation Restrictions   [x]Reduced participation in self care activities   [x]Reduced participation in home management activities   [x]Reduced participation in work activities   [x]Reduced participation in social activities. [x]Reduced participation in sport/recreation activities. Classification:   [x]Signs/symptoms consistent with post-surgical status including decreased ROM, strength and function.   []Signs/symptoms consistent with joint sprain/strain   []Signs/symptoms consistent with shoulder impingement   []Signs/symptoms consistent with shoulder/elbow/wrist tendinopathy   []Signs/symptoms consistent with Rotator cuff tear   []Signs/symptoms consistent with labral tear   []Signs/symptoms consistent with postural dysfunction    []Signs/symptoms consistent with Glenohumeral IR Deficit - <45 degrees   []Signs/symptoms consistent with facet dysfunction of cervical/thoracic spine    []Signs/symptoms consistent with pathology which may benefit from Dry needling     []other:     Prognosis/Rehab Potential:      [x]Excellent   []Good    []Fair   []Poor    Tolerance of evaluation/treatment:    [x]Excellent   []Good    []Fair   []Poor    Physical Therapy Evaluation Complexity Justification  [x] A history of present problem with:  [x] no personal factors and/or comorbidities that impact the plan of care;  []1-2 personal factors and/or comorbidities that impact the plan of care  []3 personal factors and/or comorbidities that impact the plan of care  [x] An examination of body systems using standardized tests and measures addressing any of the following: body structures and functions (impairments), activity limitations, and/or participation restrictions;:  [x] a total of 1-2 or more elements   [] a total of 3 or more elements   [] a total of 4 or more elements   [x] A clinical presentation with:  [x] stable and/or uncomplicated characteristics   [] evolving clinical presentation with changing characteristics  [] unstable and unpredictable characteristics;   [x] Clinical decision making of [x] low, [] moderate, [] high complexity using standardized patient assessment instrument and/or measurable assessment of functional outcome.     [x] EVAL (LOW) 10556 (typically 30 minutes face-to-face)  [] EVAL (MOD) 63313 (typically 30 minutes face-to-face)  [] EVAL (HIGH) 19389 (typically 45 minutes face-to-face)  [] RE-EVAL     PLAN:  Frequency/Duration:  1-2 days per week for 6-12 Weeks:  INTERVENTIONS:  [x] Therapeutic exercise including: strength training, ROM, for Upper extremity and core   [x]  NMR activation and proprioception for UE, scap and Core   [x] Manual therapy as indicated for shoulder, scapula and spine to include: Dry Needling/IASTM, STM, PROM, Gr I-IV mobilizations, manipulation. [x] Modalities as needed that may include: thermal agents, E-stim, Biofeedback, US, iontophoresis as indicated  [x] Patient education on joint protection, postural re-education, activity modification, progression of HEP. Date:  5/10/2022    Patient Name:  Antoinette Cheema  \"Giacomo\"  :  1975  MRN: [id number]  Medical/Treatment Diagnosis Information:  · Diagnosis: Biceps tendinitis or right upper extremity (M75.21)   · Treatment Diagnosis: Extensive debridement of labrum, rotator cuff, rotator interval, subacromial bursa; Subacromial decompression with acromioplasty; arthroscopic distal clavicle excision; injection of corticosteroid  Insurance/Certification information:  PT Insurance Information: [facility]  Physician Information:  Referring Practitioner: Parminder Steve MD  Plan of care signed (Y/N):      Date of Patient follow up with Physician:      Progress Report: [x]  Yes  []  No     Functional Scale:   22 SPADI = 46% disability  5/10/22 FOTO: 39      Date Range for reporting period:  Beginnin/10/22  Ending:      Progress report due (10 Rx/or 30 days whichever is less): 9630     Recertification due (POC duration/ or 90 days whichever is less): 8/10/22     Visit # Insurance Allowable Auth Needed   5 used prior to sx  6 30 [x]Yes    []No     Pain level: 4/10     SUBJECTIVE:  Refer above     OBJECTIVE: Skilled care provided per flow sheet below; Evaluation - Patient education regarding PT assessment and plan of care. Discussed any post operative precautions or guidelines at this time (if appropriate for patient diagnosis); discussed activity modification, while providing explanation in regards to anatomical structures involved, healing time frames and relevant joint mechanics. Provided patient time for questions with answers provided when possible.     Observation:      Test measurements:    DATE PROM              RESTRICTIONS/PRECAUTIONS: *agg signs = flexion/abduction, resisted ER/Ir    Home exercises Program:  3/23/2022: tband row, supine DB press, supine DB OH raise, SL ER, standing doorway pec stretch (Handout to be provided [place])    Exercises/Interventions:   Therapeutic Ex  x12 min Wt / Resistance Sets/sec Reps Notes   Wand assisted flexion       Wand assisted ER       Strap IR walkout              No money iso       SL ER 0# 2 10    SL abd 0# 2 10    Prone row 10# 3 12    Prone ext 5# 3 10    Supine DB press up 3# 2 x10    Supine DB Oh flexion 2# 2 x10    Tband row Black 3 x15    Standing doorway stretch  1 min 3           Education provided above                        Therapeutic Activities                                                                      Manual Intervention  x13 min       GH Mobs  5 min  AP and inferior GH mobs while in neutral, ER, and 90 deg flex   Myofascial release, STM  3 min  Cross friction massage to long head of biceps   Thoracic/Rib manipulation       CT MT/Mobs       Shoulder PROM   5 min  PROM into all planes (heavier focus on ER/IR/flex)   Dry Needling              NMR re-education                                                                   Therapeutic Exercise and NMR EXR  [x] (82008) Provided verbal/tactile cueing for activities related to strengthening, flexibility, endurance, ROM  for improvements in scapular, scapulothoracic and UE control with self care, reaching, carrying, lifting, house/yardwork, driving/computer work.    [] (35427) Provided verbal/tactile cueing for activities related to improving balance, coordination, kinesthetic sense, posture, motor skill, proprioception  to assist with  scapular, scapulothoracic and UE control with self care, reaching, carrying, lifting, house/yardwork, driving/computer work.     Therapeutic Activities:    [x] (79831 or 73642) Provided verbal/tactile cueing for activities related to improving balance, coordination, kinesthetic sense, posture, motor skill, proprioception and motor activation to allow for proper function of scapular, scapulothoracic and UE control with self care, carrying, lifting, driving/computer work. Home Exercise Program:    [x] (21885) Reviewed/Progressed HEP activities related to strengthening, flexibility, endurance, ROM of scapular, scapulothoracic and UE control with self care, reaching, carrying, lifting, house/yardwork, driving/computer work  [] (54340) Reviewed/Progressed HEP activities related to improving balance, coordination, kinesthetic sense, posture, motor skill, proprioception of scapular, scapulothoracic and UE control with self care, reaching, carrying, lifting, house/yardwork, driving/computer work      Manual Treatments:  PROM / STM / Oscillations-Mobs:  G-I, II, III, IV (PA's, Inf., Post.)  [x] (08793) Provided manual therapy to mobilize soft tissue/joints of cervical/CT, scapular GHJ and UE for the purpose of modulating pain, promoting relaxation,  increasing ROM, reducing/eliminating soft tissue swelling/inflammation/restriction, improving soft tissue extensibility and allowing for proper ROM for normal function with self care, reaching, carrying, lifting, house/yardwork, driving/computer work    Modalities:     Charges:  Timed Code Treatment Minutes: 25 min + EVAL   Total Treatment Minutes: 40 min       [x] EVAL (LOW) 57407 (typically 20 minutes face-to-face)  [] EVAL (MOD) 75207 (typically 30 minutes face-to-face)  [] EVAL (HIGH) 62353 (typically 45 minutes face-to-face)  [] RE-EVAL     [x] UZ(24755) x    1  [] IONTO (95150)  [] NMR (74368) x     [] VASO (54056)  [x] Manual (59535) x  1 [] Other: (98387) x   [] TA (33948)x     [] Mech Traction (24562)  [] ES(attended) (92761)     [] ES (un) (20838):     GOALS:  Patient stated goal: \"Return to work\"  []? Progressing: []? Met: [x]? Not Met: []? Adjusted     Therapist goals for Patient:   Short Term Goals:  To be achieved in: 2 weeks  1. Independent in HEP and progression per patient tolerance, in order to prevent re-injury. []? Progressing: []? Met: [x]? Not Met: []? Adjusted  2. Patient will have a decrease in pain to facilitate improvement in movement, function, and ADLs as indicated by Functional Deficits. []? Progressing: []? Met: [x]? Not Met: []? Adjusted     Long Term Goals: To be achieved in: 6-12 weeks  1. FOTO: 77 or greater to assist with reaching prior level of function. []? Progressing: []? Met: [x]? Not Met: []? Adjusted  2. Patient will demonstrate increased AROM with 90% of contralateral limb to allow for proper joint functioning as indicated by patients Functional Deficits. []? Progressing: []? Met: [x]? Not Met: []? Adjusted  3. Patient will demonstrate an increase in Strength to 5lbs of contralateral limb to allow for proper functional mobility as indicated by patients Functional Deficits. []? Progressing: []? Met: [x]? Not Met: []? Adjusted  4. Patient will return to reaching [place] functional activities without increased symptoms or restriction. []? Progressing: []? Met: [x]? Not Met: []? Adjusted  5. Pt will tolerate working x 4 hours without pain greater than 4/10    []? Progressing: []? Met: [x]? Not Met: []? Adjusted         Progression Towards Functional goals:  [] Patient is progressing as expected towards functional goals listed. [] Progression is slowed due to complexities listed. [] Progression has been slowed due to co-morbidities. [x] Plan just implemented, too soon to assess goals progression  [] Other:     ASSESSMENT:  See eval above      Treatment/Activity Tolerance:  [x] Patient tolerated treatment well [] Patient limited by fatique  [] Patient limited by pain  [] Patient limited by other medical complications  [] Other:     Overall Progression Towards Functional goals/ Treatment Progress Update:  [] Patient is progressing as expected towards functional goals listed.     [x] Progression is slowed due to complexities/Impairments listed. [] Progression has been slowed due to co-morbidities. [x] Plan just implemented, too soon to assess goals progression <30days   [] Goals require adjustment due to lack of progress  [] Patient is not progressing as expected and requires additional follow up with physician  [] Other    Prognosis for POC: [x] Good [] Fair  [] Poor    Patient requires continued skilled intervention: [x] Yes  [] No      PLAN: Improve OH movement   Frequency/Duration:  1-2 days per week for 6-12 Weeks:    [x] Continue per plan of care [] Alter current plan (see comments)  [] Plan of care initiated [] Hold pending MD visit [] Discharge    Electronically signed by: Arthur Wolfe PT     Note: If patient does not return for scheduled/recommended follow up visits, this note will serve as a discharge from care along with the most recent update on progress.

## 2022-05-12 ENCOUNTER — HOSPITAL ENCOUNTER (OUTPATIENT)
Dept: PHYSICAL THERAPY | Age: 47
Setting detail: THERAPIES SERIES
Discharge: HOME | End: 2022-05-12
Payer: COMMERCIAL

## 2022-05-12 PROCEDURE — 97110 THERAPEUTIC EXERCISES: CPT | Performed by: SPECIALIST/TECHNOLOGIST

## 2022-05-12 PROCEDURE — 97140 MANUAL THERAPY 1/> REGIONS: CPT | Performed by: SPECIALIST/TECHNOLOGIST

## 2022-05-12 NOTE — FLOWSHEET NOTE
[place] [employer]  [address] [address]  Phone [phone]   Fax [phone]                                                  Date:  2022    Patient Name:  Sherman Swann  \"Giacomo\"  :  1975  MRN: [id number]  Medical/Treatment Diagnosis Information:  Diagnosis: Biceps tendinitis or right upper extremity (M75.21) Subacromial decompression with acromioplasty; arthroscopic distal clavicle excision; injection of corticosteroid on 22. Treatment Diagnosis: Extensive debridement of labrum, rotator cuff, rotator interval, subacromial bursa; Subacromial decompression with acromioplasty; arthroscopic distal clavicle excision; injection of corticosteroid  Insurance/Certification information:  PT Insurance Information: [facility]  Physician Information:  Referring Practitioner: Marylin Jean MD  Plan of care signed (Y/N):      Date of Patient follow up with Physician:      Progress Report: [x]  Yes  []  No     Functional Scale:   22 SPADI = 46% disability  5/10/22 FOTO: 39      Date Range for reporting period:  Beginnin/10/22  Ending:      Progress report due (10 Rx/or 30 days whichever is less): 6677     Recertification due (POC duration/ or 90 days whichever is less): 8/10/22     Visit # Insurance Allowable Auth Needed   5 used prior to sx  7 30 [x]Yes    []No     Pain level: 2/10     SUBJECTIVE:  Pt is sore today from last session but is doing better. Icing shoulder as directed but has been increased soreness with IR BB. SHELDON     OBJECTIVE: Skilled care provided per flow sheet below; Evaluation - Patient education regarding PT assessment and plan of care.  Discussed any post operative precautions or guidelines at this time (if appropriate for patient diagnosis); discussed activity modification, while providing explanation in regards to anatomical structures involved, healing time frames and relevant joint mechanics. Provided patient time for questions with answers provided when possible. Observation:      Test measurements:    DATE          PROM            5/12/22 PROM demonstrated improvements in all ranges with increased soreness into flexion, ER, IR. Pt has increased Skin irritation with his axilla due to switching to dial prior to surgery and may have an allergy.        RESTRICTIONS/PRECAUTIONS: *agg signs = flexion/abduction, resisted ER/Ir    Home exercises Program:  3/23/2022: tband row, supine DB press, supine DB OH raise, SL ER, standing doorway pec stretch (Handout to be provided [place])    Exercises/Interventions:   Therapeutic Ex  X 20 min Wt / Resistance Sets/sec Reps Notes   Wand assisted flexion       Wand assisted ER in seated   10\"  10x 5/12   Strap IR walkout       Pendulums  30x ccw/cw   5/12   scap retractions  10\" 10x 5/12   No money iso                               Table slides flexion  10\" 10x 5/12                          Therapeutic Activities                                                                      Manual Intervention  x15 min       GH Mobs  5 min  AP and inferior GH mobs while in neutral, ER, and 90 deg flex   CT MT/Mobs       Shoulder PROM   10 min  PROM into all planes (heavier focus on ER/IR/flex)   Dry Needling              NMR re-education                                                                   Therapeutic Exercise and NMR EXR  [x] (33360) Provided verbal/tactile cueing for activities related to strengthening, flexibility, endurance, ROM  for improvements in scapular, scapulothoracic and UE control with self care, reaching, carrying, lifting, house/yardwork, driving/computer work.    [] (89286) Provided verbal/tactile cueing for activities related to improving balance, coordination, kinesthetic sense, posture, motor skill, proprioception  to assist with  scapular, scapulothoracic and UE control with self care, reaching, carrying, lifting, house/yardwork, driving/computer work. Therapeutic Activities:    [x] (35423 or 54618) Provided verbal/tactile cueing for activities related to improving balance, coordination, kinesthetic sense, posture, motor skill, proprioception and motor activation to allow for proper function of scapular, scapulothoracic and UE control with self care, carrying, lifting, driving/computer work.      Home Exercise Program:    [x] (59512) Reviewed/Progressed HEP activities related to strengthening, flexibility, endurance, ROM of scapular, scapulothoracic and UE control with self care, reaching, carrying, lifting, house/yardwork, driving/computer work  [] (64406) Reviewed/Progressed HEP activities related to improving balance, coordination, kinesthetic sense, posture, motor skill, proprioception of scapular, scapulothoracic and UE control with self care, reaching, carrying, lifting, house/yardwork, driving/computer work      Manual Treatments:  PROM / STM / Oscillations-Mobs:  G-I, II, III, IV (PA's, Inf., Post.)  [x] (36045) Provided manual therapy to mobilize soft tissue/joints of cervical/CT, scapular GHJ and UE for the purpose of modulating pain, promoting relaxation,  increasing ROM, reducing/eliminating soft tissue swelling/inflammation/restriction, improving soft tissue extensibility and allowing for proper ROM for normal function with self care, reaching, carrying, lifting, house/yardwork, driving/computer work    Modalities:     Charges:  Timed Code Treatment Minutes: 35 min   Total Treatment Minutes: 35 min       [] EVAL (LOW) 08027 (typically 20 minutes face-to-face)  [] EVAL (MOD) 95676 (typically 30 minutes face-to-face)  [] EVAL (HIGH) 15058 (typically 45 minutes face-to-face)  [] RE-EVAL     [x] EQ(36662) x    1  [] IONTO (44665)  [] NMR (52254) x     [] VASO (07889)  [x] Manual (45406) x  1 [] Other: (83586) x   [] TA (44777)x     [] Mech Traction (38500)  [] ES(attended) (60654)     [] ES (un) (46311):     GOALS:  Patient stated goal: \"Return to [employer]"  [] Progressing: [] Met: [x] Not Met: [] Adjusted     Therapist goals for Patient:   Short Term Goals: To be achieved in: 2 weeks  1. Independent in HEP and progression per patient tolerance, in order to prevent re-injury. [] Progressing: [] Met: [x] Not Met: [] Adjusted  2. Patient will have a decrease in pain to facilitate improvement in movement, function, and ADLs as indicated by Functional Deficits. [] Progressing: [] Met: [x] Not Met: [] Adjusted     Long Term Goals: To be achieved in: 6-12 weeks  1. FOTO: 77 or greater to assist with reaching prior level of function. [] Progressing: [] Met: [x] Not Met: [] Adjusted  2. Patient will demonstrate increased AROM with 90% of contralateral limb to allow for proper joint functioning as indicated by patients Functional Deficits. [] Progressing: [] Met: [x] Not Met: [] Adjusted  3. Patient will demonstrate an increase in Strength to 5lbs of contralateral limb to allow for proper functional mobility as indicated by patients Functional Deficits. [] Progressing: [] Met: [x] Not Met: [] Adjusted  4. Patient will return to reaching [place] functional activities without increased symptoms or restriction. [] Progressing: [] Met: [x] Not Met: [] Adjusted  5. Pt will tolerate working x 4 hours without pain greater than 4/10    [] Progressing: [] Met: [x] Not Met: [] Adjusted         Progression Towards Functional goals:  [] Patient is progressing as expected towards functional goals listed. [] Progression is slowed due to complexities listed. [] Progression has been slowed due to co-morbidities. [x] Plan just implemented, too soon to assess goals progression  [] Other:     ASSESSMENT: Pt some increased soreness over shoulder with PROM and mobilizations today. Pt is very sore recently from surgery. Pt has some axilla skin irritation from adhesive reaction and soap (DIAL) reaction potentially.  Pt has some decreased scapular stiffness with mobilizations but fair mobility with ER and IR ROM. Pt advised not to overstress his IR with BB stretch due to increased general discomfort. Tolerated progressions with table slides and cane ER today. Treatment/Activity Tolerance:  [x] Patient tolerated treatment well [] Patient limited by fatique  [x] Patient limited by pain w/ OH flexion & ER  [] Patient limited by other medical complications  [] Other:     Overall Progression Towards Functional goals/ Treatment Progress Update:  [] Patient is progressing as expected towards functional goals listed. [x] Progression is slowed due to complexities/Impairments listed. [] Progression has been slowed due to co-morbidities. [x] Plan just implemented, too soon to assess goals progression <30days   [] Goals require adjustment due to lack of progress  [] Patient is not progressing as expected and requires additional follow up with physician  [] Other    Prognosis for POC: [x] Good [] Fair  [] Poor    Patient requires continued skilled intervention: [x] Yes  [] No      PLAN: Improve OH movement   Frequency/Duration:  1-2 days per week for 6-12 Weeks:    [x] Continue per plan of care [] Alter current plan (see comments)  [] Plan of care initiated [] Hold pending MD visit [] Discharge    Electronically signed by: Angela Smith, DANIELE, [id number]    Note: If patient does not return for scheduled/recommended follow up visits, this note will serve as a discharge from care along with the most recent update on progress.

## 2022-05-16 ENCOUNTER — OFFICE VISIT (OUTPATIENT)
Dept: ORTHOPEDIC SURGERY | Age: 47
End: 2022-05-16

## 2022-05-16 VITALS — HEIGHT: 69 IN | BODY MASS INDEX: 35.84 KG/M2 | WEIGHT: 242 LBS

## 2022-05-16 DIAGNOSIS — Z98.890 S/P SHOULDER SURGERY: Primary | ICD-10-CM

## 2022-05-16 PROCEDURE — 99024 POSTOP FOLLOW-UP VISIT: CPT | Performed by: PHYSICIAN ASSISTANT

## 2022-05-16 RX ORDER — IBUPROFEN 800 MG/1
800 TABLET ORAL EVERY 8 HOURS PRN
Qty: 90 TABLET | Refills: 0 | Status: SHIPPED | OUTPATIENT
Start: 2022-05-16

## 2022-05-16 NOTE — PROGRESS NOTES
12 [facility]  History and Physical  Shoulder Pain    Date:  2022    Name:  Mehreen Kitchen  Address:  [facility] [address] [facility] [address]    :  1975      Age:   48 y.o.    SSN:  [id number]      Medical Record Number:  [id number]    Reason for Visit:    Follow-up (Right Shoulder)      HPI:   Mehreen Kitchen is a 48 y.o. male who presents to our office today for follow up of the right shoulder pain. This patient underwent arthroscopic subacromial decompression with acromioplasty; arthroscopic distal clavicle excision on 2022. He reports he is still having some discomfort after his physical therapy. He has been going to therapy once a week. Denies any new injuries or setbacks since his last visit with us. Pain Assessment  Location of Pain: Shoulder  Location Modifiers: Right  Severity of Pain: 4  Quality of Pain: Aching,Throbbing  Duration of Pain: Persistent  Frequency of Pain: Intermittent  Aggravating Factors:  (postop pain)  Limiting Behavior: Yes  Relieving Factors: Rest,Ice  Work-Related Injury: No  Are there other pain locations you wish to document?: No    No notes on file    Review of Systems:  A 14 point review of systems available in the scanned medical record as documented by the patient. The review is negative with the exception of those things mentioned in the History of Present Illness and Past Medical History. Past Medical History:  Patient's medications, allergies, past medical, surgical, social and family histories were reviewed and updated as appropriate. Allergies: Allergies   Allergen Reactions    Amoxicillin Hives    Penicillins Rash       Physical Exam:  Vitals:     General: Mehreen Kitchen is a healthy and well appearing 48 y.o. male who is sitting comfortably in our office in acute distress. Skin:  There are no skin lesions, cellulitis, or extreme edema.  The patient has warm and well-perfused Bilateral upper extremities with brisk capillary refill. Eyes: Extra-ocular muscles intact  Mouth: Oral mucosa moist. No perioral lesions  Pulm: Respirations unlabored and regular. Neuro: Alert and oriented x3    right Shoulder Exam:  Inspection:  No gross deformities, no signs of infection. Palpation: No crepitus to passive movement. Active Range of Motion: Forward elevation of 120, external rotation with elbow at the side 20    Passive Range of Motion: deferred. Strength: External rotation with resistance with elbow at the side 4/5    Special Tests:   No Aryan muscle deformity. Neurovascular: Sensation to light touch is intact, no motor deficits, palpable radial pulses 2+    Additional Examinations:    Examination of the contralateral extremity does not show any tenderness, deformity or injury. Range of motion is unremarkable. There is no gross instability. There are no rashes, ulcerations or lesions. Strength and tone are normal.      Radiographic:  X-ray not obtained today. 48 y.o. male who underwent an arthroscopic subacromial decompression with acromioplasty and distal clavicle excision on 4/29/2022. Assessment:  Ivonne Avelar is a     Impression:  No diagnosis found. Office Procedures:  No orders of the defined types were placed in this encounter. Plan:   Patient continues to do well in his recovery. He was given reassurance regarding that. We will have him discontinue his sling completely. We will have him go to therapy twice a week at this point. Therapy orders were placed in his chart today. All his questions were fully answered today. 5/16/2022  11:35 AM      Tiffany Guzman PA-C  Orthopaedic Sports Medicine Physician Assistant      This dictation was performed with a verbal recognition program [facility]) and it was checked for errors. It is possible that there are still dictated errors within this office note. If so, please bring any errors to my attention for an addendum.   All efforts were made to ensure that this office note is accurate.

## 2022-05-16 NOTE — LETTER
Physical Therapy Rehabilitation Referral    Patient Name:  Stefanie Timmons      YOB: 1975    Diagnosis:    1. S/P shoulder surgery    2.  48 y.o. male who underwent an arthroscopic subacromial decompression with acromioplasty and distal clavicle excision on 4/29/2022. Precautions:     [x] Evaluate and Treat    Post Op Instructions:  [] Continuous passive motion (CPM)  [x] Elbow ROM  [x] Exercise in plane of scapula  [x]  Strengthening     [x] Pulley and instruction   [x] Home exercise program (copy to patient)   [] Sling when arm at risk  [] Sling or brace at all times   [x] AAROM: Forward elevation to  140            [x] AAROM: External rotation  To  40    [] Isometric external rotator strengthening [x] AAROM: internal rotation: up the back  [x] Isometric abductor strengthening  [x] AAROM: Internal abduction   [] Isometric internal rotator strengthening [x] AAROM: cross-body adduction             Stretching:     Strengthening:  [] Four quadrant (FE, ER, IR, CBA)  [] Rotator cuff (ER, IR, Abd)  [x] Forward Elevation    [] External Rotators     [x] External Rotation    [] Internal Rotators  [x] Internal Rotation: up/back   [] Abductors     [x] Internal Rotation: supine in abduction  [x] Sleeper Stretch    [] Flexors  [] Cross-body abduction    [] Extensors  [x] Pendulum (FE, Abd/Add, cw/ccw)  [x] Scapular Stabilizers   [x] Wall-walking (FE, Abd)        [x] Shoulder shrugs     [x] Table slides (FE)                [x] Rhomboid pinch  [] Elbow (flex, ext, pron, sup)        [] Lat.  Pull downs     [] Medial epicondylitis program       [] Forward punch   [] Lateral epicondylitis program       [] Internal rotators     [] Progressive resistive exercises  [] Bench Press        [] Bench press plus  Activities:     [] Lateral pull-downs  [] Rowing     [] Progressive two-hand supine press  [] Stepper/Exercise bike   [] Biceps: curls/supination  [] Swimming  [] Water exercises    Modalities:     Return to Sport:  [x] Of Choice      [] Plyometrics  [] Ultrasound     [] Rhythmic stabilization  [] Iontophoresis    [] Core strengthening   [] Moist heat     [] Sports specific program:   [] Massage         [x] Cryotherapy      [] Electrical stimulation     [] Paraffin  [] Whirlpool  [] TENS    [x] Home exercise program (copy to patient). Perform exercises for:   15     minutes    3      times/day  [x] Supervised physical therapy  Frequency: []  1x week  [x] 2x week  [] 3x week  [] Other:   Duration: [] 2 weeks   [] 4 weeks  [x] 6 weeks  [] Other:     Additional Instructions:     Janee Velasquez MD, PhD

## 2022-05-18 ENCOUNTER — HOSPITAL ENCOUNTER (OUTPATIENT)
Dept: PHYSICAL THERAPY | Age: 47
Setting detail: THERAPIES SERIES
Discharge: HOME | End: 2022-05-18
Payer: COMMERCIAL

## 2022-05-18 PROCEDURE — 97140 MANUAL THERAPY 1/> REGIONS: CPT | Performed by: SPECIALIST/TECHNOLOGIST

## 2022-05-18 PROCEDURE — 97110 THERAPEUTIC EXERCISES: CPT | Performed by: SPECIALIST/TECHNOLOGIST

## 2022-05-18 PROCEDURE — 97016 VASOPNEUMATIC DEVICE THERAPY: CPT | Performed by: SPECIALIST/TECHNOLOGIST

## 2022-05-18 NOTE — FLOWSHEET NOTE
[facility] [employer]  [address] [address]  Phone [phone]   Fax [phone]                                                  Date:  2022    Patient Name:  Anne-Marie Loving  \"Giacomo\"  :  1975  MRN: [id number]  Medical/Treatment Diagnosis Information:  Diagnosis: Biceps tendinitis or right upper extremity (M75.21) Subacromial decompression with acromioplasty; arthroscopic distal clavicle excision; injection of corticosteroid on 22. Treatment Diagnosis: Extensive debridement of labrum, rotator cuff, rotator interval, subacromial bursa; Subacromial decompression with acromioplasty; arthroscopic distal clavicle excision; injection of corticosteroid  Insurance/Certification information:  PT Insurance Information: [facility]  Physician Information:  Referring Practitioner: Katarzyna Cleveland MD  Plan of care signed (Y/N):      Date of Patient follow up with Physician: Miles Cunningham      Progress Report: [x]  Yes  []  No     Functional Scale:   22 SPADI = 46% disability  5/10/22 FOTO: 39      Date Range for reporting period:  Beginnin/10/22  Ending:      Progress report due (10 Rx/or 30 days whichever is less): 3/9843     Recertification due (POC duration/ or 90 days whichever is less): 8/10/22     Visit # Insurance Allowable Auth Needed   6 used prior to sx  8 30 [x]Yes    []No     Pain level: 2/10     SUBJECTIVE:  Pt reports shoulder doing ok today, has some soreness but making slow gains. Saw BIANCA Murillo  on  and she was pleased with his progress in his RT shoulder. Less irritation under his RT shoulder and is more careful with application of deodorant etc.   OBJECTIVE:   Skilled care provided per flow sheet below; Evaluation - Patient education regarding PT assessment and plan of care.  Discussed any post operative precautions or guidelines at this time (if appropriate for patient diagnosis); discussed activity modification, while providing explanation in regards to anatomical structures involved, healing time frames and relevant joint mechanics. Provided patient time for questions with answers provided when possible. Observation:      Test measurements:    DATE          PROM            5/12/22 PROM demonstrated improvements in all ranges with increased soreness into flexion, ER, IR. Pt has increased Skin irritation with his axilla due to switching to dial prior to surgery and may have an allergy.        RESTRICTIONS/PRECAUTIONS: *agg signs = flexion/abduction, resisted ER/Ir    Home exercises Program:  3/23/2022: tband row, supine DB press, supine DB OH raise, SL ER, standing doorway pec stretch (Handout to be provided NV)    Exercises/Interventions: 40'  Therapeutic Ex  X 20 min Wt / Resistance Sets/sec Reps Notes   Wand assisted flexion       Wand assisted ER in seated   10\"  10x 5/12   Strap IR walkout       Pendulums  30x ccw/cw   5/12   scap retractions  10\" 10x 5/12   No money   1 15x 5/18   SL ER  SL punch 0#ea 2 10ea 5/18 Recomm scap retract initially      Prone row 0# 3 12 5/18   Prone ext 0# 3 10 5/18   Supine punch 0# 2 x10 5/18      Tband row  extension Blue  green 2 x10x 5/18      Table slides flexion  10\" 10x 5/12   Pulleys  flex 4'   5/18                    Therapeutic Activities  5'       Pt education with postural emphasis, rtw, icing and preparing shoulder for higher level strengthening                                                               Manual Intervention  x15 min       GH Mobs  5 min  AP and inferior GH mobs while in neutral, ER, and 90 deg flex   CT MT/Mobs       Shoulder PROM   10 min  PROM into all planes (heavier focus on ER/IR/flex)   Dry Needling              NMR re-education                                                                   Therapeutic Exercise and NMR EXR  [x] (39718) Provided verbal/tactile cueing for activities related to strengthening, flexibility, endurance, ROM for improvements in scapular, scapulothoracic and UE control with self care, reaching, carrying, lifting, house/yardwork, driving/computer work.    [] (04065) Provided verbal/tactile cueing for activities related to improving balance, coordination, kinesthetic sense, posture, motor skill, proprioception  to assist with  scapular, scapulothoracic and UE control with self care, reaching, carrying, lifting, house/yardwork, driving/computer work. Therapeutic Activities:    [x] (16615 or 62474) Provided verbal/tactile cueing for activities related to improving balance, coordination, kinesthetic sense, posture, motor skill, proprioception and motor activation to allow for proper function of scapular, scapulothoracic and UE control with self care, carrying, lifting, driving/computer work.      Home Exercise Program:    [x] (83039) Reviewed/Progressed HEP activities related to strengthening, flexibility, endurance, ROM of scapular, scapulothoracic and UE control with self care, reaching, carrying, lifting, house/yardwork, driving/computer work  [] (95112) Reviewed/Progressed HEP activities related to improving balance, coordination, kinesthetic sense, posture, motor skill, proprioception of scapular, scapulothoracic and UE control with self care, reaching, carrying, lifting, house/yardwork, driving/computer work      Manual Treatments:  PROM / STM / Oscillations-Mobs:  G-I, II, III, IV (PA's, Inf., Post.)  [x] (11356) Provided manual therapy to mobilize soft tissue/joints of cervical/CT, scapular GHJ and UE for the purpose of modulating pain, promoting relaxation,  increasing ROM, reducing/eliminating soft tissue swelling/inflammation/restriction, improving soft tissue extensibility and allowing for proper ROM for normal function with self care, reaching, carrying, lifting, house/yardwork, driving/computer work    Modalities:  Vaso 10- due to inflammation in RT anterior shoulder    Charges:  Timed Code Treatment Minutes: 40 min   Total Treatment Minutes: 50 min       [] EVAL (LOW) 08285 (typically 20 minutes face-to-face)  [] EVAL (MOD) 35236 (typically 30 minutes face-to-face)  [] EVAL (HIGH) 75846 (typically 45 minutes face-to-face)  [] RE-EVAL     [x] ML(45524) x    2  [] IONTO (02593)  [] NMR (39388) x     [x] VASO (85473)  [x] Manual (50020) x  1 [] Other: (76930) x   [] TA (39448)x     [] Mech Traction (73949)  [] ES(attended) (90468)     [] ES (un) (92927):     GOALS:  Patient stated goal: \"Return to work\"  [] Progressing: [] Met: [x] Not Met: [] Adjusted     Therapist goals for Patient:   Short Term Goals: To be achieved in: 2 weeks  1. Independent in HEP and progression per patient tolerance, in order to prevent re-injury. [] Progressing: [] Met: [x] Not Met: [] Adjusted  2. Patient will have a decrease in pain to facilitate improvement in movement, function, and ADLs as indicated by Functional Deficits. [] Progressing: [] Met: [x] Not Met: [] Adjusted     Long Term Goals: To be achieved in: 6-12 weeks  1. FOTO: 77 or greater to assist with reaching prior level of function. [] Progressing: [] Met: [x] Not Met: [] Adjusted  2. Patient will demonstrate increased AROM with 90% of contralateral limb to allow for proper joint functioning as indicated by patients Functional Deficits. [] Progressing: [] Met: [x] Not Met: [] Adjusted  3. Patient will demonstrate an increase in Strength to 5lbs of contralateral limb to allow for proper functional mobility as indicated by patients Functional Deficits. [] Progressing: [] Met: [x] Not Met: [] Adjusted  4. Patient will return to reaching [place] functional activities without increased symptoms or restriction. [] Progressing: [] Met: [x] Not Met: [] Adjusted  5.  Pt will tolerate working x 4 hours without pain greater than 4/10    [] Progressing: [] Met: [x] Not Met: [] Adjusted         Progression Towards Functional goals:  [] Patient is progressing as expected towards functional goals listed. [] Progression is slowed due to complexities listed. [] Progression has been slowed due to co-morbidities. [x] Plan just implemented, too soon to assess goals progression  [] Other:     ASSESSMENT: Pt some increased soreness still related to poor RT shoulder RTC/ scapular stabilizers. Pt advised not to overstress his IR with BB stretch due to increased general discomfort. Tolerated progressions with pulleys and cane ER today. Pt is eager to RTW but is aware that strength has some deficits still to return using a printing press etc. Pt had moments of popping in RT anterior shoudler when patient didn't set the scapular stabilzers and the shoulder was elevated, this increased the stress to the RT anterior shoulder Significant weakness and shaking present with SL and prone strengthening. Treatment/Activity Tolerance:  [x] Patient tolerated treatment well [] Patient limited by fatique  [x] Patient limited by pain w/ OH flexion & ER  [] Patient limited by other medical complications  [] Other:     Overall Progression Towards Functional goals/ Treatment Progress Update:  [] Patient is progressing as expected towards functional goals listed. [x] Progression is slowed due to complexities/Impairments listed. [] Progression has been slowed due to co-morbidities.   [x] Plan just implemented, too soon to assess goals progression <30days   [] Goals require adjustment due to lack of progress  [] Patient is not progressing as expected and requires additional follow up with physician  [] Other    Prognosis for POC: [x] Good [] Fair  [] Poor    Patient requires continued skilled intervention: [x] Yes  [] No      PLAN: Improve OH movement   Frequency/Duration:  1-2 days per week for 6-12 Weeks: determine soreness NPv  [x] Continue per plan of care [] Alter current plan (see comments)  [] Plan of care initiated [] Hold pending MD visit [] Discharge    Electronically signed by: Hemanth Bryant PTA, [id number]    Note: If patient does not return for scheduled/recommended follow up visits, this note will serve as a discharge from care along with the most recent update on progress.

## 2022-05-20 ENCOUNTER — HOSPITAL ENCOUNTER (OUTPATIENT)
Dept: PHYSICAL THERAPY | Age: 47
Setting detail: THERAPIES SERIES
Discharge: HOME | End: 2022-05-20
Payer: COMMERCIAL

## 2022-05-20 NOTE — PROGRESS NOTES
[facility]    Physical Therapy  Cancellation/No-show Note  Patient Name:  Nickie Man  :  1975   Date:  2022  Cancelled visits to date: 0  No-shows to date:1      For today's appointment patient:  []  Cancelled  []  Rescheduled appointment  [x]  No-show     Reason given by patient:  []  Patient ill  []  Conflicting appointment  []  No transportation    []  Conflict with work  []  No reason given  []  Other:     Comments:      Phone call information:   []  Phone call made today to patient at _ time at number provided:      []  Patient answered, conversation as follows:    []  Patient did not answer, message left as follows:  [x]  Phone call not made today  []  Phone call not needed - pt contacted us to cancel and provided reason for cancellation.      Electronically signed by:  Cody Lou, [id number]

## 2022-05-23 ENCOUNTER — HOSPITAL ENCOUNTER (OUTPATIENT)
Dept: PHYSICAL THERAPY | Age: 47
Setting detail: THERAPIES SERIES
Discharge: HOME | End: 2022-05-23
Payer: COMMERCIAL

## 2022-05-23 NOTE — PROGRESS NOTES
[facility]    Physical Therapy  Cancellation/No-show Note  Patient Name:  Nickie Man  :  1975   Date:  2022  Cancelled visits to date: 0  No-shows to date: 2    For today's appointment patient:  []  Cancelled  []  Rescheduled appointment  [x]  No-show     Reason given by patient:  []  Patient ill  []  Conflicting appointment  []  No transportation    []  Conflict with work  [x]  No reason given  []  Other:     Comments:      Phone call information:   []  Phone call made today to patient at _ time at number provided:      []  Patient answered, conversation as follows:    []  Patient did not answer, message left as follows:  [x]  Phone call not made today  []  Phone call not needed - pt contacted us to cancel and provided reason for cancellation. Electronically signed by:   Franky Irene PT

## 2022-05-25 ENCOUNTER — HOSPITAL ENCOUNTER (OUTPATIENT)
Dept: PHYSICAL THERAPY | Age: 47
Setting detail: THERAPIES SERIES
Discharge: HOME | End: 2022-05-25
Payer: COMMERCIAL

## 2022-05-25 PROCEDURE — 97016 VASOPNEUMATIC DEVICE THERAPY: CPT

## 2022-05-25 PROCEDURE — 97110 THERAPEUTIC EXERCISES: CPT

## 2022-05-25 PROCEDURE — 20560 NDL INSJ W/O NJX 1 OR 2 MUSC: CPT

## 2022-05-25 PROCEDURE — 97140 MANUAL THERAPY 1/> REGIONS: CPT

## 2022-05-25 NOTE — FLOWSHEET NOTE
[facility]  [address] [address]  Phone [phone]   Fax [phone]                                                  Date:  2022    Patient Name:  Marly Dan  \"Giacomo\"  :  1975  MRN: [id number]  Medical/Treatment Diagnosis Information:  · Diagnosis: Biceps tendinitis or right upper extremity (M75.21) Subacromial decompression with acromioplasty; arthroscopic distal clavicle excision; injection of corticosteroid on 22. · Treatment Diagnosis: Extensive debridement of labrum, rotator cuff, rotator interval, subacromial bursa; Subacromial decompression with acromioplasty; arthroscopic distal clavicle excision; injection of corticosteroid  Insurance/Certification information:  PT Insurance Information: [facility]  Physician Information:  Referring Practitioner: Petty Lui MD  Plan of care signed (Y/N):      Date of Patient follow up with Physician: Denise Briones      Progress Report: [x]  Yes  []  No     Functional Scale:   22 SPADI = 46% disability  5/10/22 FOTO: 39      Date Range for reporting period:  Beginnin/10/22  Ending:      Progress report due (10 Rx/or 30 days whichever is less): 8343     Recertification due (POC duration/ or 90 days whichever is less): 8/10/22     Visit # Insurance Allowable Auth Needed   6 used prior to sx 9 30 [x]Yes    []No     Pain level: 5/10, global shoulder soreness. SUBJECTIVE:  Patient reports an increase in global right shoulder soreness. Denies no known mechanics to increase right shoulder use. OBJECTIVE:   Skilled care provided per flow sheet below; Significant UT and pec minor facilitation    Observation: unable to actively raise arm over 90 deg due to biceps impingement.   · Test measurements:      DATE 22         PROM Flex: 155  ER: 80  IR: 58           22 PROM demonstrated improvements in all ranges with increased soreness into flexion, ER, IR. Pt has increased Skin irritation with his axilla due to switching to dial prior to surgery and may have an allergy.        RESTRICTIONS/PRECAUTIONS: *agg signs = flexion/abduction, resisted ER/Ir    Home exercises Program:  3/23/2022: tband row, supine DB press, supine DB OH raise, SL ER, standing doorway pec stretch (Handout to be provided NV)    Exercises/Interventions:   Therapeutic Ex  x15 min Wt / Resistance Sets/sec Reps Notes   Wand assisted flexion       Wand assisted ER in seated   10\"  10x    Strap IR walkout       Pendulums  30x ccw/cw      scap retractions  10\" 10x    No money   1 15x    SL ER  SL punch 0#ea 2 10ea    Elevated hi / low pec and bicep stretch  30\" x5    Prone row 0# 3 12    Prone ext 0# 3 10    Supine punch 0# 2 x10       CC row   extension 15#  green 3 x10 Single arm      Table slides flexion  10\" 10x    Pulleys flexion  3 min                      Therapeutic Activities                                                                        Manual Intervention  x30 min       GH Mobs  5 min  AP and inferior GH mobs while in neutral, ER, and 90 deg flex   Myofascial release, STM 7 min  STM to UT post dry needling, DTM to pec minor   CT MT/Mobs       Shoulder PROM   8 min  PROM into all planes (heavier focus on ER/IR/flex)   Dry Needling  10 min  Performed with stim          NMR re-education                                                                   Therapeutic Exercise and NMR EXR  [x] (85750) Provided verbal/tactile cueing for activities related to strengthening, flexibility, endurance, ROM  for improvements in scapular, scapulothoracic and UE control with self care, reaching, carrying, lifting, house/yardwork, driving/computer work.    [] (61322) Provided verbal/tactile cueing for activities related to improving balance, coordination, kinesthetic sense, posture, motor skill, proprioception  to assist with  scapular, scapulothoracic and UE control with self care, reaching, carrying, lifting, house/yardwork, driving/computer work. Therapeutic Activities:    [x] (72262 or 23937) Provided verbal/tactile cueing for activities related to improving balance, coordination, kinesthetic sense, posture, motor skill, proprioception and motor activation to allow for proper function of scapular, scapulothoracic and UE control with self care, carrying, lifting, driving/computer work. Home Exercise Program:    [x] (30840) Reviewed/Progressed HEP activities related to strengthening, flexibility, endurance, ROM of scapular, scapulothoracic and UE control with self care, reaching, carrying, lifting, house/yardwork, driving/computer work  [] (66393) Reviewed/Progressed HEP activities related to improving balance, coordination, kinesthetic sense, posture, motor skill, proprioception of scapular, scapulothoracic and UE control with self care, reaching, carrying, lifting, house/yardwork, driving/computer work      Manual Treatments:  PROM / STM / Oscillations-Mobs:  G-I, II, III, IV (PA's, Inf., Post.)  [x] (95010) Provided manual therapy to mobilize soft tissue/joints of cervical/CT, scapular GHJ and UE for the purpose of modulating pain, promoting relaxation,  increasing ROM, reducing/eliminating soft tissue swelling/inflammation/restriction, improving soft tissue extensibility and allowing for proper ROM for normal function with self care, reaching, carrying, lifting, house/yardwork, driving/computer work    Spoke with   regarding the use of Dry Needling     Dry needling manual therapy: consisted on the placement of 4 needles in the following muscles:  (R) UT.  A 50 mm needle was inserted, piston, rotated, and coned to produce intramuscular mobilization. These techniques were used to restore functional range of motion, reduce muscle spasm and induce healing in the corresponding musculature. (35669)    Clean Technique was utilized today while applying Dry needling treatment.   The treatment sites where cleaned with 70% solution of  isopropyl alcohol . The PT washed their hands and utilized treatment gloves along with hand  prior to inserting the needles. All needles where removed and discarded in the appropriate sharps container. MD has given verbal and/or written approval for this treatment. Attended low frequency (1-20Hz) electrical stimulation was utilized in conjunction with Dry Needling:  the Estim was manipulated between all above needles for a period of 5 min. at 4 volts. The low frequency electrical stimulation was used to help reduce muscle spasm and help to interrupt /[place] the pain cycle. (05138)       Modalities:  Game ready for increased right shoulder soreness and swelling x 10 min, Dry needling per statement above    Charges:  Timed Code Treatment Minutes:  min   Total Treatment Minutes:  min       [] EVAL (LOW) 74382 (typically 20 minutes face-to-face)  [] EVAL (MOD) 90085 (typically 30 minutes face-to-face)  [] EVAL (HIGH) 91346 (typically 45 minutes face-to-face)  [] RE-EVAL     [x] TF(14399) x    1  [] IONTO (00635)  [] NMR (71864) x     [x] VASO (58620)  [x] Manual (15957) x  1 [x] Other: (52218/1) x 1  [] TA (50152)x     [] Mech Traction (26978)  [] ES(attended) (89286)     [] ES (un) (81129):     GOALS:  Patient stated goal: \"Return to work\"  [] Progressing: [] Met: [x] Not Met: [] Adjusted     Therapist goals for Patient:   Short Term Goals: To be achieved in: 2 weeks  1. Independent in HEP and progression per patient tolerance, in order to prevent re-injury. [] Progressing: [] Met: [x] Not Met: [] Adjusted  2. Patient will have a decrease in pain to facilitate improvement in movement, function, and ADLs as indicated by Functional Deficits. [] Progressing: [] Met: [x] Not Met: [] Adjusted     Long Term Goals: To be achieved in: 6-12 weeks  1. FOTO: 77 or greater to assist with reaching prior level of function.    [] Progressing: [] Met: [x] Not Met: [] Adjusted  2. Patient will demonstrate increased AROM with 90% of contralateral limb to allow for proper joint functioning as indicated by patients Functional Deficits. [] Progressing: [] Met: [x] Not Met: [] Adjusted  3. Patient will demonstrate an increase in Strength to 5lbs of contralateral limb to allow for proper functional mobility as indicated by patients Functional Deficits. [] Progressing: [] Met: [x] Not Met: [] Adjusted  4. Patient will return to reaching [place] functional activities without increased symptoms or restriction. [] Progressing: [] Met: [x] Not Met: [] Adjusted  5. Pt will tolerate working x 4 hours without pain greater than 4/10    [] Progressing: [] Met: [x] Not Met: [] Adjusted         Progression Towards Functional goals:  [] Patient is progressing as expected towards functional goals listed. [] Progression is slowed due to complexities listed. [] Progression has been slowed due to co-morbidities. [x] Plan just implemented, too soon to assess goals progression  [] Other:     ASSESSMENT: Patient with noted facilitation throughout the UT and pec minor limiting scapular upward rotation and posterior tipping. This is resulting in biceps and supraspinatus impingement with subsequent pain. Heavy focus on decreasing myofascial tone to improve OH flexion without biceps impingement. Patient continue to have great difficulty and pain with any active or passive forward flexion >90 deg. Patient will continue to benefit from skilled care to address deficits being noted above and progress toward function. Treatment/Activity Tolerance:  [x] Patient tolerated treatment well [] Patient limited by fatique  [x] Patient limited by pain w/ OH flexion & ER  [] Patient limited by other medical complications  [] Other:     Overall Progression Towards Functional goals/ Treatment Progress Update:  [] Patient is progressing as expected towards functional goals listed.     [x] Progression is slowed due to complexities/Impairments listed. [] Progression has been slowed due to co-morbidities. [] Plan just implemented, too soon to assess goals progression <30days   [] Goals require adjustment due to lack of progress  [] Patient is not progressing as expected and requires additional follow up with physician  [] Other    Prognosis for POC: [x] Good [] Fair  [] Poor    Patient requires continued skilled intervention: [x] Yes  [] No      PLAN:Work on pec and UT hypertonicity. Frequency/Duration:  1-2 days per week for 6-12 Weeks: determine soreness NPv  [x] Continue per plan of care [] Alter current plan (see comments)  [] Plan of care initiated [] Hold pending MD visit [] Discharge    Electronically signed by: Clark Finley PT,    Note: If patient does not return for scheduled/recommended follow up visits, this note will serve as a discharge from care along with the most recent update on progress.

## 2022-06-01 ENCOUNTER — HOSPITAL ENCOUNTER (OUTPATIENT)
Dept: PHYSICAL THERAPY | Age: 47
Setting detail: THERAPIES SERIES
Discharge: HOME | End: 2022-06-01
Payer: COMMERCIAL

## 2022-06-01 PROCEDURE — 97110 THERAPEUTIC EXERCISES: CPT

## 2022-06-01 PROCEDURE — 97140 MANUAL THERAPY 1/> REGIONS: CPT

## 2022-06-01 PROCEDURE — 97016 VASOPNEUMATIC DEVICE THERAPY: CPT

## 2022-06-01 NOTE — FLOWSHEET NOTE
[facility]  [address] [address]  Phone [phone]   Fax [phone]                                                  Date:  2022    Patient Name:  Loren Downey  \"Giacomo\"  :  1975  MRN: [id number]  Medical/Treatment Diagnosis Information:  · Diagnosis: Biceps tendinitis or right upper extremity (M75.21) Subacromial decompression with acromioplasty; arthroscopic distal clavicle excision; injection of corticosteroid on 22. · Treatment Diagnosis: Extensive debridement of labrum, rotator cuff, rotator interval, subacromial bursa; Subacromial decompression with acromioplasty; arthroscopic distal clavicle excision; injection of corticosteroid  Insurance/Certification information:  PT [facility] Information: [facility]  Physician Information:  Referring Practitioner: Camilla Dawson MD  Plan of care signed (Y/N):      Date of Patient follow up with Physician: Joe Brower      Progress Report: [x]  Yes  []  No     Functional Scale:   22 SPADI = 46% disability  5/10/22 FOTO: 39      Date Range for reporting period:  Beginnin/10/22  Ending:      Progress report due (10 Rx/or 30 days whichever is less):      Recertification due (POC duration/ or 90 days whichever is less): 8/10/22     Visit # Insurance Allowable Auth Needed   6 used prior to sx 10 30 [x]Yes    []No     Pain level: 5/10, global shoulder soreness, especially along the anterior chest and biceps    SUBJECTIVE: Patient voices on going anterior shoulder soreness, especially along the anterior chest, UT and biceps. OBJECTIVE:   Skilled care provided per flow sheet below; Significant UT and pec facilitation due to compensations.  Observation: Patient with great difficultly with scapular control during thera-ex.    · Test measurements:      DATE 22           PROM Flex: 155  ER: 80  IR: 58 Flex: 157  ER: 85  IR: 65          22 PROM demonstrated improvements in all ranges with increased soreness into flexion, ER, IR. Pt has increased Skin irritation with his axilla due to switching to dial prior to surgery and may have an allergy.        RESTRICTIONS/PRECAUTIONS: *agg signs = flexion/abduction, resisted ER/Ir    Home exercises Program:  3/23/2022: tband row, supine DB press, supine DB OH raise, SL ER, standing doorway pec stretch (Handout to be provided NV)    Exercises/Interventions:   Therapeutic Ex  x23 min Wt / Resistance Sets/sec Reps Notes   Supine wand assisted flexion    Held after repetitive complaints of anterior / superior shoulder pain   Wand assisted ER in seated   10\"  10x    Strap IR walkout       No money   1 15x    SL ER  SL punch 0#ea 2 10ea    Doorway pec stretch  30\" x1 Demonstrated for HEP   Prone row 0# 3 12    Prone ext 0# 3 10    Supine punch 0# 2 x10    Supine pec stretch w/ towel roll  1 min x3 Towel roll b/w scapula   Row   Extension blue  green 3 x10 Tband   Table slides flexion  10\" 10x    Pulleys flexion  3 min     RTC interval stretch 3# 1 min x2    UT and levator scap stretch w/ strap assist for scap depress  30\" x2 ea                     Therapeutic Activities                                                                        Manual Intervention  x17 min       GH Mobs  3 min  AP and inferior GH mobs while in neutral, ER, and slight abduction   Myofascial release, STM 6 min  Manual STM / MFR and with use of hypervolt to pec    CT MT/Mobs       Shoulder PROM   8 min  PROM into all planes (heavier focus on ER/IR/flex)   Dry Needling    Performed with stim          NMR re-education                                                                   Therapeutic Exercise and NMR EXR  [x] (84721) Provided verbal/tactile cueing for activities related to strengthening, flexibility, endurance, ROM  for improvements in scapular, scapulothoracic and UE control with self care, reaching, carrying, lifting, minutes face-to-face)  [] EVAL (MOD) 88437 (typically 30 minutes face-to-face)  [] EVAL (HIGH) 91264 (typically 45 minutes face-to-face)  [] RE-EVAL     [x] KF(57931) x    2  [] IONTO (62419)  [] NMR (78462) x     [x] VASO (59655)  [x] Manual (03133) x  1 [] Other: (20560/1) x  [] TA (12773)x     [] Mech Traction (16885)  [] ES(attended) (57518)     [] ES (un) (33706):     GOALS:  Patient stated goal: \"Return to work\"  [x] Progressing: [] Met: [] Not Met: [] Adjusted     Therapist goals for Patient:   Short Term Goals: To be achieved in: 2 weeks  1. Independent in HEP and progression per patient tolerance, in order to prevent re-injury. [x] Progressing: [] Met: [] Not Met: [] Adjusted  2. Patient will have a decrease in pain to facilitate improvement in movement, function, and ADLs as indicated by Functional Deficits. [x] Progressing: [] Met: [] Not Met: [] Adjusted     Long Term Goals: To be achieved in: 6-12 weeks  1. FOTO: 77 or greater to assist with reaching prior level of function. [x] Progressing: [] Met: [] Not Met: [] Adjusted  2. Patient will demonstrate increased AROM with 90% of contralateral limb to allow for proper joint functioning as indicated by patients Functional Deficits. [x] Progressing: [] Met: [] Not Met: [] Adjusted  3. Patient will demonstrate an increase in Strength to 5lbs of contralateral limb to allow for proper functional mobility as indicated by patients Functional Deficits. [x] Progressing: [] Met: [] Not Met: [] Adjusted  4. Patient will return to reaching [place] functional activities without increased symptoms or restriction. [x] Progressing: [] Met: [] Not Met: [] Adjusted  5. Pt will tolerate working x 4 hours without pain greater than 4/10    [x] Progressing: [] Met: [] Not Met: [] Adjusted         Progression Towards Functional goals:  [x] Patient is progressing as expected towards functional goals listed. [] Progression is slowed due to complexities listed.   [] Progression has been slowed due to co-morbidities. [] Plan just implemented, too soon to assess goals progression  [] Other:       ASSESSMENT: Patient continue to voice on going shoulder soreness, specifically to the anterolateral and superior shoulder. Voices UT discomfort improved after last session with dry needling and manual but has since returned. Significant weakness is still noted throughout the RTC structures and global posterior chain. Scapular dyskinesia noted during banded exercises. Limitations still being noted in [place] motion seem to be a result of pec facilitation. On going difficulty stretching into abduction and flexion due to chronic biceps inflammation. Patient will continue to benefit from skilled therapy to address postural dysfunction, significant anterior pec restriction and deficits listed above. .       Treatment/Activity Tolerance:  [] Patient tolerated treatment well [x] Patient limited by fatique  [x] Patient limited by pain   [] Patient limited by other medical complications  [] Other:     Overall Progression Towards Functional goals/ Treatment Progress Update:  [] Patient is progressing as expected towards functional goals listed. [x] Progression is slowed due to complexities/Impairments listed. [] Progression has been slowed due to co-morbidities. [] Plan just implemented, too soon to assess goals progression <30days   [] Goals require adjustment due to lack of progress  [] Patient is not progressing as expected and requires additional follow up with physician  [] Other    Prognosis for POC: [x] Good [] Fair  [] Poor    Patient requires continued skilled intervention: [x] Yes  [] No      PLAN: Continue to work on [employer] and biceps compensations and hypertonicity, work to improve posterior chain strength, decreased biceps impingement pain.    Frequency/Duration:  1-2 days per week for 6-12 Weeks: determine soreness NPv  [x] Continue per plan of care [] Alter current plan (see comments)  [] Plan of care initiated [] Hold pending MD visit [] Discharge    Electronically signed by: Gurdeep Nieves PT,    Note: If patient does not return for scheduled/recommended follow up visits, this note will serve as a discharge from care along with the most recent update on progress.

## 2022-06-03 ENCOUNTER — HOSPITAL ENCOUNTER (OUTPATIENT)
Dept: PHYSICAL THERAPY | Age: 47
Setting detail: THERAPIES SERIES
Discharge: HOME | End: 2022-06-03
Payer: COMMERCIAL

## 2022-06-03 PROCEDURE — 97140 MANUAL THERAPY 1/> REGIONS: CPT | Performed by: SPECIALIST/TECHNOLOGIST

## 2022-06-03 PROCEDURE — 97016 VASOPNEUMATIC DEVICE THERAPY: CPT | Performed by: SPECIALIST/TECHNOLOGIST

## 2022-06-03 PROCEDURE — 97110 THERAPEUTIC EXERCISES: CPT | Performed by: SPECIALIST/TECHNOLOGIST

## 2022-06-03 NOTE — FLOWSHEET NOTE
[facility]  [address] [address]  Phone [phone]   Fax [phone]                                                  Date:  6/3/2022    Patient Name:  Rhina Jeffries  \"Giacomo\"  :  1975  MRN: [id number]  Medical/Treatment Diagnosis Information:  · Diagnosis: Biceps tendinitis or right upper extremity (M75.21) Subacromial decompression with acromioplasty; arthroscopic distal clavicle excision; injection of corticosteroid on 22. · Treatment Diagnosis: Extensive debridement of labrum, rotator cuff, rotator interval, subacromial bursa; Subacromial decompression with acromioplasty; arthroscopic distal clavicle excision; injection of corticosteroid  Insurance/Certification information:  PT Insurance Information: [facility]  Physician Information:  Referring Practitioner: Az Magaña MD  Plan of care signed (Y/N):      Date of Patient follow up with Physician: Marcie Sweeney      Progress Report: [x]  Yes  []  No     Functional Scale:   22 SPADI = 46% disability  5/10/22 FOTO: 39      Date Range for reporting period:  Beginnin/10/22  Ending:      Progress report due (10 Rx/or 30 days whichever is less):      Recertification due (POC duration/ or 90 days whichever is less): 8/10/22     Visit # Insurance Allowable Auth Needed   6 used prior to sx 10 30 [x]Yes    []No     Pain level: 4-5/10, global shoulder soreness, especially along the anterior chest and biceps    SUBJECTIVE: still having some anterior Rt shoulder pain but with some  Referral into his neck . OBJECTIVE:   Skilled care provided per flow sheet below; Significant UT and pec facilitation due to compensations.  Observation: Patient with great difficultly with scapular control during thera-ex.    · Test measurements:      DATE 22           PROM Flex: 155  ER: 80  IR: 58 Flex: 157  ER: 85  IR: 65          22 PROM demonstrated improvements in all ranges with increased soreness into flexion, ER, IR. Pt has increased Skin irritation with his axilla due to switching to dial prior to surgery and may have an allergy.        RESTRICTIONS/PRECAUTIONS: *agg signs = flexion/abduction, resisted ER/Ir    Home exercises Program:  3/23/2022: tband row, supine DB press, supine DB OH raise, SL ER, standing doorway pec stretch (Handout to be provided NV)    Exercises/Interventions:  36'  Therapeutic Ex  x23 min Wt / Resistance Sets/sec Reps Notes   Supine wand assisted flexion    Held after repetitive complaints of anterior / superior shoulder pain   Wand assisted ER in supine  10\"  10x 6/3   Strap IR walkout       No money  yellow 1 15x 6/3   SL ER  SL punch 0#ea 2 10ea 6/3   Doorway pec stretch  30\" x1 Demonstrated for HEP   Standing Row 3# 2 10x 6/3   Prone ext 0# 3 10    Supine punch 0# 2 x10 HOLD 6/3   Supine pec stretch w/ towel roll  1 min x3 Towel roll b/w scapula   Row   Extension Blue   3 x10 Tband   Table slides flexion  10\" 10x 6/3   Pulleys flexion  3 min     RTC interval stretch 3# 1 min x2    UT and levator scap stretch w/ strap assist for scap depress  30\" x2 ea 6/3 reviewed                    Therapeutic Activities   3'      Pt education with postural emphasis,                                                               Manual Intervention  x23 min       GH Mobs  3 min  AP and inferior GH mobs while in neutral, ER, and slight abduction   CT MT/Mobs       Shoulder PROM   10 min  PROM into all planes (heavier focus on ER/IR/flex)   KT tape to Rt shoulder into retraction  5'   6/3   STM to trigger points in upper traps/ spine of scapula 5'   6/3   NMR re-education                                                                   Therapeutic Exercise and NMR EXR  [x] (70359) Provided verbal/tactile cueing for activities related to strengthening, flexibility, endurance, ROM  for improvements in scapular, scapulothoracic and UE control with self care, reaching, carrying, lifting, house/yardwork, driving/computer work.    [] (13782) Provided verbal/tactile cueing for activities related to improving balance, coordination, kinesthetic sense, posture, motor skill, proprioception  to assist with  scapular, scapulothoracic and UE control with self care, reaching, carrying, lifting, house/yardwork, driving/computer work. Therapeutic Activities:    [x] (64311 or 43391) Provided verbal/tactile cueing for activities related to improving balance, coordination, kinesthetic sense, posture, motor skill, proprioception and motor activation to allow for proper function of scapular, scapulothoracic and UE control with self care, carrying, lifting, driving/computer work.      Home Exercise Program:    [x] (97452) Reviewed/Progressed HEP activities related to strengthening, flexibility, endurance, ROM of scapular, scapulothoracic and UE control with self care, reaching, carrying, lifting, house/yardwork, driving/computer work  [] (99141) Reviewed/Progressed HEP activities related to improving balance, coordination, kinesthetic sense, posture, motor skill, proprioception of scapular, scapulothoracic and UE control with self care, reaching, carrying, lifting, house/yardwork, driving/computer work      Manual Treatments:  PROM / STM / Oscillations-Mobs:  G-I, II, III, IV (PA's, Inf., Post.)  [x] (10762) Provided manual therapy to mobilize soft tissue/joints of cervical/CT, scapular GHJ and UE for the purpose of modulating pain, promoting relaxation,  increasing ROM, reducing/eliminating soft tissue swelling/inflammation/restriction, improving soft tissue extensibility and allowing for proper ROM for normal function with self care, reaching, carrying, lifting, house/yardwork, driving/computer work          Modalities:  Game ready for increased right shoulder soreness and swelling x 15 min    Charges:  Timed Code Treatment Minutes: 40 min   Total Treatment Minutes: 55 min [] EVAL (LOW) 36846 (typically 20 minutes face-to-face)  [] EVAL (MOD) 60956 (typically 30 minutes face-to-face)  [] EVAL (HIGH) 94589 (typically 45 minutes face-to-face)  [] RE-EVAL     [x] GX(00254) x    2  [] IONTO (51878)  [] NMR (69229) x     [x] VASO (36323)  [x] Manual (75282) x  1 [] Other: (20560/1) x  [] TA (98778)x     [] Mech Traction (66740)  [] ES(attended) (20052)     [] ES (un) (91023):     GOALS:  Patient stated goal: \"Return to work\"  [x] Progressing: [] Met: [] Not Met: [] Adjusted     Therapist goals for Patient:   Short Term Goals: To be achieved in: 2 weeks  1. Independent in HEP and progression per patient tolerance, in order to prevent re-injury. [x] Progressing: [] Met: [] Not Met: [] Adjusted  2. Patient will have a decrease in pain to facilitate improvement in movement, function, and ADLs as indicated by Functional Deficits. [x] Progressing: [] Met: [] Not Met: [] Adjusted     Long Term Goals: To be achieved in: 6-12 weeks  1. FOTO: 77 or greater to assist with reaching prior level of function. [x] Progressing: [] Met: [] Not Met: [] Adjusted  2. Patient will demonstrate increased AROM with 90% of contralateral limb to allow for proper joint functioning as indicated by patients Functional Deficits. [x] Progressing: [] Met: [] Not Met: [] Adjusted  3. Patient will demonstrate an increase in Strength to 5lbs of contralateral limb to allow for proper functional mobility as indicated by patients Functional Deficits. [x] Progressing: [] Met: [] Not Met: [] Adjusted  4. Patient will return to reaching [place] functional activities without increased symptoms or restriction. [x] Progressing: [] Met: [] Not Met: [] Adjusted  5. Pt will tolerate working x 4 hours without pain greater than 4/10    [x] Progressing: [] Met: [] Not Met: [] Adjusted         Progression Towards Functional goals:  [x] Patient is progressing as expected towards functional goals listed.     [] Progression is slowed due to complexities listed. [] Progression has been slowed due to co-morbidities. [] Plan just implemented, too soon to assess goals progression  [] Other:       ASSESSMENT: Patient continue to voice on going shoulder soreness, specifically to the anterolateral and superior shoulder. Pt states he is still sore from last session related to hypervolt to his rt shoulder last session. Slight modifications made in program today as a result. Significant weakness is still noted throughout the RTC structures and global posterior chain. Scapular dyskinesia noted during banded exercises and with rowing mechanics. Trial of KT tape applied to RT shoulder to promote scapular depression versus hiking for resting postures. Advised to remove with any skin breakdown or itching. Improved Rt shoulder flexion with more horizontal adduction versus abduction and flexion during PROM. Patient will continue to benefit from skilled therapy to address postural dysfunction, significant anterior pec restriction and deficits listed above. .       Treatment/Activity Tolerance:  [] Patient tolerated treatment well [x] Patient limited by fatique  [x] Patient limited by pain   [] Patient limited by other medical complications  [] Other:     Overall Progression Towards Functional goals/ Treatment Progress Update:  [] Patient is progressing as expected towards functional goals listed. [x] Progression is slowed due to complexities/Impairments listed. [] Progression has been slowed due to co-morbidities.   [] Plan just implemented, too soon to assess goals progression <30days   [] Goals require adjustment due to lack of progress  [] Patient is not progressing as expected and requires additional follow up with physician  [] Other    Prognosis for POC: [x] Good [] Fair  [] Poor    Patient requires continued skilled intervention: [x] Yes  [] No      PLAN: Continue to work on UT, pec and biceps compensations and hypertonicity, work to improve posterior chain strength, decreased biceps impingement pain. Frequency/Duration:  1-2 days per week for 6-12 Weeks: determine soreness NPV and benefit of KT tape into retraction  [x] Continue per plan of care [] Alter current plan (see comments)  [] Plan of care initiated [] Hold pending MD visit [] Discharge    Electronically signed by: Judson Wing PTA, [id number]    Note: If patient does not return for scheduled/recommended follow up visits, this note will serve as a discharge from care along with the most recent update on progress.

## 2022-06-08 ENCOUNTER — HOSPITAL ENCOUNTER (OUTPATIENT)
Dept: PHYSICAL THERAPY | Age: 47
Setting detail: THERAPIES SERIES
Discharge: HOME | End: 2022-06-08
Payer: COMMERCIAL

## 2022-06-08 PROCEDURE — 97110 THERAPEUTIC EXERCISES: CPT | Performed by: SPECIALIST/TECHNOLOGIST

## 2022-06-08 PROCEDURE — 97140 MANUAL THERAPY 1/> REGIONS: CPT | Performed by: SPECIALIST/TECHNOLOGIST

## 2022-06-08 PROCEDURE — 97016 VASOPNEUMATIC DEVICE THERAPY: CPT | Performed by: SPECIALIST/TECHNOLOGIST

## 2022-06-08 NOTE — FLOWSHEET NOTE
[facility], [employer]  [address] [address]  Phone [phone]   Fax [phone]                                                  Date:  2022    Patient Name:  Margarito Best  \"Giacomo\"  :  1975  MRN: [id number]  Medical/Treatment Diagnosis Information:  · Diagnosis: Biceps tendinitis or right upper extremity (M75.21) Subacromial decompression with acromioplasty; arthroscopic distal clavicle excision; injection of corticosteroid on 22. · Treatment Diagnosis: Extensive debridement of labrum, rotator cuff, rotator interval, subacromial bursa; Subacromial decompression with acromioplasty; arthroscopic distal clavicle excision; injection of corticosteroid  Insurance/Certification information:  PT Insurance Information: [facility]  Physician Information:  Referring Practitioner: Nagi Robles MD  Plan of care signed (Y/N):      Date of Patient follow up with Physician: Theo Ferguson       Progress Report: [x]  Yes  []  No     Functional Scale:   22 SPADI = 46% disability  5/10/22 FOTO: 39      Date Range for reporting period:  Beginnin/10/22  Ending:      Progress report due (10 Rx/or 30 days whichever is less): 3/2251     Recertification due (POC duration/ or 90 days whichever is less): 8/10/22     Visit # Insurance Allowable Auth Needed   6 used prior to sx 10 30 [x]Yes    []No     Pain level: 0/ 10 @ rest     4-5/10, global shoulder soreness, especially along the anterior chest and biceps    SUBJECTIVE:  Pt reports having less neck pain but has some soreness. Pt feels the top of the shoulder is still uncomfortable but trending in the right direction. Tired today from starting to work early 4 hour shifts but is not working constantly but can take breaks. 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  Trial of KT tape seemed to aggravate his skin on RT shoulder so he took off for a couple of days. OBJECTIVE:   Skilled care provided per flow sheet below; Significant UT and pec facilitation due to compensations.  Observation: Patient with great difficultly with scapular control during thera-ex. · Test measurements:      DATE 5/25/22 6/1/22           PROM Flex: 155  ER: 80  IR: 58 Flex: 157  ER: 85  IR: 65          5/12/22 PROM demonstrated improvements in all ranges with increased soreness into flexion, ER, IR. Pt has increased Skin irritation with his axilla due to switching to dial prior to surgery and may have an allergy.        RESTRICTIONS/PRECAUTIONS: *agg signs = flexion/abduction, resisted ER/Ir    Home exercises Program:  3/23/2022: tband row, supine DB press, supine DB OH raise, SL ER, standing doorway pec stretch (Handout to be provided NV)    Exercises/Interventions:  36'  Therapeutic Ex  x23 min Wt / Resistance Sets/sec Reps Notes   Supine wand assisted flexion    Held after repetitive complaints of anterior / superior shoulder pain   Strap IR walkout       No money  RED 1 15x 6/8   SL ER  SL punch 1#ea 2 10ea 6/8   Doorway pec stretch  30\" x1 Demonstrated for HEP   Standing Row 3# 3 10x 6/8   Prone ext 0# 3 10    Supine punch 0# 2 x10  HOLD 6/8   Supine pec stretch w/ towel roll  1 min x3 Towel roll b/w scapula   Row   Extension Blue   3 x10 6/8   Pulleys flexion  3 min  6/8   RTC interval stretch 3# 1 min x2    UT and levator scap stretch w/ strap assist for scap depress  30\" x2 ea 6/3 reviewed                    Therapeutic Activities   3'      Pt education with postural emphasis,                                                               Manual Intervention  x13 min       GH Mobs  3 min  AP and inferior GH mobs while in neutral, ER, and slight abduction          Shoulder PROM   10 min  PROM into all planes (heavier focus on ER/IR/flex)   NMR re-education       BB ER/IR  yellow 10\" 5x 6/8 carrying, lifting, house/yardwork, driving/computer work          Modalities:  Game ready for increased right shoulder soreness and swelling x 15 min    Charges:  Timed Code Treatment Minutes: 40 min   Total Treatment Minutes: 55 min       [] EVAL (LOW) 94895 (typically 20 minutes face-to-face)  [] EVAL (MOD) 22358 (typically 30 minutes face-to-face)  [] EVAL (HIGH) 73972 (typically 45 minutes face-to-face)  [] RE-EVAL     [x] GA(67488) x    2  [] IONTO (12668)  [] NMR (99890) x     [x] VASO (64299)  [x] Manual (69638) x  1 [] Other: (29330/3) x  [] TA (44623)x     [] Mech Traction (92894)  [] ES(attended) (17036)     [] ES (un) (08354):     GOALS:  Patient stated goal: \"Return to work\"  [x] Progressing: [] Met: [] Not Met: [] Adjusted     Therapist goals for Patient:   Short Term Goals: To be achieved in: 2 weeks  1. Independent in HEP and progression per patient tolerance, in order to prevent re-injury. [x] Progressing: [] Met: [] Not Met: [] Adjusted  2. Patient will have a decrease in pain to facilitate improvement in movement, function, and ADLs as indicated by Functional Deficits. [x] Progressing: [] Met: [] Not Met: [] Adjusted     Long Term Goals: To be achieved in: 6-12 weeks  1. FOTO: 77 or greater to assist with reaching prior level of function. [x] Progressing: [] Met: [] Not Met: [] Adjusted  2. Patient will demonstrate increased AROM with 90% of contralateral limb to allow for proper joint functioning as indicated by patients Functional Deficits. [x] Progressing: [] Met: [] Not Met: [] Adjusted  3. Patient will demonstrate an increase in Strength to 5lbs of contralateral limb to allow for proper functional mobility as indicated by patients Functional Deficits. [x] Progressing: [] Met: [] Not Met: [] Adjusted  4. Patient will return to [facility] functional activities without increased symptoms or restriction. [x] Progressing: [] Met: [] Not Met: [] Adjusted  5.  Pt will tolerate working x 4 hours without pain greater than 4/10    [x] Progressing: [] Met: [] Not Met: [] Adjusted         Progression Towards Functional goals:  [x] Patient is progressing as expected towards functional goals listed. [] Progression is slowed due to complexities listed. [] Progression has been slowed due to co-morbidities. [] Plan just implemented, too soon to assess goals progression  [] Other:       ASSESSMENT: Patient continue to voice on going shoulder soreness, specifically to the anterolateral and superior shoulder. Pt had improved Rt shoulder PROM into OH planes and less painful arcs. Significant weakness is still noted throughout the RTC structures and global posterior chain. Scapular dyskinesia noted during banded exercises and with rowing mechanics related to long standing history of doing bench press and overdeveloping pectoral muscles with minimal scapular training. This may be a factor in having anterior shoulder pain due to poor GH joint surfaces and biomechanical muscle imbalance. Trial of KT tape applied to RT shoulder to promote scapular depression irritated his Rt shoulder and was removed. Holding on anterior directed strengthening has helped his RT shoulderr symptoms. Patient will continue to benefit from skilled therapy to address postural dysfunction, significant anterior pec restriction and deficits listed above. .       Treatment/Activity Tolerance:  [] Patient tolerated treatment well [x] Patient limited by fatique  [x] Patient limited by pain   [] Patient limited by other medical complications  [] Other:     Overall Progression Towards Functional goals/ Treatment Progress Update:  [] Patient is progressing as expected towards functional goals listed. [x] Progression is slowed due to complexities/Impairments listed. [] Progression has been slowed due to co-morbidities.   [] Plan just implemented, too soon to assess goals progression <30days   [] Goals require adjustment due to lack of progress  [] Patient is not progressing as expected and requires additional follow up with physician  [] Other    Prognosis for POC: [x] Good [] Fair  [] Poor    Patient requires continued skilled intervention: [x] Yes  [] No      PLAN: Continue to work on [employer] and biceps compensations and hypertonicity, work to improve posterior chain strength, decreased biceps impingement pain. Frequency/Duration:  1-2 days per week for 6-12 Weeks:   [x] Continue per plan of care [] Alter current plan (see comments)  [] Plan of care initiated [] Hold pending MD visit [] Discharge    Electronically signed by: Tate Ventura, DANIELE, [id number]    Note: If patient does not return for scheduled/recommended follow up visits, this note will serve as a discharge from care along with the most recent update on progress.

## 2022-06-10 ENCOUNTER — HOSPITAL ENCOUNTER (OUTPATIENT)
Dept: PHYSICAL THERAPY | Age: 47
Setting detail: THERAPIES SERIES
Discharge: HOME | End: 2022-06-10
Payer: COMMERCIAL

## 2022-06-10 PROCEDURE — 97140 MANUAL THERAPY 1/> REGIONS: CPT | Performed by: SPECIALIST/TECHNOLOGIST

## 2022-06-10 PROCEDURE — 97016 VASOPNEUMATIC DEVICE THERAPY: CPT | Performed by: SPECIALIST/TECHNOLOGIST

## 2022-06-10 PROCEDURE — 97110 THERAPEUTIC EXERCISES: CPT | Performed by: SPECIALIST/TECHNOLOGIST

## 2022-06-10 NOTE — FLOWSHEET NOTE
[facility] [employer]  [address] [address]  Phone [phone]   Fax [phone]                                                  Date:  6/10/2022    Patient Name:  Rosalba Horan  \"Giacomo\"  :  1975  MRN: [id number]  Medical/Treatment Diagnosis Information:  · Diagnosis: Biceps tendinitis or right upper extremity (M75.21) Subacromial decompression with acromioplasty; arthroscopic distal clavicle excision; injection of corticosteroid on 22. · Treatment Diagnosis: Extensive debridement of labrum, rotator cuff, rotator interval, subacromial bursa; Subacromial decompression with acromioplasty; arthroscopic distal clavicle excision; injection of corticosteroid  Insurance/Certification information:  PT Insurance Information: [facility]  Physician Information:  Referring Practitioner: Catarina Alford MD  Plan of care signed (Y/N):      Date of Patient follow up with Physician: Iván Mcclendon       Progress Report: [x]  Yes  []  No     Functional Scale:   22 SPADI = 46% disability  5/10/22 FOTO: 39      Date Range for reporting period:  Beginnin/10/22  Ending:      Progress report due (10 Rx/or 30 days whichever is less): 7875     Recertification due (POC duration/ or 90 days whichever is less): 8/10/22     Visit # Insurance Allowable Auth Needed   6 used prior to sx 11 30 [x]Yes    []No     Pain level: 3-4/ 10 @ rest     4-5/10, global shoulder soreness, especially along the anterior chest and biceps    SUBJECTIVE:  Pt has been more sore and with pain in the Rt. 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   OBJECTIVE:   Skilled care provided per flow sheet below; Significant UT and pec facilitation due to compensations.  Observation: Patient with great difficultly with scapular control during thera-ex.    · Test measurements: DATE 5/25/22 6/1/22    6/10/22       PROM Flex: 155  ER: 80  IR: 58 Flex: 157  ER: 85  IR: 65 Flex 158 w/ pain  Er: 100  IR: 80         5/12/22 PROM demonstrated improvements in all ranges with increased soreness into flexion, ER, IR. Pt has increased Skin irritation with his axilla due to switching to dial prior to surgery and may have an allergy.        RESTRICTIONS/PRECAUTIONS: *agg signs = flexion/abduction, resisted ER/Ir    Home exercises Program:  3/23/2022: tband row, supine DB press, supine DB OH raise, SL ER, standing doorway pec stretch (Handout to be provided NV)    Exercises/Interventions:  40'  Therapeutic Ex  x23 min Wt / Resistance Sets/sec Reps Notes   Supine wand assisted flexion    Held after repetitive complaints of anterior / superior shoulder pain   Strap IR walkout       No money  RED 1 15x 6/10   SL ER  SL  ABD 1#ea   HOLD 2 10ea 6/10   Doorway pec stretch  30\" x1 Demonstrated for HEP   Standing Row 3# 3 10x 6/8   Prone ext 0# 3 10     HOLD 6/8   Supine pec stretch w/ towel roll  1 min x3 Towel roll b/w scapula   Row   Extension Blue   3 x10 6/10   Pulleys flexion  3 min  6/10   RTC interval stretch 3# 1 min x2    UT and levator scap stretch w/ strap assist for scap depress  30\" x2 ea 6/3 reviewed                    Therapeutic Activities   3'      Pt education with postural emphasis,                                                               Manual Intervention  x13 min       GH Mobs  3 min  AP and inferior GH mobs while in neutral, ER, and slight abduction          Shoulder PROM   10 min  PROM into all planes (heavier focus on ER/IR/flex)   NMR re-education       BB ER/IR  yellow 10\" 5x 6/8                                                        Therapeutic Exercise and NMR EXR  [x] (23558) Provided verbal/tactile cueing for activities related to strengthening, flexibility, endurance, ROM  for improvements in scapular, scapulothoracic and UE control with self care, reaching, carrying, lifting, house/yardwork, driving/computer work.    [] (56246) Provided verbal/tactile cueing for activities related to improving balance, coordination, kinesthetic sense, posture, motor skill, proprioception  to assist with  scapular, scapulothoracic and UE control with self care, reaching, carrying, lifting, house/yardwork, driving/computer work. Therapeutic Activities:    [x] (50182 or 97489) Provided verbal/tactile cueing for activities related to improving balance, coordination, kinesthetic sense, posture, motor skill, proprioception and motor activation to allow for proper function of scapular, scapulothoracic and UE control with self care, carrying, lifting, driving/computer work.      Home Exercise Program:    [x] (19299) Reviewed/Progressed HEP activities related to strengthening, flexibility, endurance, ROM of scapular, scapulothoracic and UE control with self care, reaching, carrying, lifting, house/yardwork, driving/computer work  [] (68356) Reviewed/Progressed HEP activities related to improving balance, coordination, kinesthetic sense, posture, motor skill, proprioception of scapular, scapulothoracic and UE control with self care, reaching, carrying, lifting, house/yardwork, driving/computer work      Manual Treatments:  PROM / STM / Oscillations-Mobs:  G-I, II, III, IV (PA's, Inf., Post.)  [x] (22001) Provided manual therapy to mobilize soft tissue/joints of cervical/CT, scapular GHJ and UE for the purpose of modulating pain, promoting relaxation,  increasing ROM, reducing/eliminating soft tissue swelling/inflammation/restriction, improving soft tissue extensibility and allowing for proper ROM for normal function with self care, reaching, carrying, lifting, house/yardwork, driving/computer work          Modalities:  Game ready for increased right shoulder soreness and swelling x 15 min    Charges:  Timed Code Treatment Minutes: 40 min   Total Treatment Minutes: 55 min       [] MARIAN (LOW) 16374 (typically 20 minutes face-to-face)  [] EVAL (MOD) 24708 (typically 30 minutes face-to-face)  [] EVAL (HIGH) 15855 (typically 45 minutes face-to-face)  [] RE-EVAL     [x] TE(90392) x    2  [] IONTO (16155)  [] NMR (90980) x     [x] VASO (05790)  [x] Manual (09877) x  1 [] Other: (20560/1) x  [] TA (13139)x     [] Mech Traction (10598)  [] ES(attended) (77249)     [] ES (un) (95978):     GOALS:  Patient stated goal: \"Return to work\"  [x] Progressing: [] Met: [] Not Met: [] Adjusted     Therapist goals for Patient:   Short Term Goals: To be achieved in: 2 weeks  1. Independent in HEP and progression per patient tolerance, in order to prevent re-injury. [x] Progressing: [] Met: [] Not Met: [] Adjusted  2. Patient will have a decrease in pain to facilitate improvement in movement, function, and ADLs as indicated by Functional Deficits. [x] Progressing: [] Met: [] Not Met: [] Adjusted     Long Term Goals: To be achieved in: 6-12 weeks  1. FOTO: 77 or greater to assist with reaching prior level of function. [x] Progressing: [] Met: [] Not Met: [] Adjusted  2. Patient will demonstrate increased AROM with 90% of contralateral limb to allow for proper joint functioning as indicated by patients Functional Deficits. [x] Progressing: [] Met: [] Not Met: [] Adjusted  3. Patient will demonstrate an increase in Strength to 5lbs of contralateral limb to allow for proper functional mobility as indicated by patients Functional Deficits. [x] Progressing: [] Met: [] Not Met: [] Adjusted  4. Patient will return to reaching [place] functional activities without increased symptoms or restriction. [x] Progressing: [] Met: [] Not Met: [] Adjusted  5. Pt will tolerate working x 4 hours without pain greater than 4/10    [x] Progressing: [] Met: [] Not Met: [] Adjusted         Progression Towards Functional goals:  [x] Patient is progressing as expected towards functional goals listed.     [] Progression is slowed due to complexities listed. [] Progression has been slowed due to co-morbidities. [] Plan just implemented, too soon to assess goals progression  [] Other:       ASSESSMENT: Patient continue to voice on going shoulder soreness, specifically to the anterolateral and superior shoulder which has worsened since Wednesday. Pt had improved Rt shoulder PROM into OH planes and less painful arcs with measurements provided today. Significant weakness is still noted throughout the RTC structures and global posterior chain. Scapular dyskinesia noted during banded exercises and with rowing mechanics related to long standing history of doing bench press and overdeveloping pectoral muscles with minimal scapular training. This may be a factor in having anterior shoulder pain due to poor GH joint surfaces and biomechanical muscle imbalance. Holding on anterior directed strengthening has helped his RT shoulderr symptoms and decreased overall intensity of program.  Patient will continue to benefit from skilled therapy to address postural dysfunction, significant anterior pec restriction and deficits listed above. Chidi Garcia 6/14. Treatment/Activity Tolerance:  [] Patient tolerated treatment well [x] Patient limited by fatique  [x] Patient limited by pain   [] Patient limited by other medical complications  [] Other:     Overall Progression Towards Functional goals/ Treatment Progress Update:  [] Patient is progressing as expected towards functional goals listed. [x] Progression is slowed due to complexities/Impairments listed. [] Progression has been slowed due to co-morbidities.   [] Plan just implemented, too soon to assess goals progression <30days   [] Goals require adjustment due to lack of progress  [] Patient is not progressing as expected and requires additional follow up with physician  [] Other    Prognosis for POC: [x] Good [] Fair  [] Poor    Patient requires continued skilled intervention: [x] Yes  [] No      PLAN: Continue to work on [employer] and biceps compensations and hypertonicity, work to improve posterior chain strength, decreased biceps impingement pain. Frequency/Duration:  1-2 days per week for 6-12 Weeks:   [x] Continue per plan of care [] Alter current plan (see comments)  [] Plan of care initiated [] Hold pending MD visit [] Discharge    Electronically signed by: Saima Galvan, DANIELE, [id number]    Note: If patient does not return for scheduled/recommended follow up visits, this note will serve as a discharge from care along with the most recent update on progress.

## 2022-06-14 ENCOUNTER — OFFICE VISIT (OUTPATIENT)
Dept: ORTHOPEDIC SURGERY | Age: 47
End: 2022-06-14

## 2022-06-14 VITALS — WEIGHT: 242 LBS | BODY MASS INDEX: 35.84 KG/M2 | HEIGHT: 69 IN

## 2022-06-14 DIAGNOSIS — Z98.890 S/P SHOULDER SURGERY: Primary | ICD-10-CM

## 2022-06-14 PROCEDURE — 99024 POSTOP FOLLOW-UP VISIT: CPT | Performed by: ORTHOPAEDIC SURGERY

## 2022-06-14 RX ORDER — CELECOXIB 200 MG/1
200 CAPSULE ORAL DAILY
Qty: 60 CAPSULE | Refills: 3 | Status: SHIPPED | OUTPATIENT
Start: 2022-06-14

## 2022-06-14 NOTE — LETTER
exercises    Modalities:     Return to Sport:  [x] Of Choice      [] Plyometrics  [] Ultrasound     [] Rhythmic stabilization  [] Iontophoresis    [] Core strengthening   [] Moist heat     [] Sports specific program:   [] Massage         [x] Cryotherapy      [] Electrical stimulation     [] Paraffin  [] Whirlpool  [] TENS    [x] Home exercise program (copy to patient). Perform exercises for:   15     minutes    3      times/day  [x] Supervised physical therapy  Frequency: []  1x week  [x] 2x week  [] 3x week  [] Other:   Duration: [] 2 weeks   [] 4 weeks  [x] 6 weeks  [] Other:     Additional Instructions:     Janee Graves MD, PhD

## 2022-06-14 NOTE — PROGRESS NOTES
Chief Complaint    Follow-up (Right Shoulder)      History of Present Illness:  Salomon Marks is a pleasant, 48 y.o., male, here today for follow up of his right shoulder. This patient underwent arthroscopic subacromial decompression with acromioplasty; arthroscopic distal clavicle excision on 4/29/2022. He is nearly 7 weeks postop. He has continued in physical therapy at [facility]. He does have some soreness following physical therapy especially over his AC joint. He also endorses pain into his neck on the right side. He does feel his overhead rotation has improved as compared prior to surgery. He is wondering if ibuprofen is the only thing he can be taking at this time. He reports no new injuries or setbacks. Pain Assessment  Location of Pain: Shoulder  Location Modifiers: Right  Severity of Pain: 2  Quality of Pain: Aching,Throbbing  Duration of Pain: Persistent  Frequency of Pain: Intermittent  Aggravating Factors:  (certain movements/activities)  Limiting Behavior: Some  Relieving Factors: Rest,Ice,Nsaids  Work-Related Injury: No  Are there other pain locations you wish to document?: No      Medical History:  Patient's medications, allergies, past medical, surgical, social and family histories were reviewed and updated as appropriate. No notes on file    Review of Systems  A 14 point review of systems was completed by the patient on 2/1/22 and is available in the media section of the scanned medical record and was reviewed on 6/14/2022. The review is negative with the exception of those things mentioned in the HPI and Past Medical History    Vital Signs:  Vitals:       General/Appearance: Alert and oriented and in no apparent distress. Skin:  There are no skin lesions, cellulitis, or extreme edema. The patient has warm and well-perfused Bilateral upper extremities with brisk capillary refill. Right Shoulder Exam:  Inspection: Incision is healing well.  No gross deformities, no signs of infection. Palpation: No crepitus    Active Range of Motion: Not tested    Passive Range of Motion: Forward Elevation 120, External Rotation 40    Strength: Deferred    Special Tests: No Aryan muscle deformity. Neurovascular: Sensation to light touch is intact, no motor deficits, palpable radial pulses 2+      Radiology:     No new XR obtained at this time. Assessment :  Mr. Lawanda Swartz is a pleasant, 48 y.o. patient who is underwent arthroscopic subacromial decompression with acromioplasty; arthroscopic distal clavicle excision on 4/29/2022. He is nearly 7 weeks postop. We do feel his cervical spine may be a contributing source to his pain. Impression:  Encounter Diagnosis   Name Primary?  S/P shoulder surgery Yes       Office Procedures:  Orders Placed This Encounter   Procedures   [facility]     Referral Priority:   Routine     Referral Type:   Eval and Treat     Referral Reason:   Specialty Services Required     Requested Specialty:   Physical Therapist     Number of Visits Requested:   1       Treatment Plan: We recommend that Lawanda Swartz continue in physical therapy. A new physical therapy letter was documented in [facility] today. We recommend he take a once-per-day anti-inflammatory. He has taken Celebrex in the past, we will go ahead and call this in to his pharmacy. Also he may benefit from topical Voltaren gel. Lastly he should be icing frequently, especially following physical therapy. We will see Nate White back in 4 weeks and/or as needed. All questions were answered to patient's satisfaction and He was encouraged to call with any further questions or concerns. Maria Dolores Lanham is in agreement with this plan. 6/14/2022  9:21 AM    Roshni Burns ATC  [employer] [address]    During this examination, DAVID, Kennedy Ocampo, functioned as a scribe for Dr. Ramos Burnham.  The history taking and physical examination were performed by Dr. Leonel Arriola. All counseling during the appointment was performed between the patient and Dr. Leonel Arriola. 6/14/22  ______________  I, Dr. Crystal Mckeon, personally performed the services described in this documentation as described by Eugenio Stringer ATC in my presence, and it is both accurate and complete. Janee Arriola MD, PhD  6/14/2022

## 2022-06-15 ENCOUNTER — HOSPITAL ENCOUNTER (OUTPATIENT)
Dept: PHYSICAL THERAPY | Age: 47
Setting detail: THERAPIES SERIES
Discharge: HOME | End: 2022-06-15
Payer: COMMERCIAL

## 2022-06-15 PROCEDURE — 97110 THERAPEUTIC EXERCISES: CPT

## 2022-06-15 PROCEDURE — 97140 MANUAL THERAPY 1/> REGIONS: CPT

## 2022-06-15 NOTE — FLOWSHEET NOTE
[employer]  [address] [address]  Phone [phone]   Fax [phone]                                                  Date:  6/15/2022    Patient Name:  Doris East  \"Giacomo\"  :  1975  MRN: [id number]  Medical/Treatment Diagnosis Information:  · Diagnosis: Biceps tendinitis or right upper extremity (M75.21) Subacromial decompression with acromioplasty; arthroscopic distal clavicle excision; injection of corticosteroid on 22. · Treatment Diagnosis: Extensive debridement of labrum, rotator cuff, rotator interval, subacromial bursa; Subacromial decompression with acromioplasty; arthroscopic distal clavicle excision; injection of corticosteroid  Insurance/Certification information:  [facility] Information: [facility] [place]  Physician Information:  Referring Practitioner: Rodriguez oGnzalez MD  Plan of care signed (Y/N):      Date of Patient follow up with Physician: Jordan Kirby       Progress Report: [x]  Yes  []  No     Functional Scale:   22 SPADI = 46% disability  5/10/22 FOTO: 39      Date Range for reporting period:  Beginnin/10/22  Ending:      Progress report due (10 Rx/or 30 days whichever is less):      Recertification due (POC duration/ or 90 days whichever is less): 8/10/22     Visit # Insurance Allowable Auth Needed   7 used prior to sx 11 30 [x]Yes    []No     Pain level: 2-3/10 soreness, no pain at rest    SUBJECTIVE: Patient continues to voice on going anterior biceps pain or soreness. States Dr. Jordan Kirby would like PT to continue progressing strength. Dr. Jordan Kirby prescribed Celebrex and instructed him to alternate between the NSAID and tylenol. OBJECTIVE:   Skilled care provided per flow sheet below;     Observation: Still having great difficulty with UT and pec facilitation resulting in great difficultly with scapular control during thera-ex. .   · Test measurements:      DATE 22 6/1/22    6/10/22       PROM Flex: 155  ER: 80  IR: 58 Flex: 157  ER: 85  IR: 65 Flex 158 w/ pain  Er: 100  IR: 80         5/12/22 PROM demonstrated improvements in all ranges with increased soreness into flexion, ER, IR. Pt has increased Skin irritation with his axilla due to switching to dial prior to surgery and may have an allergy.        RESTRICTIONS/PRECAUTIONS: *agg signs = flexion/abduction, resisted ER/Ir    Home exercises Program:  3/23/2022: tband row, supine DB press, supine DB OH raise, SL ER, standing doorway pec stretch (Handout to be provided NV)    Exercises/Interventions:    Therapeutic Ex  15 min Wt / Resistance Sets/sec Reps Notes   Supine wand assisted flexion    Held after repetitive complaints of anterior / superior shoulder pain   Strap IR walkout       No money  RED 1 15x    SL ER  SL ABD 1#ea   HOLD 2 10ea    Doorway pec stretch  30\" x1    Standing DB bent over row 7# 3 x10    Standing ext 3# 3 x10    Mitchel CC row 20# 3 x10    Supine pec stretch w/ towel roll  1 min x3 Towel roll b/w scapula   Row   Extension Blue   3 x10    Pulleys flexion  3 min     RTC interval stretch 3# 1 min x2    UT and levator scap stretch w/ strap assist for scap depress  30\" x2 ea 6/3 reviewed                    Therapeutic Activities           Pt education with postural emphasis,                                                               Manual Intervention  15 min       GH Mobs  5 min  AP and inferior GH mobs during neutral, ER, and slight abduction positioning   Myofascial release, STM Manual STM / MFR and with use of hypervolt to pec  Thoracic/Rib manipulation         Shoulder PROM   10 min  PROM into all planes (heavier focus on ER/IR/flex)   Dry Needling Performed with stim KT tape to Rt shoulder into retraction   STM to trigger points in upper traps/ spine of scapula  NMR re-education         BB ER/IR  yellow 10\" 5x                                                         Therapeutic Exercise and NMR EXR  [x] (20063) Provided verbal/tactile cueing for activities related to strengthening, flexibility, endurance, ROM  for improvements in scapular, scapulothoracic and UE control with self care, reaching, carrying, lifting, house/yardwork, driving/computer work.    [] (25448) Provided verbal/tactile cueing for activities related to improving balance, coordination, kinesthetic sense, posture, motor skill, proprioception  to assist with  scapular, scapulothoracic and UE control with self care, reaching, carrying, lifting, house/yardwork, driving/computer work. Therapeutic Activities:    [] (82039 or 88492) Provided verbal/tactile cueing for activities related to improving balance, coordination, kinesthetic sense, posture, motor skill, proprioception and motor activation to allow for proper function of scapular, scapulothoracic and UE control with self care, carrying, lifting, driving/computer work.      Home Exercise Program:    [] (91276) Reviewed/Progressed HEP activities related to strengthening, flexibility, endurance, ROM of scapular, scapulothoracic and UE control with self care, reaching, carrying, lifting, house/yardwork, driving/computer work  [] (81151) Reviewed/Progressed HEP activities related to improving balance, coordination, kinesthetic sense, posture, motor skill, proprioception of scapular, scapulothoracic and UE control with self care, reaching, carrying, lifting, house/yardwork, driving/computer work      Manual Treatments:  PROM / STM / Oscillations-Mobs:  G-I, II, III, IV (PA's, Inf., Post.)  [x] (94884) Provided manual therapy to mobilize soft tissue/joints of cervical/CT, scapular GHJ and UE for the purpose of modulating pain, promoting relaxation,  increasing ROM, reducing/eliminating soft tissue swelling/inflammation/restriction, improving soft tissue extensibility and allowing for proper ROM for normal function with self care, reaching, carrying, lifting, house/yardwork, driving/computer work          Modalities:      Charges:  Timed Code Treatment Minutes: 30 min   Total Treatment Minutes: 30 min       [] EVAL (LOW) 50416 (typically 20 minutes face-to-face)  [] EVAL (MOD) 55506 (typically 30 minutes face-to-face)  [] EVAL (HIGH) 17724 (typically 45 minutes face-to-face)  [] RE-EVAL     [x] YP(20012) x    1  [] IONTO (67524)  [] NMR (13044) x     [] VASO (24859)  [x] Manual (32933) x  1 [] Other: (20560/1) x  [] TA (64836)x     [] Mech Traction (39116)  [] ES(attended) (91775)     [] ES (un) (87030):     GOALS:  Patient stated goal: \"Return to work\"  [x] Progressing: [] Met: [] Not Met: [] Adjusted     Therapist goals for Patient:   Short Term Goals: To be achieved in: 2 weeks  1. Independent in HEP and progression per patient tolerance, in order to prevent re-injury. [x] Progressing: [] Met: [] Not Met: [] Adjusted  2. Patient will have a decrease in pain to facilitate improvement in movement, function, and ADLs as indicated by Functional Deficits. [x] Progressing: [] Met: [] Not Met: [] Adjusted     Long Term Goals: To be achieved in: 6-12 weeks  1. FOTO: 77 or greater to assist with reaching prior level of function. [x] Progressing: [] Met: [] Not Met: [] Adjusted  2. Patient will demonstrate increased AROM with 90% of contralateral limb to allow for proper joint functioning as indicated by patients Functional Deficits. [x] Progressing: [] Met: [] Not Met: [] Adjusted  3. Patient will demonstrate an increase in Strength to 5lbs of contralateral limb to allow for proper functional mobility as indicated by patients Functional Deficits. [x] Progressing: [] Met: [] Not Met: [] Adjusted  4. Patient will return to reaching [place] functional activities without increased symptoms or restriction. [x] Progressing: [] Met: [] Not Met: [] Adjusted  5.  Pt will tolerate working x 4 hours without pain greater than 4/10    [x] Progressing: [] Met: [] Not Met: [] Adjusted         Progression Towards Functional goals:  [x] Patient is progressing as expected towards functional goals listed. [] Progression is slowed due to complexities listed. [] Progression has been slowed due to co-morbidities. [] Plan just implemented, too soon to assess goals progression  [] Other:       ASSESSMENT: Patient with need for shortened session this date. Patient reports follow up with Dr. Aleta Contreras going well and being prescribed Celebrex for inflammation control. Continued with passive stretching for end range motion and GH mobilization to decreased biceps impingement. Patient Still having great difficulty with UT and pec facilitation resulting in great difficultly with scapular control during thera-ex. He is progressing slowly due significant weakness still noted throughout the RTC and global posterior chain, along with long standing history of doing bench press and overdeveloping pectoral muscles with minimal scapular training. Continue PT per POC. Treatment/Activity Tolerance:  [] Patient tolerated treatment well [x] Patient limited by fatique  [x] Patient limited by pain   [] Patient limited by other medical complications  [] Other:     Overall Progression Towards Functional goals/ Treatment Progress Update:  [] Patient is progressing as expected towards functional goals listed. [x] Progression is slowed due to complexities/Impairments listed. [] Progression has been slowed due to co-morbidities. [] Plan just implemented, too soon to assess goals progression <30days   [] Goals require adjustment due to lack of progress  [] Patient is not progressing as expected and requires additional follow up with physician  [] Other    Prognosis for POC: [x] Good [] Fair  [] Poor    Patient requires continued skilled intervention: [x] Yes  [] No      PLAN: Continue to work on [place] and biceps compensations and hypertonicity, work to improving posterior chain strength, decreased biceps impingement pain.      Frequency/Duration: 1-2 days per week for 6-12 Weeks:   [x] Continue per plan of care [] Alter current plan (see comments)  [] Plan of care initiated [] Hold pending MD visit [] Discharge    Electronically signed by: Kenya Wright PT    Note: If patient does not return for scheduled/recommended follow up visits, this note will serve as a discharge from care along with the most recent update on progress.

## 2022-06-17 ENCOUNTER — APPOINTMENT (OUTPATIENT)
Dept: PHYSICAL THERAPY | Age: 47
End: 2022-06-17
Payer: COMMERCIAL

## 2022-06-21 ENCOUNTER — HOSPITAL ENCOUNTER (OUTPATIENT)
Dept: PHYSICAL THERAPY | Age: 47
Setting detail: THERAPIES SERIES
Discharge: HOME | End: 2022-06-21
Payer: COMMERCIAL

## 2022-06-21 PROCEDURE — 97140 MANUAL THERAPY 1/> REGIONS: CPT

## 2022-06-21 PROCEDURE — 97016 VASOPNEUMATIC DEVICE THERAPY: CPT

## 2022-06-21 PROCEDURE — 97110 THERAPEUTIC EXERCISES: CPT

## 2022-06-21 NOTE — FLOWSHEET NOTE
[place] [address]  Phone [phone]   Fax [phone]                                                  Date:  2022    Patient Name:  Fabio Renee  \"Giacomo\"  :  1975  MRN: [id number]  Medical/Treatment Diagnosis Information:  · Diagnosis: Biceps tendinitis or right upper extremity (M75.21) Subacromial decompression with acromioplasty; arthroscopic distal clavicle excision; injection of corticosteroid on 22. · Treatment Diagnosis: Extensive debridement of labrum, rotator cuff, rotator interval, subacromial bursa; Subacromial decompression with acromioplasty; arthroscopic distal clavicle excision; injection of corticosteroid  Insurance/Certification information:  PT Insurance Information: [facility]  Physician Information:  Referring Practitioner: Jake Joseph MD  Plan of care signed (Y/N):      Date of Patient follow up with Physician:      Progress Report: []  Yes  [x]  No     Functional Scale:  FOTO: 39 5/10/22  FOTO: Needs updated 22      Date Range for reporting period:  Beginnin/10/22  Ending:      Progress report due (10 Rx/or 30 days whichever is less): 67     Recertification due (POC duration/ or 90 days whichever is less): 8/10/22     Visit # Insurance Allowable Auth Needed   7 used prior to sx   12 30 [x]Yes    []No     Pain level: 0/10    SUBJECTIVE: Patient reports shoulder motion below 90 deg of FF has improved. Still voicing frequent superior shoulder soreness which becomes pain during prolonged use. States work is going \"fine. 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 \"      OBJECTIVE:   Skilled care provided per flow sheet below; Patient with on going scapular dyskinesia after 10 SL abduction raises. Shoulder burning after 3 sets of thera-ex.     Observation:    · Test measurements:      DATE 5/25/22 6/1/22    6/10/22 6/21/22      PROM Flex: 155  ER: 80  IR: 58 Flex: 157  ER: 85  IR: 65 Flex 158 w/ pain  Er: 100  IR: 80 Flex 160, w/ pinch   Er: 95  IR: 80        5/12/22 PROM demonstrated improvements in all ranges with increased soreness into flexion, ER, IR. Pt has increased Skin irritation with his axilla due to switching to dial prior to surgery and may have an allergy.        RESTRICTIONS/PRECAUTIONS: *agg signs = flexion/abduction, resisted ER/Ir    Home exercises Program:  3/23/2022: tband row, supine DB press, supine DB OH raise, SL ER, standing doorway pec stretch (Handout to be provided NV)    Exercises/Interventions:    Therapeutic Ex  30 min Wt / Resistance Sets/sec Reps Notes   Supine wand assisted flexion    Held after repetitive complaints of anterior / superior shoulder pain   Strap IR walkout       No money  RED 1 15x    SL ER  SL ABD 0#  1# 2 x15ea    SL flex 0# 2 x5    Doorway pec stretch  30\" x1    Standing DB bent over row 7# 3 x10    Standing CC single arm ext 5# 3 x10    Mitchel CC row 20# 3 x15    Supine pec stretch w/ towel roll  1 min x3 Towel roll b/w scapula   Row   Extension Blue   3 x10    Pulleys flexion  3 min     RTC interval stretch  1 min x2    UT and levator scap stretch w/ strap assist for scap depress  30\" x2 ea                     Therapeutic Activities                                                                          Manual Intervention  15 min       GH Mobs  5 min  AP and inferior GH mobs during neutral, ER, and slight abduction or flexion positioning   Myofascial release, STM Manual STM / MFR and with use of hypervolt to pec  Thoracic/Rib manipulation         Shoulder PROM   10 min  PROM into all planes (heavier focus on ER/IR/flex)   Dry Needling Performed with stim KT tape to Rt shoulder into retraction   STM to trigger points in upper traps/ spine of scapula  NMR re-education         BB ER/IR  yellow 10\" 5x                                                         Therapeutic Exercise and NMR EXR  [x] (64513) Provided verbal/tactile cueing for activities related to strengthening, flexibility, endurance, ROM  for improvements in scapular, scapulothoracic and UE control with self care, reaching, carrying, lifting, house/yardwork, driving/computer work. [x] (54132) Provided verbal/tactile cueing for activities related to improving balance, coordination, kinesthetic sense, posture, motor skill, proprioception  to assist with  scapular, scapulothoracic and UE control with self care, reaching, carrying, lifting, house/yardwork, driving/computer work. Therapeutic Activities:    [] (49727 or 98768) Provided verbal/tactile cueing for activities related to improving balance, coordination, kinesthetic sense, posture, motor skill, proprioception and motor activation to allow for proper function of scapular, scapulothoracic and UE control with self care, carrying, lifting, driving/computer work.      Home Exercise Program:    [] (93808) Reviewed/Progressed HEP activities related to strengthening, flexibility, endurance, ROM of scapular, scapulothoracic and UE control with self care, reaching, carrying, lifting, house/yardwork, driving/computer work  [] (87253) Reviewed/Progressed HEP activities related to improving balance, coordination, kinesthetic sense, posture, motor skill, proprioception of scapular, scapulothoracic and UE control with self care, reaching, carrying, lifting, house/yardwork, driving/computer work      Manual Treatments:  PROM / STM / Oscillations-Mobs:  G-I, II, III, IV (PA's, Inf., Post.)  [x] (47797) Provided manual therapy to mobilize soft tissue/joints of cervical/CT, scapular GHJ and UE for the purpose of modulating pain, promoting relaxation,  increasing ROM, reducing/eliminating soft tissue swelling/inflammation/restriction, improving soft tissue extensibility and allowing for proper ROM for normal function with self care, reaching, carrying, lifting, house/yardwork, driving/computer work          Modalities: Game ready for increased right shoulder soreness x15 min    Charges:  Timed Code Treatment Minutes: 45 min   Total Treatment Minutes:  60 min       [] EVAL (LOW) 00499 (typically 20 minutes face-to-face)  [] EVAL (MOD) 98462 (typically 30 minutes face-to-face)  [] EVAL (HIGH) 16373 (typically 45 minutes face-to-face)  [] RE-EVAL     [x] SP(66198) x    2  [] IONTO (75619)  [] NMR (05150) x     [] VASO (67038)  [x] Manual (74265) x  1 [x] Other: (20560/1) x  [] TA (20024)x     [] Mech Traction (16457)  [] ES(attended) (63865)     [] ES (un) (96378):     GOALS:  Patient stated goal: \"Return to work\"  [x] Progressing: [] Met: [] Not Met: [] Adjusted     Therapist goals for Patient:   Short Term Goals: To be achieved in: 2 weeks  1. Independent in HEP and progression per patient tolerance, in order to prevent re-injury. [x] Progressing: [] Met: [] Not Met: [] Adjusted  2. Patient will have a decrease in pain to facilitate improvement in movement, function, and ADLs as indicated by Functional Deficits. [x] Progressing: [] Met: [] Not Met: [] Adjusted     Long Term Goals: To be achieved in: 6-12 weeks  1. FOTO: 77 or greater to assist with reaching prior level of function. [x] Progressing: [] Met: [] Not Met: [] Adjusted  2. Patient will demonstrate increased AROM with 90% of contralateral limb to allow for proper joint functioning as indicated by patients Functional Deficits. [x] Progressing: [] Met: [] Not Met: [] Adjusted  3. Patient will demonstrate an increase in Strength to 5lbs of contralateral limb to allow for proper functional mobility as indicated by patients Functional Deficits. [x] Progressing: [] Met: [] Not Met: [] Adjusted  4. Patient will return to reaching [place] functional activities without increased symptoms or restriction. [x] Progressing: [] Met: [] Not Met: [] Adjusted  5.  Pt will tolerate working x 4 hours without pain greater than 4/10    [x] Progressing: [] Met: [] Not Met: [] Adjusted         Progression Towards Functional goals:  [x] Patient is progressing as expected towards functional goals listed. [] Progression is slowed due to complexities listed. [] Progression has been slowed due to co-morbidities. [] Plan just implemented, too soon to assess goals progression  [] Other:       ASSESSMENT: Patient reports further improvements with shoulder movements below 90 deg of FF. He is still demonstrating profound scapular weakness and difficult with consistent RTC activation during all shoulder movements. PROM is nearly symmetrical but has noted pinching or deficits with all forward flexion. Worked toward progressing patient back into strengthening as he has returned to work and performs repetitive reaching. Had some periods of biceps impingement which verbal correction helped reduce during thera-ex. Patient continues to tolerate treatment well but is limited by fatigue and pain. Treatment/Activity Tolerance:  [x] Patient tolerated treatment well [x] Patient limited by fatique  [x] Patient limited by pain   [] Patient limited by other medical complications  [] Other:     Overall Progression Towards Functional goals/ Treatment Progress Update:  [] Patient is progressing as expected towards functional goals listed. [x] Progression is slowed due to complexities/Impairments listed. [] Progression has been slowed due to co-morbidities. [] Plan just implemented, too soon to assess goals progression <30days   [] Goals require adjustment due to lack of progress  [] Patient is not progressing as expected and requires additional follow up with physician  [] Other    Prognosis for POC: [x] Good [] Fair  [] Poor    Patient requires continued skilled intervention: [x] Yes  [] No      PLAN: Continue to work on [place] and biceps compensations and hypertonicity, work to improving posterior chain strength, decreased biceps impingement pain.      Frequency/Duration:  1-2 days per week for 6-12 Weeks:   [x] Continue per plan of care [] Alter current plan (see comments)  [] Plan of care initiated [] Hold pending MD visit [] Discharge    Electronically signed by: Dk Evans PT    Note: If patient does not return for scheduled/recommended follow up visits, this note will serve as a discharge from care along with the most recent update on progress.

## 2022-06-24 ENCOUNTER — HOSPITAL ENCOUNTER (OUTPATIENT)
Dept: PHYSICAL THERAPY | Age: 47
Setting detail: THERAPIES SERIES
Discharge: HOME | End: 2022-06-24
Payer: COMMERCIAL

## 2022-06-24 PROCEDURE — 97016 VASOPNEUMATIC DEVICE THERAPY: CPT | Performed by: SPECIALIST/TECHNOLOGIST

## 2022-06-24 PROCEDURE — 97140 MANUAL THERAPY 1/> REGIONS: CPT | Performed by: SPECIALIST/TECHNOLOGIST

## 2022-06-24 PROCEDURE — 97110 THERAPEUTIC EXERCISES: CPT | Performed by: SPECIALIST/TECHNOLOGIST

## 2022-06-24 NOTE — FLOWSHEET NOTE
[employer]  [address] [address]  Phone [phone]   Fax [phone]                                                  Date:  2022    Patient Name:  Abel Hunt  \"Giacomo\"  :  1975  MRN: [id number]  Medical/Treatment Diagnosis Information:  · Diagnosis: Biceps tendinitis or right upper extremity (M75.21) Subacromial decompression with acromioplasty; arthroscopic distal clavicle excision; injection of corticosteroid on 22. · Treatment Diagnosis: Extensive debridement of labrum, rotator cuff, rotator interval, subacromial bursa; Subacromial decompression with acromioplasty; arthroscopic distal clavicle excision; injection of corticosteroid  Insurance/Certification information:  PT Insurance Information: [facility]  Physician Information:  Referring Practitioner: Jeremiah Suggs MD  Plan of care signed (Y/N):      Date of Patient follow up with Physician: Anton Joiner      Progress Report: []  Yes  [x]  No     Functional Scale:  FOTO: 39 5/10/22  FOTO: Needs updated 22      Date Range for reporting period:  Beginnin/10/22  Ending:      Progress report due (10 Rx/or 30 days whichever is less):      Recertification due (POC duration/ or 90 days whichever is less): 8/10/22     Visit # Insurance Allowable Auth Needed   7 used prior to sx   12 30 [x]Yes    []No     Pain level: 0/10    SUBJECTIVE: Pt reports doing slightly izaiah since taking celebrex for his shoulder. Woke up on his Rt shoulder last night and had popping. OBJECTIVE:   Skilled care provided per flow sheet below; Patient with on going scapular dyskinesia after 10 SL abduction raises. Shoulder burning after 3 sets of thera-ex.     Observation:    · Test measurements:      DATE 5/25/22 6/1/22    6/10/22 6/21/22      PROM Flex: 155  ER: 80  IR: 58 Flex: 157  ER: 85  IR: 65 Flex 158 w/ pain  Er: 100  IR: 80 Flex 160, w/ pinch   Er: 95  IR: 80        5/12/22 PROM demonstrated improvements in all ranges with increased soreness into flexion, ER, IR. Pt has increased Skin irritation with his axilla due to switching to dial prior to surgery and may have an allergy.        RESTRICTIONS/PRECAUTIONS: *agg signs = flexion/abduction, resisted ER/Ir    Home exercises Program:  3/23/2022: tband row, supine DB press, supine DB OH raise, SL ER, standing doorway pec stretch (Handout to be provided NV)    Exercises/Interventions:    Therapeutic Ex  30 min Wt / Resistance Sets/sec Reps Notes   Supine wand assisted flexion    Held after repetitive complaints of anterior / superior shoulder pain   Strap IR walkout  10\" 3x 6/24   No money  RED 2 10x 6/24   SL ER  SL ABD 2#  0# 2 x15ea 6/24   SL flex 0# 2 x5    Doorway pec stretch  30\" x1 6/24      Standing CC single arm ext 5# 3 x10               Towel roll b/w scapula   HIGH Row  Extension Blue   3 x10 6/24   Pulleys flexion  3 min     RTC interval stretch  1 min x2    CC Rows  35#   6/24   UT and levator scap stretch w/ strap assist for scap depress  30\" x2 ea                     Therapeutic Activities           Sleeper stretch  15\" 3x 6/24                                                           Manual Intervention  15 min       GH Mobs  5 min  AP and inferior GH mobs during neutral, ER, and slight abduction or flexion positioning   Myofascial release, STM Manual STM / MFR and with use of hypervolt to pec  T         Shoulder PROM   10 min  PROM into all planes (heavier focus on ER/IR/flex)   Dry Needling Performed with stim KT tape to Rt shoulder into retraction   STM to trigger points in upper traps/ spine of scapula  NMR re-education         BB ER/IR  yellow 10\" 5x 6/24   Standing scaption  2 10x 6/24                                                 Therapeutic Exercise and NMR EXR  [x] (71500) Provided verbal/tactile cueing for activities related to strengthening, flexibility, endurance, ROM  for improvements in scapular, scapulothoracic and UE control with self care, reaching, carrying, lifting, house/yardwork, driving/computer work. [x] (37910) Provided verbal/tactile cueing for activities related to improving balance, coordination, kinesthetic sense, posture, motor skill, proprioception  to assist with  scapular, scapulothoracic and UE control with self care, reaching, carrying, lifting, house/yardwork, driving/computer work. Therapeutic Activities:    [] (70940 or 83389) Provided verbal/tactile cueing for activities related to improving balance, coordination, kinesthetic sense, posture, motor skill, proprioception and motor activation to allow for proper function of scapular, scapulothoracic and UE control with self care, carrying, lifting, driving/computer work.      Home Exercise Program:    [] (31169) Reviewed/Progressed HEP activities related to strengthening, flexibility, endurance, ROM of scapular, scapulothoracic and UE control with self care, reaching, carrying, lifting, house/yardwork, driving/computer work  [] (51633) Reviewed/Progressed HEP activities related to improving balance, coordination, kinesthetic sense, posture, motor skill, proprioception of scapular, scapulothoracic and UE control with self care, reaching, carrying, lifting, house/yardwork, driving/computer work      Manual Treatments:  PROM / STM / Oscillations-Mobs:  G-I, II, III, IV (PA's, Inf., Post.)  [x] (65374) Provided manual therapy to mobilize soft tissue/joints of cervical/CT, scapular GHJ and UE for the purpose of modulating pain, promoting relaxation,  increasing ROM, reducing/eliminating soft tissue swelling/inflammation/restriction, improving soft tissue extensibility and allowing for proper ROM for normal function with self care, reaching, carrying, lifting, house/yardwork, driving/computer work          Modalities: Game ready for increased right shoulder soreness x15 min    Charges:  Timed Code Treatment Minutes: 45 min   Total Treatment Minutes:  60 min       [] EVAL (LOW) 46489 (typically 20 minutes face-to-face)  [] EVAL (MOD) 31085 (typically 30 minutes face-to-face)  [] EVAL (HIGH) 28340 (typically 45 minutes face-to-face)  [] RE-EVAL     [x] HS(43225) x    2  [] IONTO (18074)  [] NMR (97320) x     [] VASO (43264)  [x] Manual (12473) x  1 [x] Other: (20560/1) x  [] TA (78484)x     [] Mech Traction (19755)  [] ES(attended) (46225)     [] ES (un) (62887):     GOALS:  Patient stated goal: \"Return to work\"  [x] Progressing: [] Met: [] Not Met: [] Adjusted     Therapist goals for Patient:   Short Term Goals: To be achieved in: 2 weeks  1. Independent in HEP and progression per patient tolerance, in order to prevent re-injury. [x] Progressing: [] Met: [] Not Met: [] Adjusted  2. Patient will have a decrease in pain to facilitate improvement in movement, function, and ADLs as indicated by Functional Deficits. [x] Progressing: [] Met: [] Not Met: [] Adjusted     Long Term Goals: To be achieved in: 6-12 weeks  1. FOTO: 77 or greater to assist with reaching prior level of function. [x] Progressing: [] Met: [] Not Met: [] Adjusted  2. Patient will demonstrate increased AROM with 90% of contralateral limb to allow for proper joint functioning as indicated by patients Functional Deficits. [x] Progressing: [] Met: [] Not Met: [] Adjusted  3. Patient will demonstrate an increase in Strength to 5lbs of contralateral limb to allow for proper functional mobility as indicated by patients Functional Deficits. [x] Progressing: [] Met: [] Not Met: [] Adjusted  4. Patient will return to [facility] functional activities without increased symptoms or restriction. [x] Progressing: [] Met: [] Not Met: [] Adjusted  5.  Pt will tolerate working x 4 hours without pain greater than 4/10    [x] Progressing: [] Met: [] Not Met: [] Adjusted         Progression Towards Functional goals:  [x] Patient is progressing as expected towards functional goals listed. [] Progression is slowed due to complexities listed. [] Progression has been slowed due to co-morbidities. [] Plan just implemented, too soon to assess goals progression  [] Other:       ASSESSMENT: Pt has improved ROM in all planes with minimal catching with OH activities. He is still demonstrating profound scapular weakness and difficult with consistent RTC activation during all shoulder movements due to anterior shoulder tightness/ pectoral tightness. PROM is nearly symmetrical but has noted pinching or deficits with all forward flexion. Worked toward progressing patient back into strengthening as he has returned to work and performs repetitive reaching. Overall good tolerance today with program content especially with scaption/ LPD. Patient continues to tolerate treatment well but is limited by fatigue and pain. Treatment/Activity Tolerance:  [x] Patient tolerated treatment well [x] Patient limited by fatique  [x] Patient limited by pain   [] Patient limited by other medical complications  [] Other:     Overall Progression Towards Functional goals/ Treatment Progress Update:  [] Patient is progressing as expected towards functional goals listed. [x] Progression is slowed due to complexities/Impairments listed. [] Progression has been slowed due to co-morbidities. [] Plan just implemented, too soon to assess goals progression <30days   [] Goals require adjustment due to lack of progress  [] Patient is not progressing as expected and requires additional follow up with physician  [] Other    Prognosis for POC: [x] Good [] Fair  [] Poor    Patient requires continued skilled intervention: [x] Yes  [] No      PLAN: Continue to work on [employer] and biceps compensations and hypertonicity, work to improving posterior chain strength, decreased biceps impingement pain.      Frequency/Duration:  1-2 days per week for 6-12 Weeks:   [x] Continue per plan of care [] Alter current plan (see comments)  [] Plan of care initiated [] Hold pending MD visit [] Discharge    Electronically signed by: García John, PTA, [id number]    Note: If patient does not return for scheduled/recommended follow up visits, this note will serve as a discharge from care along with the most recent update on progress.

## 2022-06-27 ENCOUNTER — HOSPITAL ENCOUNTER (OUTPATIENT)
Dept: PHYSICAL THERAPY | Age: 47
Setting detail: THERAPIES SERIES
Discharge: HOME | End: 2022-06-27
Payer: COMMERCIAL

## 2022-06-27 PROCEDURE — 97110 THERAPEUTIC EXERCISES: CPT

## 2022-06-27 PROCEDURE — 97140 MANUAL THERAPY 1/> REGIONS: CPT

## 2022-06-27 NOTE — FLOWSHEET NOTE
[facility]  [address] [address]  Phone [phone]   Fax [phone]                                                  Date:  2022    Patient Name:  Loren Downey  \"Giacomo\"  :  1975  MRN: [id number]  Medical/Treatment Diagnosis Information:  · Diagnosis: Biceps tendinitis or right upper extremity (M75.21) Subacromial decompression with acromioplasty; arthroscopic distal clavicle excision; injection of corticosteroid on 22. · Treatment Diagnosis: Extensive debridement of labrum, rotator cuff, rotator interval, subacromial bursa; Subacromial decompression with acromioplasty; arthroscopic distal clavicle excision; injection of corticosteroid  Insurance/Certification information:  PT Insurance Information: [facility]  Physician Information:  Referring Practitioner: Camilla Dawson MD  Plan of care signed (Y/N):      Date of Patient follow up with Physician: Joe Brower      Progress Report: []  Yes  [x]  No     Functional Scale:  FOTO: 39 5/10/22  FOTO: Needs updated 22      Date Range for reporting period:  Beginnin/10/22  Ending:      Progress report due (10 Rx/or 30 days whichever is less): 3/83/47     Recertification due (POC duration/ or 90 days whichever is less): 8/10/22     Visit # Insurance Allowable Auth Needed   7 used prior to sx   13 30 [x]Yes    []No     Pain level: 0/10    SUBJECTIVE: Pt reports doing slightly izaiah since taking celebrex for his shoulder. Woke up on his Rt shoulder last night and had popping. OBJECTIVE:   Skilled care provided per flow sheet below; Patient with on going scapular dyskinesia after 10 SL abduction raises. Shoulder burning after 3 sets of thera-ex.     Observation:    · Test measurements:      DATE 5/25/22 6/1/22    6/10/22 6/21/22      PROM Flex: 155  ER: 80  IR: 58 Flex: 157  ER: 85  IR: 65 Flex 158 w/ pain  Er: 100  IR: 80 Flex 160, w/ pinch   Er: 95  IR: 80        5/12/22 PROM demonstrated improvements in all ranges with increased soreness into flexion, ER, IR. Pt has increased Skin irritation with his axilla due to switching to dial prior to surgery and may have an allergy.        RESTRICTIONS/PRECAUTIONS: *agg signs = flexion/abduction, resisted ER/Ir    Home exercises Program:  3/23/2022: tband row, supine DB press, supine DB OH raise, SL ER, standing doorway pec stretch (Handout to be provided NV)    Exercises/Interventions:    Therapeutic Ex  30 min Wt / Resistance Sets/sec Reps Notes   Supine wand assisted flexion    Held after repetitive complaints of anterior / superior shoulder pain   Strap IR walkout  10\" 3x 6/27   No money  RED 2 10x 6/27   SL ER  SL ABD 2#  0# 2 x15ea 6/27   SL flex 0# 2 x5    Doorway pec stretch  30\" x1 6/27      Standing CC single arm ext 5# 3 x10               Towel roll b/w scapula   HIGH Row  Extension Blue   3 x10 6/27   Pulleys flexion  3 min  6/27   RTC interval stretch  1 min x2    CC Rows  35#      UT and levator scap stretch w/ strap assist for scap depress  30\" x2 ea                     Therapeutic Activities           Sleeper stretch  15\" 3x 6/24                                                           Manual Intervention  15 min       GH Mobs  5 min  AP and inferior GH mobs during neutral, ER, and slight abduction or flexion positioning   Myofascial release, STM Manual STM / MFR and with use of hypervolt to pec  T         Shoulder PROM   10 min  PROM into all planes (heavier focus on ER/IR/flex)   Dry Needling Performed with stim KT tape to Rt shoulder into retraction   STM to trigger points in upper traps/ spine of scapula  NMR re-education         BB ER/IR  yellow 10\" 5x 6/24   Standing scaption  2 10x 6/24                                                 Therapeutic Exercise and NMR EXR  [x] (46696) Provided verbal/tactile cueing for activities related to strengthening, flexibility, endurance, ROM  for improvements in scapular, scapulothoracic and UE control with self care, reaching, carrying, lifting, house/yardwork, driving/computer work. [x] (98991) Provided verbal/tactile cueing for activities related to improving balance, coordination, kinesthetic sense, posture, motor skill, proprioception  to assist with  scapular, scapulothoracic and UE control with self care, reaching, carrying, lifting, house/yardwork, driving/computer work. Therapeutic Activities:    [] (08787 or 84718) Provided verbal/tactile cueing for activities related to improving balance, coordination, kinesthetic sense, posture, motor skill, proprioception and motor activation to allow for proper function of scapular, scapulothoracic and UE control with self care, carrying, lifting, driving/computer work.      Home Exercise Program:    [] (72207) Reviewed/Progressed HEP activities related to strengthening, flexibility, endurance, ROM of scapular, scapulothoracic and UE control with self care, reaching, carrying, lifting, house/yardwork, driving/computer work  [] (91291) Reviewed/Progressed HEP activities related to improving balance, coordination, kinesthetic sense, posture, motor skill, proprioception of scapular, scapulothoracic and UE control with self care, reaching, carrying, lifting, house/yardwork, driving/computer work      Manual Treatments:  PROM / STM / Oscillations-Mobs:  G-I, II, III, IV (PA's, Inf., Post.)  [x] (59947) Provided manual therapy to mobilize soft tissue/joints of cervical/CT, scapular GHJ and UE for the purpose of modulating pain, promoting relaxation,  increasing ROM, reducing/eliminating soft tissue swelling/inflammation/restriction, improving soft tissue extensibility and allowing for proper ROM for normal function with self care, reaching, carrying, lifting, house/yardwork, driving/computer work          Modalities: declined    Charges:  Timed Code Treatment Minutes: 45 min   Total Treatment Minutes:  45 min [] EVAL (LOW) 89124 (typically 20 minutes face-to-face)  [] EVAL (MOD) 09210 (typically 30 minutes face-to-face)  [] EVAL (HIGH) 24310 (typically 45 minutes face-to-face)  [] RE-EVAL     [x] DF(92465) x    2  [] IONTO (68194)  [] NMR (24310) x     [] VASO (30558)  [x] Manual (81215) x  1 [] Other: (20560/1) x  [] TA (60789)x     [] Mech Traction (69451)  [] ES(attended) (52828)     [] ES (un) (18550):     GOALS:  Patient stated goal: \"Return to work\"  [x] Progressing: [] Met: [] Not Met: [] Adjusted     Therapist goals for Patient:   Short Term Goals: To be achieved in: 2 weeks  1. Independent in HEP and progression per patient tolerance, in order to prevent re-injury. [x] Progressing: [] Met: [] Not Met: [] Adjusted  2. Patient will have a decrease in pain to facilitate improvement in movement, function, and ADLs as indicated by Functional Deficits. [x] Progressing: [] Met: [] Not Met: [] Adjusted     Long Term Goals: To be achieved in: 6-12 weeks  1. FOTO: 77 or greater to assist with reaching prior level of function. [x] Progressing: [] Met: [] Not Met: [] Adjusted  2. Patient will demonstrate increased AROM with 90% of contralateral limb to allow for proper joint functioning as indicated by patients Functional Deficits. [x] Progressing: [] Met: [] Not Met: [] Adjusted  3. Patient will demonstrate an increase in Strength to 5lbs of contralateral limb to allow for proper functional mobility as indicated by patients Functional Deficits. [x] Progressing: [] Met: [] Not Met: [] Adjusted  4. Patient will return to reaching [place] functional activities without increased symptoms or restriction. [x] Progressing: [] Met: [] Not Met: [] Adjusted  5. Pt will tolerate working x 4 hours without pain greater than 4/10    [x] Progressing: [] Met: [] Not Met: [] Adjusted         Progression Towards Functional goals:  [x] Patient is progressing as expected towards functional goals listed.     [] Progression is slowed due to complexities listed. [] Progression has been slowed due to co-morbidities. [] Plan just implemented, too soon to assess goals progression  [] Other:       ASSESSMENT: Pt had good tolerance to tx after first performing manual PROM and [address] mobs; tight into ER but improved with cues to relax his arm. Difficulty with strap IR walkout. Noted that doorway pec stretch felt good today. Cues to stand tall with no monies and high rows exercises. Fatigued with SL exercises. Declined vaso this date. Treatment/Activity Tolerance:  [x] Patient tolerated treatment well [x] Patient limited by fatique  [x] Patient limited by pain   [] Patient limited by other medical complications  [] Other:     Overall Progression Towards Functional goals/ Treatment Progress Update:  [] Patient is progressing as expected towards functional goals listed. [x] Progression is slowed due to complexities/Impairments listed. [] Progression has been slowed due to co-morbidities. [] Plan just implemented, too soon to assess goals progression <30days   [] Goals require adjustment due to lack of progress  [] Patient is not progressing as expected and requires additional follow up with physician  [] Other    Prognosis for POC: [x] Good [] Fair  [] Poor    Patient requires continued skilled intervention: [x] Yes  [] No      PLAN: Continue to work on [address] and biceps compensations and hypertonicity, work to improving posterior chain strength, decreased biceps impingement pain. Frequency/Duration:  1-2 days per week for 6-12 Weeks:   [x] Continue per plan of care [] Alter current plan (see comments)  [] Plan of care initiated [] Hold pending MD visit [] Discharge    Electronically signed by: Adriel Taylor, PTA [id number]    Note: If patient does not return for scheduled/recommended follow up visits, this note will serve as a discharge from care along with the most recent update on progress.

## 2022-07-01 ENCOUNTER — HOSPITAL ENCOUNTER (OUTPATIENT)
Dept: PHYSICAL THERAPY | Age: 47
Setting detail: THERAPIES SERIES
Discharge: HOME | End: 2022-07-01
Payer: COMMERCIAL

## 2022-07-01 PROCEDURE — 97140 MANUAL THERAPY 1/> REGIONS: CPT | Performed by: SPECIALIST/TECHNOLOGIST

## 2022-07-01 PROCEDURE — 97110 THERAPEUTIC EXERCISES: CPT | Performed by: SPECIALIST/TECHNOLOGIST

## 2022-07-01 PROCEDURE — 97016 VASOPNEUMATIC DEVICE THERAPY: CPT | Performed by: SPECIALIST/TECHNOLOGIST

## 2022-07-01 NOTE — FLOWSHEET NOTE
[facility]  [address] [address]  Phone [phone]   Fax [phone]                                                  Date:  2022    Patient Name:  Danay Azul  \"Giacomo\"  :  1975  MRN: [id number]  Medical/Treatment Diagnosis Information:  · Diagnosis: Biceps tendinitis or right upper extremity (M75.21) Subacromial decompression with acromioplasty; arthroscopic distal clavicle excision; injection of corticosteroid on 22. · Treatment Diagnosis: Extensive debridement of labrum, rotator cuff, rotator interval, subacromial bursa; Subacromial decompression with acromioplasty; arthroscopic distal clavicle excision; injection of corticosteroid  Insurance/Certification information:  PT Insurance Information: [facility]  Physician Information:  Referring Practitioner: Bennett Barnes MD  Plan of care signed (Y/N):      Date of Patient follow up with Physician: Piedad Moore      Progress Report: []  Yes  [x]  No     Functional Scale:  FOTO: 39 5/10/22  FOTO: 50 22    FOTO: 60 22      Date Range for reporting period:  Beginnin/10/22  Ending:      Progress report due (10 Rx/or 30 days whichever is less): 3/45/61     Recertification due (POC duration/ or 90 days whichever is less): 8/10/22     Visit # Insurance Allowable Auth Needed   7 used prior to sx   13 30 [x]Yes    []No     Pain level: 0/10    SUBJECTIVE: Rt shoulder making progress with overall pain but still having discomfort on the top of the shoulder when going overhead or reaching behind his back. Pain is 6/10 in OH positions and 8/ 10 when reaching behind his back. OBJECTIVE:   Skilled care provided per flow sheet below; Patient with on going scapular dyskinesia after 10 SL abduction raises. Shoulder burning after 3 sets of thera-ex.     Observation:    · Test measurements:      DATE 5/25/22 6/1/22    6/10/22 6/21/22 7/1/22     PROM Flex: 155  ER: 80  IR: 58 Flex: 157  ER: 85  IR: 65 Flex 158 w/ pain  Er: 100  IR: 80 Flex 160, w/ pinch   Er: 95  IR: 80 Flex  170  W/ pinch     IR 90 w/ pn       5/12/22 PROM demonstrated improvements in all ranges with increased soreness into flexion, ER, IR. Pt has increased Skin irritation with his axilla due to switching to dial prior to surgery and may have an allergy.        RESTRICTIONS/PRECAUTIONS: *agg signs = flexion/abduction, resisted ER/Ir    Home exercises Program:  3/23/2022: tband row, supine DB press, supine DB OH raise, SL ER, standing doorway pec stretch (Handout to be provided NV)    Exercises/Interventions:   39'  Therapeutic Ex  30 min Wt / Resistance Sets/sec Reps Notes   Strap IR walkout  10\" 3x  7/1   No money  RED 2 10x 6/27   SL ER  SL ABD 3#  1# 2 x10ea 7/1   SL flex 0# 2 x5    Doorway pec stretch  30\" x1 6/27      Standing CC single arm ext  CC LPD 5#  40# 3 x10 7/1   Supine punch    7/1  HOLD W/ anterior pain          Pulleys flexion  3 min  7/1   RTC interval stretch  1 min x2    CC Rows  40#   7/1   UT and levator scap stretch w/ strap assist for scap depress  30\" x2 ea                     Therapeutic Activities           Sleeper stretch  15\" 3x 7/1                                                           Manual Intervention  15 min       GH Mobs  5 min  AP and inferior GH mobs during neutral, ER, and slight abduction or flexion positioning   Myofascial release, STM Manual STM / MFR and with use of hypervolt to pec           Shoulder PROM   10 min  PROM into all planes (heavier focus on ER/IR/flex)   NMR re-education         BB flexion   ER/IR    Yellow  blck 10\" 3x  5x 7/1   Standing scaption 2# 2 10x 7/1   H abd w/ TB  yellow   7/1                                          Therapeutic Exercise and NMR EXR  [x] (72874) Provided verbal/tactile cueing for activities related to strengthening, flexibility, endurance, ROM  for improvements in scapular, scapulothoracic and UE control with self care, reaching, carrying, lifting, house/yardwork, driving/computer work. [x] (27765) Provided verbal/tactile cueing for activities related to improving balance, coordination, kinesthetic sense, posture, motor skill, proprioception  to assist with  scapular, scapulothoracic and UE control with self care, reaching, carrying, lifting, house/yardwork, driving/computer work. Therapeutic Activities:    [] (10519 or 34555) Provided verbal/tactile cueing for activities related to improving balance, coordination, kinesthetic sense, posture, motor skill, proprioception and motor activation to allow for proper function of scapular, scapulothoracic and UE control with self care, carrying, lifting, driving/computer work.      Home Exercise Program:    [] (43825) Reviewed/Progressed HEP activities related to strengthening, flexibility, endurance, ROM of scapular, scapulothoracic and UE control with self care, reaching, carrying, lifting, house/yardwork, driving/computer work  [] (27324) Reviewed/Progressed HEP activities related to improving balance, coordination, kinesthetic sense, posture, motor skill, proprioception of scapular, scapulothoracic and UE control with self care, reaching, carrying, lifting, house/yardwork, driving/computer work      Manual Treatments:  PROM / STM / Oscillations-Mobs:  G-I, II, III, IV (PA's, Inf., Post.)  [x] (64117) Provided manual therapy to mobilize soft tissue/joints of cervical/CT, scapular GHJ and UE for the purpose of modulating pain, promoting relaxation,  increasing ROM, reducing/eliminating soft tissue swelling/inflammation/restriction, improving soft tissue extensibility and allowing for proper ROM for normal function with self care, reaching, carrying, lifting, house/yardwork, driving/computer work          Modalities: 15' Vasopneumatic device  Charges:  Timed Code Treatment Minutes: 45 min   Total Treatment Minutes:   60 min       [] MARIAN (LOW) 40249 (typically 20 minutes face-to-face)  [] EVAL (MOD) 99468 (typically 30 minutes face-to-face)  [] EVAL (HIGH) 18108 (typically 45 minutes face-to-face)  [] RE-EVAL     [x] NT(54621) x    2  [] IONTO (94147)  [] NMR (12778) x     [x] VASO (87960)  [x] Manual (76215) x  1 [] Other: (20560/1) x  [] TA (37876)x     [] Mech Traction (94065)  [] ES(attended) (23909)     [] ES (un) (24035):     GOALS:  Patient stated goal: \"Return to work\"  [x] Progressing: [] Met: [] Not Met: [] Adjusted     Therapist goals for Patient:   Short Term Goals: To be achieved in: 2 weeks  1. Independent in HEP and progression per patient tolerance, in order to prevent re-injury. [x] Progressing: [] Met: [] Not Met: [] Adjusted  2. Patient will have a decrease in pain to facilitate improvement in movement, function, and ADLs as indicated by Functional Deficits. [x] Progressing: [] Met: [] Not Met: [] Adjusted     Long Term Goals: To be achieved in: 6-12 weeks  1. FOTO: 77 or greater to assist with reaching prior level of function. [x] Progressing: [] Met: [] Not Met: [] Adjusted  2. Patient will demonstrate increased AROM with 90% of contralateral limb to allow for proper joint functioning as indicated by patients Functional Deficits. [x] Progressing: [] Met: [] Not Met: [] Adjusted  3. Patient will demonstrate an increase in Strength to 5lbs of contralateral limb to allow for proper functional mobility as indicated by patients Functional Deficits. [x] Progressing: [] Met: [] Not Met: [] Adjusted  4. Patient will return to reaching [place] functional activities without increased symptoms or restriction. [x] Progressing: [] Met: [] Not Met: [] Adjusted  5. Pt will tolerate working x 4 hours without pain greater than 4/10    [x] Progressing: [] Met: [] Not Met: [] Adjusted         Progression Towards Functional goals:  [x] Patient is progressing as expected towards functional goals listed. [] Progression is slowed due to complexities listed.   [] Progression has been slowed due to co-morbidities. [] Plan just implemented, too soon to assess goals progression  [] Other:       ASSESSMENT: Pt had good tolerance to tx after first performing manual PROM and GH mobs; tight into IR but improved with mobilizations today. Pt making good gains with progression into flexion strengthening with BB today, horizontal abduction TB, and increased wts. in sl and w/ standing scaption. Difficulty with strap IR walkout and with SL sleeper with discomfort. Noted that doorway pec stretch felt good today. Cues to stand tall with no monies and high rows exercises. Fatigued with SL exercises with progressions but denied pain from wts. Treatment/Activity Tolerance:  [x] Patient tolerated treatment well [x] Patient limited by fatique  [x] Patient limited by pain   [] Patient limited by other medical complications  [] Other:     Overall Progression Towards Functional goals/ Treatment Progress Update:  [] Patient is progressing as expected towards functional goals listed. [x] Progression is slowed due to complexities/Impairments listed. [] Progression has been slowed due to co-morbidities. [] Plan just implemented, too soon to assess goals progression <30days   [] Goals require adjustment due to lack of progress  [] Patient is not progressing as expected and requires additional follow up with physician  [] Other    Prognosis for POC: [x] Good [] Fair  [] Poor    Patient requires continued skilled intervention: [x] Yes  [] No      PLAN: Continue to work on Atbrox and biceps compensations and hypertonicity, work to improving posterior chain strength, decreased biceps impingement pain.      Frequency/Duration:  1-2 days per week for 6-12 Weeks:   [x] Continue per plan of care [] Alter current plan (see comments)  [] Plan of care initiated [] Hold pending MD visit [] Discharge    Electronically signed by: Sil Ceballos, PTA. [id number]    Note: If patient does not return for scheduled/recommended follow up visits, this note will serve as a discharge from care along with the most recent update on progress.

## 2022-07-08 ENCOUNTER — HOSPITAL ENCOUNTER (OUTPATIENT)
Dept: PHYSICAL THERAPY | Age: 47
Setting detail: THERAPIES SERIES
Discharge: HOME | End: 2022-07-08
Payer: COMMERCIAL

## 2022-07-12 ENCOUNTER — OFFICE VISIT (OUTPATIENT)
Dept: ORTHOPEDIC SURGERY | Age: 47
End: 2022-07-12

## 2022-07-12 VITALS — HEIGHT: 69 IN | WEIGHT: 242 LBS | BODY MASS INDEX: 35.84 KG/M2

## 2022-07-12 DIAGNOSIS — Z98.890 S/P SHOULDER SURGERY: Primary | ICD-10-CM

## 2022-07-12 PROCEDURE — 99024 POSTOP FOLLOW-UP VISIT: CPT | Performed by: ORTHOPAEDIC SURGERY

## 2022-07-12 RX ORDER — PREDNISONE 10 MG/1
TABLET ORAL
Qty: 35 TABLET | Refills: 0 | Status: SHIPPED | OUTPATIENT
Start: 2022-07-12

## 2022-07-12 NOTE — LETTER
Physical Therapy Rehabilitation Referral    Patient Name:  Ofelia Franco      YOB: 1975    Diagnosis:    1. S/P shoulder surgery    2.  48 y.o. male who underwent an arthroscopic subacromial decompression with acromioplasty and distal clavicle excision on 4/29/2022. Precautions:     [x] Evaluate and Treat    Post Op Instructions:  [] Continuous passive motion (CPM)  [x] Elbow ROM  [x] Exercise in plane of scapula  [x]  Strengthening     [x] Pulley and instruction   [x] Home exercise program (copy to patient)   [] Sling when arm at risk  [] Sling or brace at all times   [x] AAROM: Forward elevation to  140+            [x] AAROM: External rotation  To  40  +  [] Isometric external rotator strengthening [x] AAROM: internal rotation: up the back  [x] Isometric abductor strengthening  [x] AAROM: Internal abduction   [] Isometric internal rotator strengthening [x] AAROM: cross-body adduction             Stretching:     Strengthening:  [x] Four quadrant (FE, ER, IR, CBA)  [x] Rotator cuff (ER, IR, Abd)  [x] Forward Elevation    [] External Rotators     [x] External Rotation    [] Internal Rotators  [x] Internal Rotation: up/back   [] Abductors     [x] Internal Rotation: supine in abduction  [x] Sleeper Stretch    [] Flexors  [] Cross-body abduction    [] Extensors  [x] Pendulum (FE, Abd/Add, cw/ccw)  [x] Scapular Stabilizers   [x] Wall-walking (FE, Abd)        [x] Shoulder shrugs     [x] Table slides (FE)                [x] Rhomboid pinch  [] Elbow (flex, ext, pron, sup)        [] Lat.  Pull downs     [] Medial epicondylitis program       [] Forward punch   [] Lateral epicondylitis program       [] Internal rotators     [x] Progressive resistive exercises  [] Bench Press        [] Bench press plus  Activities:     [] Lateral pull-downs  [x] Rowing     [] Progressive two-hand supine press  [] Stepper/Exercise bike   [x] Biceps: curls/supination  [] Swimming  [] Water exercises    Modalities:     Return to Sport:  [x] Of Choice      [] Plyometrics  [] Ultrasound     [] Rhythmic stabilization  [] Iontophoresis    [] Core strengthening   [] Moist heat     [] Sports specific program:   [] Massage         [x] Cryotherapy      [] Electrical stimulation     [] Paraffin  [] Whirlpool  [] TENS    [x] Home exercise program (copy to patient). Perform exercises for:   15     minutes    3      times/day  [x] Supervised physical therapy  Frequency: []  1x week  [x] 2x week  [] 3x week  [] Other:   Duration: [] 2 weeks   [] 4 weeks  [x] 6 weeks  [] Other:     Additional Instructions:   Prioritize internal rotation/posterior capsule stretches, nahomy sleeper stretch  Gentle manual stretch by therapist  Ramp up strengthening    Janee Hwang MD, PhD

## 2022-07-12 NOTE — PROGRESS NOTES
Chief Complaint    Follow-up (right shoulder. sx 4/29/22)      History of Present Illness:  Evangelina Claudio is a pleasant, 48 y.o., male, here today for follow up of his right shoulder. This patient underwent arthroscopic subacromial decompression with acromioplasty; arthroscopic distal clavicle excision on 4/29/2022. He is nearly 11 weeks postop. He has continued in physical therapy at [facility]. He notices improvement week-over-week in terms of overall function and pain. He states he is able to do more with less pain. He does feel he is not making much progress reaching behind his back. He reports no new injuries or setbacks. Medical History:  Patient's medications, allergies, past medical, surgical, social and family histories were reviewed and updated as appropriate. Review of Systems  A 14 point review of systems was completed by the patient and is available in the media section of the scanned medical record and was reviewed on 7/12/2022. The review is negative with the exception of those things mentioned in the HPI and Past Medical History    Vital Signs: There were no vitals filed for this visit. General/Appearance: Alert and oriented and in no apparent distress. Skin:  There are no skin lesions, cellulitis, or extreme edema. The patient has warm and well-perfused Bilateral upper extremities with brisk capillary refill. Right Shoulder Exam:  Inspection: Incision portals are healing well. No gross deformities, no signs of infection. Palpation: Non-tender AC joint    Active Range of Motion: Forward Elevation 140, Internal Rotation back pocket vs L2    Passive Range of Motion: Forward Elevation 150 with tightness in end-range, Internal Rotation sacrum the midline    Strength: Not tested    Special Tests: No Aryan muscle deformity.     Neurovascular: Sensation to light touch is intact, no motor deficits, palpable radial pulses 2+      Radiology:     No new XR obtained at this time.         Assessment :  Mr. Owen Dominguez is a pleasant, 48 y.o. patient who underwent arthroscopic subacromial decompression with acromioplasty; arthroscopic distal clavicle excision on 4/29/2022. He is nearly 11 weeks postop. He is struggling with some postoperative stiffness. Impression:  Encounter Diagnosis   Name Primary?  S/P shoulder surgery Yes       Office Procedures:  Orders Placed This Encounter   Procedures   [facility]     Referral Priority:   Routine     Referral Type:   Eval and Treat     Referral Reason:   Specialty Services Required     Requested Specialty:   Physical Therapist     Number of Visits Requested:   1       Treatment Plan:  Overall, Owen Dominguez is doing well in his recovery, although he is battling some stiffness. We would like to place him on oral steroids to combat this problem. We called prednisone in to his pharmacy. We recommend this patient continue in physical therapy. A new physical therapy letter was documented in [facility] today. He should prioritize stretches, especially the sleeper stretch. He should continue to eae back into his normal activities as tolerated. We will see Asberry Paget back in 4 weeks and/or as needed. All questions were answered to patient's satisfaction and He was encouraged to call with any further questions or concerns. Rik Olivarez is in agreement with this plan. 7/12/2022  9:24 AM    Alyssa Saba ATC  [facility] [address] [facility]    During this examination, I, Clair Bamberger, functioned as a scribe for Dr. Isis Coates. The history taking and physical examination were performed by Dr. Keely Jeronimo. All counseling during the appointment was performed between the patient and Dr. Keely Jeronimo.  7/12/22  ______________  I, Dr. Isis Coates, personally performed the services described in this documentation as described by Alyssa Saba ATC in my presence, and it is both accurate and complete. Janee Mckeon MD, PhD  7/12/2022

## 2022-07-13 ENCOUNTER — HOSPITAL ENCOUNTER (OUTPATIENT)
Dept: PHYSICAL THERAPY | Age: 47
Setting detail: THERAPIES SERIES
Discharge: HOME | End: 2022-07-13
Payer: COMMERCIAL

## 2022-07-13 PROCEDURE — 97016 VASOPNEUMATIC DEVICE THERAPY: CPT | Performed by: SPECIALIST/TECHNOLOGIST

## 2022-07-13 PROCEDURE — 97110 THERAPEUTIC EXERCISES: CPT | Performed by: SPECIALIST/TECHNOLOGIST

## 2022-07-13 PROCEDURE — 97140 MANUAL THERAPY 1/> REGIONS: CPT | Performed by: SPECIALIST/TECHNOLOGIST

## 2022-07-13 NOTE — FLOWSHEET NOTE
[facility]  [address] [address]  Phone [phone]   Fax [phone]                                                  Date:  2022    Patient Name:  Aubrie Schwartz  \"Giacomo\"  :  1975  MRN: [id number]  Medical/Treatment Diagnosis Information:  · Diagnosis: Biceps tendinitis or right upper extremity (M75.21) Subacromial decompression with acromioplasty; arthroscopic distal clavicle excision; injection of corticosteroid on 22. · Treatment Diagnosis: Extensive debridement of labrum, rotator cuff, rotator interval, subacromial bursa; Subacromial decompression with acromioplasty; arthroscopic distal clavicle excision; injection of corticosteroid  Insurance/Certification information:  PT Insurance Information: [facility]  Physician Information:  Referring Practitioner: Jayme Lo MD  Plan of care signed (Y/N):      Date of Patient follow up with Physician: Moose Carrillo      Progress Report: []  Yes  [x]  No     Functional Scale:  FOTO: 39 5/10/22  FOTO: 50 22    FOTO: 60 22      Date Range for reporting period:  Beginnin/10/22  Ending:      Progress report due (10 Rx/or 30 days whichever is less):      Recertification due (POC duration/ or 90 days whichever is less): 8/10/22     Visit # Insurance Allowable Auth Needed   7 used prior to sx    30    7-8-22- 8-- [x]Yes    []No     Pain level: 0/10    SUBJECTIVE: 11+ weeks s/p Pt saw Moose Carrillo yesterday and he was concerned with his Rt shoulder ROM and put him on a dosepack for the next 10 days and is to focus on IR ROM primarily. Pt feels he has more pain and is very sore after doctors visit yesterday. OBJECTIVE:   Skilled care provided per flow sheet below; Patient with on going scapular dyskinesia after 10 SL abduction raises. Shoulder burning after 3 sets of thera-ex.     Observation:    · Test measurements:      DATE 22 6/10/22 6/21/22 7/1/22 7/13/22    PROM Flex: 155  ER: 80  IR: 58 Flex: 157  ER: 85  IR: 65 Flex 158 w/ pain  Er: 100  IR: 80 Flex 160, w/ pinch   Er: 95  IR: 80 Flex  170  W/ pinch     IR 90 w/ pn Flex ~170  ABD~ 170   ER ~ 100  IR ~ 80 w/ pn.       5/12/22 PROM demonstrated improvements in all ranges with increased soreness into flexion, ER, IR. Pt has increased Skin irritation with his axilla due to switching to dial prior to surgery and may have an allergy.        RESTRICTIONS/PRECAUTIONS: *agg signs = flexion/abduction, resisted ER/Ir    Home exercises Program:  3/23/2022: tband row, supine DB press, supine DB OH raise, SL ER, standing doorway pec stretch (Handout to be provided [place])    Exercises/Interventions:   39'  Therapeutic Ex  30 min Wt / Resistance Sets/sec Reps Notes   Strap IR walkout  10\" 3x  7/13   No money  RED 2 10x 6/27   SL ER  SL ABD 3#  1# 3 x10ea 7/13   SL flex/ punch  2# 2 x10 7/13   Doorway pec stretch  30\" x1 6/27       CC single arm ext  CC LPD   ROWS 5#  40# 2/3 x10 7/13   Supine punch    7/1  HOLD W/ anterior pain                 RTC interval stretch  1 min x2           UT and levator scap stretch w/ strap assist for scap depress  30\" x2 ea                     Therapeutic Activities           Sleeper stretch  15\" 3x 7/13 increased focus on ROM    Supine IR BB                                                         Manual Intervention  15 min       GH Mobs  5 min  AP and inferior GH mobs during neutral, ER, and slight abduction or flexion positioning   Myofascial release, STM Manual STM / MFR and with use of hypervolt to pec           Shoulder PROM   10 min  PROM into all planes (heavier focus on ER/IR/flex)   NMR re-education         BB flexion   ER/IR    Yellow  blck 10\" 3x  5x 7/13   Standing scaption to half wall  2# 2 10x 7/13   H abd w/ TB  yellow   7/1                                          Therapeutic Exercise and NMR EXR  [x] (16752) Provided verbal/tactile cueing for activities related to strengthening, flexibility, endurance, ROM  for improvements in scapular, scapulothoracic and UE control with self care, reaching, carrying, lifting, house/yardwork, driving/computer work. [x] (08975) Provided verbal/tactile cueing for activities related to improving balance, coordination, kinesthetic sense, posture, motor skill, proprioception  to assist with  scapular, scapulothoracic and UE control with self care, reaching, carrying, lifting, house/yardwork, driving/computer work. Therapeutic Activities:    [] (77270 or 32640) Provided verbal/tactile cueing for activities related to improving balance, coordination, kinesthetic sense, posture, motor skill, proprioception and motor activation to allow for proper function of scapular, scapulothoracic and UE control with self care, carrying, lifting, driving/computer work.      Home Exercise Program:    [] (66697) Reviewed/Progressed HEP activities related to strengthening, flexibility, endurance, ROM of scapular, scapulothoracic and UE control with self care, reaching, carrying, lifting, house/yardwork, driving/computer work  [] (11061) Reviewed/Progressed HEP activities related to improving balance, coordination, kinesthetic sense, posture, motor skill, proprioception of scapular, scapulothoracic and UE control with self care, reaching, carrying, lifting, house/yardwork, driving/computer work      Manual Treatments:  PROM / STM / Oscillations-Mobs:  G-I, II, III, IV (PA's, Inf., Post.)  [x] (61601) Provided manual therapy to mobilize soft tissue/joints of cervical/CT, scapular GHJ and UE for the purpose of modulating pain, promoting relaxation,  increasing ROM, reducing/eliminating soft tissue swelling/inflammation/restriction, improving soft tissue extensibility and allowing for proper ROM for normal function with self care, reaching, carrying, lifting, house/yardwork, driving/computer work          Modalities: 15' Vasopneumatic device  Charges:  Timed Code Treatment Minutes: 45 min   Total Treatment Minutes:   60 min       [] EVAL (LOW) 26915 (typically 20 minutes face-to-face)  [] EVAL (MOD) 55123 (typically 30 minutes face-to-face)  [] EVAL (HIGH) 40104 (typically 45 minutes face-to-face)  [] RE-EVAL     [x] JJ(80543) x    2  [] IONTO (89324)  [] NMR (32307) x     [x] VASO (54428)  [x] Manual (34795) x  1 [] Other: (91397/5) x  [] TA (24983)x     [] Mech Traction (67837)  [] ES(attended) (04790)     [] ES (un) (32252):     GOALS:  Patient stated goal: \"Return to work\"  [x] Progressing: [] Met: [] Not Met: [] Adjusted     Therapist goals for Patient:   Short Term Goals: To be achieved in: 2 weeks  1. Independent in HEP and progression per patient tolerance, in order to prevent re-injury. [x] Progressing: [] Met: [] Not Met: [] Adjusted  2. Patient will have a decrease in pain to facilitate improvement in movement, function, and ADLs as indicated by Functional Deficits. [x] Progressing: [] Met: [] Not Met: [] Adjusted     Long Term Goals: To be achieved in: 6-12 weeks  1. FOTO: 77 or greater to assist with reaching prior level of function. [x] Progressing: [] Met: [] Not Met: [] Adjusted  2. Patient will demonstrate increased AROM with 90% of contralateral limb to allow for proper joint functioning as indicated by patients Functional Deficits. [x] Progressing: [] Met: [] Not Met: [] Adjusted  3. Patient will demonstrate an increase in Strength to 5lbs of contralateral limb to allow for proper functional mobility as indicated by patients Functional Deficits. [x] Progressing: [] Met: [] Not Met: [] Adjusted  4. Patient will return to reaching [place] functional activities without increased symptoms or restriction. [x] Progressing: [] Met: [] Not Met: [] Adjusted  5.  Pt will tolerate working x 4 hours without pain greater than 4/10    [x] Progressing: [] Met: [] Not Met: [] Adjusted         Progression Towards Functional goals:  [x] Patient is progressing as expected towards functional goals listed. [] Progression is slowed due to complexities listed. [] Progression has been slowed due to co-morbidities. [] Plan just implemented, too soon to assess goals progression  [] Other:       ASSESSMENT: Pt had good tolerance to tx after first performing manual PROM and GH mobs; tight into IR but improved with mobilizations today. Pt continues to have posterior capsule tightness and decreased ER/ IR mobility. Increased focus on IR stretching due to improved mobility per physician Gini Sanders. Tolerated improved tolerance to strengthening with addition of wts/ intensity with program.  Fatigued with SL exercises with progressions but denied pain from wts. IR discomfort with sleeper stretching. Treatment/Activity Tolerance:  [x] Patient tolerated treatment well [x] Patient limited by fatique  [x] Patient limited by pain   [] Patient limited by other medical complications  [] Other:     Overall Progression Towards Functional goals/ Treatment Progress Update:  [] Patient is progressing as expected towards functional goals listed. [x] Progression is slowed due to complexities/Impairments listed. [] Progression has been slowed due to co-morbidities.   [] Plan just implemented, too soon to assess goals progression <30days   [] Goals require adjustment due to lack of progress  [] Patient is not progressing as expected and requires additional follow up with physician  [] Other    Prognosis for POC: [x] Good [] Fair  [] Poor    Patient requires continued skilled intervention: [x] Yes  [] No      PLAN: Continue to work on posture with working and using his arms    Frequency/Duration:  1-2 days per week for 6-12 Weeks: Pt given increased visits per insurance  [x] Continue per plan of care [] Alter current plan (see comments)  [] Plan of care initiated [] Hold pending MD visit [] Discharge    Electronically signed by: Wen Solomon, PTA, [id number]    Note: If patient does not return for scheduled/recommended follow up visits, this note will serve as a discharge from care along with the most recent update on progress.

## 2022-07-15 ENCOUNTER — HOSPITAL ENCOUNTER (OUTPATIENT)
Dept: PHYSICAL THERAPY | Age: 47
Setting detail: THERAPIES SERIES
Discharge: HOME | End: 2022-07-15
Payer: COMMERCIAL

## 2022-07-15 PROCEDURE — 97110 THERAPEUTIC EXERCISES: CPT

## 2022-07-15 PROCEDURE — 97016 VASOPNEUMATIC DEVICE THERAPY: CPT

## 2022-07-15 PROCEDURE — 97140 MANUAL THERAPY 1/> REGIONS: CPT

## 2022-07-15 PROCEDURE — 97112 NEUROMUSCULAR REEDUCATION: CPT

## 2022-07-15 NOTE — FLOWSHEET NOTE
[facility]  [address] [address]  Phone [phone]   Fax [phone]                                                  Date:  7/15/2022    Patient Name:  Marion Phillips  \"Giacomo\"  :  1975  MRN: [id number]  Medical/Treatment Diagnosis Information:  Diagnosis: Biceps tendinitis or right upper extremity (M75.21) Subacromial decompression with acromioplasty; arthroscopic distal clavicle excision; injection of corticosteroid on 22. Treatment Diagnosis: Extensive debridement of labrum, rotator cuff, rotator interval, subacromial bursa; Subacromial decompression with acromioplasty; arthroscopic distal clavicle excision; injection of corticosteroid  Insurance/Certification information:  PT Insurance Information: [facility]  Physician Information:  Referring Practitioner: Ani Grimm MD  Plan of care signed (Y/N):      Date of Patient follow up with Physician: Deidra Mustafa      Progress Report: []  Yes  [x]  No     Functional Scale:  FOTO: 39 5/10/22  FOTO: 50 22    FOTO: 60 22      Date Range for reporting period:  Beginnin/10/22  Ending:      Progress report due (10 Rx/or 30 days whichever is less):      Recertification due (POC duration/ or 90 days whichever is less): 8/10/22     Visit # Insurance Allowable Auth Needed   7 used prior to sx    30    7-8-22- 8-- [x]Yes    []No     Pain level: 0/10    SUBJECTIVE: 11+ weeks s/p Pt reports shoulder has been doing better. Forgot to take steroid this morning. OBJECTIVE:   Skilled care provided per flow sheet below; Patient with on going scapular dyskinesia after 10 SL abduction raises. Shoulder burning after 3 sets of thera-ex.    Observation:    Test measurements:      DATE 5/25/22 6/1/22    6/10/22 6/21/22 7/1/22 7/13/22    PROM Flex: 155  ER: 80  IR: 58 Flex: 157  ER: 85  IR: 65 Flex 158 w/ pain  Er: 100  IR: 80 Flex 160, w/ pinch   Er: 95  IR: 80 Flex  170  W/ pinch     IR 90 w/ pn Flex ~170  ABD~ 170   ER ~ 100  IR ~ 80 w/ pn.       5/12/22 PROM demonstrated improvements in all ranges with increased soreness into flexion, ER, IR. Pt has increased Skin irritation with his axilla due to switching to dial prior to surgery and may have an allergy.        RESTRICTIONS/PRECAUTIONS: *agg signs = flexion/abduction, resisted ER/Ir    Home exercises Program:  3/23/2022: tband row, supine DB press, supine DB OH raise, SL ER, standing doorway pec stretch (Handout to be provided NV)    Exercises/Interventions:   39'  Therapeutic Ex  30 min Wt / Resistance Sets/sec Reps Notes   Strap IR walkout  10\" 3x  7/13   No money  RED 2 10x 6/27   SL ER  SL ABD 3#  1# 3 x10ea 7/13   SL flex/ punch  2# 2 x10 7/13   Doorway pec stretch  30\" x1 6/27       CC single arm ext  CC LPD   ROWS 5#  40# 2/3 x10 7/13   Supine punch    7/1  HOLD W/ anterior pain                 RTC interval stretch  1 min x2           UT and levator scap stretch w/ strap assist for scap depress  30\" x2 ea                     Therapeutic Activities           Sleeper stretch  15\" 5x 7/13 increased focus on ROM    Supine IR BB                                                         Manual Intervention  15 min       GH Mobs  5 min  AP and inferior GH mobs during neutral, ER, and slight abduction or flexion positioning   Myofascial release, STM Manual STM / MFR and with use of hypervolt to pec               Shoulder PROM   10 min  PROM into all planes (heavier focus on ER/IR/flex)   NMR re-education         BB flexion   ER/IR    Yellow  blck 10\" 3x  5x 7/13   Standing scaption to half wall  2# 2 10x 7/13   H abd w/ TB  yellow   7/1                                          Therapeutic Exercise and NMR EXR  [x] (94228) Provided verbal/tactile cueing for activities related to strengthening, flexibility, endurance, ROM  for improvements in scapular, scapulothoracic and UE control with self care, reaching, carrying, lifting, house/yardwork, driving/computer work. [x] (66889) Provided verbal/tactile cueing for activities related to improving balance, coordination, kinesthetic sense, posture, motor skill, proprioception  to assist with  scapular, scapulothoracic and UE control with self care, reaching, carrying, lifting, house/yardwork, driving/computer work. Therapeutic Activities:    [] (47525 or 87119) Provided verbal/tactile cueing for activities related to improving balance, coordination, kinesthetic sense, posture, motor skill, proprioception and motor activation to allow for proper function of scapular, scapulothoracic and UE control with self care, carrying, lifting, driving/computer work.      Home Exercise Program:    [] (66955) Reviewed/Progressed HEP activities related to strengthening, flexibility, endurance, ROM of scapular, scapulothoracic and UE control with self care, reaching, carrying, lifting, house/yardwork, driving/computer work  [] (24672) Reviewed/Progressed HEP activities related to improving balance, coordination, kinesthetic sense, posture, motor skill, proprioception of scapular, scapulothoracic and UE control with self care, reaching, carrying, lifting, house/yardwork, driving/computer work      Manual Treatments:  PROM / STM / Oscillations-Mobs:  G-I, II, III, IV (PA's, Inf., Post.)  [x] (74441) Provided manual therapy to mobilize soft tissue/joints of cervical/CT, scapular GHJ and UE for the purpose of modulating pain, promoting relaxation,  increasing ROM, reducing/eliminating soft tissue swelling/inflammation/restriction, improving soft tissue extensibility and allowing for proper ROM for normal function with self care, reaching, carrying, lifting, house/yardwork, driving/computer work        Modalities: 15' Vasopneumatic device  Charges:  Timed Code Treatment Minutes: 45 min   Total Treatment Minutes:   60 min       [] MARIAN (LOW) 66441 (typically 20 minutes face-to-face)  [] EVAL (MOD) 51233 (typically 30 minutes face-to-face)  [] EVAL (HIGH) 48311 (typically 45 minutes face-to-face)  [] RE-EVAL     [x] JJ(96113) x    2  [] IONTO (51301)  [] NMR (93139) x     [x] VASO (04719)  [x] Manual (63324) x  1 [] Other: (20560/1) x  [] TA (25492)x     [] Mech Traction (33169)  [] ES(attended) (04466)     [] ES (un) (52409):     GOALS:  Patient stated goal: \"Return to work\"  [x] Progressing: [] Met: [] Not Met: [] Adjusted     Therapist goals for Patient:   Short Term Goals: To be achieved in: 2 weeks  1. Independent in HEP and progression per patient tolerance, in order to prevent re-injury. [x] Progressing: [] Met: [] Not Met: [] Adjusted  2. Patient will have a decrease in pain to facilitate improvement in movement, function, and ADLs as indicated by Functional Deficits. [x] Progressing: [] Met: [] Not Met: [] Adjusted     Long Term Goals: To be achieved in: 6-12 weeks  1. FOTO: 77 or greater to assist with reaching prior level of function. [x] Progressing: [] Met: [] Not Met: [] Adjusted  2. Patient will demonstrate increased AROM with 90% of contralateral limb to allow for proper joint functioning as indicated by patients Functional Deficits. [x] Progressing: [] Met: [] Not Met: [] Adjusted  3. Patient will demonstrate an increase in Strength to 5lbs of contralateral limb to allow for proper functional mobility as indicated by patients Functional Deficits. [x] Progressing: [] Met: [] Not Met: [] Adjusted  4. Patient will return to reaching [place] functional activities without increased symptoms or restriction. [x] Progressing: [] Met: [] Not Met: [] Adjusted  5. Pt will tolerate working x 4 hours without pain greater than 4/10    [x] Progressing: [] Met: [] Not Met: [] Adjusted         Progression Towards Functional goals:  [x] Patient is progressing as expected towards functional goals listed. [] Progression is slowed due to complexities listed.   [] Progression has been slowed due to co-morbidities. [] Plan just implemented, too soon to assess goals progression  [] Other:       ASSESSMENT: Improved shoulder PROM with no reports of pain. Pt fatigues quickly with SL series. Treatment/Activity Tolerance:  [x] Patient tolerated treatment well [x] Patient limited by fatique  [x] Patient limited by pain   [] Patient limited by other medical complications  [] Other:     Overall Progression Towards Functional goals/ Treatment Progress Update:  [] Patient is progressing as expected towards functional goals listed. [x] Progression is slowed due to complexities/Impairments listed. [] Progression has been slowed due to co-morbidities. [] Plan just implemented, too soon to assess goals progression <30days   [] Goals require adjustment due to lack of progress  [] Patient is not progressing as expected and requires additional follow up with physician  [] Other    Prognosis for POC: [x] Good [] Fair  [] Poor    Patient requires continued skilled intervention: [x] Yes  [] No      PLAN: Continue to work on posture with working and using his arms    Frequency/Duration:  1-2 days per week for 6-12 Weeks: Pt given increased visits per insurance  [x] Continue per plan of care [] Alter current plan (see comments)  [] Plan of care initiated [] Hold pending MD visit [] Discharge    Electronically signed by: Becky Solis, PT    Note: If patient does not return for scheduled/recommended follow up visits, this note will serve as a discharge from care along with the most recent update on progress.

## 2022-07-20 ENCOUNTER — HOSPITAL ENCOUNTER (OUTPATIENT)
Dept: PHYSICAL THERAPY | Age: 47
Setting detail: THERAPIES SERIES
Discharge: HOME | End: 2022-07-20
Payer: COMMERCIAL

## 2022-07-20 PROCEDURE — 97112 NEUROMUSCULAR REEDUCATION: CPT

## 2022-07-20 PROCEDURE — 97140 MANUAL THERAPY 1/> REGIONS: CPT

## 2022-07-20 PROCEDURE — 97016 VASOPNEUMATIC DEVICE THERAPY: CPT

## 2022-07-20 PROCEDURE — 97110 THERAPEUTIC EXERCISES: CPT

## 2022-07-20 NOTE — FLOWSHEET NOTE
[address] [address]  Phone [phone]   Fax [phone]                                                  Date:  2022    Patient Name:  Ra Ribeiro  \"Giacomo\"  :  1975  MRN: [id number]  Medical/Treatment Diagnosis Information:  Diagnosis: Biceps tendinitis or right upper extremity (M75.21) Subacromial decompression with acromioplasty; arthroscopic distal clavicle excision; injection of corticosteroid on 22. Treatment Diagnosis: Extensive debridement of labrum, rotator cuff, rotator interval, subacromial bursa; Subacromial decompression with acromioplasty; arthroscopic distal clavicle excision; injection of corticosteroid  Insurance/Certification information:  [facility] [place]  Physician Information:  Referring Practitioner: Serafin Harper MD  Plan of care signed (Y/N): Y     Date of Patient follow up with Physician: Jeremiah Olivares      Progress Report: []  Yes  [x]  No     Functional Scale:  FOTO: 39 5/10/22  FOTO: 50 22    FOTO: 60 22      Date Range for reporting period:  Beginnin22  Ending:      Progress report due (10 Rx/or 30 days whichever is less): 1/3/03    Recertification due (POC duration/ or 90 days whichever is less): 8/10/22     Visit # Insurance Allowable Auth Needed   3/8 7/8/22 - 22 [x]Yes    []No     Pain level: 3-4/10, soreness    SUBJECTIVE:  Patient reports increased soreness along the anterior and superior shoulder. States soreness extends into the bicep and believes it could be a result of performing his sleeper stretches each day. OBJECTIVE:   Skilled care provided per flow sheet below; Patient with noted restriction performing active IR   Observation: RTC interval restriction. Changed strap IR stretch to SL IR stretch.    Test measurements:      DATE 5/25/22 6/1/22    6/10/22 6/21/22 7/1/22 7/13/22 7/20/22   PROM Flex: 155  ER: 80  IR: 58 Flex: 157  ER: 85  IR: 65 Flex 158 w/ pain  Er: 100  IR: 80 Flex 160, w/ pinch   Er: 95  IR: 80 Flex  170  W/ pinch     IR 90 w/ pn Flex ~170  ABD~ 170  ER ~ 100  IR ~ 80 w/ pn.   Flex 168  ABD: 61  ER: 95  IR: 70, w/o p!       RESTRICTIONS/PRECAUTIONS: *agg signs = flexion/abduction, resisted ER/Ir    Home exercises Program:  3/23/2022: tband row, supine DB press, supine DB OH raise, SL ER, standing doorway pec stretch (Handout to be provided NV)    Exercises/Interventions:   Therapeutic Ex  20 min Wt / Resistance Sets/sec Reps Notes   Strap IR walkout  20\" 1x Trialed but was too painful   SL IR stretch  30\" x5    No money  RED 2 10x    SL ER  SL ABD (0-OH) 3#  0# 3 x10ea    SL flex/ punch  2# 2 x10    Wall slides  3 x10 Needed to adjust to more scapular plane to reduce pain   Doorway pec stretch  30\" x1       CC single arm ext  CC LPD  ROWS 5#  40# 2/3 x10    Supine punch       RTC interval stretch 3# 1 min x3                         UT and levator scap stretch w/ strap assist for scap depress  30\" x2 ea                     Therapeutic Activities           Sleeper stretch  15\" 5x 7/13 increased focus on ROM    Supine IR BB                                                         Manual Intervention  15 min       GH Mobs  5 min  AP and inferior glides in neutral and partial ranges    Myofascial release, STM Manual STM / MFR and with use of hypervolt to pec               Shoulder PROM   10 min  PROM into all planes (heavier focus on ER/IR)   NMR re-education  10 min       BB flexion   Black 20\"/10\" 3x  5x ER/IR, flexion (sup/inf), flex (abd/add)    Standing scaption to half wall  2# 2 10x    H abd w/ TB  yellow      No Money  3 x10 Towel between shoulder blades at corner, verbal cueing to encourage scapular retraction    CC rows 40# 3 x15 Verbal cueing for scapular retraction                            Therapeutic Exercise and NMR EXR  [x] (46243) Provided verbal/tactile cueing for activities related to strengthening, flexibility, endurance, ROM  for improvements in scapular, scapulothoracic and UE control with self care, reaching, carrying, lifting, house/yardwork, driving/computer work. [x] (75747) Provided verbal/tactile cueing for activities related to improving balance, coordination, kinesthetic sense, posture, motor skill, proprioception  to assist with  scapular, scapulothoracic and UE control with self care, reaching, carrying, lifting, house/yardwork, driving/computer work. Therapeutic Activities:    [] (06917 or 55923) Provided verbal/tactile cueing for activities related to improving balance, coordination, kinesthetic sense, posture, motor skill, proprioception and motor activation to allow for proper function of scapular, scapulothoracic and UE control with self care, carrying, lifting, driving/computer work.      Home Exercise Program:    [] (67117) Reviewed/Progressed HEP activities related to strengthening, flexibility, endurance, ROM of scapular, scapulothoracic and UE control with self care, reaching, carrying, lifting, house/yardwork, driving/computer work  [] (82890) Reviewed/Progressed HEP activities related to improving balance, coordination, kinesthetic sense, posture, motor skill, proprioception of scapular, scapulothoracic and UE control with self care, reaching, carrying, lifting, house/yardwork, driving/computer work      Manual Treatments:  PROM / STM / Oscillations-Mobs:  G-I, II, III, IV (PA's, Inf., Post.)  [x] (30581) Provided manual therapy to mobilize soft tissue/joints of cervical/CT, scapular GHJ and UE for the purpose of modulating pain, promoting relaxation,  increasing ROM, reducing/eliminating soft tissue swelling/inflammation/restriction, improving soft tissue extensibility and allowing for proper ROM for normal function with self care, reaching, carrying, lifting, house/yardwork, driving/computer work      Modalities:     Charges:  Timed Code Treatment Minutes: 45 min   Total Treatment Minutes:  60 min       [] EVAL (LOW) 76443 (typically 20 minutes face-to-face)  [] EVAL (MOD) 14183 (typically 30 minutes face-to-face)  [] EVAL (HIGH) 43272 (typically 45 minutes face-to-face)  [] RE-EVAL     [x] AL(31472) x    1  [] IONTO (28621)  [x] NMR (14388) x   1  [] VASO (26762)  [x] Manual (14302) x  1 [] Other: (20560/1) x  [] TA (64697)x     [] Mech Traction (31018)  [] ES(attended) (99801)     [] ES (un) (74535):     GOALS:  Patient stated goal: \"Return to work\"  [x] Progressing: [] Met: [] Not Met: [] Adjusted     Therapist goals for Patient:   Short Term Goals: To be achieved in: 2 weeks  1. Independent in HEP and progression per patient tolerance, in order to prevent re-injury. [x] Progressing: [] Met: [] Not Met: [] Adjusted  2. Patient will have a decrease in pain to facilitate improvement in movement, function, and ADLs as indicated by Functional Deficits. [x] Progressing: [] Met: [] Not Met: [] Adjusted     Long Term Goals: To be achieved in: 6-12 weeks  1. FOTO: 77 or greater to assist with reaching prior level of function. [x] Progressing: [] Met: [] Not Met: [] Adjusted  2. Patient will demonstrate increased AROM with 90% of contralateral limb to allow for proper joint functioning as indicated by patients Functional Deficits. [x] Progressing: [] Met: [] Not Met: [] Adjusted  3. Patient will demonstrate an increase in Strength to 5lbs of contralateral limb to allow for proper functional mobility as indicated by patients Functional Deficits. [x] Progressing: [] Met: [] Not Met: [] Adjusted  4. Patient will return to reaching [place] functional activities without increased symptoms or restriction. [x] Progressing: [] Met: [] Not Met: [] Adjusted  5.  Pt will tolerate working x 4 hours without pain greater than 4/10    [x] Progressing: [] Met: [] Not Met: [] Adjusted         Progression Towards Functional goals:  [x] Patient is progressing as expected towards functional goals listed. [] Progression is slowed due to complexities listed. [] Progression has been slowed due to co-morbidities. [] Plan just implemented, too soon to assess goals progression  [] Other:       ASSESSMENT: Patient reporting on going biceps soreness which he contributes to daily sleeper stretching. Patient continues to quickly fatigue during therapeutic exercise. Passive IR is pain free up to 70 deg but active rotation remains very limited and unable to reach his LS junction. Patient will continue to benefit from skilled therapy to address on going pain, shoulder weakness, chronic biceps inflammation and postural dysfunctions. Treatment/Activity Tolerance:  [x] Patient tolerated treatment well [x] Patient limited by fatique  [x] Patient limited by pain   [] Patient limited by other medical complications  [] Other:     Overall Progression Towards Functional goals/ Treatment Progress Update:  [] Patient is progressing as expected towards functional goals listed. [x] Progression is slowed due to complexities/Impairments listed. [] Progression has been slowed due to co-morbidities. [] Plan just implemented, too soon to assess goals progression <30days   [] Goals require adjustment due to lack of progress  [] Patient is not progressing as expected and requires additional follow up with physician  [] Other    Prognosis for POC: [x] Good [] Fair  [] Poor    Patient requires continued skilled intervention: [x] Yes  [] No      PLAN: Continue to work on posture with working and using his arms    Frequency/Duration:  8 visits 7/8/22 - 8/26/22    [x] Continue per plan of care [] Alter current plan (see comments)  [] Plan of care initiated [] Hold pending MD visit [] Discharge    Electronically signed by: Alfred Kemp PT    Note: If patient does not return for scheduled/recommended follow up visits, this note will serve as a discharge from care along with the most recent update on progress.

## 2022-07-22 ENCOUNTER — HOSPITAL ENCOUNTER (OUTPATIENT)
Dept: PHYSICAL THERAPY | Age: 47
Setting detail: THERAPIES SERIES
Discharge: HOME | End: 2022-07-22
Payer: COMMERCIAL

## 2022-07-22 PROCEDURE — 97016 VASOPNEUMATIC DEVICE THERAPY: CPT | Performed by: SPECIALIST/TECHNOLOGIST

## 2022-07-22 PROCEDURE — 97140 MANUAL THERAPY 1/> REGIONS: CPT | Performed by: SPECIALIST/TECHNOLOGIST

## 2022-07-22 PROCEDURE — 97112 NEUROMUSCULAR REEDUCATION: CPT | Performed by: SPECIALIST/TECHNOLOGIST

## 2022-07-22 PROCEDURE — 97110 THERAPEUTIC EXERCISES: CPT | Performed by: SPECIALIST/TECHNOLOGIST

## 2022-07-22 NOTE — FLOWSHEET NOTE
[facility]  [address] [address]  Phone [phone]   Fax [phone]                                                  Date:  2022    Patient Name:  Stephan Licona  \"Giacomo\"  :  1975  MRN: [id number]  Medical/Treatment Diagnosis Information:  Diagnosis: Biceps tendinitis or right upper extremity (M75.21) Subacromial decompression with acromioplasty; arthroscopic distal clavicle excision; injection of corticosteroid on 22. Treatment Diagnosis: Extensive debridement of labrum, rotator cuff, rotator interval, subacromial bursa; Subacromial decompression with acromioplasty; arthroscopic distal clavicle excision; injection of corticosteroid  Insurance/Certification information:  [facility] [facility] [place]  Physician Information:  Referring Practitioner: Donna Garcia MD  Plan of care signed (Y/N): Y     Date of Patient follow up with Physician: Gini Sanders      Progress Report: []  Yes  [x]  No     Functional Scale:  FOTO: 39 5/10/22  FOTO: 50 22    FOTO: 60 22      Date Range for reporting period:  Beginnin22  Ending:      Progress report due (10 Rx/or 30 days whichever is less): 64    Recertification due (POC duration/ or 90 days whichever is less): 8/10/22     Visit # Insurance Allowable Auth Needed   22 - 22 [x]Yes    []No     Pain level: 2/10, soreness    SUBJECTIVE:  Pt reports d/c  taking steroids couple days ago. Is feeling much better but wasn't earlier in the week. Admits to being more sore from ADL's earlier in the week. OBJECTIVE:   Skilled care provided per flow sheet below; Patient with noted restriction performing active IR   Observation: RTC interval restriction. Changed strap IR stretch to SL IR stretch.    Test measurements:      DATE 5/25/22 6/1/22    6/10/22 6/21/22 7/1/22 7/13/22 7/20/22   PROM Flex: 155  ER: 80  IR: 58 Flex: 157  ER: 85  IR: 65 Flex 158 w/ pain  Er: 100  IR: 80 Flex 160, w/ pinch   Er: 95  IR: 80 Flex  170  W/ pinch     IR 90 w/ pn Flex ~170  ABD~ 170  ER ~ 100  IR ~ 80 w/ pn.   Flex 168  ABD: 61  ER: 95  IR: 70, w/o p!       RESTRICTIONS/PRECAUTIONS: *agg signs = flexion/abduction, resisted ER/Ir    Home exercises Program:  3/23/2022: tband row, supine DB press, supine DB OH raise, SL ER, standing doorway pec stretch (Handout to be provided NV)    Exercises/Interventions:   Therapeutic Ex  20 min Wt / Resistance Sets/sec Reps Notes   Strap IR walkout/ in SL with IR  20\" 1x Trialed but was too painful   SL IR stretch  30\" x5 PRN   No money  RED 2 10x    SL ER  SL ABD (0-OH) 4#  0# 2 x15ea 7/22   SL flex/ punch  2# 2 x10    Wall slides  3 x10 Needed to adjust to more scapular plane to reduce pain   Doorway pec stretch  30\" x1       CC single arm ext  CC LPD  ROWS 5#  40# 2/3 x10    Supine punch 3# 2 10x 7/22   RTC interval stretch 3# 1 min x3                         UT and levator scap stretch w/ strap assist for scap depress  30\" x2 ea                     Therapeutic Activities           Sleeper stretch  15\" 5x 7/13 increased focus on ROM    Supine IR BB     7/22                                                    Manual Intervention  15 min       GH Mobs  5 min  AP and inferior glides in neutral and partial ranges           Shoulder PROM   10 min  PROM into all planes (heavier focus on ER/IR)   NMR re-education  10 min       BB flexion   Black 20\"/10\" 3x  5x ER/IR, flexion (sup/inf), flex (abd/add)    Standing scaption to half wall  14 ounces  2 10x 7/22   H abd w/ TB  yellow      No Money RED 3 x10 Towel between shoulder blades at corner, verbal cueing to encourage scapular retraction 7/22    CC rows   SArm  rows  40#  12# 3  2 X15  10x Verbal cueing for scapular retraction 7/22                            Therapeutic Exercise and NMR EXR  [x] (08942) Provided verbal/tactile cueing for activities related to strengthening, flexibility, endurance, ROM  for improvements in scapular, scapulothoracic and UE control with self care, reaching, carrying, lifting, house/yardwork, driving/computer work. [x] (98173) Provided verbal/tactile cueing for activities related to improving balance, coordination, kinesthetic sense, posture, motor skill, proprioception  to assist with  scapular, scapulothoracic and UE control with self care, reaching, carrying, lifting, house/yardwork, driving/computer work. Therapeutic Activities:    [] (78895 or 58453) Provided verbal/tactile cueing for activities related to improving balance, coordination, kinesthetic sense, posture, motor skill, proprioception and motor activation to allow for proper function of scapular, scapulothoracic and UE control with self care, carrying, lifting, driving/computer work.      Home Exercise Program:    [] (97530) Reviewed/Progressed HEP activities related to strengthening, flexibility, endurance, ROM of scapular, scapulothoracic and UE control with self care, reaching, carrying, lifting, house/yardwork, driving/computer work  [] (00172) Reviewed/Progressed HEP activities related to improving balance, coordination, kinesthetic sense, posture, motor skill, proprioception of scapular, scapulothoracic and UE control with self care, reaching, carrying, lifting, house/yardwork, driving/computer work      Manual Treatments:  PROM / STM / Oscillations-Mobs:  G-I, II, III, IV (PA's, Inf., Post.)  [x] (29765) Provided manual therapy to mobilize soft tissue/joints of cervical/CT, scapular GHJ and UE for the purpose of modulating pain, promoting relaxation,  increasing ROM, reducing/eliminating soft tissue swelling/inflammation/restriction, improving soft tissue extensibility and allowing for proper ROM for normal function with self care, reaching, carrying, lifting, house/yardwork, driving/computer work      Modalities: 10'   Vaso for RT hip soreness    Charges:  Timed Code Treatment Minutes: 45 min goals listed. [] Progression is slowed due to complexities listed. [] Progression has been slowed due to co-morbidities. [] Plan just implemented, too soon to assess goals progression  [] Other:       ASSESSMENT: Patient reporting no biceps soreness and Rt shoulder is making gradual improvements in IR ROM . Rt shoulder is making some gains with overall strength with less AC joint pain. Pt has decreased frequency with his HEP as a result. Pt is still attempting to progress Ir ROM as able. Patient continues to quickly fatigue during therapeutic exercise but had improved tolerance with supine punches, scaption, and pushing IR ROM. RTC continues to make progress in stability. Patient will continue to benefit from skilled therapy to address on going pain, shoulder weakness, chronic biceps inflammation and postural dysfunctions. Treatment/Activity Tolerance:  [x] Patient tolerated treatment well [x] Patient limited by fatique  [x] Patient limited by pain   [] Patient limited by other medical complications  [] Other:     Overall Progression Towards Functional goals/ Treatment Progress Update:  [] Patient is progressing as expected towards functional goals listed. [x] Progression is slowed due to complexities/Impairments listed. [] Progression has been slowed due to co-morbidities.   [] Plan just implemented, too soon to assess goals progression <30days   [] Goals require adjustment due to lack of progress  [] Patient is not progressing as expected and requires additional follow up with physician  [] Other    Prognosis for POC: [x] Good [] Fair  [] Poor    Patient requires continued skilled intervention: [x] Yes  [] No      PLAN: Continue to work on posture with working and using his arms    Frequency/Duration:  8 visits 7/8/22 - 8/26/22 plan is to decrease to 1x /week due to insurance limitations    [x] Continue per plan of care [] Alter current plan (see comments)  [] Plan of care initiated [] Hold pending MD visit [] Discharge    Electronically signed by: Celine Miller, PTA, [id number]    Note: If patient does not return for scheduled/recommended follow up visits, this note will serve as a discharge from care along with the most recent update on progress.

## 2022-07-27 ENCOUNTER — HOSPITAL ENCOUNTER (OUTPATIENT)
Dept: PHYSICAL THERAPY | Age: 47
Setting detail: THERAPIES SERIES
Discharge: HOME | End: 2022-07-27
Payer: COMMERCIAL

## 2022-07-27 PROCEDURE — 97140 MANUAL THERAPY 1/> REGIONS: CPT

## 2022-07-27 PROCEDURE — 97110 THERAPEUTIC EXERCISES: CPT

## 2022-07-27 NOTE — FLOWSHEET NOTE
[facility] [employer]  [address] [address]  Phone [phone]   Fax [phone]                                                  Date:  2022    Patient Name:  Arthur Price  \"Giacomo\"  :  1975  MRN: [id number]  Medical/Treatment Diagnosis Information:  Diagnosis: Biceps tendinitis or right upper extremity (M75.21) Subacromial decompression with acromioplasty; arthroscopic distal clavicle excision; injection of corticosteroid on 22. Treatment Diagnosis: Extensive debridement of labrum, rotator cuff, rotator interval, subacromial bursa; Subacromial decompression with acromioplasty; arthroscopic distal clavicle excision; injection of corticosteroid  Insurance/Certification information:  [facility] Information[facility] [place]  Physician Information:  Referring Practitioner: Paul Singh MD  Plan of care signed (Y/N): Y     Date of Patient follow up with Physician: Sarika Darnell      Progress Report: []  Yes  [x]  No     Functional Scale:  FOTO: 39 5/10/22  FOTO: 50 22    FOTO: 60 22      Date Range for reporting period:  Beginnin22  Ending:      Progress report due (10 Rx/or 30 days whichever is less):     Recertification due (POC duration/ or 90 days whichever is less): 8/10/22     Visit # Insurance Allowable Auth Needed   22 - 22 [x]Yes    []No     Pain level: 2/10, soreness    SUBJECTIVE:  Continues to voices ongoing difficulty with reaching behind his back. States the middle of his back has been spasming when laying supine. OBJECTIVE:   Skilled care provided per flow sheet below; hypomobility throughout thoracic spine.    Observation: Patient was difficult to manipulate, required supine FR rollover manip  Test measurements:      DATE 5/25/22 6/1/22    6/10/22 6/21/22 7/1/22 7/13/22 7/20/22   PROM Flex: 155  ER: 80  IR: 58 Flex: 157  ER: 85  IR: 65 Flex 158 w/ pain  Er: 100  IR: 80 Flex 160, w/ pinch   Er: 95  IR: 80 Flex  170  W/ pinch     IR 90 w/ pn Flex ~170  ABD~ 170  ER ~ 100  IR ~ 80 w/ pn. Flex 168  ABD: 61  ER: 95  IR: 70, w/o p!       RESTRICTIONS/PRECAUTIONS: *agg signs = flexion/abduction, resisted ER/Ir    Home exercises Program:  3/23/2022: tband row, supine DB press, supine DB OH raise, SL ER, standing doorway pec stretch (Handout to be provided NV)    Exercises/Interventions:   Therapeutic Ex  23 min Wt / Resistance Sets/sec Reps Notes   Pulley's  3 min     Strap IR walkout / in SL with IR  20\" 1x Trialed but was too painful   SL IR stretch  30\" x5 PRN   Standing behind back IR lift offs  3/3\" x10    Seated CC LPD OH lat stretch  15\" x3 Held on set 3 due to increased shoulder discomfort.    No money  RED 2 10x    SL ER  SL ABD (0-OH) 4#  0# 2 x15ea    SL flex / punch  2# 2 x10    Wall slides w/ lift off  3 x10 Needed to adjust to more scapular plane to reduce pain   Hi / low pec stretch w/ ER lift off  3x3\" x10       CC single arm ext  CC LPD  CC ER walkout 5#  40#  5# 2/3    1 X10    x10    Supine punch 3# 2 10x    RTC interval stretch 3# 1 min x3    TRX row  3 x10    Tband ER Grn 3 x10                         UT and levator scap stretch w/ strap assist for scap depress  30\" x2 ea                     Therapeutic Activities           Sleeper stretch  15\" 5x    Supine IR BB                                                         Manual Intervention  18 min       GH Mobs  5 min  AP and inferior glides in neutral and partial ranges    Thoracic / rib mobs  5 min  Supine PA glides to mid thoracic spine, supine FR roll over manip          Shoulder PROM   8 min  PROM into all planes (heavier focus on ER/IR)          NMR re-education   min       BB flexion   Black 20\"/10\" 3x  5x ER/IR, flexion (sup/inf), flex (abd/add)    Standing scaption to half wall  14 ounces  2 10x    H abd w/ TB  yellow      No Money RED 3 x10 Towel between shoulder blades at corner, verbal cueing to encourage scapular retraction   CC rows  [facility]  rows  40#  12# 3  2 x15  x10 Verbal cueing for scapular retraction                             Therapeutic Exercise and NMR EXR  [x] (32525) Provided verbal/tactile cueing for activities related to strengthening, flexibility, endurance, ROM  for improvements in scapular, scapulothoracic and UE control with self care, reaching, carrying, lifting, house/yardwork, driving/computer work. [x] (83247) Provided verbal/tactile cueing for activities related to improving balance, coordination, kinesthetic sense, posture, motor skill, proprioception  to assist with  scapular, scapulothoracic and UE control with self care, reaching, carrying, lifting, house/yardwork, driving/computer work. Therapeutic Activities:    [] (11409 or 39060) Provided verbal/tactile cueing for activities related to improving balance, coordination, kinesthetic sense, posture, motor skill, proprioception and motor activation to allow for proper function of scapular, scapulothoracic and UE control with self care, carrying, lifting, driving/computer work.      Home Exercise Program:    [] (31873) Reviewed/Progressed HEP activities related to strengthening, flexibility, endurance, ROM of scapular, scapulothoracic and UE control with self care, reaching, carrying, lifting, house/yardwork, driving/computer work  [] (66659) Reviewed/Progressed HEP activities related to improving balance, coordination, kinesthetic sense, posture, motor skill, proprioception of scapular, scapulothoracic and UE control with self care, reaching, carrying, lifting, house/yardwork, driving/computer work      Manual Treatments:  PROM / STM / Oscillations-Mobs:  G-I, II, III, IV (PA's, Inf., Post.)  [x] (75985) Provided manual therapy to mobilize soft tissue/joints of cervical/CT, scapular GHJ and UE for the purpose of modulating pain, promoting relaxation,  increasing ROM, reducing/eliminating soft tissue swelling/inflammation/restriction, improving soft tissue extensibility and allowing for proper ROM for normal function with self care, reaching, carrying, lifting, house/yardwork, driving/computer work    Modalities:  Declined ice    Charges:  Timed Code Treatment Minutes: 41 min   Total Treatment Minutes: 41 min       [] EVAL (LOW) 48092 (typically 20 minutes face-to-face)  [] EVAL (MOD) 67054 (typically 30 minutes face-to-face)  [] EVAL (HIGH) 36114 (typically 45 minutes face-to-face)  [] RE-EVAL     [x] JR(47722) x    2  [] IONTO (06917)  [] NMR (84218) x     [] VASO (18357)  [x] Manual (51224) x  1 [] Other: (49982/1) x  [] TA (46425)x     [] Mech Traction (79791)  [] ES(attended) (28623)     [] ES (un) (19615):     GOALS:  Patient stated goal: \"Return to work\"  [x] Progressing: [] Met: [] Not Met: [] Adjusted     Therapist goals for Patient:   Short Term Goals: To be achieved in: 2 weeks  1. Independent in HEP and progression per patient tolerance, in order to prevent re-injury. [x] Progressing: [] Met: [] Not Met: [] Adjusted  2. Patient will have a decrease in pain to facilitate improvement in movement, function, and ADLs as indicated by Functional Deficits. [x] Progressing: [] Met: [] Not Met: [] Adjusted     Long Term Goals: To be achieved in: 6-12 weeks  1. FOTO: 77 or greater to assist with reaching prior level of function. [x] Progressing: [] Met: [] Not Met: [] Adjusted  2. Patient will demonstrate increased AROM with 90% of contralateral limb to allow for proper joint functioning as indicated by patients Functional Deficits. [x] Progressing: [] Met: [] Not Met: [] Adjusted  3. Patient will demonstrate an increase in Strength to 5lbs of contralateral limb to allow for proper functional mobility as indicated by patients Functional Deficits. [x] Progressing: [] Met: [] Not Met: [] Adjusted  4. Patient will return to [facility] functional activities without increased symptoms or restriction. [x] Progressing: [] Met: [] Not Met: [] Adjusted  5. Pt will tolerate working x 4 hours without pain greater than 4/10    [x] Progressing: [] Met: [] Not Met: [] Adjusted         Progression Towards Functional goals:  [x] Patient is progressing as expected towards functional goals listed. [] Progression is slowed due to complexities listed. [] Progression has been slowed due to co-morbidities. [] Plan just implemented, too soon to assess goals progression  [] Other:       ASSESSMENT: Patient reports improvements in all right shoulder soreness but continues to having difficulty and pain during active IR movement. We have seen gradual improvements in all AROM but still fatigues quickly throughout the posterior shoulder. PT continues to focus on progression of posterior strengthening and end range stability. Patient will continue to benefit from skilled therapy to address on going pain, shoulder weakness, chronic biceps inflammation and postural dysfunctions. Treatment/Activity Tolerance:  [x] Patient tolerated treatment well [x] Patient limited by fatique  [x] Patient limited by pain   [] Patient limited by other medical complications  [] Other:     Overall Progression Towards Functional goals/ Treatment Progress Update:  [] Patient is progressing as expected towards functional goals listed. [x] Progression is slowed due to complexities/Impairments listed. [] Progression has been slowed due to co-morbidities.   [] Plan just implemented, too soon to assess goals progression <30days   [] Goals require adjustment due to lack of progress  [] Patient is not progressing as expected and requires additional follow up with physician  [] Other    Prognosis for POC: [x] Good [] Fair  [] Poor    Patient requires continued skilled intervention: [x] Yes  [] No      PLAN: Continue to work on posture with working and using his arms    Frequency/Duration:  8 visits 7/8/22 - 8/26/22 plan is to decrease to 1x /week due to insurance limitations    [x] Continue per plan of care [] Alter current plan (see comments)  [] Plan of care initiated [] Hold pending MD visit [] Discharge    Electronically signed by: [place], [id number]    Note: If patient does not return for scheduled/recommended follow up visits, this note will serve as a discharge from care along with the most recent update on progress.

## 2022-08-03 ENCOUNTER — HOSPITAL ENCOUNTER (OUTPATIENT)
Dept: PHYSICAL THERAPY | Age: 47
Setting detail: THERAPIES SERIES
Discharge: HOME | End: 2022-08-03
Payer: COMMERCIAL

## 2022-08-03 PROCEDURE — 97140 MANUAL THERAPY 1/> REGIONS: CPT

## 2022-08-03 PROCEDURE — 97110 THERAPEUTIC EXERCISES: CPT

## 2022-08-03 PROCEDURE — 97016 VASOPNEUMATIC DEVICE THERAPY: CPT

## 2022-08-03 NOTE — FLOWSHEET NOTE
pinch     IR 90 w/ pn Flex ~170  ABD~ 170  ER ~ 100  IR ~ 80 w/ pn.   Flex 168  ABD: 61  ER: 95  IR: 70, w/o p!       RESTRICTIONS/PRECAUTIONS: *agg signs = flexion/abduction, resisted ER/Ir    Home exercises Program:  3/23/2022: tband row, supine DB press, supine DB OH raise, SL ER, standing doorway pec stretch (Handout to be provided NV)    Exercises/Interventions:   Therapeutic Ex  26 min Wt / Resistance Sets/sec Reps Notes   Pulley's  3 min     Strap IR walkout  20\" 1x Trialed but was too painful   SL IR stretch  30\" x5 PRN   Standing behind back IR lift offs  3/3\" x10    Seated CC LPD OH lat stretch  15\" x3    No money  RED 2 10x    SL ER  SL ABD (0-OH) 4#  0# 2 x15ea    SL flex / punch  2# 2 x10    Wall slides w/ lift off  3 x10 Needed to adjust to more scapular plane to reduce pain   Hi / low pec stretch w/ ER lift off  2/5\" x10       Seated CC pull down 30# 3 x10    CC single arm ext  CC ER walkout 5#  5# 2/3  1 X10  x10    Supine punch 3# 2 10x    RTC interval stretch 3# 1 min x3    TRX row  3 x10    Tband ER Grn 3 x10                         UT and levator scap stretch w/ strap assist for scap depress  30\" x2 ea                     Therapeutic Activities           Sleeper stretch  15\" 5x    Supine IR BB                                                         Manual Intervention  13 min       GH Mobs  5 min  AP and inferior glides in neutral and partial ranges    Myofascial release, STM CFM to long head of the bicepsThoracic / rib mobs  5 min  Supine PA glides to mid thoracic spine, supine FR roll over manip          Shoulder PROM   8 min  PROM into all planes (heavier focus on ER/IR)          NMR re-education   min       BB flexion   Black 20\"/10\" 3x  5x ER/IR, flexion (sup/inf), flex (abd/add)    Standing scaption to half wall  14 ounces  2 10x    H abd w/ TB  yellow      No Money RED 3 x10 Towel between shoulder blades at corner, verbal cueing to encourage scapular retraction   CC rows  [facility]  rows 40#  12# 3  2 x15  x10 Verbal cueing for scapular retraction                             Therapeutic Exercise and NMR EXR  [x] (14681) Provided verbal/tactile cueing for activities related to strengthening, flexibility, endurance, ROM  for improvements in scapular, scapulothoracic and UE control with self care, reaching, carrying, lifting, house/yardwork, driving/computer work. [x] (56694) Provided verbal/tactile cueing for activities related to improving balance, coordination, kinesthetic sense, posture, motor skill, proprioception  to assist with  scapular, scapulothoracic and UE control with self care, reaching, carrying, lifting, house/yardwork, driving/computer work. Therapeutic Activities:    [] (73696 or 71359) Provided verbal/tactile cueing for activities related to improving balance, coordination, kinesthetic sense, posture, motor skill, proprioception and motor activation to allow for proper function of scapular, scapulothoracic and UE control with self care, carrying, lifting, driving/computer work.      Home Exercise Program:    [] (22869) Reviewed/Progressed HEP activities related to strengthening, flexibility, endurance, ROM of scapular, scapulothoracic and UE control with self care, reaching, carrying, lifting, house/yardwork, driving/computer work  [] (15205) Reviewed/Progressed HEP activities related to improving balance, coordination, kinesthetic sense, posture, motor skill, proprioception of scapular, scapulothoracic and UE control with self care, reaching, carrying, lifting, house/yardwork, driving/computer work      Manual Treatments:  PROM / STM / Oscillations-Mobs:  G-I, II, III, IV (PA's, Inf., Post.)  [x] (47371) Provided manual therapy to mobilize soft tissue/joints of cervical/CT, scapular GHJ and UE for the purpose of modulating pain, promoting relaxation,  increasing ROM, reducing/eliminating soft tissue swelling/inflammation/restriction, improving soft tissue extensibility and allowing for proper ROM for normal function with self care, reaching, carrying, lifting, house/yardwork, driving/computer work    Modalities: 10' Vaso to right shoulder for pain and biceps inflammation. Charges:  Timed Code Treatment Minutes: 39 min   Total Treatment Minutes: 49 min       [] EVAL (LOW) 36292 (typically 20 minutes face-to-face)  [] EVAL (MOD) 33253 (typically 30 minutes face-to-face)  [] EVAL (HIGH) 26657 (typically 45 minutes face-to-face)  [] RE-EVAL     [x] PX(20615) x    2  [] IONTO (55034)  [] NMR (13935) x     [x] VASO (94111)  [x] Manual (99958) x  1 [] Other: (14773/7) x  [] TA (93342)x     [] Mech Traction (26883)  [] ES(attended) (24374)     [] ES (un) (63058):     GOALS:  Patient stated goal: \"Return to work\"  [x] Progressing: [] Met: [] Not Met: [] Adjusted     Therapist goals for Patient:   Short Term Goals: To be achieved in: 2 weeks  1. Independent in HEP and progression per patient tolerance, in order to prevent re-injury. [x] Progressing: [] Met: [] Not Met: [] Adjusted  2. Patient will have a decrease in pain to facilitate improvement in movement, function, and ADLs as indicated by Functional Deficits. [x] Progressing: [] Met: [] Not Met: [] Adjusted     Long Term Goals: To be achieved in: 6-12 weeks  1. FOTO: 77 or greater to assist with reaching prior level of function. [x] Progressing: [] Met: [] Not Met: [] Adjusted  2. Patient will demonstrate increased AROM with 90% of contralateral limb to allow for proper joint functioning as indicated by patients Functional Deficits. [x] Progressing: [] Met: [] Not Met: [] Adjusted  3. Patient will demonstrate an increase in Strength to 5lbs of contralateral limb to allow for proper functional mobility as indicated by patients Functional Deficits. [x] Progressing: [] Met: [] Not Met: [] Adjusted  4. Patient will return to reaching [place] functional activities without increased symptoms or restriction.    [x] Progressing: [] Met: [] Not Met: [] Adjusted  5. Pt will tolerate working x 4 hours without pain greater than 4/10    [x] Progressing: [] Met: [] Not Met: [] Adjusted         Progression Towards Functional goals:  [x] Patient is progressing as expected towards functional goals listed. [] Progression is slowed due to complexities listed. [] Progression has been slowed due to co-morbidities. [] Plan just implemented, too soon to assess goals progression  [] Other:       ASSESSMENT: Patient voicing improvements in reaching behind his back but continues to voice pain. Manual therapy still targeting end range mobility. Still noticing bicep inflammation. Patient states he has been doing more at work with repetitive motions which might be reasoning for increase in global shoulder soreness. Patient still demonstrating poor posterior cuff and parascapular strength in relationship to anterior chain. Patient will need to continue to progress posterior strength before anterior impingement symptoms resolve. Treatment/Activity Tolerance:  [x] Patient tolerated treatment well [x] Patient limited by fatique  [x] Patient limited by pain   [] Patient limited by other medical complications  [] Other:     Overall Progression Towards Functional goals/ Treatment Progress Update:  [] Patient is progressing as expected towards functional goals listed. [x] Progression is slowed due to complexities/Impairments listed. [] Progression has been slowed due to co-morbidities.   [] Plan just implemented, too soon to assess goals progression <30days   [] Goals require adjustment due to lack of progress  [] Patient is not progressing as expected and requires additional follow up with physician  [] Other    Prognosis for POC: [x] Good [] Fair  [] Poor    Patient requires continued skilled intervention: [x] Yes  [] No      PLAN: Continue to work on posture with working and using his arms    Frequency/Duration:  8 visits 7/8/22 - 8/26/22 plan is to decrease to

## 2022-08-10 ENCOUNTER — HOSPITAL ENCOUNTER (OUTPATIENT)
Dept: PHYSICAL THERAPY | Age: 47
Setting detail: THERAPIES SERIES
Discharge: HOME | End: 2022-08-10
Payer: COMMERCIAL

## 2022-08-18 ENCOUNTER — HOSPITAL ENCOUNTER (OUTPATIENT)
Dept: PHYSICAL THERAPY | Age: 47
Setting detail: THERAPIES SERIES
Discharge: HOME | End: 2022-08-18
Payer: COMMERCIAL

## 2022-08-18 PROCEDURE — 97140 MANUAL THERAPY 1/> REGIONS: CPT

## 2022-08-18 PROCEDURE — 97016 VASOPNEUMATIC DEVICE THERAPY: CPT

## 2022-08-18 PROCEDURE — 97110 THERAPEUTIC EXERCISES: CPT

## 2022-08-18 NOTE — FLOWSHEET NOTE
[facility]  [address] [address]  Phone [phone]   Fax [phone]                                                  Date:  2022    Patient Name:  Drake Stratton  \"Giacomo\"  :  1975  MRN: [id number]  Medical/Treatment Diagnosis Information:  Diagnosis: Biceps tendinitis or right upper extremity (M75.21) Subacromial decompression with acromioplasty; arthroscopic distal clavicle excision; injection of corticosteroid on 22. Treatment Diagnosis: Extensive debridement of labrum, rotator cuff, rotator interval, subacromial bursa; Subacromial decompression with acromioplasty; arthroscopic distal clavicle excision; injection of corticosteroid  Insurance/Certification information:  PT Insurance Information: [facility]  Physician Information:  Referring Practitioner: Marilyn Benjamin MD  Plan of care signed (Y/N): Y     Date of Patient follow up with Physician:      Progress Report: []  Yes  [x]  No     Functional Scale:  FOTO: 39 5/10/22  FOTO: 50 22    FOTO: 60 22      Date Range for reporting period:  Beginnin22  Ending:      Progress report due (10 Rx/or 30 days whichever is less): 13    Recertification due (POC duration/ or 90 days whichever is less): 8/10/22     Visit # Insurance Allowable Auth Needed   22 - 22 [x]Yes    []No     Pain level: 2/10, soreness    SUBJECTIVE: Patient reports near constant shoulder soreness along the anterosuperior shoulder. Says he feels like he is getting behind his back better but still has pain. Is working his full schedule but notices the shoulder really bothers him after a full week. OBJECTIVE: Skilled care provided per flow sheet below; Extended discussion on why chronic soreness remains present after surgical intervention. 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    Observation: Patient continue to having several muscular imbalance with anterior dominance. Test measurements:      DATE 5/25/22 6/1/22    6/10/22 6/21/22 7/1/22 7/13/22 7/20/22   PROM Flex: 155  ER: 80  IR: 58 Flex: 157  ER: 85  IR: 65 Flex 158 w/ pain  Er: 100  IR: 80 Flex 160, w/ pinch   Er: 95  IR: 80 Flex  170  W/ pinch     IR 90 w/ pn Flex ~170  ABD~ 170  ER ~ 100  IR ~ 80 w/ pn. Flex 168  ABD: 61  ER: 95  IR: 70, w/o p!       RESTRICTIONS/PRECAUTIONS: reaching behind the back, repetitive movements     Home exercises Program:  3/23/2022: tband row, supine DB press, supine DB OH raise, SL ER, standing doorway pec stretch (Handout to be provided [place])  8/19/22: Access Code:  [email or web address] - Banded pull aparts w/ shoulder ER, supine thoracic ext over towel roll w/ pec stretch, snow angels over towel roll, standing pec stretch, Standing RTC interval stretch, self tennis ball release to pec, Mitchel shoulder ER w/ hold    Exercises/Interventions:   Therapeutic Ex  28 min Wt / Resistance Sets/sec Reps Notes   Pulley's  3 min     Self tennis ball pec release  2 min  Static release and dynamic w/ use of shoulder abduction on wall   Doorway pec stretch  1 min x1    Strap IR walkout  20\" 1x Trialed but was too painful   SL IR stretch  30\" x5 PRN   Standing behind back IR lift offs  3/3\" x10    Seated CC LPD OH lat stretch  15\" x3    No money  RED 2 10x    SL ER  SL ABD (0-OH) 4#  0# 2 x15ea    SL flex / punch  2# 2 x10    Wall slides w/ lift off  3 x10 Needed to adjust to more scapular plane to reduce pain   Hi / low pec stretch w/ ER lift off  2/5\" x10    Standing DB bent over row 7# 3 x10    Seated CC pull down 30# 3 x10    CC single arm ext  CC ER walkout 5#  5# 2/3  1 X10  x10    Supine punch 3# 2 10x    RTC interval stretch  1 min x2 Off corner of wall   TRX row  3 x10    Tband ER Grn 3 x10    Tband pull apart Red 2 x15    Supine thoracic ext over towel roll w/ prec stretch    Demonstrated and reviewed for HEP    Snow angels over towel roll    Demonstrated and reviewed for HEP   UT and levator scap stretch w/ strap assist for scap depress  30\" x2 ea    Education and review of all home exercises provided above  8 min               Therapeutic Activities           Sleeper stretch  15\" 5x    Supine IR BB                                                         Manual Intervention  12 min       GH Mobs    AP and inferior glides in neutral and partial ranges    Myofascial release, STM  12 min  STM and use of hypervolt to posterior shoulder structures   Thoracic / rib mobs    Supine PA glides to mid thoracic spine, supine FR roll over manip          Shoulder PROM     PROM into all planes (heavier focus on ER/IR)   Dry Needling    Performed with stim   KT tape to Rt shoulder into retraction               NMR re-education   min       BB flexion   Black 20\"/10\" 3x  5x ER/IR, flexion (sup/inf), flex (abd/add)    Standing scaption to half wall  14 ounces  2 10x    H abd w/ TB  yellow      No Money RED 3 x10 Towel between shoulder blades at corner, verbal cueing to encourage scapular retraction   CC rows  SArm  rows  40#  12# 3  2 x15  x10 Verbal cueing for scapular retraction                             Therapeutic Exercise and NMR EXR  [x] (73542) Provided verbal/tactile cueing for activities related to strengthening, flexibility, endurance, ROM  for improvements in scapular, scapulothoracic and UE control with self care, reaching, carrying, lifting, house/yardwork, driving/computer work. [x] (40243) Provided verbal/tactile cueing for activities related to improving balance, coordination, kinesthetic sense, posture, motor skill, proprioception  to assist with  scapular, scapulothoracic and UE control with self care, reaching, carrying, lifting, house/yardwork, driving/computer work.     Therapeutic Activities:    [] (66642 or 36380) Provided verbal/tactile cueing for activities related to improving balance, coordination, kinesthetic sense, posture, motor skill, proprioception and motor activation to allow for proper function of scapular, scapulothoracic and UE control with self care, carrying, lifting, driving/computer work. Home Exercise Program:    [] (70097) Reviewed/Progressed HEP activities related to strengthening, flexibility, endurance, ROM of scapular, scapulothoracic and UE control with self care, reaching, carrying, lifting, house/yardwork, driving/computer work  [] (29490) Reviewed/Progressed HEP activities related to improving balance, coordination, kinesthetic sense, posture, motor skill, proprioception of scapular, scapulothoracic and UE control with self care, reaching, carrying, lifting, house/yardwork, driving/computer work      Manual Treatments:  PROM / STM / Oscillations-Mobs:  G-I, II, III, IV (PA's, Inf., Post.)  [x] (19516) Provided manual therapy to mobilize soft tissue/joints of cervical/CT, scapular GHJ and UE for the purpose of modulating pain, promoting relaxation,  increasing ROM, reducing/eliminating soft tissue swelling/inflammation/restriction, improving soft tissue extensibility and allowing for proper ROM for normal function with self care, reaching, carrying, lifting, house/yardwork, driving/computer work    Modalities: Game ready for chronic shoulder soreness x10 min    Charges:  Timed Code Treatment Minutes: 40 min   Total Treatment Minutes:  50 min       [] EVAL (LOW) 58444 (typically 20 minutes face-to-face)  [] EVAL (MOD) 76095 (typically 30 minutes face-to-face)  [] EVAL (HIGH) 63968 (typically 45 minutes face-to-face)  [] RE-EVAL     [x] BA(19679) x    2  [] IONTO (95198)  [] NMR (56180) x     [x] VASO (88605)x   1  [x] Manual (62539) x  1 [] Other: (01003/1) x  [] TA (79908)x     [] Mech Traction (47721)  [] ES(attended) (19154)     [] ES (un) (38224):     GOALS:  Patient stated goal: \"Return to work\"  [x] Progressing: [] Met: [] Not Met: [] Adjusted     Therapist goals for Patient:   Short Term Goals: To be achieved in: 2 weeks  1.  Independent in HEP and progression per patient tolerance, in order to prevent re-injury. [x] Progressing: [] Met: [] Not Met: [] Adjusted  2. Patient will have a decrease in pain to facilitate improvement in movement, function, and ADLs as indicated by Functional Deficits. [x] Progressing: [] Met: [] Not Met: [] Adjusted     Long Term Goals: To be achieved in: 6-12 weeks  1. FOTO: 77 or greater to assist with reaching prior level of function. [x] Progressing: [] Met: [] Not Met: [] Adjusted  2. Patient will demonstrate increased AROM with 90% of contralateral limb to allow for proper joint functioning as indicated by patients Functional Deficits. [x] Progressing: [] Met: [] Not Met: [] Adjusted  3. Patient will demonstrate an increase in Strength to 5lbs of contralateral limb to allow for proper functional mobility as indicated by patients Functional Deficits. [x] Progressing: [] Met: [] Not Met: [] Adjusted  4. Patient will return to [facility] functional activities without increased symptoms or restriction. [x] Progressing: [] Met: [] Not Met: [] Adjusted  5. Pt will tolerate working x 4 hours without pain greater than 4/10    [x] Progressing: [] Met: [] Not Met: [] Adjusted         Progression Towards Functional goals:  [x] Patient is progressing as expected towards functional goals listed. [] Progression is slowed due to complexities listed. [] Progression has been slowed due to co-morbidities. [] Plan just implemented, too soon to assess goals progression  [] Other:       ASSESSMENT: Patient still demonstrating muscular imbalances with anterior dominance throughout the RUE. Increased pec tightness and biceps compensations due to poor RTC strength is resulting in superior and anterior placement of the humeral head. [facility] joint mobility is normal. Reaching behind the back remains asymmetrical and painful. Use of STM and iASTM to posterior structures did not improve symptoms.  However, use of scapular retraction with reaching behind the back improved symptoms. Pec stretching improved symptom. Progressed home exercises to target on going ROM restriction and self pectoralis release. Patient still demonstrating poor posterior cuff and parascapular strength in relationship to anterior chain. And  will need to continue to progress posterior strength before anterior impingement symptoms resolve. Treatment/Activity Tolerance:  [x] Patient tolerated treatment well [x] Patient limited by fatique  [] Patient limited by pain   [] Patient limited by other medical complications  [] Other:     Overall Progression Towards Functional goals/ Treatment Progress Update:  [x] Patient is progressing as expected towards functional goals listed. [] Progression is slowed due to complexities/Impairments listed. [] Progression has been slowed due to co-morbidities. [] Plan just implemented, too soon to assess goals progression <30days   [] Goals require adjustment due to lack of progress  [] Patient is not progressing as expected and requires additional follow up with physician  [] Other    Prognosis for POC: [x] Good [] Fair  [] Poor    Patient requires continued skilled intervention: [x] Yes  [] No      PLAN: Work on improving global shoulder soreness, continue to encourage daily stretching to the pectoralis. Frequency/Duration:  8 visits 7/8/22 - 8/26/22 plan is to decrease to 1x /week due to insurance limitations    [x] Continue per plan of care [] Alter current plan (see comments)  [] Plan of care initiated [] Hold pending MD visit [] Discharge    Electronically signed by: Dorothy Boyer PT    Note: If patient does not return for scheduled/recommended follow up visits, this note will serve as a discharge from care along with the most recent update on progress.

## 2022-08-23 ENCOUNTER — OFFICE VISIT (OUTPATIENT)
Dept: ORTHOPEDIC SURGERY | Age: 47
End: 2022-08-23
Payer: COMMERCIAL

## 2022-08-23 VITALS — BODY MASS INDEX: 35.84 KG/M2 | WEIGHT: 242 LBS | HEIGHT: 69 IN

## 2022-08-23 DIAGNOSIS — M77.11 LATERAL EPICONDYLITIS OF RIGHT ELBOW: Primary | ICD-10-CM

## 2022-08-23 PROCEDURE — G8417 CALC BMI ABV UP PARAM F/U: HCPCS | Performed by: ORTHOPAEDIC SURGERY

## 2022-08-23 PROCEDURE — 99213 OFFICE O/P EST LOW 20 MIN: CPT | Performed by: ORTHOPAEDIC SURGERY

## 2022-08-23 PROCEDURE — G8427 DOCREV CUR MEDS BY ELIG CLIN: HCPCS | Performed by: ORTHOPAEDIC SURGERY

## 2022-08-23 PROCEDURE — 4004F PT TOBACCO SCREEN RCVD TLK: CPT | Performed by: ORTHOPAEDIC SURGERY

## 2022-08-23 NOTE — PROGRESS NOTES
Chief Complaint    Shoulder Pain (F/U RIGHT SHOULDER)      History of Present Illness:  Paco Noriega is a pleasant, 48 y.o., male, here today for follow up of his right shoulder. This patient underwent arthroscopic subacromial decompression with acromioplasty; arthroscopic distal clavicle excision on 4/29/2022. He is nearly 4 months postop. He has continued in physical therapy at [facility]. He notices improvement week-over-week in terms of overall function and pain. He states he is able to do more with less pain. He finally made progress reaching behind his back. He reports no new injuries or setbacks. He still has some soreness over his AC joint and his tennis elbow has gone back. He underwent 3 corticosteroid injection for his tennis elbow in the past and interested in exploring options such as PRP today. Medical History:  Patient's medications, allergies, past medical, surgical, social and family histories were reviewed and updated as appropriate. Review of Systems  A 14 point review of systems was completed by the patient and is available in the media section of the scanned medical record and was reviewed on 8/23/2022. The review is negative with the exception of those things mentioned in the HPI and Past Medical History    Vital Signs: There were no vitals filed for this visit. General/Appearance: Alert and oriented and in no apparent distress. Skin:  There are no skin lesions, cellulitis, or extreme edema. The patient has warm and well-perfused Bilateral upper extremities with brisk capillary refill. Right Shoulder Exam:  Inspection: Incision portals are healing well. No gross deformities, no signs of infection. Palpation: Non-tender AC joint    Active Range of Motion: Forward Elevation 150, Internal Rotation L2 improved compared to last time    Passive Range of Motion:  Forward Elevation 160 with tightness in end-range    Strength: 5/5 with no weakness    Special Tests: No Aryan muscle deformity. Neurovascular: Sensation to light touch is intact, no motor deficits, palpable radial pulses 2+      Radiology:     No new XR obtained at this time. Assessment :  Mr. Tommy Mcelroy is a pleasant, 48 y.o. patient who underwent arthroscopic subacromial decompression with acromioplasty; arthroscopic distal clavicle excision on 4/29/2022. He is nearly 4 months postop. His shoulder continues to improve. He's interested in exploring PRP option for his recalcitrant tennis elbow. Impression:  Encounter Diagnosis   Name Primary? Lateral epicondylitis of right elbow Yes         Office Procedures:  Orders Placed This Encounter   Procedures    Danay Giron MD, Orthopedics and Sports Medicine (Shoulder; Hip; Knee), [facility]     Referral Priority:   Routine     Referral Type:   Eval and Treat     Referral Reason:   Specialty Services Required     Requested Specialty:   Orthopedic Surgery     Number of Visits Requested:   1         Treatment Plan:  Overall, Tommy Mcelroy is doing well in his recovery, continue therapy and home exercises working on strengthening and endrange motions. He should continue to ease back into his normal activities as tolerated. Refer to Dr. Hannah Montalvo for PRP injections for right tennis elbow. We will see Dorothy Denny back as needed. All questions were answered to patient's satisfaction and He was encouraged to call with any further questions or concerns. Taylor Bass is in agreement with this plan. 8/23/2022  3:42 PM    Adilia Ching MD  Sports Medicine Fellow  [address]    The encounter with Tommy Mcelroy was supervised by Dr. Bettie Estrada who personally examined the patient and reviewed the plan. This dictation was performed with a verbal recognition program (DRAGON) and it was checked for errors. It is possible that there are still dictated errors within this office note.   If so, please bring any errors to my attention for an addendum. All efforts were made to ensure that this office note is accurate.   ________________  I was physically present and personally supervised the Orthopaedic Sports Medicine Fellow in the evaluation and development of a treatment plan for this patient. I personally interviewed the patient and performed a physical examination. In addition, I discussed the patient's condition and treatment options with them. I have also reviewed and agree with the past medical, family and social history unless otherwise noted. All of the patient's questions were answered. Janee Wisdom MD, PhD  8/23/2022

## 2022-08-24 ENCOUNTER — HOSPITAL ENCOUNTER (OUTPATIENT)
Dept: PHYSICAL THERAPY | Age: 47
Setting detail: THERAPIES SERIES
Discharge: HOME | End: 2022-08-24
Payer: COMMERCIAL

## 2022-08-24 PROCEDURE — 97140 MANUAL THERAPY 1/> REGIONS: CPT

## 2022-08-24 PROCEDURE — 97110 THERAPEUTIC EXERCISES: CPT

## 2022-08-24 NOTE — PLAN OF CARE
[facility]  [address] [address]  Phone [phone]   Fax [phone]                                                 Physical Therapy Re-Certification Plan of Care    Dear Britney Esqueda MD    We had the pleasure of treating the following patient for physical therapy services at [address] [place] [address]. A summary of our findings can be found in the updated assessment below. This includes our plan of care. If you have any questions or concerns regarding these findings, please do not hesitate to contact me at the office phone number checked above. Thank you for the referral.     Physician Signature:________________________________Date:__________________  By signing above (or electronic signature), therapists plan is approved by physician      Functional Outcome: FOTO: 61 Typical result for D/C: 66    Overall Response to Treatment:   []Patient is responding well to treatment and improvement is noted with regards  to goals   []Patient should continue to improve in reasonable time if they continue HEP   []Patient has plateaued and is no longer responding to skilled PT intervention    []Patient is getting worse and would benefit from return to referring MD   []Patient unable to adhere to initial POC   [x]Other: Patient with significant improvements in shoulder AROM and more symmetrical RTC strength since last progress note. However, he does still demonstrating muscular imbalances with anterior dominance. Excessive pec tightness and biceps compensation is present during all shoulder movements resulting in superior and anterior placement of the humeral head. There is some moderate tenderness to the [facility] joint line with great improvements in biceps tenderness. [address] joint mobility is normal yet reaching behind his back remains asymmetrical and painful.  Again, these symptoms are related to poor posterior cuff and parascapular strength. Patient should continue to progress with skilled therapy to address on going end ROM restriction, superior shoulder pain, improve postural and scapular stability deficits. Patient will need to continue to progress posterior strength before anterior impingement symptoms completely resolve. Date range of Visits: 22 - 22  Total Visits: 26    Recommendation:    [x]Continue PT 1x / wk for 8 weeks. []Hold PT, pending MD visit    Date:  2022    Patient Name:  Jeyson Little  \"Giacomo\"  :  1975  MRN: [id number]  Medical/Treatment Diagnosis Information:  Diagnosis: Biceps tendinitis or right upper extremity (M75.21) Subacromial decompression with acromioplasty; arthroscopic distal clavicle excision; injection of corticosteroid on 22. Treatment Diagnosis: Extensive debridement of labrum, rotator cuff, rotator interval, subacromial bursa; Subacromial decompression with acromioplasty; arthroscopic distal clavicle excision; injection of corticosteroid  Insurance/Certification information:  PT Insurance Information: [facility]  Physician Information:  Referring Practitioner: Jennifer Lowery MD  Plan of care signed (Y/N): Y     Date of Patient follow up with Physician:      Progress Report: [x]  Yes  []  No     Functional Scale:  FOTO: 39 5/10/22  FOTO: 50 22    FOTO: 60 22  FOTO: 59 22        Date Range for reporting period:  Beginnin22  Endin22     Progress report due (10 Rx/or 30 days whichever is less):     Recertification due (POC duration/ or 90 days whichever is less): 22 - 22    Visit # Insurance Allowable Auth Needed     Total: 26 22 - 22 [x]Yes    []No     Pain level: 4/10, soreness    SUBJECTIVE: Patient continues to voice near constant soreness along the anterosuperior shoulder. Says he feels like he is getting behind his back better but does continue to have pain or discomfort with this movement.  He has returned to his full work schedule but does believe this is increasing his soreness due to the repetitive nature of his job. OBJECTIVE: Skilled care provided per flow sheet below; Patient   Observation: Patient continue to having several muscular imbalance with anterior dominance. Test measurements:      DATE Right Left   AROM Flex: 160  Abd: 150  ER: CT junction  IR: L5 Flex: 160  Abd: 150  ER: CT junction  IR: LT junction     Strength Right left   **Tested at wrist** Flex: 27.2  Abd: 32.2  ER: 26.6  IR: 51.9 Flex: 32.8  Abd: 32.5   ER: 31.2  IR: 53.4       RESTRICTIONS/PRECAUTIONS: reaching behind the back, repetitive movements     Home exercises Program:  3/23/2022: tband row, supine DB press, supine DB OH raise, SL ER, standing doorway pec stretch (Handout to be provided NV)  8/19/22: Access Code:  [email or web address] - Banded pull aparts w/ shoulder ER, supine thoracic ext over towel roll w/ pec stretch, snow angels over towel roll, standing pec stretch, Standing RTC interval stretch, self tennis ball release to pec, Mitchel shoulder ER w/ hold    Exercises/Interventions:   Therapeutic Exercise  28 min Wt / Resistance Sets/sec Reps Notes   Pulley's  3 min     Self tennis ball pec release  2 min  Static release and dynamic w/ use of shoulder abduction on wall   Doorway pec stretch  1 min x1    Strap IR walkout  20\" 1x Trialed but was too painful   SL IR stretch  30\" x5 PRN   Standing behind back IR lift offs  3/3\" x10    Seated CC LPD OH lat stretch  15\" x3    No money  RED 2 10x    SL ER  SL ABD (0-OH) 4#  0# 2 x15ea    SL flex / punch  2# 2 x10    Wall slides w/ lift off  3 x10 Needed to adjust to more scapular plane to reduce pain   Hi / low pec stretch w/ ER lift off  2/5\" x10    Standing DB bent over row 7# 3 x10    Seated CC pull down 30# 3 x10    CC single arm ext  CC ER walkout 5#  5# 2/3  1 X10  x10    Supine punch 3# 2 10x    RTC interval stretch  1 min x2 Off corner of wall   TRX row  3 x10    Tband ER Grn 3 x10    Tband pull apart Red 2 x15    Supine thoracic ext over towel roll w/ prec stretch    Demonstrated and reviewed for HEP    Snow angels over towel roll    Demonstrated and reviewed for HEP   UT and levator scap stretch w/ strap assist for scap depress  30\" x2 ea    Plan of care, progress note measurements and discussion of POC. Education and review of all home exercises provided above  25 min               Therapeutic Activities           Sleeper stretch  15\" 5x    Supine IR BB                                                         Manual Intervention  15 min       GH Mobs    AP and inferior glides in neutral and partial ranges    Myofascial release, STM  15 min  STM to superior, lateral and posterior shoulder structures. Thoracic / rib mobs    Supine PA glides to mid thoracic spine, supine FR roll over manip          Shoulder PROM     PROM into all planes (heavier focus on ER/IR)   Dry Needling    Performed with stim   KT tape to Rt shoulder into retraction               NMR re-education   min       BB flexion   Black 20\"/10\" 3x  5x ER/IR, flexion (sup/inf), flex (abd/add)    Standing scaption to half wall  14 ounces  2 10x    H abd w/ TB  yellow      No Money RED 3 x10 Towel between shoulder blades at corner, verbal cueing to encourage scapular retraction   CC rows  SArm  rows  40#  12# 3  2 x15  x10 Verbal cueing for scapular retraction                             Therapeutic Exercise and NMR EXR  [x] (51573) Provided verbal/tactile cueing for activities related to strengthening, flexibility, endurance, ROM  for improvements in scapular, scapulothoracic and UE control with self care, reaching, carrying, lifting, house/yardwork, driving/computer work.     [x] (27666) Provided verbal/tactile cueing for activities related to improving balance, coordination, kinesthetic sense, posture, motor skill, proprioception  to assist with  scapular, scapulothoracic and UE control with self care, reaching, carrying, lifting, house/yardwork, driving/computer work. Therapeutic Activities:    [] (39941 or 96120) Provided verbal/tactile cueing for activities related to improving balance, coordination, kinesthetic sense, posture, motor skill, proprioception and motor activation to allow for proper function of scapular, scapulothoracic and UE control with self care, carrying, lifting, driving/computer work.      Home Exercise Program:    [] (40830) Reviewed/Progressed HEP activities related to strengthening, flexibility, endurance, ROM of scapular, scapulothoracic and UE control with self care, reaching, carrying, lifting, house/yardwork, driving/computer work  [] (56700) Reviewed/Progressed HEP activities related to improving balance, coordination, kinesthetic sense, posture, motor skill, proprioception of scapular, scapulothoracic and UE control with self care, reaching, carrying, lifting, house/yardwork, driving/computer work      Manual Treatments:  PROM / STM / Oscillations-Mobs:  G-I, II, III, IV (PA's, Inf., Post.)  [x] (98032) Provided manual therapy to mobilize soft tissue/joints of cervical/CT, scapular GHJ and UE for the purpose of modulating pain, promoting relaxation,  increasing ROM, reducing/eliminating soft tissue swelling/inflammation/restriction, improving soft tissue extensibility and allowing for proper ROM for normal function with self care, reaching, carrying, lifting, house/yardwork, driving/computer work    Modalities:   Charges:  Timed Code Treatment Minutes: 43 min   Total Treatment Minutes: 43 min       [] EVAL (LOW) 28522 (typically 20 minutes face-to-face)  [] EVAL (MOD) 97445 (typically 30 minutes face-to-face)  [] EVAL (HIGH) 95642 (typically 45 minutes face-to-face)  [] RE-EVAL     [x] OJ(18499) x    2  [] IONTO (21310)  [] NMR (46896) x     [] VASO (98578)x     [x] Manual (57266) x  1 [] Other: (20560/1) x  [] TA (30937)x     [] Mech Traction (55623)  [] ES(attended) (20006)     [] ES (un) (96517): biceps compensation is present during all shoulder movements resulting in superior and anterior placement of the humeral head. There is some moderate tenderness to the [facility] joint line with great improvements in biceps tenderness. [facility] joint mobility is normal yet reaching behind his back remains asymmetrical and painful. Again, these symptoms are related to poor posterior cuff and parascapular strength. Patient should continue to progress with skilled therapy to address on going end ROM restriction, superior shoulder pain, improve postural and scapular stability deficits. Patient will need to continue to progress posterior strength before anterior impingement symptoms completely resolve. Treatment/Activity Tolerance:  [x] Patient tolerated treatment well [] Patient limited by fatique  [] Patient limited by pain   [] Patient limited by other medical complications  [] Other:     Overall Progression Towards Functional goals/ Treatment Progress Update:  [x] Patient is progressing as expected towards functional goals listed. [] Progression is slowed due to complexities/Impairments listed. [] Progression has been slowed due to co-morbidities. [] Plan just implemented, too soon to assess goals progression <30days   [] Goals require adjustment due to lack of progress  [] Patient is not progressing as expected and requires additional follow up with physician  [] Other    Prognosis for POC: [x] Good [] Fair  [] Poor    Patient requires continued skilled intervention: [x] Yes  [] No      PLAN: Continued to improve AC joint tenderness, encourage daily stretching to anterior chest and promote postural strengthening. Frequency/Duration: Patient will continue to benefit from skilled therapy 1x / week to address on going muscular imbalance and shoulder pain.      [x] Continue per plan of care [] Alter current plan (see comments)  [] Plan of care initiated [] Hold pending MD visit [] Discharge    Electronically signed by: Raji Thibodeaux, PT    Note: If patient does not return for scheduled/recommended follow up visits, this note will serve as a discharge from care along with the most recent update on progress.

## 2022-08-26 ENCOUNTER — HOSPITAL ENCOUNTER (OUTPATIENT)
Dept: PHYSICAL THERAPY | Age: 47
Setting detail: THERAPIES SERIES
Discharge: HOME | End: 2022-08-26
Payer: COMMERCIAL

## 2022-08-26 NOTE — FLOWSHEET NOTE
[facility]    Physical Therapy  Cancellation/No-show Note  Patient Name:  Aubrie Schwartz  :  1975   Date:  2022  Cancelled visits to date: 2    No-shows to date: 0    For today's appointment patient:  [x]  Cancelled  []  Rescheduled appointment  []  No-show     Reason given by patient:  []  Patient ill  []  Conflicting appointment  []  No transportation    []  Conflict with work  []  No reason given  [x]  Other:      Comments:  Authorization not approved     Phone call information:   []  Phone call made today to patient at _ time at number provided:      []  Patient answered, conversation as follows:    []  Patient did not answer, message left as follows:  []  Phone call not made today  [x]  Phone call not needed - pt contacted us to cancel and provided reason for cancellation. Electronically signed by:   Lupe Milner PT

## 2022-08-29 ENCOUNTER — OFFICE VISIT (OUTPATIENT)
Dept: ORTHOPEDIC SURGERY | Age: 47
End: 2022-08-29
Payer: COMMERCIAL

## 2022-08-29 VITALS — HEIGHT: 69 IN | BODY MASS INDEX: 35.85 KG/M2 | WEIGHT: 242.06 LBS

## 2022-08-29 DIAGNOSIS — M77.11 LATERAL EPICONDYLITIS OF RIGHT ELBOW: ICD-10-CM

## 2022-08-29 DIAGNOSIS — M25.521 RIGHT ELBOW PAIN: ICD-10-CM

## 2022-08-29 DIAGNOSIS — M67.921 TENDINOPATHY OF ELBOW, RIGHT: ICD-10-CM

## 2022-08-29 PROCEDURE — L3908 WHO COCK-UP NONMOLDE PRE OTS: HCPCS | Performed by: INTERNAL MEDICINE

## 2022-08-29 PROCEDURE — 99203 OFFICE O/P NEW LOW 30 MIN: CPT | Performed by: INTERNAL MEDICINE

## 2022-08-29 PROCEDURE — 4004F PT TOBACCO SCREEN RCVD TLK: CPT | Performed by: INTERNAL MEDICINE

## 2022-08-29 PROCEDURE — G8417 CALC BMI ABV UP PARAM F/U: HCPCS | Performed by: INTERNAL MEDICINE

## 2022-08-29 PROCEDURE — G8427 DOCREV CUR MEDS BY ELIG CLIN: HCPCS | Performed by: INTERNAL MEDICINE

## 2022-08-29 SDOH — HEALTH STABILITY: PHYSICAL HEALTH: ON AVERAGE, HOW MANY DAYS PER WEEK DO YOU ENGAGE IN MODERATE TO STRENUOUS EXERCISE (LIKE A BRISK WALK)?: 0 DAYS

## 2022-08-29 NOTE — LETTER
fit by a healthcare professional with expert knowledge and specialization in brace application while under the direct supervision of the treating physician. Verbal and written instructions for the use of and application of this item were provided. They were instructed to contact the office immediately should the brace result in increased pain, decreased sensation, increased swelling or worsening of the condition. Disclaimer: \"This note was dictated with voice recognition software. Though review and correction are routine, we apologize for any errors. \"             If you have questions, please do not hesitate to call me. I look forward to following Garrick Aquino along with you.     Sincerely,        Christy Pugh MD    CC providers:  MD Mike Xei  [facility]

## 2022-08-29 NOTE — PROGRESS NOTES
Chief Complaint:   Chief Complaint   Patient presents with    Elbow Pain     R elbow, pain for past 2 years and has tried 3 CSIs. Gave pain relief but pain always returns (about 5 months of relief). Increased pain with gripping, lifting, using R hand in general.           History of Present Illness:       Patient is a 50 y.o. male presents with the above complaint. He is seen in consultation at request of Dr. Juan Arboleda. Gladis Zhong for consideration of biologic orthopedic injection for management of elbow tendinosis at the lateral epicondyle. The symptoms began 2 yearsago and started without an injury. The patient describes a aching pain that does not radiate. The symptoms are intermittent  and are cycling since the onset. Pain localizes to the lateral elbow and  does seem to follow a typical epicondylitis provacative pattern. There are not associated mechanical symptoms. There is not neuritic symptoms about the elbow. The patient denies subjective instability about the elbow and admits to new onset or progressive weakness of the upper extremity. Pain level 3    The patient denies a pattern of activity related swelling. Treatment to date: CSI x3 most recently in March with temporary partial benefit. There is nono prior history of elbow trauma. There is no prior history of autoimmune disease, crystal arthropathy, or crystal arthropathy.       Past Medical History:        Past Medical History:   Diagnosis Date    Chronic back pain     Neuropathy     both feet; right leg         Past Surgical History:   Procedure Laterality Date    APPENDECTOMY  1989    BACK SURGERY  4/2007    L5-S1 discectomy    BACK SURGERY  06/02/2014     BACK SURGERY  2010 2012 2016    SHOULDER ARTHROSCOPY Right 4/29/2022    RIGHT SHOULDER ARTHROSCOPY FOR DISTAL CLAVICLE EXCISION, SUBACROMIAL DECOMPRESSION, EXTENSIVE DEBRIDEMENT, SWATHI DECOMPRESSION, INJECTION OF CORTICOSTEROID performed by Allyson Valdes MD at [place] [address]. Medications:         Current Outpatient Medications   Medication Sig Dispense Refill    predniSONE (DELTASONE) 10 MG tablet 20 mg 1 po bid for 5 days then 10 mg 1 po bid for 5 days then 10 mg 1 po qd for 5 days 35 tablet 0    celecoxib (CELEBREX) 200 MG capsule Take 1 capsule by mouth daily 60 capsule 3    ibuprofen (ADVIL;MOTRIN) 800 MG tablet Take 1 tablet by mouth every 8 hours as needed for Pain 90 tablet 0    aspirin 325 MG EC tablet Take 1 tablet by mouth 2 times daily for 14 days 30 tablet 0    ondansetron (ZOFRAN) 4 MG tablet Take 1 tablet by mouth every 8 hours as needed for Nausea 20 tablet 0    celecoxib (CELEBREX) 200 MG capsule Take 1 capsule by mouth daily 30 capsule 2     No current facility-administered medications for this visit. Allergies: Allergies   Allergen Reactions    Amoxicillin Hives    Penicillins Rash        Social History:         Social History     Socioeconomic History    Marital status: Single     Spouse name: Not on file    Number of children: Not on file    Years of education: Not on file    Highest education level: Not on file   Occupational History    Occupation:      Employer: [employer]   Tobacco Use    Smoking status: Former     Packs/day: 0.50     Years: 2.00     Pack years: 1.00     Types: Cigarettes     Quit date: 3/28/1989     Years since quittin.4    Smokeless tobacco: Current     Types: Snuff   Vaping Use    Vaping Use: Never used   Substance and Sexual Activity    Alcohol use:  Yes     Alcohol/week: 0.0 standard drinks     Comment: RARE    Drug use: No    Sexual activity: Yes     Partners: Female   Other Topics Concern    Not on file   Social History Narrative    Not on file     Social Determinants of Health     Financial Resource Strain: Low Risk     Difficulty of Paying Living Expenses: Not hard at all   Food Insecurity: No Food Insecurity    Worried About [address] in the Last Year: Never true    [address] in the Last Year: Biceps and triceps reflexes are symmetric and +2. Spurlling sign is negative         Office Imaging Results/Procedures PerformedToday:      Radiology:      X-rays obtained and reviewed in office:   Views 3 views right elbow   Location right elbow   Impression there is moderate sized traction enthesophyte olecranon tip. Radial capitellar joint is congruent or humeral and radiohumeral joints have normal radiograph appearance no other soft tissue or osseous abnormalities. Office Procedures:     Orders Placed This Encounter   Procedures    XR ELBOW RIGHT (MIN 3 VIEWS)     Standing Status:   Future     Number of Occurrences:   1     Standing Expiration Date:   8/29/2023     Order Specific Question:   Reason for exam:     Answer:   pain    Breg [place] Wrist Brace Short     Patient was prescribed a Breg Universal Short Wrist brace . The right wrist will require stabilization / immobilization from this semi-rigid / rigid orthosis to improve their function. The orthosis will assist in protecting the affected area, provide functional support and facilitate healing. The patient was educated and fit by a healthcare professional with expert knowledge and specialization in brace application while under the direct supervision of the treating physician. Verbal and written instructions for the use of and application of this item were provided. They were instructed to contact the office immediately should the brace result in increased pain, decreased sensation, increased swelling or worsening of the condition. Other Outside Imaging and Testing Personally Reviewed:    XR ELBOW RIGHT (MIN 3 VIEWS)    Result Date: 8/29/2022  Radiology exam is complete. No Radiologist dictation. Please follow up with ordering provider. Assessment   Impression: . Encounter Diagnoses   Name Primary?     Lateral epicondylitis of right elbow     Tendinopathy of elbow, right     Right elbow pain               Plan: Activity modification lateral epicondylitis protocol  Voltaren gel trial and continue compression for strapping and nocturnal wrist splinting  Limited ultrasound evaluation of the elbow on follow-up consider him a candidate for biologic orthopedic injection-PRP type I Arthrex Edouard pending results of ultrasound evaluation       The nature of the finding, probable diagnosis and likely treatment was thoroughly discussed with the patient. The options, risks, complications, alternative treatment as well as some of the differential diagnosis was discussed. The patient was thoroughly informed and all questions were answered. the patient indicated understanding and satisfaction with the discussion. Orders:        Orders Placed This Encounter   Procedures    XR ELBOW RIGHT (MIN 3 VIEWS)     Standing Status:   Future     Number of Occurrences:   1     Standing Expiration Date:   8/29/2023     Order Specific Question:   Reason for exam:     Answer:   pain    Breg Sh[place]oh Wrist Brace Short     Patient was prescribed a Breg Universal Short Wrist brace . The right wrist will require stabilization / immobilization from this semi-rigid / rigid orthosis to improve their function. The orthosis will assist in protecting the affected area, provide functional support and facilitate healing. The patient was educated and fit by a healthcare professional with expert knowledge and specialization in brace application while under the direct supervision of the treating physician. Verbal and written instructions for the use of and application of this item were provided. They were instructed to contact the office immediately should the brace result in increased pain, decreased sensation, increased swelling or worsening of the condition. Disclaimer: \"This note was dictated with voice recognition software. Though review and correction are routine, we apologize for any errors. \"

## 2022-09-12 ENCOUNTER — OFFICE VISIT (OUTPATIENT)
Dept: ORTHOPEDIC SURGERY | Age: 47
End: 2022-09-12
Payer: COMMERCIAL

## 2022-09-12 VITALS — WEIGHT: 242 LBS | HEIGHT: 69 IN | BODY MASS INDEX: 35.84 KG/M2

## 2022-09-12 DIAGNOSIS — M77.11 LATERAL EPICONDYLITIS OF RIGHT ELBOW: ICD-10-CM

## 2022-09-12 DIAGNOSIS — M67.921 TENDINOPATHY OF ELBOW, RIGHT: ICD-10-CM

## 2022-09-12 DIAGNOSIS — M25.521 RIGHT ELBOW PAIN: Primary | ICD-10-CM

## 2022-09-12 PROCEDURE — G8427 DOCREV CUR MEDS BY ELIG CLIN: HCPCS | Performed by: INTERNAL MEDICINE

## 2022-09-12 PROCEDURE — 99213 OFFICE O/P EST LOW 20 MIN: CPT | Performed by: INTERNAL MEDICINE

## 2022-09-12 PROCEDURE — G8417 CALC BMI ABV UP PARAM F/U: HCPCS | Performed by: INTERNAL MEDICINE

## 2022-09-12 PROCEDURE — 4004F PT TOBACCO SCREEN RCVD TLK: CPT | Performed by: INTERNAL MEDICINE

## 2022-09-12 NOTE — LETTER
[facility]) Orthopaedics  Surgery Precert & Billing Form:    DEMOGRAPHICS:                                                                                                       Patient Name:  George oLu  Patient :  1975   Patient SS#:      Patient Phone:  [phone] (home) [phone] (work) Alt.  Patient Phone:    Patient Address:  [address] [place]    PCP:  Mike Mane MD  Insurance: [facility] Medicaid    DIAGNOSIS & PROCEDURE:                                                                                      Diagnosis: M77.11  Operation: right elbow    SURGERY  INFORMATION  Date of Surgery:   2022  Location:    [facility]  Type:    Outpatient  23 hour hold:  No  Surgeon:     Tushar Cortez MD.      22     BILLING INFORMATION:                                                                                                Physician Procedure                                            CPT Codes        Tenotomy, elbow, lateral or medial, percutaneous 99535       PA, or Fellow Procedure                                      CPT Codes              Precert information:

## 2022-09-12 NOTE — LETTER
SURGERY SCHEDULING SHEET         Account: [de-identified]  Patient: Adele Deluna    : 1975  Address:  [address] [facility] [address]    Diagnosis:     ICD-10-CM    1. Right elbow pain  M25.521 US EXTREMITY RIGHT NON VASC LIMITED      2. Lateral epicondylitis of right elbow  M77.11 US EXTREMITY RIGHT NON VASC LIMITED      3. Tendinopathy of elbow, right  M67.921 US EXTREMITY RIGHT NON VASC LIMITED          Date of Surgery: 10/7/2022  Time of Surgery: 10:00 AM  Facility: [facility]  Surgeon Name: Cheyenne Sunshine MD  Procedure: Right Elbow Tenex  CPT Code: 78303  Insurance: [employer] [place] Medicaid  Length of Procedure: 30 min  Special Equipment: TX2    Anesthesia:   [x] Conscious Sedation [x] Local  Patient Status:    [x] SDS (OP)     PCP: Danielle Calles MD  Allergies:    Allergies   Allergen Reactions    Amoxicillin Hives    Penicillins Rash     Blood Thinner: No    IMPLEMENT ATTACHED CLINICAL ORDERS FOR THIS PATIENT  MD SIGNATURE:   Hola Negrete MD.    Cheyenne Sunshine MD Today's Date: 22 Time: 12:18 pm  Phone:  [phone]  Fax: [phone]     [facility] [address]  Z:[phone] [facility] B:[phone]

## 2022-09-12 NOTE — PROGRESS NOTES
Chief Complaint:   Chief Complaint   Patient presents with    Elbow Pain     Right elbow. Pain is still the same. Would like to get it taken care of before severe pain developes          History of Present Illness:       Patient is a 50 y.o. male returns follow up for the above complaint. The patient was last seen approximately 2 weeksago. The symptoms show no change since the last visit. The patient has had no further testing for the problem. Patient would like to proceed with ultrasound valuation of the common extensor tendon at the elbow today. No new injuries no new events pain is the same quality and character as per previous         Past Medical History:        Past Medical History:   Diagnosis Date    Chronic back pain     Neuropathy     both feet; right leg        Present Medications:         Current Outpatient Medications   Medication Sig Dispense Refill    diclofenac sodium (VOLTAREN) 1 % GEL Apply 2 to 4 g 3 times daily for 2 weeks then twice daily as needed thereafter 150 g 3    predniSONE (DELTASONE) 10 MG tablet 20 mg 1 po bid for 5 days then 10 mg 1 po bid for 5 days then 10 mg 1 po qd for 5 days 35 tablet 0    celecoxib (CELEBREX) 200 MG capsule Take 1 capsule by mouth daily 60 capsule 3    ibuprofen (ADVIL;MOTRIN) 800 MG tablet Take 1 tablet by mouth every 8 hours as needed for Pain 90 tablet 0    aspirin 325 MG EC tablet Take 1 tablet by mouth 2 times daily for 14 days 30 tablet 0    ondansetron (ZOFRAN) 4 MG tablet Take 1 tablet by mouth every 8 hours as needed for Nausea 20 tablet 0    celecoxib (CELEBREX) 200 MG capsule Take 1 capsule by mouth daily 30 capsule 2     No current facility-administered medications for this visit. Allergies:         Allergies   Allergen Reactions    Amoxicillin Hives    Penicillins Rash           Review of Systems:    Pertinent items are noted in HPI    Review of systems reviewed from Patient History Form dated on    and   available in the patient's chart under the Media tab. Vital Signs: There were no vitals filed for this visit. General Exam:     Constitutional: Patient is adequately groomed with no evidence of malnutrition    Physical Exam: right elbow      Primary Exam:    Inspection: No deformity atrophy or appreciable effusion      Palpation: Positive over the lateral epicondyle      Range of Motion: Stable unchanged from previous      Special Tests: Negative Tinel's      Skin: There are no rashes, ulcerations or lesions. Gait: Nonantalgic     Neurovascular - non focal and intact       Additional Comments:        Additional Examinations:              Office Imaging Results/Procedures PerformedToday:          Office Procedures:     Orders Placed This Encounter   Procedures    US EXTREMITY RIGHT NON VASC LIMITED     Standing Status:   Future     Number of Occurrences:   1     Standing Expiration Date:   9/12/2023     Order Specific Question:   Reason for exam:     Answer:   pain        Limited ultrasound evaluation of the Right  Elbow  Log[facility] E Ultrasound  12 MHZ    The patient was positioned supine with the upper extremity supported. The common extensor tendon was evaluated extensively using longitudinal technique using the linear transducer. The area of pathology localizing the the  Anterior segment region of the common extensor tendon. There was evidence of moderate hypertrophic tendinopathy change in the interstitial/deep fiber region of the common extensor tendon in its anterior segment along the epicondylar ridge. With dynamic imaging their is  evidence of gapping of fibers to suggest discrete tear. There was no evidence of complete tear or retracted tear.     Reedshire negative    No other soft tissue abnormalities appreciated     Images stored       Other Outside Imaging and Testing Personally Reviewed:    US EXTREMITY RIGHT NON VASC LIMITED    Result Date: 9/12/2022  Radiology result is complete; follow up with provider / physician office for radiology results              Assessment   Impression: . Encounter Diagnoses   Name Primary? Lateral epicondylitis of right elbow     Tendinopathy of elbow, right     Right elbow pain Yes              Plan:       Consider him a candidate for biologic orthopedic injection-type I PRP-Arthrex Edouard or percutaneous tenotomy TX 2 microtip  Continue nocturnal splinting and as needed splinting with ADLs  Activity modification letter epicondylitis protocol  Extensor forearm stretching program and eccentric strengthening exercise program formal course of OT as needed     Orders:        Orders Placed This Encounter   Procedures    US EXTREMITY RIGHT NON VASC LIMITED     Standing Status:   Future     Number of Occurrences:   1     Standing Expiration Date:   9/12/2023     Order Specific Question:   Reason for exam:     Answer:   evelia Weaver MD.      Disclaimer: \"This note was dictated with voice recognition software. Though review and correction are routine, we apologize for any errors. \"

## 2022-09-12 NOTE — LETTER
[facility]) Orthopaedics  Surgery Precert & Billing Form:     DEMOGRAPHICS:                                                                                                       Patient Name:  George Lou  Patient :  1975                               Patient SS#:      Patient Phone:  [phone] (home) [phone] (work)          Alt.  Patient Phone:    Patient Address:  [address] [place] [address]     PCP:  Mike Mane MD  Insurance: [facility] Medicaid     DIAGNOSIS & PROCEDURE:                                                                                      Diagnosis: M77.11  Operation: right elbow     SURGERY  INFORMATION  Date of Surgery:   2022  Location:    [facility]  Type:    Outpatient  23 hour hold:  No  Surgeon:   Tushar Cortez MD.      22      BILLING INFORMATION:                                                                                                 Physician Procedure                                            CPT Codes               Tenotomy, elbow, lateral or medial, percutaneous    73696        PA, or Fellow Procedure                                      CPT Codes                    Precert information:

## 2022-09-15 ENCOUNTER — HOSPITAL ENCOUNTER (OUTPATIENT)
Dept: PHYSICAL THERAPY | Age: 47
Setting detail: THERAPIES SERIES
Discharge: HOME | End: 2022-09-15
Payer: COMMERCIAL

## 2022-09-15 PROCEDURE — 97110 THERAPEUTIC EXERCISES: CPT

## 2022-09-15 PROCEDURE — 97140 MANUAL THERAPY 1/> REGIONS: CPT

## 2022-09-15 PROCEDURE — 97016 VASOPNEUMATIC DEVICE THERAPY: CPT

## 2022-09-15 NOTE — PLAN OF CARE
[facility]  [address] [address]  Phone [phone]   Fax [phone]    Date:  9/15/2022    Patient Name:  Maria Teresa Brooks  \"Giacomo\"  :  1975  MRN: [id number]  Medical/Treatment Diagnosis Information:  Diagnosis: Biceps tendinitis or right upper extremity (M75.21) Subacromial decompression with acromioplasty; arthroscopic distal clavicle excision; injection of corticosteroid on 22. Treatment Diagnosis: Extensive debridement of labrum, rotator cuff, rotator interval, subacromial bursa; Subacromial decompression with acromioplasty; arthroscopic distal clavicle excision; injection of corticosteroid  Insurance/Certification information:  PT Insurance Information: [facility]  Physician Information:  Referring Practitioner: Jesus Rios MD  Plan of care signed (Y/N): Y     Date of Patient follow up with Physician:      Progress Report: []  Yes  [x]  No     Functional Scale:  FOTO: 39 5/10/22  FOTO: 50 22    FOTO: 60 22  FOTO: 59 22        Date Range for reporting period:  Beginnin22  Ending:     Progress report due (10 Rx/or 30 days whichever is less): 29    Recertification due (POC duration/ or 90 days whichever is less): 22 - 22    Visit # Insurance Allowable Auth Needed   1/10  Total: 27 22 - 22 [x]Yes    []No     Pain level: /10, soreness    SUBJECTIVE: Patient present to therapy with complaints of on going anterolateral shoulder soreness. States symptoms have seemed to improve over the last month. Still having difficulty reaching behind his back or performing a full day of work. OBJECTIVE: Skilled care provided per flow sheet below; Observation: Patient continue to having several muscular imbalance with anterior dominance.   Test measurements:      DATE Right Left   AROM Flex: 160  Abd: 150  ER: CT junction  IR: L5 Flex: 160  Abd: 150  ER: CT junction  IR: LT junction     Strength Right left   **Tested at wrist** Flex: 27.2  Abd: 32.2  ER: 26.6  IR: 51.9 Flex: 32.8  Abd: 32.5   ER: 31.2  IR: 53.4       RESTRICTIONS/PRECAUTIONS: reaching behind the back, repetitive movements     Home exercises Program:  3/23/2022: tband row, supine DB press, supine DB OH raise, SL ER, standing doorway pec stretch (Handout to be provided [place])  8/19/22: Access Code: [email or web address] - Banded pull aparts w/ shoulder ER, supine thoracic ext over towel roll w/ pec stretch, snow angels over towel roll, standing pec stretch, Standing RTC interval stretch, self tennis ball release to pec, Mitchel shoulder ER w/ hold    Exercises/Interventions:   Therapeutic Exercise  30 min Wt / Resistance Sets/sec Reps Notes   Pulley's  3 min  Abduction and IR    Self tennis ball pec release  2 min  Static release and dynamic w/ use of shoulder abduction on wall   Doorway pec stretch  1 min x1    Strap IR walkout  20\" 1x Trialed but was too painful   SL IR stretch  30\" x5 PRN   Standing behind back IR lift offs  3/3\" x10    Seated CC LPD OH lat stretch  15\" x3    No money  RED 2 10x    SL ER 3# 3 x15    SL abd 0# 2 x15    SL flex / punch  2# 2 x10    Wall slides w/ lift off  3 x10 Needed to adjust to more scapular plane to reduce pain   Hi / low pec stretch w/ ER lift off  2/5\" x10    Standing DB bent over row 7# 3 x10    Seated CC pull down 30# 3 x10    CC single arm ext 5# 2/3 x10    Supine punch 3# 2 10x    RTC interval stretch  1 min x2 Off corner of wall   TRX row  3 x10    Tband abd Red 3 x10 Behind the back. Tband ER 90/90 walkout (ant) Red 3 x8    Tband pull apart w/ ER Green 3 x15    Supine thoracic ext over towel roll w/ prec stretch    Demonstrated and reviewed for HEP    Snow angels over towel roll    Demonstrated and reviewed for HEP   CC scap set w/ depression into lat pull down 60# 3 x12    Plan of care, progress note measurements and discussion of POC.  Education and review of all home exercises provided above  25 min               Therapeutic Activities           Sleeper stretch  15\" 5x    Supine IR BB                                                         Manual Intervention  10 min       GH Mobs  3 min  AP and inferior glides in neutral and end range of abd, flex, ER    Myofascial release, STM  15 min  STM to superior, lateral and posterior shoulder structures. Thoracic / rib mobs    Supine PA glides to mid thoracic spine, supine FR roll over manip          Shoulder PROM   7 min  PROM into all planes (heavier focus on Abd/ER)   Dry Needling    Performed with stim   KT tape to Rt shoulder into retraction               NMR re-education   min       BB flexion   Black 20\"/10\" 3x  5x ER/IR, flexion (sup/inf), flex (abd/add)    Standing scaption to half wall  14 ounces  2 10x    H abd w/ TB  yellow      No Money RED 3 x10 Towel between shoulder blades at corner, verbal cueing to encourage scapular retraction   CC rows  SArm  rows  40#  12# 3  2 x15  x10 Verbal cueing for scapular retraction                             Therapeutic Exercise and NMR EXR  [x] (13401) Provided verbal/tactile cueing for activities related to strengthening, flexibility, endurance, ROM  for improvements in scapular, scapulothoracic and UE control with self care, reaching, carrying, lifting, house/yardwork, driving/computer work. [x] (20336) Provided verbal/tactile cueing for activities related to improving balance, coordination, kinesthetic sense, posture, motor skill, proprioception  to assist with  scapular, scapulothoracic and UE control with self care, reaching, carrying, lifting, house/yardwork, driving/computer work.     Therapeutic Activities:    [] (69307 or 48251) Provided verbal/tactile cueing for activities related to improving balance, coordination, kinesthetic sense, posture, motor skill, proprioception and motor activation to allow for proper function of scapular, scapulothoracic and UE control with self care, carrying, [] Adjusted  2. Patient will have a decrease in pain to facilitate improvement in movement, function, and ADLs as indicated by Functional Deficits. [] Progressing: [x] Met: [] Not Met: [] Adjusted     Long Term Goals: To be achieved in: 6-12 weeks  1. FOTO: 77 or greater to assist with reaching prior level of function. [x] Progressing: [] Met: [] Not Met: [] Adjusted  2. Patient will demonstrate increased AROM with 90% of contralateral limb to allow for proper joint functioning as indicated by patients Functional Deficits. [] Progressing: [x] Met: [] Not Met: [] Adjusted  3. Patient will demonstrate an increase in Strength to 5lbs of contralateral limb to allow for proper functional mobility as indicated by patients Functional Deficits. [] Progressing: [x] Met: [] Not Met: [] Adjusted  4. Patient will return to reaching [place] functional activities without presence of superior shoulder pain. (8/24/22)  [] Progressing: [] Met: [] Not Met: [x] Adjusted  5. Pt will tolerate working x 4 hours without pain greater than 4/10    [] Progressing: [x] Met: [] Not Met: [] Adjusted         Progression Towards Functional goals:  [x] Patient is progressing as expected towards functional goals listed. [] Progression is slowed due to complexities listed. [] Progression has been slowed due to co-morbidities. [] Plan just implemented, too soon to assess goals progression  [] Other:       ASSESSMENT: Patient returning to therapy after 3 week absence wait on approval for PT. Focused heavily on further improving parascapular strengthening as he is still heavily anterior dominate. Patient should continue to progress with skilled therapy to address on going pain and improve postural and scapular stability deficits.        Treatment/Activity Tolerance:  [x] Patient tolerated treatment well [] Patient limited by fatique  [] Patient limited by pain   [] Patient limited by other medical complications  [] Other:     Overall Progression Towards Functional goals/ Treatment Progress Update:  [x] Patient is progressing as expected towards functional goals listed. [] Progression is slowed due to complexities/Impairments listed. [] Progression has been slowed due to co-morbidities. [] Plan just implemented, too soon to assess goals progression <30days   [] Goals require adjustment due to lack of progress  [] Patient is not progressing as expected and requires additional follow up with physician  [] Other    Prognosis for POC: [x] Good [] Fair  [] Poor    Patient requires continued skilled intervention: [x] Yes  [] No      PLAN: Continued to improve AC joint tenderness, encourage daily stretching to anterior chest and promote postural strengthening. Frequency/Duration: Patient will continue to benefit from skilled therapy 1x / week to address on going muscular imbalance and shoulder pain. [x] Continue per plan of care [] Alter current plan (see comments)  [] Plan of care initiated [] Hold pending MD visit [] Discharge    Electronically signed by: Nataly Carrillo PT    Note: If patient does not return for scheduled/recommended follow up visits, this note will serve as a discharge from care along with the most recent update on progress.

## 2022-09-19 ENCOUNTER — TELEPHONE (OUTPATIENT)
Dept: ORTHOPEDIC SURGERY | Age: 47
End: 2022-09-19

## 2022-09-19 NOTE — TELEPHONE ENCOUNTER
General Question     Subject: PROCEDURE   Patient and /or Facility Request: [facility]  Contact Number: [phone]    PATIENT IS CALLING TO DISCUSS HAVING A TENNEX PROCEDURE PATIENT WOULD LIKE A CALL AT THE NUMBER LISTED ABOVE

## 2022-09-20 ENCOUNTER — HOSPITAL ENCOUNTER (OUTPATIENT)
Dept: PHYSICAL THERAPY | Age: 47
Setting detail: THERAPIES SERIES
Discharge: HOME | End: 2022-09-20
Payer: COMMERCIAL

## 2022-09-20 PROCEDURE — 97016 VASOPNEUMATIC DEVICE THERAPY: CPT

## 2022-09-20 PROCEDURE — 97110 THERAPEUTIC EXERCISES: CPT

## 2022-09-20 PROCEDURE — 97140 MANUAL THERAPY 1/> REGIONS: CPT

## 2022-09-20 NOTE — FLOWSHEET NOTE
[place], [employer]  [address] [address]  Phone [phone]   Fax [phone]    Date:  2022    Patient Name:  Ofelia Franco  \"Giacomo\"  :  1975  MRN: [id number]  Medical/Treatment Diagnosis Information:  Diagnosis: Biceps tendinitis or right upper extremity (M75.21) Subacromial decompression with acromioplasty; arthroscopic distal clavicle excision; injection of corticosteroid on 22. Treatment Diagnosis: Extensive debridement of labrum, rotator cuff, rotator interval, subacromial bursa; Subacromial decompression with acromioplasty; arthroscopic distal clavicle excision; injection of corticosteroid  Insurance/Certification information:  PT Insurance Information: [facility]  Physician Information:  Referring Practitioner: Julieta Dominguez MD  Plan of care signed (Y/N): Y     Date of Patient follow up with Physician:      Progress Report: []  Yes  [x]  No     Functional Scale:  FOTO: 39 5/10/22  FOTO: 50 22    FOTO: 60 22  FOTO: 59 22        Date Range for reporting period:  Beginnin22  Ending:     Progress report due (10 Rx/or 30 days whichever is less): 1/10/91    Recertification due (POC duration/ or 90 days whichever is less): 22 - 22    Visit # Insurance Allowable Auth Needed   2/10  Total: 28 22 - 22 [x]Yes    []No     Pain level: 4/10, soreness    SUBJECTIVE: Patient reports radiating pain along the top and lateral shoulder when performing abduction    OBJECTIVE: Skilled care provided per flow sheet below; focused on progress abduction strength. Observation: Patient continue to having several muscular imbalance with anterior dominance.   Test measurements:      DATE Right Left   AROM Flex: 160  Abd: 150  ER: CT junction  IR: L5 Flex: 160  Abd: 150  ER: CT junction  IR: LT junction     Strength Right left   **Tested at wrist** Flex: 27.2  Abd: 32.2  ER: 26.6  IR: 51.9 Flex: 32.8  Abd: 32.5   ER: 31.2  IR: 53.4       RESTRICTIONS/PRECAUTIONS: reaching behind the back, repetitive movements     Home exercises Program:  3/23/2022: tband row, supine DB press, supine DB OH raise, SL ER, standing doorway pec stretch (Handout to be provided NV)  8/19/22: Access Code: [email or web address] - Banded pull aparts w/ shoulder ER, supine thoracic ext over towel roll w/ pec stretch, snow angels over towel roll, standing pec stretch, Standing RTC interval stretch, self tennis ball release to pec, Mitchel shoulder ER w/ hold    Exercises/Interventions:   Therapeutic Exercise  30 min Wt / Resistance Sets/sec Reps Notes   Pulley's  3 min  Abduction and IR    Self tennis ball pec release  2 min  Static release and dynamic w/ use of shoulder abduction on wall   Doorway pec stretch  1 min x1    Strap IR walkout  20\" 1x Trialed but was too painful   SL IR stretch  30\" x5 PRN   Standing behind back IR lift offs  3/3\" x10    Standing on wall w/ FR abduction  2 x10 Staying in pain free ROM. No money  RED 2 10x    SL ER 3# 2 x10    SL abd 2# 2 x15    SL flex / punch  2# 2 x10    Wall slides w/ lift off  3 x10 Needed to adjust to more scapular plane to reduce pain   Hi / low pec stretch w/ ER lift off  2/5\" x10    Standing DB bent over row 7# 3 x10    Seated CC pull down 30# 3 x10    CC single arm ext 5# 2/3 x10    RTC interval stretch  1 min x2 Off corner of wall   TRX row  3 x10    Tband ext into abd Red 3 x    Tband abd Red 3 x10 Behind the back.     Tband ER 90/90 walkout (ant) Red 3 x8    Tband pull apart w/ ER Green 3 x15    Supine thoracic ext over towel roll w/ prec stretch    Demonstrated and reviewed for HEP    Snow angels over towel roll    Demonstrated and reviewed for HEP   CC scap set w/ depression into lat pull down 60# 3 x12                  Therapeutic Activities           Sleeper stretch  15\" 5x    Supine IR BB                                                         Manual Intervention  10 min       GH Mobs  3 min  AP and inferior glides in neutral and end range of abd, flex, ER    Myofascial release, STM  15 min  STM to superior, lateral and posterior shoulder structures. Thoracic / rib mobs    Supine PA glides to mid thoracic spine, supine FR roll over manip          Shoulder PROM   7 min  PROM into all planes (heavier focus on Abd/ER)   Dry Needling    Performed with stim   KT tape to Rt shoulder into retraction               NMR re-education   min       BB flexion   Black 20\"/10\" 3x  5x ER/IR, flexion (sup/inf), flex (abd/add)    Standing scaption to half wall  14 ounces  2 10x    H abd w/ TB  yellow      No Money RED 3 x10 Towel between shoulder blades at corner, verbal cueing to encourage scapular retraction   CC rows  SArm  rows  40#  12# 3  2 x15  x10 Verbal cueing for scapular retraction                             Therapeutic Exercise and NMR EXR  [x] (95510) Provided verbal/tactile cueing for activities related to strengthening, flexibility, endurance, ROM  for improvements in scapular, scapulothoracic and UE control with self care, reaching, carrying, lifting, house/yardwork, driving/computer work. [x] (27204) Provided verbal/tactile cueing for activities related to improving balance, coordination, kinesthetic sense, posture, motor skill, proprioception  to assist with  scapular, scapulothoracic and UE control with self care, reaching, carrying, lifting, house/yardwork, driving/computer work. Therapeutic Activities:    [] (71280 or 82747) Provided verbal/tactile cueing for activities related to improving balance, coordination, kinesthetic sense, posture, motor skill, proprioception and motor activation to allow for proper function of scapular, scapulothoracic and UE control with self care, carrying, lifting, driving/computer work.      Home Exercise Program:    [] (26870) Reviewed/Progressed HEP activities related to strengthening, flexibility, endurance, ROM of scapular, scapulothoracic and UE control with self care, reaching, carrying, lifting, house/yardwork, driving/computer work  [] (67388) Reviewed/Progressed HEP activities related to improving balance, coordination, kinesthetic sense, posture, motor skill, proprioception of scapular, scapulothoracic and UE control with self care, reaching, carrying, lifting, house/yardwork, driving/computer work      Manual Treatments:  PROM / STM / Oscillations-Mobs:  G-I, II, III, IV (PA's, Inf., Post.)  [x] (21326) Provided manual therapy to mobilize soft tissue/joints of cervical/CT, scapular GHJ and UE for the purpose of modulating pain, promoting relaxation,  increasing ROM, reducing/eliminating soft tissue swelling/inflammation/restriction, improving soft tissue extensibility and allowing for proper ROM for normal function with self care, reaching, carrying, lifting, house/yardwork, driving/computer work    Modalities: Game ready for chronic shoulder soreness x15 min    Charges:  Timed Code Treatment Minutes: 40 min   Total Treatment Minutes: 55 min       [] EVAL (LOW) 64547 (typically 20 minutes face-to-face)  [] EVAL (MOD) 54265 (typically 30 minutes face-to-face)  [] EVAL (HIGH) 80315 (typically 45 minutes face-to-face)  [] RE-EVAL     [x] PL(75744) x    2  [] IONTO (32483)  [] NMR (68479) x     [x] VASO (70048)x     [x] Manual ([id number]) x  1 [] Other: (20560/1) x  [] TA (96757)x     [] Mech Traction (40240)  [] ES(attended) (16091)     [] ES (un) (83837):     GOALS:  Patient stated goal: \"Return to work\"  [] Progressing: [x] Met: [] Not Met: [] Adjusted     Therapist goals for Patient:   Short Term Goals: To be achieved in: 2 weeks  1. Independent in HEP and progression per patient tolerance, in order to prevent re-injury. [] Progressing: [x] Met: [] Not Met: [] Adjusted  2. Patient will have a decrease in pain to facilitate improvement in movement, function, and ADLs as indicated by Functional Deficits.   [] Progressing: [x] Met: [] Not Met: [] Adjusted Long Term Goals: To be achieved in: 6-12 weeks  1. FOTO: 77 or greater to assist with reaching prior level of function. [x] Progressing: [] Met: [] Not Met: [] Adjusted  2. Patient will demonstrate increased AROM with 90% of contralateral limb to allow for proper joint functioning as indicated by patients Functional Deficits. [] Progressing: [x] Met: [] Not Met: [] Adjusted  3. Patient will demonstrate an increase in Strength to 5lbs of contralateral limb to allow for proper functional mobility as indicated by patients Functional Deficits. [] Progressing: [x] Met: [] Not Met: [] Adjusted  4. Patient will return to reaching [place] functional activities without presence of superior shoulder pain. (8/24/22)  [] Progressing: [] Met: [] Not Met: [x] Adjusted  5. Pt will tolerate working x 4 hours without pain greater than 4/10    [] Progressing: [x] Met: [] Not Met: [] Adjusted         Progression Towards Functional goals:  [x] Patient is progressing as expected towards functional goals listed. [] Progression is slowed due to complexities listed. [] Progression has been slowed due to co-morbidities. [] Plan just implemented, too soon to assess goals progression  [] Other:       ASSESSMENT: Patient still having noticeable weakness with posterior cuff and supraspinatus. This is resulting in poor control during shoulder abduction or repetitive OH movements. Therapy continues to heavily focus on global cuff strengthening which is still proving to be difficulty or fatiguing. He will continue to benefit from additional strengthening to be able to return back to repetitive movements pain free.        Treatment/Activity Tolerance:  [x] Patient tolerated treatment well [] Patient limited by fatique  [] Patient limited by pain   [] Patient limited by other medical complications  [] Other:     Overall Progression Towards Functional goals/ Treatment Progress Update:  [x] Patient is progressing as expected towards functional goals listed. [] Progression is slowed due to complexities/Impairments listed. [] Progression has been slowed due to co-morbidities. [] Plan just implemented, too soon to assess goals progression <30days   [] Goals require adjustment due to lack of progress  [] Patient is not progressing as expected and requires additional follow up with physician  [] Other    Prognosis for POC: [x] Good [] Fair  [] Poor    Patient requires continued skilled intervention: [x] Yes  [] No      PLAN: Continued to improve AC joint tenderness, encourage daily stretching to anterior chest and promote postural strengthening. Frequency/Duration: Patient will continue to benefit from skilled therapy 1x / week to address on going muscular imbalance and shoulder pain. [x] Continue per plan of care [] Alter current plan (see comments)  [] Plan of care initiated [] Hold pending MD visit [] Discharge    Electronically signed by: Darwin Patel PT    Note: If patient does not return for scheduled/recommended follow up visits, this note will serve as a discharge from care along with the most recent update on progress.

## 2022-09-22 ENCOUNTER — HOSPITAL ENCOUNTER (OUTPATIENT)
Dept: PHYSICAL THERAPY | Age: 47
Setting detail: THERAPIES SERIES
Discharge: HOME | End: 2022-09-22
Payer: COMMERCIAL

## 2022-09-22 PROCEDURE — 97140 MANUAL THERAPY 1/> REGIONS: CPT

## 2022-09-22 PROCEDURE — 97110 THERAPEUTIC EXERCISES: CPT

## 2022-09-22 NOTE — FLOWSHEET NOTE
[facility]  [address] [address]  Phone [phone]   Fax [phone]    Date:  2022    Patient Name:  Evangelina Claudio  \"Giacomo\"  :  1975  MRN: [id number]  Medical/Treatment Diagnosis Information:  Diagnosis: Biceps tendinitis or right upper extremity (M75.21) Subacromial decompression with acromioplasty; arthroscopic distal clavicle excision; injection of corticosteroid on 22. Treatment Diagnosis: Extensive debridement of labrum, rotator cuff, rotator interval, subacromial bursa; Subacromial decompression with acromioplasty; arthroscopic distal clavicle excision; injection of corticosteroid  Insurance/Certification information:  PT Insurance Information: [facility]  Physician Information:  Referring Practitioner: Audrey Raygoza MD  Plan of care signed (Y/N): Y     Date of Patient follow up with Physician:      Progress Report: []  Yes  [x]  No     Functional Scale:  FOTO: 39 5/10/22  FOTO: 50 22    FOTO: 60 22  FOTO: 59 22        Date Range for reporting period:  Beginnin22  Ending:     Progress report due (10 Rx/or 30 days whichever is less): 37    Recertification due (POC duration/ or 90 days whichever is less): 22 - 22    Visit # Insurance Allowable Auth Needed   3/10  Total: 28 22 - 22 [x]Yes    []No     Pain level: /10, soreness    SUBJECTIVE: Patient reports on going soreness in the right shoulder. OBJECTIVE: Skilled care provided per flow sheet below;    Observation:   Test measurements:      DATE Right Left   AROM Flex: 160  Abd: 150  ER: CT junction  IR: L5 Flex: 160  Abd: 150  ER: CT junction  IR: LT junction     Strength Right left   **Tested at wrist** Flex: 27.2  Abd: 32.2  ER: 26.6  IR: 51.9 Flex: 32.8  Abd: 32.5   ER: 31.2  IR: 53.4       RESTRICTIONS/PRECAUTIONS: reaching behind the back, repetitive movements     Home exercises Program:  3/23/2022: tband row, supine DB press, supine DB OH raise, SL ER, standing doorway pec stretch (Handout to be provided NV)  8/19/22: Access Code: [email or web address] - Banded pull aparts w/ shoulder ER, supine thoracic ext over towel roll w/ pec stretch, snow angels over towel roll, standing pec stretch, Standing RTC interval stretch, self tennis ball release to pec, Mitchel shoulder ER w/ hold    Exercises/Interventions:   Therapeutic Exercise  35 min Wt / Resistance Sets/sec Reps Notes   Pulley's  3 min  Abduction and IR    Self tennis ball pec release  2 min  Static release and dynamic w/ use of shoulder abduction on wall   Doorway pec stretch  1 min x1    Strap IR walkout  20\" 1x Trialed but was too painful   SL IR stretch  30\" x5 PRN   Standing behind back IR lift offs  3/3\" x10    Standing on wall w/ FR abduction  2 x10 Staying in pain free ROM. No money  RED 2 10x    SL ER 4# 2 x10    SL abd 3# 2 x15    Banded DB scap 2# 2 x10    Wall slides w/ lift off  3 x10 Needed to adjust to more scapular plane to reduce pain   Hi / low pec stretch w/ ER lift off  2/5\" x10    Standing DB bent over row 7# 3 x10    Seated CC pull down 30# 3 x10    CC single arm ext 5# 2/3 x10    RTC interval stretch  1 min x2 Off corner of wall   TRX row  3 x10    Tband ext into abd Red 3 x    Tband abd Red 3 x10 Behind the back. Tband ER 90/90 walkout (ant) Red 3 x8    Tband pull apart w/ ER Green 3 x15    KB 90/90 OH walk 5# 3 laps x3     Tband snow shay yellow 2 x10    CC scap set w/ depression into lat pull down 60# 3 x12                  Therapeutic Activities           Sleeper stretch  15\" 5x    Supine IR BB                                                         Manual Intervention  10 min       GH Mobs  3 min  AP and inferior glides in neutral and end range of abd, flex, ER    Myofascial release, STM  15 min  STM to superior, lateral and posterior shoulder structures.     Thoracic / rib mobs    Supine PA glides to mid thoracic spine, supine FR roll over manip          Shoulder PROM   7 min  PROM into all planes (heavier focus on Abd)   Dry Needling    Performed with stim   KT tape to Rt shoulder into retraction               NMR re-education   min       BB flexion   Black 20\"/10\" 3x  5x ER/IR, flexion (sup/inf), flex (abd/add)    Standing scaption to half wall  14 ounces  2 10x    H abd w/ TB  yellow      No Money RED 3 x10 Towel between shoulder blades at corner, verbal cueing to encourage scapular retraction   CC rows  SArm  rows  40#  12# 3  2 x15  x10 Verbal cueing for scapular retraction                             Therapeutic Exercise and NMR EXR  [x] (31905) Provided verbal/tactile cueing for activities related to strengthening, flexibility, endurance, ROM  for improvements in scapular, scapulothoracic and UE control with self care, reaching, carrying, lifting, house/yardwork, driving/computer work. [x] (77968) Provided verbal/tactile cueing for activities related to improving balance, coordination, kinesthetic sense, posture, motor skill, proprioception  to assist with  scapular, scapulothoracic and UE control with self care, reaching, carrying, lifting, house/yardwork, driving/computer work. Therapeutic Activities:    [] (78182 or 96864) Provided verbal/tactile cueing for activities related to improving balance, coordination, kinesthetic sense, posture, motor skill, proprioception and motor activation to allow for proper function of scapular, scapulothoracic and UE control with self care, carrying, lifting, driving/computer work.      Home Exercise Program:    [] (58780) Reviewed/Progressed HEP activities related to strengthening, flexibility, endurance, ROM of scapular, scapulothoracic and UE control with self care, reaching, carrying, lifting, house/yardwork, driving/computer work  [] (22355) Reviewed/Progressed HEP activities related to improving balance, coordination, kinesthetic sense, posture, motor skill, proprioception of scapular, scapulothoracic and UE control with self care, reaching, carrying, lifting, house/yardwork, driving/computer work      Manual Treatments:  PROM / STM / Oscillations-Mobs:  G-I, II, III, IV (PA's, Inf., Post.)  [x] (20208) Provided manual therapy to mobilize soft tissue/joints of cervical/CT, scapular GHJ and UE for the purpose of modulating pain, promoting relaxation,  increasing ROM, reducing/eliminating soft tissue swelling/inflammation/restriction, improving soft tissue extensibility and allowing for proper ROM for normal function with self care, reaching, carrying, lifting, house/yardwork, driving/computer work    Modalities:  Charges:  Timed Code Treatment Minutes: 45 min   Total Treatment Minutes: 45 min       [] EVAL (LOW) 79501 (typically 20 minutes face-to-face)  [] EVAL (MOD) 84365 (typically 30 minutes face-to-face)  [] EVAL (HIGH) 42296 (typically 45 minutes face-to-face)  [] RE-EVAL     [x] KE(20758) x    2  [] IONTO (27689)  [] NMR (79642) x     [] VASO (12641)x     [x] Manual (06511) x  1 [] Other: (53300/1) x  [] TA (14102)x     [] Mech Traction (66781)  [] ES(attended) (99868)     [] ES (un) (93645):     GOALS:  Patient stated goal: \"Return to work\"  [] Progressing: [x] Met: [] Not Met: [] Adjusted     Therapist goals for Patient:   Short Term Goals: To be achieved in: 2 weeks  1. Independent in HEP and progression per patient tolerance, in order to prevent re-injury. [] Progressing: [x] Met: [] Not Met: [] Adjusted  2. Patient will have a decrease in pain to facilitate improvement in movement, function, and ADLs as indicated by Functional Deficits. [] Progressing: [x] Met: [] Not Met: [] Adjusted     Long Term Goals: To be achieved in: 6-12 weeks  1. FOTO: 77 or greater to assist with reaching prior level of function. [x] Progressing: [] Met: [] Not Met: [] Adjusted  2.  Patient will demonstrate increased AROM with 90% of contralateral limb to allow for proper joint functioning as indicated by patients Functional Deficits. [] Progressing: [x] Met: [] Not Met: [] Adjusted  3. Patient will demonstrate an increase in Strength to 5lbs of contralateral limb to allow for proper functional mobility as indicated by patients Functional Deficits. [] Progressing: [x] Met: [] Not Met: [] Adjusted  4. Patient will return to reaching [place] functional activities without presence of superior shoulder pain. (8/24/22)  [] Progressing: [] Met: [] Not Met: [x] Adjusted  5. Pt will tolerate working x 4 hours without pain greater than 4/10    [] Progressing: [x] Met: [] Not Met: [] Adjusted         Progression Towards Functional goals:  [x] Patient is progressing as expected towards functional goals listed. [] Progression is slowed due to complexities listed. [] Progression has been slowed due to co-morbidities. [] Plan just implemented, too soon to assess goals progression  [] Other:       ASSESSMENT: Patient still voicing on going soreness within the right shoulder. Continues to heavily address weakness in the posterior scapular stabilizers and cuff. He continues to tolerate therapy very well but fatigued quickly secondary to weakness. Continue to progress toward LTGs with plan to discharge patient to [facility] next week      Treatment/Activity Tolerance:  [x] Patient tolerated treatment well [] Patient limited by fatique  [] Patient limited by pain   [] Patient limited by other medical complications  [] Other:     Overall Progression Towards Functional goals/ Treatment Progress Update:  [x] Patient is progressing as expected towards functional goals listed. [] Progression is slowed due to complexities/Impairments listed. [] Progression has been slowed due to co-morbidities.   [] Plan just implemented, too soon to assess goals progression <30days   [] Goals require adjustment due to lack of progress  [] Patient is not progressing as expected and requires additional follow up with physician  [] Other    Prognosis for POC: [x] Good [] Fair  [] Poor    Patient requires continued skilled intervention: [x] Yes  [] No      PLAN: Continued to improve AC joint tenderness, encourage daily stretching to anterior chest and promote postural strengthening. Frequency/Duration: Patient will continue to benefit from skilled therapy 1x / week to address on going muscular imbalance and shoulder pain. [x] Continue per plan of care [] Alter current plan (see comments)  [] Plan of care initiated [] Hold pending MD visit [] Discharge    Electronically signed by: Lawyer North PT    Note: If patient does not return for scheduled/recommended follow up visits, this note will serve as a discharge from care along with the most recent update on progress.

## 2022-09-23 NOTE — TELEPHONE ENCOUNTER
S/W pt. Tenex PA completed for 10/7/22. All instructions given to pt, PO appt sched. Will mail information to pt.

## 2022-09-26 ENCOUNTER — TELEPHONE (OUTPATIENT)
Dept: ORTHOPEDIC SURGERY | Age: 47
End: 2022-09-26

## 2022-09-26 ENCOUNTER — HOSPITAL ENCOUNTER (OUTPATIENT)
Dept: PHYSICAL THERAPY | Age: 47
Setting detail: THERAPIES SERIES
Discharge: HOME | End: 2022-09-26
Payer: COMMERCIAL

## 2022-09-26 NOTE — TELEPHONE ENCOUNTER
Auth: NPR  Date: 10/07/22  Spoke with: CARECylanceNIEVES WEBSITE/LOOKUP CODE  Type of SX: OUTPATIENT  Location: [facility]  CPT: 36020   DX: M77.11  SX area: [facility]  [facility]

## 2022-09-26 NOTE — FLOWSHEET NOTE
[facility]    Physical Therapy  Cancellation/No-show Note  Patient Name:  Reynolds Lefort  :  1975   Date:  2022  Cancelled visits to date: 1  No-shows to date: 0    For today's appointment patient:  [x]  Cancelled -   []  Rescheduled appointment  []  No-show     Reason given by patient:  [x]  Patient ill  []  Conflicting appointment  []  No transportation    []  Conflict with work  []  No reason given  []  Other:     Comments:      Phone call information:   []  Phone call made today to patient at _ time at number provided:      []  Patient answered, conversation as follows:    []  Patient did not answer, message left as follows:  []  Phone call not made today  [x]  Phone call not needed - pt contacted us to cancel and provided reason for cancellation.      Electronically signed by:  Joon Shankar, PT, DPT

## 2022-09-28 ENCOUNTER — HOSPITAL ENCOUNTER (OUTPATIENT)
Dept: PHYSICAL THERAPY | Age: 47
Setting detail: THERAPIES SERIES
Discharge: HOME | End: 2022-09-28
Payer: COMMERCIAL

## 2022-09-28 NOTE — FLOWSHEET NOTE
[facility]    Physical Therapy  Cancellation/No-show Note  Patient Name:  Pastor Viveros  :  1975   Date:  2022  Cancelled visits to date: 2  No-shows to date: 0    For today's appointment patient:  [x]  Cancelled -   []  Rescheduled appointment  []  No-show     Reason given by patient:  [x]  Patient ill - still sick  []  Conflicting appointment  []  No transportation    []  Conflict with work  []  No reason given  []  Other:     Comments:      Phone call information:   []  Phone call made today to patient at _ time at number provided:      []  Patient answered, conversation as follows:    []  Patient did not answer, message left as follows:  []  Phone call not made today  [x]  Phone call not needed - pt contacted us to cancel and provided reason for cancellation. Electronically signed by:   Edger Kocher, PT, DPT

## 2022-10-28 ENCOUNTER — TELEPHONE (OUTPATIENT)
Dept: ORTHOPEDIC SURGERY | Age: 47
End: 2022-10-28

## 2022-10-28 NOTE — TELEPHONE ENCOUNTER
LVM for pt that 11/4/22 Tenex procedure had to be cancelled due to ongoing clerical/technical issue with [facility] that is out of our control. We have resched Tenex for 11/11/22, and I warned pt that there is a possibility that it could be resched again, if the issue is not resolved. Asked pt to call back to discuss. New surgery precert letter routed  Surgery letter updated and refaxed.

## 2022-11-09 ENCOUNTER — TELEPHONE (OUTPATIENT)
Dept: ORTHOPEDIC SURGERY | Age: 47
End: 2022-11-09

## 2022-11-09 NOTE — TELEPHONE ENCOUNTER
Pt stopped by the office today, S/W pt to confirm that Tenex sx will need to be postponed again due to cont. hospital administration issues. Tentative sx date is 12/9/22. Pt v/u.

## 2022-11-09 NOTE — TELEPHONE ENCOUNTER
General Question     Subject: Patient is requesting a call back wanting to confirm their surgery date.   Patient and /or Facility Request: Marion Ferrera \"[facility]\"  Contact Number: [phone]

## 2022-11-28 ENCOUNTER — TELEPHONE (OUTPATIENT)
Dept: ORTHOPEDIC SURGERY | Age: 47
End: 2022-11-28

## 2022-11-28 NOTE — TELEPHONE ENCOUNTER
Auth: NPR  Date: 12/09/22  Spoke with: [facility]  Type of SX: outpatient  Location: [facility]  CPT: 50994   DX: M77.11  SX area: [facility]  Insurance: [employer]

## 2022-11-28 NOTE — TELEPHONE ENCOUNTER
General Question     Subject: REQUESTING A CALL FROM AMEENA REGARDING [address].  Patient: Macario Bosworth \"G[facility]omo\"  Contact Number: [phone]

## 2022-12-07 ENCOUNTER — TELEPHONE (OUTPATIENT)
Dept: ORTHOPEDIC SURGERY | Age: 47
End: 2022-12-07

## 2022-12-12 ENCOUNTER — TELEPHONE (OUTPATIENT)
Dept: ORTHOPEDIC SURGERY | Age: 47
End: 2022-12-12

## 2022-12-12 NOTE — TELEPHONE ENCOUNTER
General Question     Subject: REQ CALLBACK  Patient and /or Facility Request: [facility] \"[facility]\"  Contact Number:  [phone]    REQ CALLBACK FROM SOMEONE IN CLINIC. CONTACT PT AT NUMBER LISTED TO DISCUSS AND ADVISE.

## 2022-12-14 NOTE — PROGRESS NOTES
Patient not reached. Preop instructions left on voice mail. [phone]______________    -Date__12/15/2022_____time_0930______arrival___0800_________  -Nothing to eat or drink after midnight  -Responsible adult 25 or older to stay on site while you are here and drive you home and stay with you after  -Follow any instructions your doctors office has given you  -Bring a complete list of all your medications and supplements  -If you normally take the following medications in the morning please do so with a small    sip of water-heart,blood pressure,seizure,breathing or thyroid-avoid water pilll Do not take blood pressure medications ending in \"malvin\" or \"pril\" the AM of surgery or the megan prior  -You may use your inhalers  -Take half of your normal dose of any long acting insulins the night before-do not take    any diabetic medications in the morning  -Follow your doctors instructions regarding blood thinners  -Any questions call your surgeons office            VISITOR POLICY(subject to change)    Current policy is 2 visitors per patient. No children. Masks at discretion of facility. Visiting hours are 8a-8p. Overnight visitors will be at the discretion of the nurse. All policies are subject to change.

## 2022-12-15 ENCOUNTER — HOSPITAL ENCOUNTER (OUTPATIENT)
Age: 47
Setting detail: OUTPATIENT SURGERY
Discharge: HOME | End: 2022-12-15
Attending: INTERNAL MEDICINE | Admitting: INTERNAL MEDICINE
Payer: COMMERCIAL

## 2022-12-15 VITALS
RESPIRATION RATE: 16 BRPM | WEIGHT: 247 LBS | DIASTOLIC BLOOD PRESSURE: 89 MMHG | HEIGHT: 69 IN | BODY MASS INDEX: 36.58 KG/M2 | OXYGEN SATURATION: 95 % | SYSTOLIC BLOOD PRESSURE: 132 MMHG | HEART RATE: 85 BPM | TEMPERATURE: 97.8 F

## 2022-12-15 DIAGNOSIS — M77.11 LATERAL EPICONDYLITIS OF RIGHT ELBOW: Primary | ICD-10-CM

## 2022-12-15 PROCEDURE — 99152 MOD SED SAME PHYS/QHP 5/>YRS: CPT | Performed by: INTERNAL MEDICINE

## 2022-12-15 PROCEDURE — 99153 MOD SED SAME PHYS/QHP EA: CPT | Performed by: INTERNAL MEDICINE

## 2022-12-15 PROCEDURE — 7100000010 HC PHASE II RECOVERY - FIRST 15 MIN: Performed by: INTERNAL MEDICINE

## 2022-12-15 PROCEDURE — 2500000003 HC RX 250 WO HCPCS: Performed by: INTERNAL MEDICINE

## 2022-12-15 PROCEDURE — 7100000011 HC PHASE II RECOVERY - ADDTL 15 MIN: Performed by: INTERNAL MEDICINE

## 2022-12-15 PROCEDURE — 3600000002 HC SURGERY LEVEL 2 BASE: Performed by: INTERNAL MEDICINE

## 2022-12-15 PROCEDURE — 2720000010 HC SURG SUPPLY STERILE: Performed by: INTERNAL MEDICINE

## 2022-12-15 PROCEDURE — 2709999900 HC NON-CHARGEABLE SUPPLY: Performed by: INTERNAL MEDICINE

## 2022-12-15 PROCEDURE — A4217 STERILE WATER/SALINE, 500 ML: HCPCS | Performed by: INTERNAL MEDICINE

## 2022-12-15 PROCEDURE — 2580000003 HC RX 258: Performed by: INTERNAL MEDICINE

## 2022-12-15 PROCEDURE — 3600000012 HC SURGERY LEVEL 2 ADDTL 15MIN: Performed by: INTERNAL MEDICINE

## 2022-12-15 RX ORDER — BUPIVACAINE HYDROCHLORIDE 5 MG/ML
INJECTION, SOLUTION EPIDURAL; INTRACAUDAL
Status: COMPLETED | OUTPATIENT
Start: 2022-12-15 | End: 2022-12-15

## 2022-12-15 RX ORDER — MAGNESIUM HYDROXIDE 1200 MG/15ML
LIQUID ORAL CONTINUOUS PRN
Status: COMPLETED | OUTPATIENT
Start: 2022-12-15 | End: 2022-12-15

## 2022-12-15 RX ORDER — LIDOCAINE HYDROCHLORIDE AND EPINEPHRINE 10; 10 MG/ML; UG/ML
INJECTION, SOLUTION INFILTRATION; PERINEURAL
Status: COMPLETED | OUTPATIENT
Start: 2022-12-15 | End: 2022-12-15

## 2022-12-15 ASSESSMENT — PAIN - FUNCTIONAL ASSESSMENT
PAIN_FUNCTIONAL_ASSESSMENT: 0-10
PAIN_FUNCTIONAL_ASSESSMENT: 0-10

## 2022-12-15 NOTE — PROGRESS NOTES
Discharge instructions reviewed with patient. All home medications have been reviewed, pt v/u. Discharge instructions signed. Pt discharged via wheelchair. Pt discharged with all belongings including wrist brace and sling. Sophy Schuler taking stable pt home.

## 2022-12-15 NOTE — PROGRESS NOTES
Pt arrived from OR to PACU bay 8. Report received from OR staff. Pt alert and awake. Surgical incisions dressings in place to R elbow. Pt on RA, NSR, and VSS. Pt was a local procedure. Pt admitted to Phase 2 on arrival. Will continue to monitor.

## 2022-12-15 NOTE — OP NOTE
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 #2  Diagnostic ultrasound evaluation of the elbow/Limited ultrasound evaluation of the elbow at the lateral epicondyle     Brief description of history:  Patient has long-standing history of lateral elbow pain affecting the RT elbow. Treatment to date has included a formal course of OT home exercises compression force strapping intermittent use of NSAIDs. Despite this aggressive conservative management symptoms have persisted. Description of procedure. Patient was positioned supine on the procedure table and the RT upper extremity was supported on the cushioned surface of an  adjustable tray . The RT upper extremity was prepped in sterile fashion. A diagnostic ultrasound was performed using logic E ultrasound 12 MHz frequency the linear probe. Image optimization was obtained. The area of mod-grade tendinopathy was indentified in the far  anterior segment distribution of the tendon juxtaposed to the epicondylar ridge primarily involving superficial/interstitial fiber distribution. The probe was positioned long axis along the common extensor tendon. Using longitudinal technique 25-gauge needle was advanced under direct guidance and approximately 5mL of 1% lidocaine with epinephrine was injected subcutaneously and down to the level of the extensor tendon hydrodissecting the fascial plane along the common extensor tendon in the region of pathology. The needle was then withdrawn, a 22-gauge needle was advanced within the same needle tract using the same technique and advanced into the extensor tendon at the location of pathology. A thin short segment horizontal tear was visualized to hydrodissect within the common extensor tendon. Approximately 5 mL of Marcaine was injected in this anatomic region and peppered from proximal and distal  to ensure adequate anesthesia. 11 blade surgical knife was then advanced under direct guidance using longitudinal technique into the central region of pathology again in the same needle tract that was anesthetized. The TX2  microtip was then advanced under direct guidance into the region of pathology and debridement and ablation of the tendinopathy tissue was performed addressing the pathology from proximal to distal.  The microtip was placed within the central area of pathology and debrided from proximal to distal.  The tendinopathic tissue surrounding the tear was also debrided. Medium energy setting was selected for the entirety. Total cutting time approximately 4 minutes. Video clips were taken intermittently throughout the procedure and surveillance ultrasound was performed at the conclusion of the procedure. Aspiration setting was selected and the [address] microtip was slowly withdrawn from the tendon once the procedure was determined to be completed. Compression was applied to the surgical wound and Steri-Strips were then used to secure the stab wound. A compression occlusive dressing with Tegaderm was then applied to the area.   Ace wrap applied from proximal to distal.     The patient was then transferred to the recovery area in stable fashion.        Anesthesia: IV Sedation    Estimated Blood Loss (mL): 0.2 cc    Complications: None    Specimens:   * No specimens in log *    Implants:  * No implants in log *      Drains: * No LDAs found *        Assistant:   * No surgical staff found *    Anesthesia: IV Sedation    Estimated Blood Loss (mL): 0.2 cc    Complications: None    Specimens:   * No specimens in log *    Implants:  * No implants in log *      Drains: * No LDAs found *          Electronically signed by Leland Turner MD on 12/15/2022 at 11:05 AM

## 2022-12-15 NOTE — DISCHARGE INSTRUCTIONS
Discharge Instructions for Dr. Severo Minium for Elbow Tenotomy    *Patient may drive tomorrow    *Keep bandages and procedure area clean and dry for 3 days. Okay to remove occlusive bandage in 3 days. *May apply ice for 10-15 minutes every hour as needed for discomfort. *May take over-the-counter pain medication such as Tylenol but minimize     Use of Motrin/ Advil, and use Ultram as directed. *Avoid submerging area in water (i.e. Swimming/Bathing) for five (7) days. *Contact or return to doctor's office as directed. *Immediately Contact Doctor's Office @ [phone]:    *If the area becomes red or hot to touch. *For increased pain or swelling    *For drainage from the site. * Specific Instructions for Elbow Tenotomy      *Rest arm today, use wrist splint continuosly for  10 days off for showering and exercises. *Wear compressive sleeve / ace wrap  for three (3)  Days off at night. Sling for comfort for 3-5 days. *May resume nonrepetitive sedentary use of arm/hand in three (3) days. *Light daily activity for three (3) weeks, then progress as tolerated. *May begin stretching and table exercises tomorrow as pain allows. *NO lifting objects with arm/hand greater than five (5) pounds for six (6) weeks.     *May gradually resume normal use of arm/hand at six (6) weeks as tolerated and subject to physician approval.

## 2022-12-23 ENCOUNTER — TELEPHONE (OUTPATIENT)
Dept: ORTHOPEDIC SURGERY | Age: 47
End: 2022-12-23

## 2022-12-23 NOTE — TELEPHONE ENCOUNTER
General Question     Subject:  R ELBOW PAIN   Patient and /or Facility Request: [facility] \"[facility]\"  Contact Number: [phone]    PATIENT CALLED IN TO SEE WHAT HE CAN DO ABOUT HIS R ELBOW PAIN. .. PATIENT HAD SX ON DEC 15TH. .. PLEASE CALL PATIENT BACK AT THE ABOVE NUMBER. ..

## 2023-01-03 ENCOUNTER — OFFICE VISIT (OUTPATIENT)
Dept: ORTHOPEDIC SURGERY | Age: 48
End: 2023-01-03

## 2023-01-03 VITALS — HEIGHT: 69 IN | BODY MASS INDEX: 36.57 KG/M2 | WEIGHT: 246.91 LBS

## 2023-01-03 DIAGNOSIS — M77.11 LATERAL EPICONDYLITIS OF RIGHT ELBOW: Primary | ICD-10-CM

## 2023-01-03 DIAGNOSIS — M67.921 TENDINOPATHY OF ELBOW, RIGHT: ICD-10-CM

## 2023-01-03 DIAGNOSIS — M25.521 RIGHT ELBOW PAIN: ICD-10-CM

## 2023-01-03 PROCEDURE — 99024 POSTOP FOLLOW-UP VISIT: CPT | Performed by: INTERNAL MEDICINE

## 2023-01-03 NOTE — PROGRESS NOTES
Chief Complaint:   Chief Complaint   Patient presents with    Elbow Pain     F/U R ELBOW Tenex 12/15/2022. Feels very sore in the morning and stiff, feels it loosens up very little through the day and pain is a bit more manageable. History of Present Illness:       Patient is a 50 y.o. male returns follow up for the above complaint. The patient was last seen approximately 2 weeksago at the time of surgery. The symptoms remain problematic since the last visit. The patient has had no further testing for the problem. 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   Symptoms continue to follow a typical epicondylitis provocative pattern    Pain levels 3-4/10    No reliance on narcotic analgesics    No mechanical symptoms no neuritic symptoms        Past Medical History:        Past Medical History:   Diagnosis Date    Chronic back pain     Neuropathy     both feet; right leg        Present Medications:         Current Outpatient Medications   Medication Sig Dispense Refill    diclofenac sodium (VOLTAREN) 1 % GEL Apply 2 to 4 g 3 times daily for 2 weeks then twice daily as needed thereafter 150 g 3    predniSONE (DELTASONE) 10 MG tablet 20 mg 1 po bid for 5 days then 10 mg 1 po bid for 5 days then 10 mg 1 po qd for 5 days 35 tablet 0    celecoxib (CELEBREX) 200 MG capsule Take 1 capsule by mouth daily 60 capsule 3    ibuprofen (ADVIL;MOTRIN) 800 MG tablet Take 1 tablet by mouth every 8 hours as needed for Pain 90 tablet 0    aspirin 325 MG EC tablet Take 1 tablet by mouth 2 times daily for 14 days 30 tablet 0    ondansetron (ZOFRAN) 4 MG tablet Take 1 tablet by mouth every 8 hours as needed for Nausea 20 tablet 0    celecoxib (CELEBREX) 200 MG capsule Take 1 capsule by mouth daily 30 capsule 2     No current facility-administered medications for this visit. Allergies: Allergies   Allergen Reactions    Amoxicillin Hives    Penicillins Rash           Review of Systems:    Pertinent items are noted in HPI       Vital Signs: There were no vitals filed for this visit. General Exam:     Constitutional: Patient is adequately groomed with no evidence of malnutrition    Physical Exam: right elbow      Primary Exam:    Inspection: No deformity atrophy appreciable effusion      Palpation: Mild tenderness at the surgical incision greater tenderness over the lateral condyle      Range of Motion: Full range and symmetric pain in extremes      Strength: Submaximal resisted extensor supinator associated pain      Special Tests: Negative      Skin: There are no rashes, ulcerations or lesions. Gait: Nonantalgic      Neurovascular - non focal and intact       Additional Comments:        Additional Examinations:                   Office Imaging Results/Procedures PerformedToday:          Office Procedures:     Orders Placed This Encounter   Procedures    [facility]     Referral Priority:   Routine     Referral Type:   Eval and Treat     Referral Reason:   Specialty Services Required     Requested Specialty:   Physical Therapist     Number of Visits Requested:   1           Other Outside Imaging and Testing Personally Reviewed:    No results found. Assessment   Impression: . Encounter Diagnoses   Name Primary? 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 elbow with thin interstitial tear      Plan:      Active modification epicondylitis protocol-5 pounds maximum weight restriction  Formal course of OT [facility]  Local measures inclusive of cryotherapy, as needed use of NSAIDs  D SINTIA and limited ultrasound evaluation of follow-up       Orders:        Orders Placed This Encounter   Procedures    [address] [facility]     Referral Priority:   Routine     Referral Type:   Eval and Treat     Referral Reason:   Specialty Services Required     Requested Specialty:   Physical Therapist     Number of Visits Requested:   1         Cherry Ta MD.      Adriana Billing: \"This note was dictated with voice recognition software. Though review and correction are routine, we apologize for any errors. \"

## 2023-01-09 ENCOUNTER — HOSPITAL ENCOUNTER (OUTPATIENT)
Dept: GENERAL RADIOLOGY | Age: 48
Discharge: HOME | End: 2023-01-09
Payer: COMMERCIAL

## 2023-01-09 ENCOUNTER — OFFICE VISIT (OUTPATIENT)
Dept: PRIMARY CARE CLINIC | Age: 48
End: 2023-01-09
Payer: COMMERCIAL

## 2023-01-09 VITALS
TEMPERATURE: 97.8 F | HEART RATE: 113 BPM | OXYGEN SATURATION: 96 % | SYSTOLIC BLOOD PRESSURE: 110 MMHG | DIASTOLIC BLOOD PRESSURE: 80 MMHG | BODY MASS INDEX: 36.31 KG/M2 | WEIGHT: 246 LBS

## 2023-01-09 DIAGNOSIS — M54.50 CHRONIC BILATERAL LOW BACK PAIN WITHOUT SCIATICA: ICD-10-CM

## 2023-01-09 DIAGNOSIS — M25.562 ACUTE PAIN OF LEFT KNEE: ICD-10-CM

## 2023-01-09 DIAGNOSIS — M25.562 ACUTE PAIN OF LEFT KNEE: Primary | ICD-10-CM

## 2023-01-09 DIAGNOSIS — G89.29 CHRONIC BILATERAL LOW BACK PAIN WITHOUT SCIATICA: ICD-10-CM

## 2023-01-09 PROCEDURE — G8417 CALC BMI ABV UP PARAM F/U: HCPCS | Performed by: FAMILY MEDICINE

## 2023-01-09 PROCEDURE — G8427 DOCREV CUR MEDS BY ELIG CLIN: HCPCS | Performed by: FAMILY MEDICINE

## 2023-01-09 PROCEDURE — 4004F PT TOBACCO SCREEN RCVD TLK: CPT | Performed by: FAMILY MEDICINE

## 2023-01-09 PROCEDURE — 99214 OFFICE O/P EST MOD 30 MIN: CPT | Performed by: FAMILY MEDICINE

## 2023-01-09 PROCEDURE — G8484 FLU IMMUNIZE NO ADMIN: HCPCS | Performed by: FAMILY MEDICINE

## 2023-01-09 PROCEDURE — 73560 X-RAY EXAM OF KNEE 1 OR 2: CPT

## 2023-01-09 RX ORDER — CELECOXIB 200 MG/1
200 CAPSULE ORAL DAILY
Qty: 30 CAPSULE | Refills: 1 | Status: SHIPPED | OUTPATIENT
Start: 2023-01-09

## 2023-01-09 RX ORDER — ACETAMINOPHEN 120 MG/1
120 SUPPOSITORY RECTAL EVERY 4 HOURS PRN
COMMUNITY

## 2023-01-09 ASSESSMENT — ENCOUNTER SYMPTOMS
EYES NEGATIVE: 1
RESPIRATORY NEGATIVE: 1
GASTROINTESTINAL NEGATIVE: 1

## 2023-01-09 ASSESSMENT — PATIENT HEALTH QUESTIONNAIRE - PHQ9
SUM OF ALL RESPONSES TO PHQ QUESTIONS 1-9: 0
1. LITTLE INTEREST OR PLEASURE IN DOING THINGS: 0
SUM OF ALL RESPONSES TO PHQ QUESTIONS 1-9: 0
SUM OF ALL RESPONSES TO PHQ QUESTIONS 1-9: 0
SUM OF ALL RESPONSES TO PHQ9 QUESTIONS 1 & 2: 0
2. FEELING DOWN, DEPRESSED OR HOPELESS: 0
SUM OF ALL RESPONSES TO PHQ QUESTIONS 1-9: 0

## 2023-01-09 NOTE — PROGRESS NOTES
SUBJECTIVE:  Patient ID: George Lou is a 50 y.o. y.o. male     HPI   Knee Pain: Patient presents with knee pain involving the  left knee. Onset of the symptoms was several weeks ago. Inciting event:  he slipped in his bathtop then went to work out on the treadmill start to have pain . Current symptoms include giving out, pain located lateral side, and stiffness. Pain is aggravated by any weight bearing. Patient has had no prior knee problems. Evaluation to date: none. Treatment to date: none. Has been able to walk with a little discomfort he feels with his chronic recurrent back pain the knee has gotten is being worse  He had 3 surgery on his back in the past his surgeon has retired he is interested in seeing somebody else  Past Medical History:   Diagnosis Date    Chronic back pain     Neuropathy     both feet; right leg      Past Surgical History:   Procedure Laterality Date    [address]  2007    L5-S1 discectomy    BACK SURGERY  2014     BACK SURGERY  2010    SHOULDER ARTHROSCOPY Right 2022    RIGHT SHOULDER ARTHROSCOPY FOR DISTAL CLAVICLE EXCISION, SUBACROMIAL DECOMPRESSION, EXTENSIVE DEBRIDEMENT, SWATHI DECOMPRESSION, INJECTION OF CORTICOSTEROID performed by Omar Corona MD at [facility] Right 12/15/2022    RIGHT ELBOW TENEX-LOCAL performed by Augusto Tovar MD at [address] [facility] [address] History   Adopted: Yes     Social History     Socioeconomic History    Marital status: Single   Occupational History    Occupation:      Employer: [employer]   Tobacco Use    Smoking status: Former     Packs/day: 0.50     Years: 2.00     Pack years: 1.00     Types: Cigarettes     Quit date: 3/28/1989     Years since quittin.8    Smokeless tobacco: Current     Types: Snuff   Vaping Use    Vaping Use: Never used   Substance and Sexual Activity    Alcohol use: Yes     Alcohol/week: 0.0 standard drinks     Comment: RARE    Drug use:  No Sexual activity: Yes     Partners: Female     Social Determinants of Health     Financial Resource Strain: Low Risk     Difficulty of Paying Living Expenses: Not hard at all   Food Insecurity: No Food Insecurity    Worried About Running Out of Food in the Last Year: Never true    Ran Out of Food in the Last Year: Never true   Physical Activity: Unknown    Days of Exercise per Week: 0 days   Intimate Partner Violence: Not At Risk    Fear of Current or Ex-Partner: No    Emotionally Abused: No    Physically Abused: No    Sexually Abused: No     Current Outpatient Medications   Medication Sig Dispense Refill    acetaminophen (TYLENOL) 120 MG suppository Place 120 mg rectally every 4 hours as needed for Fever 3 200 mg. As needed. diclofenac sodium (VOLTAREN) 1 % GEL Apply 2 to 4 g 3 times daily for 2 weeks then twice daily as needed thereafter (Patient not taking: Reported on 1/9/2023) 150 g 3    predniSONE (DELTASONE) 10 MG tablet 20 mg 1 po bid for 5 days then 10 mg 1 po bid for 5 days then 10 mg 1 po qd for 5 days 35 tablet 0    celecoxib (CELEBREX) 200 MG capsule Take 1 capsule by mouth daily (Patient not taking: Reported on 1/9/2023) 60 capsule 3    ibuprofen (ADVIL;MOTRIN) 800 MG tablet Take 1 tablet by mouth every 8 hours as needed for Pain (Patient not taking: Reported on 1/9/2023) 90 tablet 0    aspirin 325 MG EC tablet Take 1 tablet by mouth 2 times daily for 14 days 30 tablet 0    ondansetron (ZOFRAN) 4 MG tablet Take 1 tablet by mouth every 8 hours as needed for Nausea 20 tablet 0    celecoxib (CELEBREX) 200 MG capsule Take 1 capsule by mouth daily 30 capsule 2     No current facility-administered medications for this visit. Allergies   Allergen Reactions    Amoxicillin Hives    Penicillins Rash       Review of Systems   Constitutional: Negative. HENT: Negative. Eyes: Negative. Respiratory: Negative. Cardiovascular: Negative. Gastrointestinal: Negative.     Musculoskeletal:  Positive for arthralgias and joint swelling. All other systems reviewed and are negative. OBJECTIVE:  /80 (Site: Right Upper Arm, Position: Sitting, Cuff Size: Large Adult)   Pulse (!) 113   Temp 97.8 °F (36.6 °C) (Temporal)   Wt 246 lb (111.6 kg)   SpO2 96%   BMI 36.31 kg/m²     Physical Exam  Constitutional:       Appearance: He is well-developed. HENT:      Right Ear: Tympanic membrane normal.      Left Ear: Tympanic membrane normal.   Eyes:      General: No scleral icterus. Conjunctiva/sclera: Conjunctivae normal.   Neck:      Thyroid: No thyromegaly. Vascular: No carotid bruit or JVD. Cardiovascular:      Rate and Rhythm: Normal rate and regular rhythm. Heart sounds: Normal heart sounds. No murmur heard. Pulmonary:      Effort: Pulmonary effort is normal. No respiratory distress. Breath sounds: Normal breath sounds. No wheezing or rales. Chest:      Chest wall: No tenderness. Abdominal:      General: Bowel sounds are normal. There is no distension. Palpations: Abdomen is soft. There is no mass. Tenderness: There is no abdominal tenderness. There is no guarding or rebound. Musculoskeletal:      Cervical back: Normal range of motion and neck supple. Left knee: Decreased range of motion. Tenderness present over the lateral joint line. Skin:     Findings: No rash. Neurological:      Mental Status: He is alert and oriented to person, place, and time. ASSESSMENT:   Diagnosis Orders   1. Acute pain of left knee  XR KNEE LEFT (1-2 VIEWS)      2.  Chronic bilateral low back pain without sciatica  [facility] Hossein and Susanne Connere:  See orders  Exercise sheet is given to patient  If not better may need to be seen by Ortho  Referral to see the spine clinic

## 2023-01-10 ENCOUNTER — HOSPITAL ENCOUNTER (OUTPATIENT)
Dept: PHYSICAL THERAPY | Age: 48
Setting detail: THERAPIES SERIES
Discharge: HOME | End: 2023-01-10
Payer: COMMERCIAL

## 2023-01-10 PROCEDURE — 97161 PT EVAL LOW COMPLEX 20 MIN: CPT

## 2023-01-10 PROCEDURE — 97530 THERAPEUTIC ACTIVITIES: CPT

## 2023-01-10 PROCEDURE — 97110 THERAPEUTIC EXERCISES: CPT

## 2023-01-10 PROCEDURE — 97032 APPL MODALITY 1+ESTIM EA 15: CPT

## 2023-01-10 NOTE — FLOWSHEET NOTE
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 [facility] of care signed (Y/N): []  Yes [x]  No     Date of Patient follow up with Physician:      Progress Report: []  Yes  [x]  No     Date Range for reporting period:  Beginnin/10/2023  Ending:     Progress report due (10 Rx/or 30 days whichever is less): visit #96 or      Recertification due (POC duration/ or 90 days whichever is less): visit #TBD/ (date)     Visit # Insurance Allowable Auth required?  Date Range   Eval [employer] 30 [x]  Yes  []  No TBD       Units approved Units used Date Range   TBD TBD TBD     Latex Allergy:  [x]NO      []YES  Preferred Language for Healthcare:   [x]English       []other:    Functional Scale:       Date assessed:  QDASH: raw score = 31; dysfunction = 45.45%  1/10/23    Pain level:  4-5/10     RIGHT ELBOW TENEX-12/15/2022    SUBJECTIVE:  See eval    OBJECTIVE: See eval  Observation:   Test measurements:      RESTRICTIONS/PRECAUTIONS: 5# weight limit per patient    Exercises/Interventions:   Therapeutic Exercise (69062) Resistance / level Sets / Seconds  Reps Notes/Cues   See HEP below X15'                                                                            Therapeutic Activities (33230)       1/10/23 Pt was educated on PT POC, Diagnosis, Prognosis, pathomechanics as well as frequency and duration of scheduling future physical therapy appointments. Time was also taken on this day to answer all patient questions and participation in PT.    X10'                                  Neuromuscular Re-ed (71836)                                          Manual Intervention (21776) X15'       [address]       Thoracic/Rib manipulation Respiratory mobs  Prone R 2nd rib and R supine 1st rib Grade 3-4      CT MT/Mobs       Neural tension gliders R UE Cervical LSB followed with scapular depression , GH AB to 90, partial elbow ext glides X10      PROM MT L GH flexion,L elbow flexion/ext, wrist flexion/ext X10      Manual stretches R scalene/SCM stretches 20\" X3 followed with B Pec minor stretches 20\" X3          Modalities:     Pt. Education:  -1/10/2023 pt educated on diagnosis, prognosis and expectations for rehab  -all pt questions were answered    Home Exercise Program:  Access Code: [email or web address]  URL: [email or web address]  Date: 01/10/2023  Prepared by:  [facility]    Exercises  Seated Scapular Retraction - 1 x daily - 7 x weekly - 3 sets - 10 reps  Shoulder Extension Palms Back - 1 x daily - 7 x weekly - 3 sets - 10 reps  Supine Shoulder Flexion AAROM with Hands Clasped - 1 x daily - 7 x weekly - 3 sets - 10 reps  Seated Shoulder Flexion Towel Slide at Table Top - 1 x daily - 7 x weekly - 3 sets - 10 reps  Seated Wrist Supination Stretch - 1 x daily - 7 x weekly - 3 sets - 10 reps  Seated Forearm Pronation and Supination AROM - 1 x daily - 7 x weekly - 3 sets - 10 reps  Seated Wrist Flexion Stretch - 1 x daily - 7 x weekly - 3 sets - 10 reps  Seated Wrist Extension Stretch - 1 x daily - 7 x weekly - 3 sets - 10 reps  Wrist AROM Flexion Extension - 1 x daily - 7 x weekly - 3 sets - 10 reps  Supported Elbow Flexion Extension AROM - 1 x daily - 7 x weekly - 3 sets - 10 reps  Elbow Flexion with Caregiver - 1 x daily - 7 x weekly - 3 sets - 10 reps      Therapeutic Exercise and NMR EXR  [x] (50156) Provided verbal/tactile cueing for activities related to strengthening, flexibility, endurance, ROM  for improvements in scapular, scapulothoracic and UE control with self care, reaching, carrying, lifting, house/yardwork, driving/computer work.    [] (82465) Provided verbal/tactile cueing for activities related to improving balance, coordination, kinesthetic sense, posture, motor skill, proprioception  to assist with  scapular, scapulothoracic and UE control with self care, reaching, carrying, lifting, house/yardwork, driving/computer work.  [] (84854) Therapist is in constant attendance of 2 or more patients providing skilled therapy interventions, but not providing any significant amount of measurable one-on-one time to either patient, for improvements in cervical, scapular, scapulothoracic and UE control with self care, reaching, carrying, lifting, house/yardwork, driving, computer work. Therapeutic Activities:    [x] (32982 or 19227) Provided verbal/tactile cueing for activities related to improving balance, coordination, kinesthetic sense, posture, motor skill, proprioception and motor activation to allow for proper function of scapular, scapulothoracic and UE control with self care, carrying, lifting, driving/computer work.      Home Exercise Program:    [x] (66894) Reviewed/Progressed HEP activities related to strengthening, flexibility, endurance, ROM of scapular, scapulothoracic and UE control with self care, reaching, carrying, lifting, house/yardwork, driving/computer work  [] (76790) Reviewed/Progressed HEP activities related to improving balance, coordination, kinesthetic sense, posture, motor skill, proprioception of scapular, scapulothoracic and UE control with self care, reaching, carrying, lifting, house/yardwork, driving/computer work      Manual Treatments:  PROM / STM / Oscillations-Mobs:  G-I, II, III, IV (PA's, Inf., Post.)  [x] (58442) Provided manual therapy to mobilize soft tissue/joints of cervical/CT, scapular GHJ and UE for the purpose of modulating pain, promoting relaxation,  increasing ROM, reducing/eliminating soft tissue swelling/inflammation/restriction, improving soft tissue extensibility and allowing for proper ROM for normal function with self care, reaching, carrying, lifting, house/yardwork, driving/computer work      Charges:  Timed Code Treatment Minutes: 40   Total Treatment Minutes: 55       [] EVAL - LOW (05878)   [] EVAL - MOD (31856)  [] EVAL - HIGH (91685)  [] RE-EVAL (80115)  [x] IP(10835) x   1    [] Ionto  [] NMR (41430) x       [] Vaso  [x] Manual (95179) x  1     [] Ultrasound  [x] TA x   1     [] Mech Traction (00703)  [] Aquatic Therapy x     [] ES (un) (64006):   [] Home Management Training x  [] ES(attended) (39389)   [] Dry Needling 1-2 muscles (38959):  [] Dry Needling 3+ muscles (976998  [] Group:      [] Other:                    GOALS:[facility] PT (    Overall Progression Towards Functional goals/ Treatment Progress Update:  [] Patient is progressing as expected towards functional goals listed. [] Progression is slowed due to complexities/Impairments listed. [] Progression has been slowed due to co-morbidities.   [x] Plan just implemented, too soon to assess goals progression <30days   [] Goals require adjustment due to lack of progress  [] Patient is not progressing as expected and requires additional follow up with physician  [] Other    Persisting Functional Limitations/Impairments:  []Sitting []Standing   []Transfers  []Sleeping   []Reaching []Lifting   []ADLs []Housework  []Driving []Job related tasks  []Sports/Recreation []Other:    ASSESSMENT:  See eval  Treatment/Activity Tolerance:  [] Pt able to complete treatment [] Patient limited by fatique  [] Patient limited by pain  [] Patient limited by other medical complications  [] Other:      Patient stated goal: Be able to work unlimited and Return to gym/weight-lifting  [] Progressing: [] Met: [] Not Met: [] Adjusted     Therapist goals for Patient:   Short Term Goals: To be achieved in: 2 weeks  1. Independent in HEP and progression per patient tolerance, in order to prevent re-injury. [] Progressing: [] Met: [] Not Met: [] Adjusted  2. Patient will have a decrease in pain to facilitate improvement in movement, function, and ADLs as indicated by Functional Deficits. [] Progressing: [] Met: [] Not Met: [] Adjusted  3. Patient demonstrates understanding of and compliance with precautions and restrictions following surgery. [] Progressing: [] Met: [] Not Met: [] Adjusted     Long Term Goals: To be achieved in: 10-12 weeks  1. Pt will improve QDASH score by 10 points to reduce disability and progress towards PLOF. [] Progressing: [] Met: [] Not Met: [] Adjusted  2. Patient will demonstrate increased AROM to R elbow supination to 90 degrees  to allow for proper joint functioning as indicated by patients  ability to hold objects up to 8-10# in B UE and carry at work 25' . [] Progressing: [] Met: [] Not Met: [] Adjusted  3. 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 [] Progressing: [] Met: [] Not Met: [] Adjusted  4. Patient will return to functional activities including all ADLS including opening jars and cutting food without increased symptoms or restriction. [] Progressing: [] Met: [] Not Met: [] Adjusted  5.patient to be able to hang clothes in closet reaching out to front and side without difficulty or pain by discharge,   [] Progressing: [] Met: [] Not Met: [] Adjusted       6. Patient to be able to return to gym/weight-lifting following PT recommendations with no increase in pain.    [] Progressing: [] Met: [] Not Met: [] Adjusted    Prognosis:  [x] Good [] Fair  [] Poor    Patient Requires Follow-up: [x] Yes  [] No    Return to Play:    [x]  N/A     []  Stage 1: Intro to Strength   []  Stage 2: Dynamic Strength and Intro to Plyometrics   []  Stage 3: Advanced Plyometrics and Intro to Throwing   []  Stage 4: Sport specific Training/Return to Sport     []  Ready to Return to Play, [employer]   []  Not Ready for Return to Sports   Comments:      PLAN: See eval. PT 2x / week for 10-12 weeks. [] Continue per plan of care [] Alter current plan (see comments)  [x] Plan of care initiated [] Hold pending MD visit [] Discharge    Electronically signed by: Priscilla Servin, PT, MSPT      Note: If patient does not return for scheduled/ recommended follow up visits, this note will serve as a discharge from care along with most recent update on progress.

## 2023-01-10 NOTE — PLAN OF CARE
[place], [facility], [address]  Phone: [phone]   Fax: [phone]                                                     Physical Therapy Certification    Dear Sonu Diaz,    We had the pleasure of evaluating the following patient for physical therapy services at [address] [place] [address]. A summary of our findings can be found in the initial assessment below. This includes our plan of care. If you have any questions or concerns regarding these findings, please do not hesitate to contact me at the office phone number checked above. Thank you for the referral.       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   [] Yes, Patient intake triggered C-SSRS Screening     [] Completed, no further action required.    [] Completed, PCP notified via Epic    SUBJECTIVE: Patient stated complaint:Patient presents to clinic s/p R Tenex procedure for R l:ateral epicomdylitis on 12/15. Had TR shoulder A-scopic debridement 4/2022 and did PT. R elbow pain on/off until right before R shoulder sx and then became constant. 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 Patient out of sling and uses wrist splint at night. Difficulty with all fine motor open jars, cutting food with knife, ADLS with hair washing /etc and IADLS     Relevant Medical History:previous 3 back sx; R Shoulder sx 4/22 to remove \"bone spurs\" with follow-up PT      Functional Scale:       Date assessed:  QDASH: raw score = 31; dysfunction = 45.45%  1/10    Pain Scale: 2-8/10  Easing factors: rest, decreasing work to light duty  Provocative factors: lifting , reaching, pushing screenprinter at work, ADLS/IADLS     Type: [x]Constant   []Intermittent  []Radiating []Localized []other:     Numbness/Tingling: denies in hands/UE; present in feet/lower legs from previous back issues    Occupation/School: Part-time 20 hours at [employer] for [employer]; currently on light duty with 5# lifting restriction  Living Status/Prior Level of Function: Prior to this injury / incident, pt was independent with ADLs and IADLs, working 20 hrs per week at [employer]; limited to light medium lifting due to previous 3 back sx/diagnoses      OBJECTIVE:   Hand dominance: R    Palpation: TTP Common wrist extensor tendons, pronator teres, bicep proximal and distal, scalenes, subclavius TP/myofascial bands    Functional Mobility/Transfers:  I    Posture: R shoulder blade elevated ; elevated R 1st and 2md rib    Bandages/Dressings/Incisions: NA        Dermatomes Normal Abnormal Comments   Top of head (C1) X     Posterior occipital region (C2) X     Side of neck (C3) X     Top of shoulder (C4) X     Lateral deltoid (C5) x     Tip of thumb (C6) X     Distal middle finger (C7) X     Distal fifth finger (C8) X     Medial forearm (T1) X Myotomes - NT due to recent surgery Normal Abnormal Comments   Neck flexion (C1-C2)      Neck sidebending (C3)      Shoulder elevation (C4)      Shoulder abduction (C5)      Elbow flexion/wrist extension (C6)      Elbow extension/wrist flexion (C7)      Thumb abduction (C8)      Finger abduction (T1)          Reflexes - NT due to recent surgery Normal Abnormal Comments   C5-6 Biceps      C5-6 Brachioradialis      C7-8 Triceps      Turners           PROM AROM    L R L R   Cervical Flexion    germaine to chest    Cervical Extension    40 40   Cervical Rotation   70 65   Cervical Side-bend   20 28   Shoulder Flexion  160 150 160 135   Shoulder Abduction  160 scaption 130 160 110   Shoulder External Rotation 90 75 To T1 To T1   Shoulder Internal Rotation 70 70 To L1 To T5   Elbow flexion 140 130     Elbow Extension 0 0     Supination  85 73     Pronation 90 89     Wrist  ext  60 60     Wrist flexion 80 80     Pec minor 2.0\" 3.0\"     Latsflexibility 150 150     Teres major 150 125     Wrist ext length  50 50     Wrist flexor lenght 48 45         Strength (0-5) - NT due to recent surgery Left Right    Shoulder Flex     Shoulder Abd     Shoulder ER     Shoulder IR     Biceps     Triceps                    Joint mobility: NT due to recent surgery   []Normal    []Hypo   []Hyper    Orthopedic Special Tests: NT due to recent surgery                                    [x] Patient history, allergies, meds reviewed. Medical chart reviewed. See intake form. Review Of Systems (ROS):  [x]Performed Review of systems (Integumentary, CardioPulmonary, Neurological) by intake and observation. Intake form has been scanned into medical record. Patient has been instructed to contact their primary care physician regarding ROS issues if not already being addressed at this time.       Co-morbidities/Complexities (which will affect course of rehabilitation):   []None        []Hx of COVID   Arthritic conditions   []Rheumatoid arthritis (M05.9)  []Osteoarthritis (M19.91)  []Gout   Cardiovascular conditions   []Hypertension (I10)  []Hyperlipidemia (E78.5)  []Angina pectoris (I20)  []Atherosclerosis (I70)  []Pacemaker  []Hx of CABG/stent/  cardiac surgeries   Musculoskeletal conditions   [x]Disc pathology   []Congenital spine pathologies   []Osteoporosis (M81.8)  []Osteopenia (M85.8)  []Scoliosis       Endocrine conditions   []Hypothyroid (E03.9)  []Hyperthyroid Gastrointestinal conditions   []Constipation (V80.14)   Metabolic conditions   []Morbid obesity (E66.01)  []Diabetes type 1(E10.65) or 2 (E11.65)   []Neuropathy (G60.9)     Cardio/Pulmonary conditions   []Asthma (J45)  []Coughing   []COPD (J44.9)  []CHF  []A-fib   Psychological Disorders  []Anxiety (F41.9)  []Depression (F32.9)   []Other:   Developmental Disorders  []Autism (F84.0)  []CP (G80)  []Down Syndrome (Q90.9)  []Developmental delay     Neurological conditions  []Prior Stroke (I69.30)  []Parkinson's (G20)  []Encephalopathy (G93.40)  []MS (G35)  []Post-polio (G14)  []SCI  []TBI  []ALS Other conditions  []Fibromyalgia (M79.7)  []Vertigo  []Syncope  []Kidney Failure  []Cancer      []currently undergoing                treatment  []Pregnancy  []Incontinence   Prior surgeries  []involved limb  []previous spinal surgery  [] section birth  []hysterectomy  []bowel / bladder surgery  [x]other relevant surgeries R shoulder scope ; 3 back sx   [x]Other:   neuroathy           Barriers to/and or personal factors that will affect rehab potential:              []Age  []Sex    []Smoker              []Motivation/Lack of Motivation                        [x]Co-Morbidities              []Cognitive Function, education/learning barriers              []Environmental, home barriers              []profession/work barriers  []past PT/medical experience  []other:  Justification: Patient may require extended PT POC due to previous R UE shoulder recently ith continued ROM restrictions/strength     Falls Risk Assessment (30 days):   [x] Falls Risk assessed and no intervention required.  [] Falls Risk assessed and Patient requires intervention due to being higher risk   TUG score (>12s at risk):     [] Falls education provided, including         ASSESSMENT: Decreased flexibility in wrist flexors/extensors as well as tightness in R pec minor/bicep/pronator limiting proper upper quarter posture and poor scapulohumeral rhythm.  Patient also demonstrated tightness in R scalenes/subclavius with restricted R upper rib mobility.  Patient will likely benefit from skilled PT to address his impairments and functional limitations and restore him to his PLOF.    Functional Impairments   [x]Noted spinal or UE joint hypomobility   []Noted spinal or UE joint hypermobility   [x]Decreased UE functional ROM   [x]Decreased UE functional strength   []Abnormal reflexes/sensation/myotomal/dermatomal deficits   [x]Decreased RC/scapular/elbow/wrist strength and neuromuscular control   []other:      Functional Activity Limitations (from functional questionnaire and intake)   []Reduced ability to tolerate prolonged functional positions   []Reduced ability or difficulty with changes of positions or transfers between positions   [x]Reduced ability to maintain good posture and demonstrate good body mechanics with sitting, bending, and lifting   [x] Reduced ability or tolerance with driving and/or computer work   [x]Reduced ability to sleep   [x]Reduced ability to perform lifting, reaching, carrying tasks   [x]Reduced ability to tolerate impact through UE   []Reduced ability to reach behind back   [x]Reduced ability to  or hold objects   [x]Reduced ability to throw or toss an object   []other:    Participation Restrictions   [x]Reduced participation in self care activities   [x]Reduced participation in home management activities   [x]Reduced participation in work activities   [x]Reduced participation in social  activities. []Reduced participation in sport/recreation activities. Classification:   [x]Signs/symptoms consistent with post-surgical status including decreased ROM, strength and function.   []Signs/symptoms consistent with joint sprain/strain   []Signs/symptoms consistent with shoulder impingement   [x]Signs/symptoms consistent with shoulder/elbow/wrist tendinopathy   []Signs/symptoms consistent with Rotator cuff tear   []Signs/symptoms consistent with labral tear   [x]Signs/symptoms consistent with postural/rib dysfunction    []Signs/symptoms consistent with Glenohumeral IR Deficit - <45 degrees   []Signs/symptoms consistent with facet dysfunction of cervical/thoracic spine    []Signs/symptoms consistent with pathology which may benefit from Dry needling     []other:     Prognosis/Rehab Potential:      []Excellent   [x]Good    []Fair   []Poor    Tolerance of evaluation/treatment:    []Excellent   [x]Good    []Fair   []Poor    Physical Therapy Evaluation Complexity Justification  [x] A history of present problem with:  [] no personal factors and/or comorbidities that impact the plan of care;  []1-2 personal factors and/or comorbidities that impact the plan of care  []3 personal factors and/or comorbidities that impact the plan of care  [x] An examination of body systems using standardized tests and measures addressing any of the following: body structures and functions (impairments), activity limitations, and/or participation restrictions;:  [] a total of 1-2 or more elements   [] a total of 3 or more elements   [] a total of 4 or more elements   [x] A clinical presentation with:  [] stable and/or uncomplicated characteristics   [] evolving clinical presentation with changing characteristics  [] unstable and unpredictable characteristics;   [x] Clinical decision making of [x] low, [] moderate, [] high complexity using standardized patient assessment instrument and/or measurable assessment of functional outcome. [x] EVAL (LOW) 56356 (typically 15 minutes face-to-face)  [] EVAL (MOD) 35051 (typically 30 minutes face-to-face)  [] EVAL (HIGH) 91823 (typically 45 minutes face-to-face)  [] RE-EVAL     PLAN:  Frequency/Duration:  2 days per week for 10-12 Weeks:  INTERVENTIONS:  [x] Therapeutic exercise including: strength training, ROM, for Upper extremity and core   [x]  NMR activation and proprioception for UE, scap and Core   [x] Manual therapy as indicated for shoulder, scapula and spine to include: Dry Needling/IASTM, STM, PROM, Gr I-IV mobilizations, manipulation. [x] Modalities as needed that may include: thermal agents, E-stim, Biofeedback, US, iontophoresis as indicated  [x] Patient education on joint protection, postural re-education, activity modification, progression of HEP. HEP instruction: Pt provided with written HEP instructions. GOALS:  Patient stated goal: Be able to work unlimited and Return to gym/weight-lifting  [] Progressing: [] Met: [] Not Met: [] Adjusted    Therapist goals for Patient:   Short Term Goals: To be achieved in: 2 weeks  1. Independent in HEP and progression per patient tolerance, in order to prevent re-injury. [] Progressing: [] Met: [] Not Met: [] Adjusted  2. Patient will have a decrease in pain to facilitate improvement in movement, function, and ADLs as indicated by Functional Deficits. [] Progressing: [] Met: [] Not Met: [] Adjusted  3. Patient demonstrates understanding of and compliance with precautions and restrictions following surgery. [] Progressing: [] Met: [] Not Met: [] Adjusted    Long Term Goals: To be achieved in: 10-12 weeks  1. Pt will improve QDASH score by 10 points to reduce disability and progress towards PLOF. [] Progressing: [] Met: [] Not Met: [] Adjusted  2.  Patient will demonstrate increased AROM to R elbow supination to 90 degrees  to allow for proper joint functioning as indicated by patients  ability to hold objects up to 8-10# in B UE and carry at work 25' . [] Progressing: [] Met: [] Not Met: [] Adjusted  3. 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 [] Progressing: [] Met: [] Not Met: [] Adjusted  4. Patient will return to functional activities including all ADLS including opening jars and cutting food without increased symptoms or restriction. [] Progressing: [] Met: [] Not Met: [] Adjusted  5.patient to be able to hang clothes in closet reaching out to front and side without difficulty or pain by discharge,   [] Progressing: [] Met: [] Not Met: [] Adjusted   6. Patient to be able to return to gym/weight-lifting following PT recommendations with no increase in pain.    [] Progressing: [] Met: [] Not Met: [] Adjusted   Electronically signed by:  Priscilla Servin, PT

## 2023-01-17 ENCOUNTER — HOSPITAL ENCOUNTER (OUTPATIENT)
Dept: PHYSICAL THERAPY | Age: 48
Setting detail: THERAPIES SERIES
Discharge: HOME | End: 2023-01-17
Payer: COMMERCIAL

## 2023-01-17 DIAGNOSIS — M54.16 CHRONIC RADICULAR PAIN OF LOWER BACK: Primary | ICD-10-CM

## 2023-01-17 DIAGNOSIS — G89.29 CHRONIC RADICULAR PAIN OF LOWER BACK: Primary | ICD-10-CM

## 2023-01-17 PROCEDURE — 97110 THERAPEUTIC EXERCISES: CPT

## 2023-01-17 PROCEDURE — 97162 PT EVAL MOD COMPLEX 30 MIN: CPT

## 2023-01-17 NOTE — FLOWSHEET NOTE
[facility]    Physical Therapy Treatment Note/ Progress Report:     Date:  2023    Patient Name:  Rhina Jeffries    :  1975  MRN: [id number]  Medical Diagnosis:  Radiculopathy, lumbar region [M54.16]  Treatment Diagnosis:    ICD-10-CM    1. Chronic radicular pain of lower back  M54.16     G89.29      Insurance/Certification information:  PT Insurance Information: Berhane Dickson / Cristian Jimenes / Pierre Fuentes / 30 VISITS / ALL CODES BILLABLE    Physician Information:  Eliezer Benitez MD  Plan of care signed (Y/N): []  Yes [x]  No  Date sent: 23    Date of Patient follow up with Physician:      Progress Report: []  Yes  []  No     Functional Scale:    Date assessed:  ASAD  46% disability  23    Date Range for reporting period:  Beginnin23  Ending:      Progress report due (10 Rx/or 30 days whichever is less): 3/73/40     Recertification due (POC duration/ or 90 days whichever is less): 3/1/23     Visit # Insurance Allowable Auth required?  Date Range   1 30 []  Yes[x]  No 1/10/23 - 4/10/23     Pain level:  3/10, 8/10 at worse     SUBJECTIVE:  See eval    OBJECTIVE: See eval  Observation:   Test measurements:      RESTRICTIONS/PRECAUTIONS: Chronic lower back pain    Exercises/Interventions:   Therapeutic Ex  20' Wt / Resistance sets/sec reps notes          Supine figure 4  45\" x1    Supine LTR  45\" x1    Standing quad stretch off hi/low  45\" x1    Standing hip flexor stretch (captains pose)  45\" x1    EOB HS stretch  45\" x1           Education as noted below  8'                                Therapeutic Activities                                                               Manual Intervention               Prone PA       GISTM/STM       Lumbar Manip       SI Manip       Hip belt mobs       Hip LA distraction              NMR re-education                                             Pt. Education:  23: Skilled care provided per flow sheet below; Evaluation - Patient education regarding PT assessment and plan of care. Discussed any post operative precautions or guidelines at this time (if appropriate for patient diagnosis); discussed activity modification, while providing explanation in regards to anatomical structures involved, healing time frames and relevant joint mechanics. Provided patient time for questions with answers provided when possible.       Home Exercise Program:  1/17/23: Access Code: [email or web address]     Exercises  Supine Figure 4 Piriformis Stretch - 1 x daily - 7 x weekly - 3 sets - 45 seconds hold  Supine Lower Trunk Rotation - 1 x daily - 7 x weekly - 3 sets - 45 seconds hold  Standing Hip Flexor Stretch with Foot Elevated - 1 x daily - 7 x weekly - 3 sets - 45 seconds hold  Hip Flexor Stretch with Chair - 1 x daily - 7 x weekly - 3 sets - 45 seconds hold  Seated Table Hamstring Stretch - 1 x daily - 7 x weekly - 3 sets - 45 seconds hold    Therapeutic Exercise and NMR EXR  [] (57708) Provided verbal/tactile cueing for activities related to strengthening, flexibility, endurance, ROM  for improvements in proximal hip and core control with self care, mobility, lifting and ambulation.  [] (88539) Provided verbal/tactile cueing for activities related to improving balance, coordination, kinesthetic sense, posture, motor skill, proprioception  to assist with core control in self care, mobility, lifting, and ambulation.     Therapeutic Activities:    [] (14551 or 39069) Provided verbal/tactile cueing for activities related to improving balance, coordination, kinesthetic sense, posture, motor skill, proprioception and motor activation to allow for proper function  with self care and ADLs  [] (51429) Provided training and instruction to the patient for proper core and proximal hip recruitment and positioning with ambulation re-education     Home Exercise Program:    [x] (25742) Reviewed/Progressed HEP activities related to  strengthening, flexibility, endurance, ROM of core, proximal hip and LE for functional self-care, mobility, lifting and ambulation   [] (52009) Reviewed/Progressed HEP activities related to improving balance, coordination, kinesthetic sense, posture, motor skill, proprioception of core, proximal hip and LE for self care, mobility, lifting, and ambulation      Manual Treatments:  PROM / STM / Oscillations-Mobs:  G-I, II, III, IV (PA's, Inf., Post.)  [] (41108) Provided manual therapy to mobilize proximal hip and LS spine soft tissue/joints for the purpose of modulating pain, promoting relaxation,  increasing ROM, reducing/eliminating soft tissue swelling/inflammation/restriction, improving soft tissue extensibility and allowing for proper ROM for normal function with self care, mobility, lifting and ambulation. Modalities:       Charges:  Timed Code Treatment Minutes: 20'   Total Treatment Minutes: 48'       [] EVAL (LOW) 455 1011 (typically 20 minutes face-to-face)  [x] EVAL (MOD) 95066 (typically 30 minutes face-to-face)  [] EVAL (HIGH) 89487 (typically 45 minutes face-to-face)  [] RE-EVAL     [x] QA(52447) x   1  [] IONTO (70186)  [] NMR (87100) x     [] VASO (18002)  [] Manual (18279) x     [] Other:  [] TA (32904)x     [] Mech Traction (93312)  [] ES(attended) (84212)     [] ES (un) (76312):     GOALS:  Patient stated goal: \"improve lower back pain\"  [] Progressing: [] Met: [] Not Met: [] Adjusted     Therapist goals for Patient:   Short Term Goals: To be achieved in: 2 weeks  1. Independent in HEP and progression per patient tolerance, in order to prevent re-injury. [] Progressing: [] Met: [] Not Met: [] Adjusted  2. Patient will have a decrease in pain to facilitate improvement in movement, function, and ADLs as indicated by Functional Deficits. [] Progressing: [] Met: [] Not Met: [] Adjusted     Long Term Goals: To be achieved in: 6-12 weeks  1.  Patient will reach ASAD functional score of less than or equal to 15/50 to assist with reaching prior level of function with activities such as sitting more than 1/2 hour. [] Progressing: [] Met: [] Not Met: [] Adjusted  2. Patient will demonstrate increased active forward flexion to hand to knees to demonstrates improve trunk control. [] Progressing: [] Met: [] Not Met: [] Adjusted  3. Patient will demonstrate an increase in Strength to good proximal hip and core activation to allow for proper functional mobility as indicated by patients Functional Deficits. [] Progressing: [] Met: [] Not Met: [] Adjusted  4. Patient will be able to walk > 1 mile to demonstrate symptom progression   [] Progressing: [] Met: [] Not Met: [] Adjusted  5. Patient able to reports 50% reduction in symptoms to improve quality of life.(patient specific functional goal)    [] Progressing: [] Met: [] Not Met: [] Adjusted       ASSESSMENT:  See eval      Treatment/Activity Tolerance:  [x] Patient tolerated treatment well [] Patient limited by fatique  [] Patient limited by pain  [] Patient limited by other medical complications  [] Other:     Overall Progression Towards Functional goals/ Treatment Progress Update:  [] Patient is progressing as expected towards functional goals listed. [] Progression is slowed due to complexities/Impairments listed. [] Progression has been slowed due to co-morbidities.   [x] Plan just implemented, too soon to assess goals progression <30days   [] Goals require adjustment due to lack of progress  [] Patient is not progressing as expected and requires additional follow up with physician  [] Other:    Prognosis for POC: [x] Good [] Fair  [] Poor    Patient requires continued skilled intervention: [x] Yes  [] No        PLAN: Begin PT focusing on: proximal hip mobilizations, LB mobs, LB core activation, proximal hip activation, and HEP     Frequency/Duration:  1-2 days per week for 6-12 Weeks:  Interventions:  [] Continue per plan of care [] Alter current plan (see comments)  [x] Plan of care initiated [] Hold pending MD visit [] Discharge    Electronically signed by: Kenya Wright PT    Note: If patient does not return for scheduled/recommended follow up visits, this note will serve as a discharge from care along with the most recent update on progress.

## 2023-01-17 NOTE — FLOWSHEET NOTE
[facility]    Physical Therapy Treatment Note/ Progress Report:     Date:  2023    Patient Name:  Rhina Jeffries    :  1975  MRN: [id number]  Medical Diagnosis:  Radiculopathy, lumbar region [M54.16]  Treatment Diagnosis:    ICD-10-CM    1. Chronic radicular pain of lower back  M54.16     G89.29      Insurance/Certification information:  PT Insurance Information: Berhane Dickson / Cristian Jimenes / Pierre Fuentes / 30 VISITS / ALL CODES BILLABLE  Physician Information:  Eliezer Benitez MD  Plan of care signed (Y/N): []  Yes [x]  No  Date sent: 23    Date of Patient follow up with Physician:      Progress Report: []  Yes  []  No     Functional Scale:    Date assessed:  ASAD  46% disability  23    Date Range for reporting period:  Beginnin23  Ending:      Progress report due (10 Rx/or 30 days whichever is less): 47     Recertification due (POC duration/ or 90 days whichever is less): 3/1/23     Visit # Insurance Allowable Auth required?  Date Range   1 30 []  Yes[x]  No 1/10/23 - 4/10/23     Pain level:  3/10, 8/10 at worse     SUBJECTIVE:  See eval    OBJECTIVE: See eval  Observation:   Test measurements:      RESTRICTIONS/PRECAUTIONS: Chronic lower back pain    Exercises/Interventions:   Therapeutic Ex  20' Wt / Resistance sets/sec reps notes          Supine figure 4 stretch  45\" x1    Supine lower trunk rotation  45\" x1    Standing quad stretch off chair  45\" x1    Foot on chair hip stretch  45\" x1    EOB HS stretch  45\" x1           Education provided below  8'                                Therapeutic Activities                                                               Manual Intervention               Prone PA       GISTM/STM       Lumbar Manip       SI Manip       Hip belt mobs       Hip LA distraction              NMR re-education                                             Pt. Education:  -pt educated on diagnosis, prognosis and expectations for rehab  -all pt questions were answered    Home Exercise Program:  1/17/23: Access Code: [email or web address]     Exercises  Supine Figure 4 Piriformis Stretch - 1 x daily - 7 x weekly - 3 sets - 45 seconds hold  Supine Lower Trunk Rotation - 1 x daily - 7 x weekly - 3 sets - 45 seconds hold  Standing Hip Flexor Stretch with Foot Elevated - 1 x daily - 7 x weekly - 3 sets - 45 seconds hold  Hip Flexor Stretch with Chair - 1 x daily - 7 x weekly - 3 sets - 45 seconds hold  Seated Table Hamstring Stretch - 1 x daily - 7 x weekly - 3 sets - 45 seconds hold    Therapeutic Exercise and NMR EXR  [] (24518) Provided verbal/tactile cueing for activities related to strengthening, flexibility, endurance, ROM  for improvements in proximal hip and core control with self care, mobility, lifting and ambulation.  [] (16810) Provided verbal/tactile cueing for activities related to improving balance, coordination, kinesthetic sense, posture, motor skill, proprioception  to assist with core control in self care, mobility, lifting, and ambulation.      Therapeutic Activities:    [] (03133 or 19064) Provided verbal/tactile cueing for activities related to improving balance, coordination, kinesthetic sense, posture, motor skill, proprioception and motor activation to allow for proper function  with self care and ADLs  [] (43337) Provided training and instruction to the patient for proper core and proximal hip recruitment and positioning with ambulation re-education     Home Exercise Program:    [x] (38058) Reviewed/Progressed HEP activities related to strengthening, flexibility, endurance, ROM of core, proximal hip and LE for functional self-care, mobility, lifting and ambulation   [] (42568) Reviewed/Progressed HEP activities related to improving balance, coordination, kinesthetic sense, posture, motor skill, proprioception of core, proximal hip and LE for self care, mobility, lifting, and ambulation      Manual Treatments:  PROM / STM / Oscillations-Mobs:  G-I, II, III, IV (PA's, Inf., Post.)  [] (49122) Provided manual therapy to mobilize proximal hip and LS spine soft tissue/joints for the purpose of modulating pain, promoting relaxation,  increasing ROM, reducing/eliminating soft tissue swelling/inflammation/restriction, improving soft tissue extensibility and allowing for proper ROM for normal function with self care, mobility, lifting and ambulation. Modalities:       Charges:  Timed Code Treatment Minutes: 20'   Total Treatment Minutes: 48'       [] EVAL (LOW) 455 [id number] (typically 20 minutes face-to-face)  [x] EVAL (MOD) 27663 (typically 30 minutes face-to-face)  [] EVAL (HIGH) 75682 (typically 45 minutes face-to-face)  [] RE-EVAL     [x] JI(76342) x   1  [] IONTO (73890)  [] NMR (28834) x     [] VASO (81275)  [] Manual (16622) x     [] Other:  [] TA (62205)x     [] Mech Traction (46916)  [] ES(attended) (86550)     [] ES (un) (28330):     GOALS:  Patient stated goal: \"improve lower back pain\"  [] Progressing: [] Met: [] Not Met: [] Adjusted     Therapist goals for Patient:   Short Term Goals: To be achieved in: 2 weeks  1. Independent in HEP and progression per patient tolerance, in order to prevent re-injury. [] Progressing: [] Met: [] Not Met: [] Adjusted  2. Patient will have a decrease in pain to facilitate improvement in movement, function, and ADLs as indicated by Functional Deficits. [] Progressing: [] Met: [] Not Met: [] Adjusted     Long Term Goals: To be achieved in: 6-12 weeks  1. Patient will reach ASAD functional score of less than or equal to 15/50 to assist with reaching prior level of function with activities such as sitting more than 1/2 hour. [] Progressing: [] Met: [] Not Met: [] Adjusted  2. Patient will demonstrate increased active forward flexion to hand to knees to demonstrates improve trunk control. [] Progressing: [] Met: [] Not Met: [] Adjusted  3.  Patient will demonstrate an increase in Strength to good proximal hip and core activation to allow for proper functional mobility as indicated by patients Functional Deficits. [] Progressing: [] Met: [] Not Met: [] Adjusted  4. Patient will be able to walk > 1 mile to demonstrate symptom progression   [] Progressing: [] Met: [] Not Met: [] Adjusted  5. Patient able to reports 50% reduction in symptoms to improve quality of life.(patient specific functional goal)    [] Progressing: [] Met: [] Not Met: [] Adjusted       ASSESSMENT:  See eval      Treatment/Activity Tolerance:  [x] Patient tolerated treatment well [] Patient limited by fatique  [] Patient limited by pain  [] Patient limited by other medical complications  [] Other:     Overall Progression Towards Functional goals/ Treatment Progress Update:  [] Patient is progressing as expected towards functional goals listed. [] Progression is slowed due to complexities/Impairments listed. [] Progression has been slowed due to co-morbidities. [x] Plan just implemented, too soon to assess goals progression <30days   [] Goals require adjustment due to lack of progress  [] Patient is not progressing as expected and requires additional follow up with physician  [] Other:    Prognosis for POC: [x] Good [] Fair  [] Poor    Patient requires continued skilled intervention: [x] Yes  [] No        PLAN: Begin PT focusing on: proximal hip mobilizations, LB mobs, LB core activation, proximal hip activation, and HEP     Frequency/Duration:  1-2 days per week for 6-12 Weeks:  Interventions:  [] Continue per plan of care [] Alter current plan (see comments)  [x] Plan of care initiated [] Hold pending MD visit [] Discharge    Electronically signed by: Yang Olivas PT    Note: If patient does not return for scheduled/recommended follow up visits, this note will serve as a discharge from care along with the most recent update on progress.

## 2023-01-17 NOTE — PLAN OF CARE
[facility] [address]  Phone [phone]    Fax [phone]                                                       Physical Therapy Certification    Dear Latesha Dasilva MD    We had the pleasure of evaluating the following patient for physical therapy services at [address] [place] [address]. A summary of our findings can be found in the initial assessment below. This includes our plan of care. If you have any questions or concerns regarding these findings, please do not hesitate to contact me at the office phone number checked above. Thank you for the referral.       Physician Signature:_______________________________Date:__________________  By signing above (or electronic signature), therapists plan is approved by physician      Patient: Margarito Best   : 1975   MRN: [id number]  Referring Physician: Latesha Dasilva MD     Evaluation Date: 2023      Medical Diagnosis:  Radiculopathy, lumbar region [M54.16]  Treatment Diagnosis:    ICD-10-CM    1. Chronic radicular pain of lower back  M54.16     G89.29                                           Insurance information: PT Insurance Information: Ramesh 145 / NO DED / Joni Epley / 30 VISITS / ALL CODES BILLABLE     Precautions/ Contra-indications:     - L5/S1 Fusion   - L3/4 Fusion   - L4/5 Discectomy    C-SSRS Triggered by Intake questionnaire (Past 2 wk assessment):   [x] No, Questionnaire did not trigger screening.   [] Yes, Patient intake triggered further evaluation      [] C-SSRS Screening completed  [] PCP notified via Plan of Care  [] Emergency services notified     Latex Allergy:  [x]NO      []YES  Preferred Language for Healthcare:   [x]English       []Other:      SUBJECTIVE: Patient stated complaint: chronic lower back pain. 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 Reports no REILLY.  Reports extensive lower back surgical history as reported below. States he had PT post operatively after each surgery but not since. States symptoms are along the entire lower back with has been known to radiate into bilateral hip and legs. States leg symptoms can travel along the anterior, lateral or posterior pathways. States following his most recent surgery in 2015 he developed neuropathy that travels down the lateral thigh, calf and into the bottom of the heel. Sates that symptoms have relatively stayed the same but have more recently within the last month gotten worse. All symptoms worsen with activity throughout the day. States all surgical interventions helped with pain but did not resolve his symptoms. Right sided pain or symptoms usually seem worse. Patient is     2009 - L5/S1 Fusion  2012 - L3/4 Fusion  2015 - L4/5 Discectomy     Relevant Medical History: 2009 - L5/S1 Fusion  2012 - L3/4 Fusion  2015 - L4/5 Discectomy    Functional Disability Index: ASAD: 23/50, 46% dysfunction     Pain Scale: 3/10 currently, 8/10 worst  Easing factors: Rest, sitting down  Provocative factors: standing, walking, position changes, household management, work / school     Type: [x]Constant   []Intermittent  []Radiating []Localized []other:     Numbness/Tingling: Neuropathy that travels down the lateral thigh, calf and into the bottom of the heel    Occupation/School: Works at [employer].     Living Status/Prior Level of Function: Independent with ADLs and IADLs, Chronic limitations secondary to pain and fusions since 2009    OBJECTIVE:   ROM     Lumbar Flexion Hands to mid thigh    Lumbar Extension Neutral  Can not engage into ext    LEFT RIGHT   Lumbar sidebend Superior thigh Superior thigh   Lumbar Quadrant     Thoracic Rotation 10-25% 10-25%    LEFT RIGHT   HIP Flex 110 110   HIP Abd     HIP ER 40 40   HIP IR 20 20   Knee Flex     Knee ext     Hamstring length 60 deg 60 deg   Piriformis length     Nico Test          Strength  LEFT RIGHT ALL NORMAL []      TrA Poor activation Presence of diastasis recti    HIP Flexors (L1-2) 4+ 4+   HIP Abductors nt nt   HIP extension     Knee EXT (L3) 5 5   Knee Flex (S1)     Ankle DF (L4) 4+ 5   Ankle PF (standing PF) 3+ 4+   Great Toe Ext (L5) 4+ 5       Reflexes  Normal Abnormal Comments   ALL NORMAL []       S1-2 Seated achilles [] [x] Hyporeflexia on right   S1-2 Prone knee bend [] []    L3-4 Patellar tendon [] [x] Hyporeflexia on right   C5-6 Biceps [] []    C6 Brachioradialis [] []    C7-8 Triceps [] []      Sensation:    [x]Dermatomes: Decreased sensation to lite touch along L4/5 pathway   [] Normal   [x] Abnormal    Joint mobility: NT secondary to presence of spinal fusions. []Normal    []Hypo   []Hyper    Palpation: TTP along bilateral lumbar spinals    Functional Mobility/Transfers: Patient with normal functional mobility. Significant limitations in all lumbar movements    Posture: Globally forward rounded posturing    Bandages/Dressings/Incisions: NA    Gait: (include devices/WB status) WFL      Neural dynamic tension testing Normal Abnormal Comments   Slump Test      SLR       Femoral nerve (L2-4)        Orthopedic Special Tests:    Normal Abnormal N/A Comments   Toe walk   X      Heel Walk X      Fwd Bend-aberrant or innominate mvmt)       Trendelenburg X      Kemps/Quadrant  X     Stork       AYANA/Lyle       Hip scour X      ASLR X      Crossed SLR       Supine to sit       Thigh thrust       SI distraction/compression       PA/Spring       Prone Instability test       Prone knee bend       Sacral Spring/thrust                                     [x] Patient history, allergies, meds reviewed. Medical chart reviewed. See intake form. Review Of Systems (ROS):  [x]Performed Review of systems (Integumentary, CardioPulmonary, Neurological) by intake and observation. Intake form has been scanned into medical record.  Patient has been instructed to contact their primary care physician regarding ROS issues if not already being addressed at this time. Co-morbidities/Complexities (which will affect course of rehabilitation):   []None           Arthritic conditions   []Rheumatoid arthritis (M05.9)  []Osteoarthritis (M19.91)   Cardiovascular conditions   []Hypertension (I10)  []Hyperlipidemia (E78.5)  []Angina pectoris (I20)  []Atherosclerosis (I70)   Musculoskeletal conditions   []Disc pathology   []Congenital spine pathologies   []Prior surgical intervention  []Osteoporosis (M81.8)  []Osteopenia (M85.8)   Endocrine conditions   []Hypothyroid (E03.9)  []Hyperthyroid Gastrointestinal conditions   []Constipation (I67.54)   Metabolic conditions   []Morbid obesity (E66.01)  []Diabetes type 1(E10.65) or 2 (E11.65)   []Neuropathy (G60.9)     Pulmonary conditions   []Asthma (J45)  []Coughing   []COPD (J44.9)   Psychological Disorders  []Anxiety (F41.9)  []Depression (F32.9)   []Other:   [x]Other: PRIOR HISTORY OF    2009 - L5/S1 Fusion  2012 - L3/4 Fusion  2015 - L4/5 Discectomy       Barriers to/and or personal factors that will affect rehab potential:              [x]Age  []Sex              []Motivation/Lack of Motivation                        []Co-Morbidities              []Cognitive Function, education/learning barriers              [x]Environmental, home barriers              []profession/work barriers  []past PT/medical experience  []other:  Justification: Patient with very repetitive job resulting in     [address] (30 days):   [x] Falls Risk assessed and no intervention required. [] Falls Risk assessed and Patient requires intervention due to being higher risk   TUG score (>12s at risk):     [] Falls education provided, including         ASSESSMENT: Patient presents with signs and symptoms consistent with chronic lower back pain likely associated with L4/5 facet dysfunction and presence of L5/S1, L3/4 fusion. Patient demonstrates impairments and functional limitations listed below.  Patient would benefit from skilled therapy according to POC below to assist with return to prior level of function and improve quality of life. Functional Impairments:     [x]Noted lumbar/proximal hip hypomobility   []Noted lumbosacral and/or generalized hypermobility   [x]Decreased Lumbosacral/hip/LE functional ROM   [x]Decreased core/proximal hip strength and neuromuscular control    [x]Decreased LE functional strength    [x]Abnormal reflexes/sensation/myotomal/dermatomal deficits  [x]Reduced balance/proprioceptive control    []other:      Functional Activity Limitations (from functional questionnaire and intake)   [x]Reduced ability to tolerate prolonged functional positions   [x]Reduced ability or difficulty with changes of positions or transfers between positions   [x]Reduced ability to maintain good posture and demonstrate good body mechanics with sitting, bending, and lifting   [x]Reduced ability to sleep   [x] Reduced ability or tolerance with driving and/or computer work   [x]Reduced ability to perform lifting, reaching, carrying tasks   [x]Reduced ability to squat   [x]Reduced ability to forward bend   [x]Reduced ability to ambulate prolonged functional periods/distances/surfaces   [x]Reduced ability to ascend/descend stairs   []other:       Participation Restrictions   []Reduced participation in self care activities   [x]Reduced participation in home management activities   [x]Reduced participation in work activities   [x]Reduced participation in social activities. []Reduced participation in sport/recreation activities. Classification:   [x]Signs/symptoms consistent with Lumbar instability/stabilization subgroup. []Signs/symptoms consistent with Lumbar mobilization/manipulation subgroup, myotomes and dermatomes intact. Meets manipulation criteria.     []Signs/symptoms consistent with Lumbar direction specific/centralization subgroup   []Signs/symptoms consistent with Lumbar traction subgroup     []Signs/symptoms consistent with lumbar facet dysfunction   []Signs/symptoms consistent with lumbar stenosis type dysfunction   [x]Signs/symptoms consistent with nerve root involvement including myotome & dermatome dysfunction   []Signs/symptoms consistent with post-surgical status including: decreased ROM, strength and function. [x]signs/symptoms consistent with pathology which may benefit from Dry needling     []other:      Prognosis/Rehab Potential:      []Excellent   []Good    [x]Fair   []Poor    Tolerance of evaluation/treatment:    [x]Excellent   []Good    []Fair   []Poor     Physical Therapy Evaluation Complexity Justification  [x] A history of present problem with:  [] no personal factors and/or comorbidities that impact the plan of care;  [x]1-2 personal factors and/or comorbidities that impact the plan of care  []3 personal factors and/or comorbidities that impact the plan of care  [x] An examination of body systems using standardized tests and measures addressing any of the following: body structures and functions (impairments), activity limitations, and/or participation restrictions;:  [] a total of 1-2 or more elements   [x] a total of 3 or more elements   [] a total of 4 or more elements   [x] A clinical presentation with:  [] stable and/or uncomplicated characteristics   [x] evolving clinical presentation with changing characteristics  [] unstable and unpredictable characteristics;   [x] Clinical decision making of [] low, [x] moderate, [] high complexity using standardized patient assessment instrument and/or measurable assessment of functional outcome.     [] EVAL (LOW) 38459 (typically 30 minutes face-to-face)  [x] EVAL (MOD) 03516 (typically 30 minutes face-to-face)  [] EVAL (HIGH) 72002 (typically 45 minutes face-to-face)  [] RE-EVAL     PLAN: Begin PT focusing on: proximal hip mobilizations, LB mobs, LB core activation, proximal hip activation, and HEP    Frequency/Duration:  1-2 days per week for 6-12 Weeks:  Interventions:  [x]  Therapeutic exercise including: strength training, ROM, for LE, Glutes and core   [x]  NMR activation and proprioception for glutes , LE and Core   [x]  Manual therapy as indicated for Hip complex, LE and spine to include: Dry Needling/IASTM, STM, PROM, Gr I-IV mobilizations, manipulation. [x]  Modalities as needed that may include: thermal agents, E-stim, Biofeedback, US, iontophoresis as indicated  [x]  Patient education on joint protection, postural re-education, activity modification, progression of HEP. HEP instruction: Access Code: [email or web address]    Exercises  Supine Figure 4 Piriformis Stretch - 1 x daily - 7 x weekly - 3 sets - 45 seconds hold  Supine Lower Trunk Rotation - 1 x daily - 7 x weekly - 3 sets - 45 seconds hold  Standing Hip Flexor Stretch with Foot Elevated - 1 x daily - 7 x weekly - 3 sets - 45 seconds hold  Hip Flexor Stretch with Chair - 1 x daily - 7 x weekly - 3 sets - 45 seconds hold  Seated Table Hamstring Stretch - 1 x daily - 7 x weekly - 3 sets - 45 seconds hold      GOALS:  Patient stated goal: \"improve lower back pain\"  [] Progressing: [] Met: [] Not Met: [] Adjusted    Therapist goals for Patient:   Short Term Goals: To be achieved in: 2 weeks  1. Independent in HEP and progression per patient tolerance, in order to prevent re-injury. [] Progressing: [] Met: [] Not Met: [] Adjusted  2. Patient will have a decrease in pain to facilitate improvement in movement, function, and ADLs as indicated by Functional Deficits. [] Progressing: [] Met: [] Not Met: [] Adjusted    Long Term Goals: To be achieved in: 6-12 weeks  1. Patient will reach ASAD functional score of less than or equal to 15/50 to assist with reaching prior level of function with activities such as sitting more than 1/2 hour. [] Progressing: [] Met: [] Not Met: [] Adjusted  2.  Patient will demonstrate increased active forward flexion to hand to knees to demonstrates improve trunk control. [] Progressing: [] Met: [] Not Met: [] Adjusted  3. Patient will demonstrate an increase in Strength to good proximal hip and core activation to allow for proper functional mobility as indicated by patients Functional Deficits. [] Progressing: [] Met: [] Not Met: [] Adjusted  4. Patient will be able to walk > 1 mile to demonstrate symptom progression   [] Progressing: [] Met: [] Not Met: [] Adjusted  5. Patient able to reports 50% reduction in symptoms to improve quality of life.(patient specific functional goal)   [] Progressing: [] Met: [] Not Met: [] Adjusted     Electronically signed by:   Glenis Unger PT

## 2023-01-25 ENCOUNTER — HOSPITAL ENCOUNTER (OUTPATIENT)
Dept: PHYSICAL THERAPY | Age: 48
Setting detail: THERAPIES SERIES
Discharge: HOME | End: 2023-01-25
Payer: COMMERCIAL

## 2023-01-27 ENCOUNTER — HOSPITAL ENCOUNTER (OUTPATIENT)
Dept: PHYSICAL THERAPY | Age: 48
Setting detail: THERAPIES SERIES
End: 2023-01-27
Payer: COMMERCIAL

## 2023-02-01 ENCOUNTER — HOSPITAL ENCOUNTER (OUTPATIENT)
Dept: PHYSICAL THERAPY | Age: 48
Setting detail: THERAPIES SERIES
Discharge: HOME | End: 2023-02-01
Payer: COMMERCIAL

## 2023-02-01 PROCEDURE — 97110 THERAPEUTIC EXERCISES: CPT

## 2023-02-01 PROCEDURE — 97112 NEUROMUSCULAR REEDUCATION: CPT

## 2023-02-01 PROCEDURE — 97140 MANUAL THERAPY 1/> REGIONS: CPT

## 2023-02-01 NOTE — FLOWSHEET NOTE
[facility]    Physical Therapy Treatment Note/ Progress Report:     Date:  2023    Patient Name:  Jamel Bocanegra    :  1975  MRN: [id number]  Medical Diagnosis:  Radiculopathy, lumbar region [M54.16]  Treatment Diagnosis:    ICD-10-CM    1. Chronic radicular pain of lower back  M54.16     G89.29      Insurance/Certification information:     Physician Information:  Terrence Cr MD  Plan of care signed (Y/N): []  Yes [x]  No  Date sent: 23    Date of Patient follow up with Physician:      Progress Report: []  Yes  []  No     Functional Scale:    Date assessed:  ASAD  46% disability  23    Date Range for reporting period:  Beginnin23  Ending:      Progress report due (10 Rx/or 30 days whichever is less):      Recertification due (POC duration/ or 90 days whichever is less): 3/1/23     Visit # Insurance Allowable Auth required? Date Range   2 30 []  Yes[x]  No 1/10/23 - 4/10/23     Pain level:  3/10, 8/10 at worse     SUBJECTIVE:  Patient reports increased bilateral lower back soreness. States he is limited with siting and standing. Laying supine is his favorable position for comfort. OBJECTIVE: Skilled care provided in flow sheet below;   Observation: Patient with significant hypertonicity and hypersensitivity to lumbar paraspinals and QL  Test measurements:      RESTRICTIONS/PRECAUTIONS: Chronic lower back pain    Exercises/Interventions:   Therapeutic Ex  8' Wt / Rt sets/sec reps notes   Double knee to chest  30\" x3 Use of strap assistance   Supine sciatic nerve glides NV      Supine figure 4 stretch  45\" x1    Supine lower trunk rotation  45\" x1    Standing quad stretch off chair  45\" x1    Foot on chair hip stretch  45\" x1    EOB HS stretch  45\" x1    Resisted side stepping Maroon 5 laps 12ft      8'                          Therapeutic Activities  5'       Continued to educate patient on diagnosis, impact of previous spinal fusions, functional deficits in relationship to objective deficits. Discussed activity modification  5'                                                      Manual Intervention  12'               Prone PA       iASTM/STM  15'  STM to bilateral lumbar spine, QL and paraspinals. Lumbar Manip       SI Manip       Hip belt mobs       Hip LA distraction              NMR re-education   14'       Supine BS, glute squeeze, pelvic tilt  1 x15    Modified deadbug  2 x10ea Contralateral leg on table straight   Tball isometric crunch  2 x10    Standing pelvic tilts w/ foot on step 10\" 10\" x8ea             Pt. Education:  -pt educated on diagnosis, prognosis and expectations for rehab  -all pt questions were answered    Home Exercise Program:  1/17/23: Access Code: [email or web address]     Exercises  Supine Figure 4 Piriformis Stretch - 1 x daily - 7 x weekly - 3 sets - 45 seconds hold  Supine Lower Trunk Rotation - 1 x daily - 7 x weekly - 3 sets - 45 seconds hold  Standing Hip Flexor Stretch with Foot Elevated - 1 x daily - 7 x weekly - 3 sets - 45 seconds hold  Hip Flexor Stretch with Chair - 1 x daily - 7 x weekly - 3 sets - 45 seconds hold  Seated Table Hamstring Stretch - 1 x daily - 7 x weekly - 3 sets - 45 seconds hold    Therapeutic Exercise and NMR EXR  [] (58705) Provided verbal/tactile cueing for activities related to strengthening, flexibility, endurance, ROM  for improvements in proximal hip and core control with self care, mobility, lifting and ambulation.  [] (22655) Provided verbal/tactile cueing for activities related to improving balance, coordination, kinesthetic sense, posture, motor skill, proprioception  to assist with core control in self care, mobility, lifting, and ambulation.      Therapeutic Activities:    [] (00462 or 92340) Provided verbal/tactile cueing for activities related to improving balance, coordination, kinesthetic sense, posture, motor skill, proprioception and motor activation to allow for proper function  with self care and ADLs  [] (61791) Provided training and instruction to the patient for proper core and proximal hip recruitment and positioning with ambulation re-education     Home Exercise Program:    [x] (96605) Reviewed/Progressed HEP activities related to strengthening, flexibility, endurance, ROM of core, proximal hip and LE for functional self-care, mobility, lifting and ambulation   [] (55083) Reviewed/Progressed HEP activities related to improving balance, coordination, kinesthetic sense, posture, motor skill, proprioception of core, proximal hip and LE for self care, mobility, lifting, and ambulation      Manual Treatments:  PROM / STM / Oscillations-Mobs:  G-I, II, III, IV (PA's, Inf., Post.)  [] (12446) Provided manual therapy to mobilize proximal hip and LS spine soft tissue/joints for the purpose of modulating pain, promoting relaxation,  increasing ROM, reducing/eliminating soft tissue swelling/inflammation/restriction, improving soft tissue extensibility and allowing for proper ROM for normal function with self care, mobility, lifting and ambulation.     Modalities:       Charges:  Timed Code Treatment Minutes: 39'   Total Treatment Minutes: 39'       [] EVAL (LOW) 98661 (typically 20 minutes face-to-face)  [] EVAL (MOD) 46773 (typically 30 minutes face-to-face)  [] EVAL (HIGH) 66295 (typically 45 minutes face-to-face)  [] RE-EVAL     [x] TE(58177) x   1  [] IONTO (81072)  [x] NMR (90968) x  1   [] VASO (38005)  [x] Manual (56644) x  1   [] Other:  [] TA (05872)x     [] Mech Traction (40203)  [] ES(attended) (38182)     [] ES (un) (20736):     GOALS:  Patient stated goal: \"improve lower back pain\"  [] Progressing: [] Met: [] Not Met: [] Adjusted     Therapist goals for Patient:   Short Term Goals: To be achieved in: 2 weeks  1. Independent in HEP and progression per patient tolerance, in order to prevent re-injury.   [] Progressing: [] Met: [] Not Met: []  Adjusted  2. Patient will have a decrease in pain to facilitate improvement in movement, function, and ADLs as indicated by Functional Deficits. [] Progressing: [] Met: [] Not Met: [] Adjusted     Long Term Goals: To be achieved in: 6-12 weeks  1. Patient will reach ASAD functional score of less than or equal to 15/50 to assist with reaching prior level of function with activities such as sitting more than 1/2 hour. [] Progressing: [] Met: [] Not Met: [] Adjusted  2. Patient will demonstrate increased active forward flexion to hand to knees to demonstrates improve trunk control. [] Progressing: [] Met: [] Not Met: [] Adjusted  3. Patient will demonstrate an increase in Strength to good proximal hip and core activation to allow for proper functional mobility as indicated by patients Functional Deficits. [] Progressing: [] Met: [] Not Met: [] Adjusted  4. Patient will be able to walk > 1 mile to demonstrate symptom progression   [] Progressing: [] Met: [] Not Met: [] Adjusted  5. Patient able to reports 50% reduction in symptoms to improve quality of life.(patient specific functional goal)    [] Progressing: [] Met: [] Not Met: [] Adjusted       ASSESSMENT:  Patient with significant restriction noted in bilateral lower back. Discussed how previous fusions and subsequent hypermobility to L4/5 segments are huge influences for symptoms. Discussed importance of flexibility and need to daily HEP. Use of manual therapy to address significant muscular facilitation followed up with stretching and neuromuscular re-education. Patient remarkably fatigued at end of session. He will continue to benefit from skilled therapy to address pain, poor spinal stability, improve neuromuscular control and improve sitting and standing tolerance.       Treatment/Activity Tolerance:  [x] Patient tolerated treatment well [] Patient limited by fatique  [] Patient limited by pain  [] Patient limited by other medical complications  [] Other: Overall Progression Towards Functional goals/ Treatment Progress Update:  [x] Patient is progressing as expected towards functional goals listed. [] Progression is slowed due to complexities/Impairments listed. [] Progression has been slowed due to co-morbidities. [] Plan just implemented, too soon to assess goals progression <30days   [] Goals require adjustment due to lack of progress  [] Patient is not progressing as expected and requires additional follow up with physician  [] Other:    Prognosis for POC: [x] Good [] Fair  [] Poor    Patient requires continued skilled intervention: [x] Yes  [] No        PLAN: Begin PT focusing on: proximal hip mobilizations, LB mobs, LB core activation, proximal hip activation, and HEP     Frequency/Duration:  1-2 days per week for 6-12 Weeks:  Interventions:  [x] Continue per plan of care [] Alter current plan (see comments)  [x] Plan of care initiated [] Hold pending MD visit [] Discharge    Electronically signed by: Annie Neri, PT    Note: If patient does not return for scheduled/recommended follow up visits, this note will serve as a discharge from care along with the most recent update on progress.

## 2023-02-08 ENCOUNTER — HOSPITAL ENCOUNTER (OUTPATIENT)
Dept: PHYSICAL THERAPY | Age: 48
Setting detail: THERAPIES SERIES
Discharge: HOME | End: 2023-02-08
Payer: COMMERCIAL

## 2023-02-08 PROCEDURE — 97140 MANUAL THERAPY 1/> REGIONS: CPT

## 2023-02-08 PROCEDURE — 97112 NEUROMUSCULAR REEDUCATION: CPT

## 2023-02-15 ENCOUNTER — HOSPITAL ENCOUNTER (OUTPATIENT)
Dept: PHYSICAL THERAPY | Age: 48
Setting detail: THERAPIES SERIES
Discharge: HOME | End: 2023-02-15
Payer: COMMERCIAL

## 2023-02-15 PROCEDURE — 97112 NEUROMUSCULAR REEDUCATION: CPT

## 2023-02-15 PROCEDURE — 97140 MANUAL THERAPY 1/> REGIONS: CPT

## 2023-02-15 NOTE — FLOWSHEET NOTE
[facility]    Physical Therapy Treatment Note/ Progress Report:     Date:  02/15/2023    Patient Name:  Maria Teresa Brooks    :  1975  MRN: [id number]  Medical Diagnosis:  Radiculopathy, lumbar region [M54.16]  Treatment Diagnosis:    ICD-10-CM    1. Chronic radicular pain of lower back  M54.16     G89.29      Insurance/Certification information:  PT Insurance Information: March s / Demetrice Singh / Mirta Holiness /  VISITS / ALL CODES BILLABLE    Physician Information:  Jem Skaggs MD  Plan of care signed (Y/N): []  Yes [x]  No  Date sent: 23    Date of Patient follow up with Physician:      Progress Report: []  Yes  []  No     Functional Scale:    Date assessed:  ASAD  46% disability  23    Date Range for reporting period:  Beginnin23  Ending:      Progress report due (10 Rx/or 30 days whichever is less): 3/37/39     Recertification due (POC duration/ or 90 days whichever is less): 3/1/23     Visit # Insurance Allowable Auth required? Date Range   4 30 []  Yes[x]  No 1/10/23 - 4/10/23     Pain level:  6/10, 8/10 at worse     SUBJECTIVE:  Patient reports progressively worsening of lower back symptoms. Pt reports that the pain is still there along with stiffness. OBJECTIVE: Skilled care provided in flow sheet below; Observation: Patient with global tightness throughout the lower back and bilateral lower quarter. Noted muscular spasms when rolling in bed or when stretching lower back structures.  Left side stronger during step up, right side weaker and needs more cueing for glute recruitment  Test measurements:      RESTRICTIONS/PRECAUTIONS: Chronic lower back pain    Exercises/Interventions:   Therapeutic Ex  8' Wt / Rt sets/sec reps notes   Double knee to chest  30\" x3 Use of strap assistance   Supine sciatic nerve glides NV      Supine figure 4 stretch  45\" x1    Supine lower trunk rotation  45\" x1    Standing quad stretch off chair  45\" x1    Foot on chair hip stretch  45\" x1    SL QL stretch  30\" x1 Not very effective   Standing QL stretch  30\" x1 Difficulty with maintaining stretch   EOB HS stretch  45\" x1    Resisted side stepping Maroon 5 laps 12ft                                Therapeutic Activities                                                              Manual Intervention  25'               Prone PA       iASTM/STM  15'  Left sidelying QL distraction and release w/ right leg raise and ankle pumps   Lumbar Manip       SI Manip  2' Gr5 RLE LAD manip with prolonged distraction   Hip belt mobs  10' Gr 3-4 Bilateral hip belt mobs into flexion/ER/IR   Hip LA distraction              NMR re-education   12'       Supine BS, glute squeeze, pelvic tilt  1 x15    Modified deadbug  2 x10ea Contralateral leg on table straight   Tball isometric crunch  2 x10    Standing pelvic tilts w/ foot on step and rotation 8\" 10\" 2x10ea Cueing for glute recuitment   Half kneeling palloff press  2 x10 Focusing on resisting left rotation. Had to change to standing palloff press   FSU w/ glute cueing 8\" 2 x10 Use of PVC HHA, several cues for increased glute recruitment     Pt.  Education:  2/8/23: Patient continues to be educated on      Home Exercise Program:  1/17/23: Access Code: [email or web address]     Exercises  Supine Figure 4 Piriformis Stretch - 1 x daily - 7 x weekly - 3 sets - 45 seconds hold  Supine Lower Trunk Rotation - 1 x daily - 7 x weekly - 3 sets - 45 seconds hold  Standing Hip Flexor Stretch with Foot Elevated - 1 x daily - 7 x weekly - 3 sets - 45 seconds hold  Hip Flexor Stretch with Chair - 1 x daily - 7 x weekly - 3 sets - 45 seconds hold  Seated Table Hamstring Stretch - 1 x daily - 7 x weekly - 3 sets - 45 seconds hold    Therapeutic Exercise and NMR EXR  [] (89044) Provided verbal/tactile cueing for activities related to strengthening, flexibility, endurance, ROM  for improvements in proximal hip and core control with self care, mobility, lifting and ambulation.  [] (22872) Provided verbal/tactile cueing for activities related to improving balance, coordination, kinesthetic sense, posture, motor skill, proprioception  to assist with core control in self care, mobility, lifting, and ambulation.     Therapeutic Activities:    [] (10446 or 28719) Provided verbal/tactile cueing for activities related to improving balance, coordination, kinesthetic sense, posture, motor skill, proprioception and motor activation to allow for proper function  with self care and ADLs  [] (69946) Provided training and instruction to the patient for proper core and proximal hip recruitment and positioning with ambulation re-education     Home Exercise Program:    [x] (51991) Reviewed/Progressed HEP activities related to strengthening, flexibility, endurance, ROM of core, proximal hip and LE for functional self-care, mobility, lifting and ambulation   [] (44679) Reviewed/Progressed HEP activities related to improving balance, coordination, kinesthetic sense, posture, motor skill, proprioception of core, proximal hip and LE for self care, mobility, lifting, and ambulation      Manual Treatments:  PROM / STM / Oscillations-Mobs:  G-I, II, III, IV (PA's, Inf., Post.)  [] (16457) Provided manual therapy to mobilize proximal hip and LS spine soft tissue/joints for the purpose of modulating pain, promoting relaxation,  increasing ROM, reducing/eliminating soft tissue swelling/inflammation/restriction, improving soft tissue extensibility and allowing for proper ROM for normal function with self care, mobility, lifting and ambulation.     Modalities:       Charges:  Timed Code Treatment Minutes: 50'   Total Treatment Minutes: 50'       [] EVAL (LOW) 56786 (typically 20 minutes face-to-face)  [] EVAL (MOD) 39425 (typically 30 minutes face-to-face)  [] EVAL (HIGH) 90556 (typically 45 minutes face-to-face)  [] RE-EVAL     [] TE(98692) x     [] IONTO (88525)  [x] NMR (88334) x  1   []  VASO (81401)  [x] Manual (42917) x  2   [] Other:  [] TA (20391)x     [] Mech Traction (76117)  [] ES(attended) (68678)     [] ES (un) (97037):     GOALS:  Patient stated goal: \"improve lower back pain\"  [] Progressing: [] Met: [] Not Met: [] Adjusted     Therapist goals for Patient:   Short Term Goals: To be achieved in: 2 weeks  1. Independent in HEP and progression per patient tolerance, in order to prevent re-injury. [] Progressing: [] Met: [] Not Met: [] Adjusted  2. Patient will have a decrease in pain to facilitate improvement in movement, function, and ADLs as indicated by Functional Deficits. [] Progressing: [] Met: [] Not Met: [] Adjusted     Long Term Goals: To be achieved in: 6-12 weeks  1. Patient will reach ASAD functional score of less than or equal to 15/50 to assist with reaching prior level of function with activities such as sitting more than 1/2 hour. [] Progressing: [] Met: [] Not Met: [] Adjusted  2. Patient will demonstrate increased active forward flexion to hand to knees to demonstrates improve trunk control. [] Progressing: [] Met: [] Not Met: [] Adjusted  3. Patient will demonstrate an increase in Strength to good proximal hip and core activation to allow for proper functional mobility as indicated by patients Functional Deficits. [] Progressing: [] Met: [] Not Met: [] Adjusted  4. Patient will be able to walk > 1 mile to demonstrate symptom progression   [] Progressing: [] Met: [] Not Met: [] Adjusted  5. Patient able to reports 50% reduction in symptoms to improve quality of life.(patient specific functional goal)    [] Progressing: [] Met: [] Not Met: [] Adjusted       ASSESSMENT:  Patient still demonstrates pain throughout his low back. Today's PT session focused mor on manual therapy to calm the area down which pt tolerated will. Pt was able to complete all exercises but still demonstrated increase in pain and soreness following all of them.  Pt still requires cueing for glute activation. Treatment/Activity Tolerance:  [x] Patient tolerated treatment well [] Patient limited by fatique  [] Patient limited by pain  [] Patient limited by other medical complications  [] Other:     Overall Progression Towards Functional goals/ Treatment Progress Update:  [x] Patient is progressing as expected towards functional goals listed. [] Progression is slowed due to complexities/Impairments listed. [] Progression has been slowed due to co-morbidities. [] Plan just implemented, too soon to assess goals progression <30days   [] Goals require adjustment due to lack of progress  [] Patient is not progressing as expected and requires additional follow up with physician  [] Other:    Prognosis for POC: [x] Good [] Fair  [] Poor    Patient requires continued skilled intervention: [x] Yes  [] No        PLAN: Begin PT focusing on: proximal hip mobilizations, LB mobs, LB core activation, proximal hip activation, and HEP     Frequency/Duration:  1-2 days per week for 6-12 Weeks:  Interventions:  [x] Continue per plan of care [] Alter current plan (see comments)  [x] Plan of care initiated [] Hold pending MD visit [] Discharge    Electronically signed by: Juvenal Dick, SPT  Therapist was present, directed the patient's care, made skilled judgement, and was responsible for assessment and treatment of the patient. Becky Solis, PT    Note: If patient does not return for scheduled/recommended follow up visits, this note will serve as a discharge from care along with the most recent update on progress.

## 2023-02-20 ENCOUNTER — HOSPITAL ENCOUNTER (OUTPATIENT)
Dept: PHYSICAL THERAPY | Age: 48
Setting detail: THERAPIES SERIES
Discharge: HOME | End: 2023-02-20
Payer: COMMERCIAL

## 2023-02-20 PROCEDURE — 97530 THERAPEUTIC ACTIVITIES: CPT

## 2023-02-20 PROCEDURE — 97140 MANUAL THERAPY 1/> REGIONS: CPT

## 2023-02-20 NOTE — PROGRESS NOTES
[employer]    Physical Therapy Treatment Note/ Progress Report:     Date:  2023    Patient Name:  Zhang Sosa    :  1975  MRN: [id number]  Medical Diagnosis:  Radiculopathy, lumbar region [M54.16]  Treatment Diagnosis:    ICD-10-CM    1. Chronic radicular pain of lower back  M54.16     G89.29      Insurance/Certification information:  PT Insurance Information: Bridgette Segura / Andria Hernandez / Marco Kamarar / 30 VISITS / ALL CODES BILLABLE    Physician Information:  Jeff Millan MD  Plan of care signed (Y/N): []  Yes [x]  No  Date sent: 23    Date of Patient follow up with Physician: 20     Progress Report: [x]  Yes  []  No     Functional Scale:    Date assessed:  ASAD  46% disability  23  AASD 15/50 30% disability 23    Date Range for reporting period:  Beginnin23  Endin23    Progress report due (10 Rx/or 30 days whichever is less): 82     Recertification due (POC duration/ or 90 days whichever is less): 23     Visit # Insurance Allowable Auth required? Date Range   5 30 []  Yes[x]  No 1/10/23 - 4/10/23     Pain level: 6/10, 8/10 at worse     SUBJECTIVE:  Patient reports that he worked this morning for about 2-2.5 hours. After working he reports that his back started hurting pretty bad. Does not voice any changes in symptoms or pain since start of PT    OBJECTIVE: Skilled care provided in flow sheet below; Observation: Patient with global tightness throughout the lower back and bilateral lower quarter. Noted muscular spasms when rolling in bed or when stretching lower back structures. Left side stronger during step up, right side weaker and needs more cueing for glute recruitment  Test measurements: Unable to perform single leg PF on LLE.      * = Pain  ROM       Lumbar Flexion Hands to knees     Lumbar Extension Neutral  Can not engage into ext     LEFT RIGHT   Lumbar sidebend Superior thigh* Superior thigh   Lumbar Quadrant       Thoracic Rotation 10-25% 10-25%     LEFT RIGHT   HIP Flex 90 90   HIP Abd       HIP ER 40 40   HIP IR 20 25   Knee Flex       Knee ext       Hamstring length 60 deg 60 deg   Piriformis length       Nico Test               Strength  LEFT RIGHT   ALL NORMAL []        TrA Poor activation Presence of diastasis recti    HIP Flexors (L1-2) 4+ 4+   HIP Abductors nt nt   HIP extension       Knee EXT (L3) 5 5   Knee Flex (S1)       Ankle DF (L4) 4+ 5   Ankle PF (standing PF) 3+ 4+   Great Toe Ext (L5) 4+ 5     RESTRICTIONS/PRECAUTIONS: Chronic lower back pain    Exercises/Interventions:   Therapeutic Ex   Wt / Rt sets/sec reps notes   Double knee to chest  30\" x3 Use of strap assistance   Supine sciatic nerve glides NV      Supine figure 4 stretch  45\" x1    Supine lower trunk rotation  45\" x1    Standing quad stretch off chair  45\" x1    Foot on chair hip stretch  45\" x1    SL QL stretch  30\" x1 Not very effective   Standing QL stretch  30\" x1 Difficulty with maintaining stretch   EOB HS stretch  45\" x1    Resisted side stepping Maroon 5 laps 12ft                                Therapeutic Activities  10'                  Progress note  10'                                        Manual Intervention  20'               Prone PA       iASTM/STM  10'  Left sidelying QL distraction and release w/ right leg raise and ankle pumps   Lumbar Manip       SI Manip  2' Gr5 RLE LAD manip with prolonged distraction   Hip belt mobs  10' Gr 3-4 Bilateral hip belt mobs into flexion/ER/IR   Hip LA distraction              NMR re-education          Supine BS, glute squeeze, pelvic tilt  1 x15    Modified deadbug  2 x10ea Contralateral leg on table straight   Tball isometric crunch  2 x10    Standing pelvic tilts w/ foot on step and rotation 8\" 10\" 2x10ea Cueing for glute recuitment   Half kneeling palloff press  2 x10 Focusing on resisting left rotation.  Had to change to standing palloff press   FSU w/ glute cueing 8\" 2 x10 Use of PVC HHA, several cues for increased glute recruitment     Pt. Education:  2/20/23: Discussed his work and possible connection to repetitive tasks he is performing. Discussed how previous fusions and subsequent hypermobility to L4/5 segments are largely influencing symptoms and importance of flexibility to prevent muscular guarding during movement. Home Exercise Program:  1/17/23: Access Code: [email or web address]     Exercises  Supine Figure 4 Piriformis Stretch - 1 x daily - 7 x weekly - 3 sets - 45 seconds hold  Supine Lower Trunk Rotation - 1 x daily - 7 x weekly - 3 sets - 45 seconds hold  Standing Hip Flexor Stretch with Foot Elevated - 1 x daily - 7 x weekly - 3 sets - 45 seconds hold  Hip Flexor Stretch with Chair - 1 x daily - 7 x weekly - 3 sets - 45 seconds hold  Seated Table Hamstring Stretch - 1 x daily - 7 x weekly - 3 sets - 45 seconds hold    Therapeutic Exercise and NMR EXR  [] (03003) Provided verbal/tactile cueing for activities related to strengthening, flexibility, endurance, ROM  for improvements in proximal hip and core control with self care, mobility, lifting and ambulation.  [] (91337) Provided verbal/tactile cueing for activities related to improving balance, coordination, kinesthetic sense, posture, motor skill, proprioception  to assist with core control in self care, mobility, lifting, and ambulation.      Therapeutic Activities:    [x] (30066 or 89139) Provided verbal/tactile cueing for activities related to improving balance, coordination, kinesthetic sense, posture, motor skill, proprioception and motor activation to allow for proper function  with self care and ADLs  [] (02620) Provided training and instruction to the patient for proper core and proximal hip recruitment and positioning with ambulation re-education     Home Exercise Program:    [x] (34364) Reviewed/Progressed HEP activities related to strengthening, flexibility, endurance, ROM of core, proximal hip and LE for functional self-care, mobility, lifting and ambulation   [] (47527) Reviewed/Progressed HEP activities related to improving balance, coordination, kinesthetic sense, posture, motor skill, proprioception of core, proximal hip and LE for self care, mobility, lifting, and ambulation      Manual Treatments:  PROM / STM / Oscillations-Mobs:  G-I, II, III, IV (PA's, Inf., Post.)  [x] (15366) Provided manual therapy to mobilize proximal hip and LS spine soft tissue/joints for the purpose of modulating pain, promoting relaxation,  increasing ROM, reducing/eliminating soft tissue swelling/inflammation/restriction, improving soft tissue extensibility and allowing for proper ROM for normal function with self care, mobility, lifting and ambulation. Modalities:       Charges:  Timed Code Treatment Minutes: 30'   Total Treatment Minutes: 30'       [] EVAL (LOW) 455 [id number] (typically 20 minutes face-to-face)  [] EVAL (MOD) 54463 (typically 30 minutes face-to-face)  [] EVAL (HIGH) 14995 (typically 45 minutes face-to-face)  [] RE-EVAL     [] XD(29063) x     [] IONTO (20158)  [] NMR (62073) x    [] VASO (02491)  [x] Manual (66163) x  1  [] Other:  [x] TA (51458)x   1  [] Mech Traction (12211)  [] ES(attended) (80992)     [] ES (un) (25194):     GOALS:  Patient stated goal: \"improve lower back pain\"  [x] Progressing: [] Met: [] Not Met: [] Adjusted     Therapist goals for Patient:   Short Term Goals: To be achieved in: 2 weeks  1. Independent in HEP and progression per patient tolerance, in order to prevent re-injury. [] Progressing: [] Met: [] Not Met: [] Adjusted  2. Patient will have a decrease in pain to facilitate improvement in movement, function, and ADLs as indicated by Functional Deficits. [x] Progressing: [] Met: [] Not Met: [] Adjusted     Long Term Goals: To be achieved in: 6-12 weeks  1.  Patient will reach ASAD functional score of less than or equal to 15/50 to assist with reaching prior level of function with activities such as sitting more than 1/2 hour. [] Progressing: [x] Met: [] Not Met: [] Adjusted  2. Patient will demonstrate increased active forward flexion to hand to knees to demonstrates improve trunk control. [] Progressing: [x] Met: [] Not Met: [] Adjusted  3. Patient will demonstrate an increase in Strength to good proximal hip and core activation to allow for proper functional mobility as indicated by patients Functional Deficits. [x] Progressing: [] Met: [] Not Met: [] Adjusted  4. Patient will be able to walk > 1 mile to demonstrate symptom progression   [x] Progressing: [] Met: [] Not Met: [] Adjusted  5. Patient able to reports 50% reduction in symptoms to improve quality of life.(patient specific functional goal)    [x] Progressing: [] Met: [] Not Met: [] Adjusted       ASSESSMENT:  Patient with minimal progress at this time. He continues to demonstrate significant deficits in spinal ROM secondary to pain; especially with L side bending and forward flexion. Discussed his work and possible connection to repetitive tasks he is performing. Further discussed how previous fusions and subsequent hypermobility to L4/5 segments seem to be largely influencing symptoms. Educated on importance of flexibility to prevent muscular guarding during movement and avoid spasms. Patient still dealing with periods of severe pain, especially when associated with work. PT has heavily worked on decreasing posterior chain facilitation while instructing on self stretching and neuromuscular re-education. I have noted on several occasions his left glute strength to be stronger in relationship to the right side and is usually remarkably fatigued at end of each session. He greatly struggles to support through his core and has evidence of diastasis recti. This is very noticeable whenever he is transferring supine to sit.  Patient should continues with skilled therapy to address pain, poor spinal stability, improve neuromuscular control and improve sitting and standing tolerance. Treatment/Activity Tolerance:  [x] Patient tolerated treatment well [] Patient limited by fatique  [] Patient limited by pain  [] Patient limited by other medical complications  [] Other:     Overall Progression Towards Functional goals/ Treatment Progress Update:  [x] Patient is progressing as expected towards functional goals listed. [] Progression is slowed due to complexities/Impairments listed. [] Progression has been slowed due to co-morbidities. [] Plan just implemented, too soon to assess goals progression <30days   [] Goals require adjustment due to lack of progress  [] Patient is not progressing as expected and requires additional follow up with physician  [] Other:    Prognosis for POC: [x] Good [] Fair  [] Poor    Patient requires continued skilled intervention: [x] Yes  [] No        PLAN: Begin PT focusing on: proximal hip mobilizations, LB mobs, LB core activation, proximal hip activation, and HEP     Frequency/Duration:  1-2 days per week for 6-12 Weeks:  Interventions:  [x] Continue per plan of care [] Alter current plan (see comments)  [x] Plan of care initiated [] Hold pending MD visit [] Discharge    Electronically signed by: PANKAJ Justice  Therapist was present, directed the patient's care, made skilled judgement, and was responsible for assessment and treatment of the patient. Fco Jones, PT    Note: If patient does not return for scheduled/recommended follow up visits, this note will serve as a discharge from care along with the most recent update on progress.

## 2024-04-22 ENCOUNTER — OFFICE VISIT (OUTPATIENT)
Dept: PRIMARY CARE CLINIC | Age: 49
End: 2024-04-22
Payer: COMMERCIAL

## 2024-04-22 VITALS
BODY MASS INDEX: 39.1 KG/M2 | HEART RATE: 101 BPM | HEIGHT: 69 IN | RESPIRATION RATE: 16 BRPM | DIASTOLIC BLOOD PRESSURE: 90 MMHG | WEIGHT: 264 LBS | TEMPERATURE: 96.9 F | OXYGEN SATURATION: 96 % | SYSTOLIC BLOOD PRESSURE: 134 MMHG

## 2024-04-22 DIAGNOSIS — Z00.00 ENCOUNTER FOR WELL ADULT EXAM WITHOUT ABNORMAL FINDINGS: Primary | ICD-10-CM

## 2024-04-22 DIAGNOSIS — G89.29 CHRONIC RIGHT-SIDED LOW BACK PAIN WITHOUT SCIATICA: ICD-10-CM

## 2024-04-22 DIAGNOSIS — M54.50 CHRONIC RIGHT-SIDED LOW BACK PAIN WITHOUT SCIATICA: ICD-10-CM

## 2024-04-22 PROCEDURE — 4004F PT TOBACCO SCREEN RCVD TLK: CPT | Performed by: FAMILY MEDICINE

## 2024-04-22 PROCEDURE — 3017F COLORECTAL CA SCREEN DOC REV: CPT | Performed by: FAMILY MEDICINE

## 2024-04-22 PROCEDURE — G8427 DOCREV CUR MEDS BY ELIG CLIN: HCPCS | Performed by: FAMILY MEDICINE

## 2024-04-22 PROCEDURE — G8417 CALC BMI ABV UP PARAM F/U: HCPCS | Performed by: FAMILY MEDICINE

## 2024-04-22 PROCEDURE — 99396 PREV VISIT EST AGE 40-64: CPT | Performed by: FAMILY MEDICINE

## 2024-04-22 PROCEDURE — 99213 OFFICE O/P EST LOW 20 MIN: CPT | Performed by: FAMILY MEDICINE

## 2024-04-22 RX ORDER — METHYLPREDNISOLONE 4 MG/1
TABLET ORAL
Qty: 1 KIT | Refills: 0 | Status: SHIPPED | OUTPATIENT
Start: 2024-04-22

## 2024-04-22 SDOH — ECONOMIC STABILITY: HOUSING INSECURITY
IN THE LAST 12 MONTHS, WAS THERE A TIME WHEN YOU DID NOT HAVE A STEADY PLACE TO SLEEP OR SLEPT IN A SHELTER (INCLUDING NOW)?: NO

## 2024-04-22 SDOH — ECONOMIC STABILITY: FOOD INSECURITY: WITHIN THE PAST 12 MONTHS, THE FOOD YOU BOUGHT JUST DIDN'T LAST AND YOU DIDN'T HAVE MONEY TO GET MORE.: NEVER TRUE

## 2024-04-22 SDOH — ECONOMIC STABILITY: INCOME INSECURITY: HOW HARD IS IT FOR YOU TO PAY FOR THE VERY BASICS LIKE FOOD, HOUSING, MEDICAL CARE, AND HEATING?: NOT HARD AT ALL

## 2024-04-22 SDOH — ECONOMIC STABILITY: FOOD INSECURITY: WITHIN THE PAST 12 MONTHS, YOU WORRIED THAT YOUR FOOD WOULD RUN OUT BEFORE YOU GOT MONEY TO BUY MORE.: NEVER TRUE

## 2024-04-22 ASSESSMENT — PATIENT HEALTH QUESTIONNAIRE - PHQ9
3. TROUBLE FALLING OR STAYING ASLEEP: NOT AT ALL
SUM OF ALL RESPONSES TO PHQ QUESTIONS 1-9: 0
10. IF YOU CHECKED OFF ANY PROBLEMS, HOW DIFFICULT HAVE THESE PROBLEMS MADE IT FOR YOU TO DO YOUR WORK, TAKE CARE OF THINGS AT HOME, OR GET ALONG WITH OTHER PEOPLE: NOT DIFFICULT AT ALL
8. MOVING OR SPEAKING SO SLOWLY THAT OTHER PEOPLE COULD HAVE NOTICED. OR THE OPPOSITE, BEING SO FIGETY OR RESTLESS THAT YOU HAVE BEEN MOVING AROUND A LOT MORE THAN USUAL: NOT AT ALL
5. POOR APPETITE OR OVEREATING: NOT AT ALL
SUM OF ALL RESPONSES TO PHQ QUESTIONS 1-9: 0
2. FEELING DOWN, DEPRESSED OR HOPELESS: NOT AT ALL
SUM OF ALL RESPONSES TO PHQ9 QUESTIONS 1 & 2: 0
1. LITTLE INTEREST OR PLEASURE IN DOING THINGS: NOT AT ALL
SUM OF ALL RESPONSES TO PHQ QUESTIONS 1-9: 0
7. TROUBLE CONCENTRATING ON THINGS, SUCH AS READING THE NEWSPAPER OR WATCHING TELEVISION: NOT AT ALL
4. FEELING TIRED OR HAVING LITTLE ENERGY: NOT AT ALL
6. FEELING BAD ABOUT YOURSELF - OR THAT YOU ARE A FAILURE OR HAVE LET YOURSELF OR YOUR FAMILY DOWN: NOT AT ALL
SUM OF ALL RESPONSES TO PHQ QUESTIONS 1-9: 0
9. THOUGHTS THAT YOU WOULD BE BETTER OFF DEAD, OR OF HURTING YOURSELF: NOT AT ALL

## 2024-04-22 ASSESSMENT — ENCOUNTER SYMPTOMS
RESPIRATORY NEGATIVE: 1
GASTROINTESTINAL NEGATIVE: 1
ALLERGIC/IMMUNOLOGIC NEGATIVE: 1
BACK PAIN: 1
EYES NEGATIVE: 1

## 2024-04-22 NOTE — PROGRESS NOTES
Well Adult Note  Name: Ryan Magana Today’s Date: 2024   MRN: [id number] Sex: Male   Age: 48 y.o. Ethnicity: Non- / Non    : 1975 Race: White (non-)      Ryan Magana is here for well adult exam.  History:  Pt is here for annual physical exam   Over all doing okay   Patient with chronic back pain status post surgery x 2 lately has been experiencing worsening back pain tingling numbness in the side of his right leg  Review of Systems   Constitutional: Negative.    HENT: Negative.     Eyes: Negative.    Respiratory: Negative.     Cardiovascular: Negative.    Gastrointestinal: Negative.    Endocrine: Negative.    Genitourinary: Negative.    Musculoskeletal:  Positive for back pain and myalgias.   Skin: Negative.    Allergic/Immunologic: Negative.    Neurological:  Positive for numbness.   Hematological: Negative.    Psychiatric/Behavioral: Negative.     All other systems reviewed and are negative.      Allergies   Allergen Reactions    Amoxicillin Hives    Penicillins Rash         Prior to Visit Medications    Medication Sig Taking? Authorizing Provider   acetaminophen (TYLENOL) 120 MG suppository Place 120 mg rectally every 4 hours as needed for Fever 3 200 mg. As needed.  Patient not taking: Reported on 2024  Provider, MD Olivier   celecoxib (CELEBREX) 200 MG capsule Take 1 capsule by mouth daily  Patient not taking: Reported on 2024  Arely Lu MD         Past Medical History:   Diagnosis Date    Chronic back pain     Neuropathy     both feet; right leg       Past Surgical History:   Procedure Laterality Date    APPENDECTOMY      BACK SURGERY  2007    L5-S1 discectomy    BACK SURGERY  2014     BACK SURGERY  2010    SHOULDER ARTHROSCOPY Right 2022    RIGHT SHOULDER ARTHROSCOPY FOR DISTAL CLAVICLE EXCISION, SUBACROMIAL DECOMPRESSION, EXTENSIVE DEBRIDEMENT, SWATHI DECOMPRESSION, INJECTION OF CORTICOSTEROID performed by Janee Kuo MD

## 2024-04-23 ENCOUNTER — HOSPITAL ENCOUNTER (OUTPATIENT)
Dept: GENERAL RADIOLOGY | Age: 49
Discharge: HOME | End: 2024-04-23
Payer: COMMERCIAL

## 2024-04-23 DIAGNOSIS — G89.29 CHRONIC RIGHT-SIDED LOW BACK PAIN WITHOUT SCIATICA: ICD-10-CM

## 2024-04-23 DIAGNOSIS — M54.50 CHRONIC RIGHT-SIDED LOW BACK PAIN WITHOUT SCIATICA: ICD-10-CM

## 2024-04-23 DIAGNOSIS — M54.50 CHRONIC RIGHT-SIDED LOW BACK PAIN WITHOUT SCIATICA: Primary | ICD-10-CM

## 2024-04-23 DIAGNOSIS — G89.29 CHRONIC RIGHT-SIDED LOW BACK PAIN WITHOUT SCIATICA: Primary | ICD-10-CM

## 2024-04-23 DIAGNOSIS — Z00.00 ENCOUNTER FOR WELL ADULT EXAM WITHOUT ABNORMAL FINDINGS: ICD-10-CM

## 2024-04-23 LAB
ALBUMIN SERPL-MCNC: 4.6 G/DL (ref 3.4–5)
ALP SERPL-CCNC: 83 U/L (ref 40–129)
ALT SERPL-CCNC: 32 U/L (ref 10–40)
ANION GAP SERPL CALCULATED.3IONS-SCNC: 27 MMOL/L (ref 3–16)
AST SERPL-CCNC: 25 U/L (ref 15–37)
BASOPHILS # BLD: 0.1 K/UL (ref 0–0.2)
BASOPHILS NFR BLD: 0.9 %
BILIRUB DIRECT SERPL-MCNC: <0.2 MG/DL (ref 0–0.3)
BILIRUB INDIRECT SERPL-MCNC: NORMAL MG/DL (ref 0–1)
BILIRUB SERPL-MCNC: 0.3 MG/DL (ref 0–1)
BUN SERPL-MCNC: 10 MG/DL (ref 7–20)
CALCIUM SERPL-MCNC: 9.3 MG/DL (ref 8.3–10.6)
CHLORIDE SERPL-SCNC: 97 MMOL/L (ref 99–110)
CHOLEST SERPL-MCNC: 249 MG/DL (ref 0–199)
CO2 SERPL-SCNC: 23 MMOL/L (ref 21–32)
CREAT SERPL-MCNC: 0.8 MG/DL (ref 0.9–1.3)
DEPRECATED RDW RBC AUTO: 13.8 % (ref 12.4–15.4)
EOSINOPHIL # BLD: 0.1 K/UL (ref 0–0.6)
EOSINOPHIL NFR BLD: 2.2 %
GFR SERPLBLD CREATININE-BSD FMLA CKD-EPI: >90 ML/MIN/{1.73_M2}
GLUCOSE SERPL-MCNC: 126 MG/DL (ref 70–99)
HCT VFR BLD AUTO: 47.6 % (ref 40.5–52.5)
HCV AB SERPL QL IA: NORMAL
HDLC SERPL-MCNC: 39 MG/DL (ref 40–60)
HGB BLD-MCNC: 16.4 G/DL (ref 13.5–17.5)
LDLC SERPL CALC-MCNC: 157 MG/DL
LYMPHOCYTES # BLD: 1.2 K/UL (ref 1–5.1)
LYMPHOCYTES NFR BLD: 18.8 %
MCH RBC QN AUTO: 29.7 PG (ref 26–34)
MCHC RBC AUTO-ENTMCNC: 34.4 G/DL (ref 31–36)
MCV RBC AUTO: 86.3 FL (ref 80–100)
MONOCYTES # BLD: 0.5 K/UL (ref 0–1.3)
MONOCYTES NFR BLD: 7.6 %
NEUTROPHILS # BLD: 4.7 K/UL (ref 1.7–7.7)
NEUTROPHILS NFR BLD: 70.5 %
PLATELET # BLD AUTO: 235 K/UL (ref 135–450)
PMV BLD AUTO: 8.6 FL (ref 5–10.5)
POTASSIUM SERPL-SCNC: 4.6 MMOL/L (ref 3.5–5.1)
PROT SERPL-MCNC: 7.4 G/DL (ref 6.4–8.2)
RBC # BLD AUTO: 5.51 M/UL (ref 4.2–5.9)
SODIUM SERPL-SCNC: 147 MMOL/L (ref 136–145)
TRIGL SERPL-MCNC: 266 MG/DL (ref 0–150)
TSH SERPL DL<=0.005 MIU/L-ACNC: 0.87 UIU/ML (ref 0.27–4.2)
VLDLC SERPL CALC-MCNC: 53 MG/DL
WBC # BLD AUTO: 6.6 K/UL (ref 4–11)

## 2024-04-23 PROCEDURE — 72100 X-RAY EXAM L-S SPINE 2/3 VWS: CPT

## 2024-04-23 PROCEDURE — 73502 X-RAY EXAM HIP UNI 2-3 VIEWS: CPT

## 2024-04-24 ENCOUNTER — HOSPITAL ENCOUNTER (OUTPATIENT)
Dept: PHYSICAL THERAPY | Age: 49
Setting detail: THERAPIES SERIES
Discharge: HOME | End: 2024-04-24
Payer: COMMERCIAL

## 2024-04-24 DIAGNOSIS — M25.652 IMPAIRED RANGE OF MOTION OF BOTH HIPS: ICD-10-CM

## 2024-04-24 DIAGNOSIS — M54.50 CHRONIC MIDLINE LOW BACK PAIN WITHOUT SCIATICA: Primary | ICD-10-CM

## 2024-04-24 DIAGNOSIS — M25.651 IMPAIRED RANGE OF MOTION OF BOTH HIPS: ICD-10-CM

## 2024-04-24 DIAGNOSIS — G89.29 CHRONIC MIDLINE LOW BACK PAIN WITHOUT SCIATICA: Primary | ICD-10-CM

## 2024-04-24 DIAGNOSIS — M62.81 WEAKNESS OF TRUNK MUSCULATURE: ICD-10-CM

## 2024-04-24 DIAGNOSIS — M54.16 LUMBAR RADICULOPATHY, CHRONIC: ICD-10-CM

## 2024-04-24 LAB
EST. AVERAGE GLUCOSE BLD GHB EST-MCNC: 99.7 MG/DL
HBA1C MFR BLD: 5.1 %

## 2024-04-24 PROCEDURE — 97530 THERAPEUTIC ACTIVITIES: CPT

## 2024-04-24 PROCEDURE — 97162 PT EVAL MOD COMPLEX 30 MIN: CPT

## 2024-04-24 NOTE — PLAN OF CARE
listed below  All WNL  Patient did voice difference in sensation side to side.     Myotomes: Abnormal findings listed below  All WNL Hip flexion (L1-L2) Diminished    Reflexes: Abnormal findings listed below  Not tested    Specific Joint Mobility Testing/Accessory Motions:      Thoracic: hypomobility of T11 T12  Lumbar: hypomobility of L1 L2    Special Tests:  Kemps/quadrant Positive  SLR Positive 36 degrees left, 40 degrees right dural stretch and increased pain   Casey Test positive right  FADIR (AMANDA/LABRAL/PSOAS): Positive and Positive on R  Adductor Squeeze (groin):Negative    Gait:    Pattern: Increase in lumbar lordosis, poor trunk and pelvic rotation, Patient very hip flexor dominate, forward flexed posturing  Assistive Device Used: no AD    Balance:  [x] WNL      [] NT       [] Dysfunction noted  Comment:     Falls Risk Assessment (30 days):   Falls Risk assessed and no intervention required.  Time Up and Go (TUG):   Not Assessed        Exercises/Interventions     Therapeutic Ex (13209)  resistance Sets/time Reps Notes/Cues/Progressions                                                                         Manual Intervention (42376)  TIME                                        NMR re-education (86526) resistance Sets/time Reps CUES NEEDED                                      Therapeutic Activity (94014)  Sets/time                                          Modalities:    No modalities applied this session    Education  4/Evaluation - Patient education regarding PT assessment and plan of care. Discussed possible courses of treatment which included but not limited to progressive resistive exercise, dry needling, stabilization exercises and manual/manipulation therapy. Discussed activity modification, while providing explanation in regards to anatomical structures involved, healing time frames and relevant joint mechanics. Provided patient time for questions with answers provided when possible.      Home

## 2024-04-25 DIAGNOSIS — M25.551 PAIN OF RIGHT HIP: ICD-10-CM

## 2024-04-25 DIAGNOSIS — E11.9 TYPE 2 DIABETES MELLITUS WITHOUT COMPLICATION, WITHOUT LONG-TERM CURRENT USE OF INSULIN (HCC): Primary | ICD-10-CM

## 2024-04-25 RX ORDER — ATORVASTATIN CALCIUM 20 MG/1
20 TABLET, FILM COATED ORAL DAILY
Qty: 30 TABLET | Refills: 2 | Status: SHIPPED | OUTPATIENT
Start: 2024-04-25

## 2024-04-25 RX ORDER — METFORMIN HYDROCHLORIDE 500 MG/1
500 TABLET, EXTENDED RELEASE ORAL
Qty: 30 TABLET | Refills: 2 | Status: SHIPPED | OUTPATIENT
Start: 2024-04-25

## 2024-04-26 ENCOUNTER — HOSPITAL ENCOUNTER (OUTPATIENT)
Dept: PHYSICAL THERAPY | Age: 49
Setting detail: THERAPIES SERIES
Discharge: HOME | End: 2024-04-26
Payer: COMMERCIAL

## 2024-04-26 PROCEDURE — 97530 THERAPEUTIC ACTIVITIES: CPT

## 2024-04-26 PROCEDURE — 97110 THERAPEUTIC EXERCISES: CPT

## 2024-04-26 PROCEDURE — 97140 MANUAL THERAPY 1/> REGIONS: CPT

## 2024-04-26 NOTE — PLAN OF CARE
Notes/Cues/Progressions          Open books  1 x10ea    TLJ extension mobs w/ OH flexion  1 x10 Performed over pillow roll   LTR  1 x10    CC lifts  2 x10 Right rotation only   Standing wand assisted trunk rotations    Right rotation only                               Manual Intervention (83566)  15'  TIME     Lumbar MT  5' Gr 3  SL lumbar rotation mobs/stretching Bilaterally,    Hip MT  10' Gr 3-5 R hip LAD w/ oscillations. Belt assisted mobs                        NMR re-education (13537) resistance Sets/time Reps CUES NEEDED                                      Therapeutic Activity (36538)  5'  Sets/time     Discussed PT findings with reference to patient symptoms. Discussed patients career and relationship to symptoms and deficits from repetitive movement from his job  5'                                   Modalities:    No modalities applied this session    Education  4/Evaluation - Patient education regarding PT assessment and plan of care. Discussed possible courses of treatment which included but not limited to progressive resistive exercise, dry needling, stabilization exercises and manual/manipulation therapy. Discussed activity modification, while providing explanation in regards to anatomical structures involved, healing time frames and relevant joint mechanics. Provided patient time for questions with answers provided when possible.      Home Exercise Program: Not enough time for HEP instruction this visit.  Plan to address at follow up.        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 Focused several manual techniques to improve right hip mobility and general spinal motion. Followed up with mobility exercises. Has been offered referral to follow up with Dr. Duque.     Medical Necessity Documentation:  I certify that this patient meets the below criteria necessary for medical necessity for care and/or

## 2024-04-30 ENCOUNTER — HOSPITAL ENCOUNTER (OUTPATIENT)
Dept: PHYSICAL THERAPY | Age: 49
Setting detail: THERAPIES SERIES
Discharge: HOME | End: 2024-04-30
Payer: COMMERCIAL

## 2024-04-30 PROCEDURE — 20560 NDL INSJ W/O NJX 1 OR 2 MUSC: CPT

## 2024-04-30 PROCEDURE — 97032 APPL MODALITY 1+ESTIM EA 15: CPT

## 2024-04-30 PROCEDURE — 97110 THERAPEUTIC EXERCISES: CPT

## 2024-04-30 PROCEDURE — 97140 MANUAL THERAPY 1/> REGIONS: CPT

## 2024-04-30 NOTE — FLOWSHEET NOTE
Adjusted    Therapist goals for Patient:   Short Term Goals: To be achieved in: 2-4 weeks  Independent in HEP and progression per patient tolerance, in order to progress toward full function and prevent re-injury.    Status: [] Progressing: [] Met: [] Not Met: [] Adjusted  Patient will have a decrease in pain to 4/10 to help facilitate improvement in movement, function, and ADLs as indicated by functional deficits.   Status: [] Progressing: [] Met: [] Not Met: [] Adjusted    Long Term Goals: To be achieved in: 8-10 weeks  Disability index score of 15% or less for the Modified Oswestry to assist with return top prior level of function.   Status: [] Progressing: [] Met: [] Not Met: [] Adjusted  Patient will demonstrates 5 degrees increase in lumbar flexion to demonstrates improved function of picking objects off the ground  Status: [] Progressing: [] Met: [] Not Met: [] Adjusted  Patient will demonstrates increase in right hip flexion by 2 MMT grades to demonstrate improvements with neuromuscular activation.    Status: [] Progressing: [] Met: [] Not Met: [] Adjusted  Patient will return to  walk 1 mile  without increased symptoms or restriction to work towards return to prior level of function.    Status: [] Progressing: [] Met: [] Not Met: [] Adjusted  Patient will be able to manage symptoms while resuming full duty at work.   Status: [] Progressing: [] Met: [] Not Met: [] Adjusted      Overall Progression Towards Functional goals/ Treatment Progress Update:  [] Patient is progressing as expected towards functional goals listed.    [] Progression is slowed due to complexities/Impairments listed.  [] Progression has been slowed due to co-morbidities.  [x] Plan just implemented, too soon (<30days) to assess goals progression   [] Goals require adjustment due to lack of progress  [] Patient is not progressing as expected and requires additional follow up with physician  [] Other:     TREATMENT PLAN

## 2024-05-01 ENCOUNTER — OFFICE VISIT (OUTPATIENT)
Dept: ORTHOPEDIC SURGERY | Age: 49
End: 2024-05-01
Payer: COMMERCIAL

## 2024-05-01 VITALS — RESPIRATION RATE: 12 BRPM | HEIGHT: 69 IN | WEIGHT: 260 LBS | BODY MASS INDEX: 38.51 KG/M2

## 2024-05-01 DIAGNOSIS — M25.551 PAIN OF RIGHT HIP: Primary | ICD-10-CM

## 2024-05-01 PROCEDURE — 99214 OFFICE O/P EST MOD 30 MIN: CPT

## 2024-05-01 PROCEDURE — 3017F COLORECTAL CA SCREEN DOC REV: CPT

## 2024-05-01 PROCEDURE — 4004F PT TOBACCO SCREEN RCVD TLK: CPT

## 2024-05-01 PROCEDURE — G8417 CALC BMI ABV UP PARAM F/U: HCPCS

## 2024-05-01 PROCEDURE — G8427 DOCREV CUR MEDS BY ELIG CLIN: HCPCS

## 2024-05-01 RX ORDER — NAPROXEN 500 MG/1
500 TABLET ORAL 2 TIMES DAILY WITH MEALS
Qty: 28 TABLET | Refills: 1 | Status: SHIPPED | OUTPATIENT
Start: 2024-05-01

## 2024-05-01 NOTE — PROGRESS NOTES
Date:  2024    Name:  Ryan Magana  Address:  [address] [facility] [address] [address]    :  1975      Age:   48 y.o.    SSN:  [id number]      Medical Record Number:  [id number]    Reason for Visit:    Chief Complaint    Hip Pain (NP, right hip.  Notes groin pain that radiates down to ankle.  Notes history of neuropathy.  )      DOS:2024     HPI: Ryan Magana is a 48 y.o. male here today for  evaluation of right anterior  hip and groin pain that has been on going for 1 + year(s). Symptoms are constant, worse with sitting for long periods. Crossing his legs, and walking longer than 10 mins. Symptoms are sharp, worsening. He has been working with [employer] for PT and has had some relief. He does not take an anti inflammatory regularly.     He has had 3 previous spinal surgeries with the most recent being . He also has radiating pain and numbness to the right lateral ankle.  Pain assessment is documented below.     The patient denies any bowel/bladder symptoms, or any numbness, tingling, or weakness down the affected thigh or leg. The patient denies any prior hip injuries, surgeries, or any childhood history of hip disorders.      Pain Assessment  Location of Pain: Hip  Location Modifiers: Right  Severity of Pain: 5  Quality of Pain: Sharp, Aching, Other (Comment) (Burning)  Duration of Pain: Persistent  Frequency of Pain: Constant  Aggravating Factors: Walking, Bending  Limiting Behavior: Yes  Relieving Factors: Rest  Result of Injury: No  Work-Related Injury: No  Are there other pain locations you wish to document?: No  ROS: Review of systems reviewed from Patient History Form completed today and available in the patient's chart under the Media tab.       Past Medical History:   Diagnosis Date    Chronic back pain     Neuropathy     both feet; right leg        Past Surgical History:   Procedure Laterality Date    APPENDECTOMY      BACK SURGERY  2007    L5-S1 discectomy    BACK SURGERY

## 2024-05-02 ENCOUNTER — TELEPHONE (OUTPATIENT)
Dept: ORTHOPEDIC SURGERY | Age: 49
End: 2024-05-02

## 2024-05-02 NOTE — TELEPHONE ENCOUNTER
General Question     Subject: QUESTIONS ABOUT INJ   Patient and /or Facility Request: Ryan Magana \"Giacomo\"   Contact Number: [phone]     PT CALLING IN REGARDING THAT HE HAS SOME QUESTIONS PERTAINING TO INJ IN THE RT HIP.     PLEASE CALL BACK PT

## 2024-05-03 ENCOUNTER — APPOINTMENT (OUTPATIENT)
Dept: PHYSICAL THERAPY | Age: 49
End: 2024-05-03
Payer: COMMERCIAL

## 2024-05-06 ENCOUNTER — HOSPITAL ENCOUNTER (OUTPATIENT)
Dept: PHYSICAL THERAPY | Age: 49
Setting detail: THERAPIES SERIES
Discharge: HOME | End: 2024-05-06
Payer: COMMERCIAL

## 2024-05-06 PROCEDURE — 97140 MANUAL THERAPY 1/> REGIONS: CPT

## 2024-05-06 PROCEDURE — 97530 THERAPEUTIC ACTIVITIES: CPT

## 2024-05-06 PROCEDURE — 97110 THERAPEUTIC EXERCISES: CPT

## 2024-05-06 NOTE — FLOWSHEET NOTE
functional mobility training and education.  Neuromuscular Re-education (13249) activation and proprioception, including postural re-education.    Manual Therapy (17774) as indicated to include: Passive Range of Motion, Gr I-IV mobilizations, Grade V Manipulation, Soft Tissue Mobilization, Dry Needling/IASTM, Trigger Point Release, and Myofascial Release  Modalities as needed that may include: Electrical Stimulation  Patient education on postural re-education, activity modification, and progression of HEP    Plan:  continue dry needling and soft tissue modalities to right hip, improve flexibility of piriformis and anterior hip capsule.     Electronically Signed by Adan Bass, PT  Date: 05/06/2024     Note: Portions of this note have been templated and/or copied from initial evaluation, reassessments and prior notes for documentation efficiency.    Note: If patient does not return for scheduled/recommended follow up visits, this note will serve as a discharge from care along with the most recent update on progress.

## 2024-05-07 ENCOUNTER — OFFICE VISIT (OUTPATIENT)
Dept: PRIMARY CARE CLINIC | Age: 49
End: 2024-05-07
Payer: COMMERCIAL

## 2024-05-07 VITALS
HEART RATE: 116 BPM | SYSTOLIC BLOOD PRESSURE: 132 MMHG | DIASTOLIC BLOOD PRESSURE: 84 MMHG | HEIGHT: 69 IN | OXYGEN SATURATION: 97 % | BODY MASS INDEX: 38.78 KG/M2 | WEIGHT: 261.8 LBS

## 2024-05-07 DIAGNOSIS — M54.2 NECK PAIN: ICD-10-CM

## 2024-05-07 DIAGNOSIS — R00.0 TACHYCARDIA: Primary | ICD-10-CM

## 2024-05-07 DIAGNOSIS — Z12.11 COLON CANCER SCREENING: ICD-10-CM

## 2024-05-07 DIAGNOSIS — M54.50 CHRONIC BILATERAL LOW BACK PAIN WITHOUT SCIATICA: ICD-10-CM

## 2024-05-07 DIAGNOSIS — G89.29 CHRONIC BILATERAL LOW BACK PAIN WITHOUT SCIATICA: ICD-10-CM

## 2024-05-07 DIAGNOSIS — M25.512 ACUTE PAIN OF LEFT SHOULDER: ICD-10-CM

## 2024-05-07 PROCEDURE — 99214 OFFICE O/P EST MOD 30 MIN: CPT | Performed by: FAMILY MEDICINE

## 2024-05-07 PROCEDURE — G8417 CALC BMI ABV UP PARAM F/U: HCPCS | Performed by: FAMILY MEDICINE

## 2024-05-07 PROCEDURE — 4004F PT TOBACCO SCREEN RCVD TLK: CPT | Performed by: FAMILY MEDICINE

## 2024-05-07 PROCEDURE — 3017F COLORECTAL CA SCREEN DOC REV: CPT | Performed by: FAMILY MEDICINE

## 2024-05-07 PROCEDURE — 93000 ELECTROCARDIOGRAM COMPLETE: CPT | Performed by: FAMILY MEDICINE

## 2024-05-07 PROCEDURE — G8427 DOCREV CUR MEDS BY ELIG CLIN: HCPCS | Performed by: FAMILY MEDICINE

## 2024-05-07 RX ORDER — CYCLOBENZAPRINE HCL 10 MG
10 TABLET ORAL NIGHTLY PRN
Qty: 30 TABLET | Refills: 0 | Status: SHIPPED | OUTPATIENT
Start: 2024-05-07 | End: 2024-05-17

## 2024-05-07 ASSESSMENT — ENCOUNTER SYMPTOMS
CHOKING: 0
EYES NEGATIVE: 1
RESPIRATORY NEGATIVE: 1
COUGH: 0
WHEEZING: 0
SHORTNESS OF BREATH: 0
STRIDOR: 0
GASTROINTESTINAL NEGATIVE: 1

## 2024-05-07 NOTE — PROGRESS NOTES
SUBJECTIVE:  Patient ID: Ryan Magana is a 48 y.o. y.o. male     HPI   Neck Pain: Paitent complains of neck pain. Event that precipitate these symptoms: none known. Onset of symptoms several months ago, stable since that time. Current symptoms are pain in lower neck (aching and throbbing in character; 4/10 in severity). Patient denies numbness in UE and paresthesias in UE . Patient has had recurrent self limited episodes of neck pain in the past.  Previous treatments include: none.  Shoulder Pain: Patient complaints of left shoulder pain. This is evaluated as a personal injury. The pain is described as aching and throbbing.  The onset of the pain was gradual, starting about 2 weeks ago.  The pain occurs continuously and lasts 3 days.  Location is anterior, lateral. No history of dislocation. Symptoms are aggravated by all activities. Symptoms are diminished by  rest.   Limited activities include: no limitations. mild stiffness, no weakness, no swelling, no crepitus noted is reported. 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  Patient heart rate is up he feels well denies any symptoms he was drinking coffee and away here    Past Medical History:   Diagnosis Date    Chronic back pain     Neuropathy     both feet; right leg      Past Surgical History:   Procedure Laterality Date    APPENDECTOMY  1989    BACK SURGERY  4/2007    L5-S1 discectomy    BACK SURGERY  06/02/2014     BACK SURGERY  2010 2012 2016    SHOULDER ARTHROSCOPY Right 4/29/2022    RIGHT SHOULDER ARTHROSCOPY FOR DISTAL CLAVICLE EXCISION, SUBACROMIAL DECOMPRESSION, EXTENSIVE DEBRIDEMENT, SWATHI DECOMPRESSION, INJECTION OF CORTICOSTEROID performed by Janee Kuo MD at [facility] OR    TENOTOMY Right 12/15/2022    RIGHT ELBOW TENEX-LOCAL performed by Jesus Maldonado MD at [facility] OR     Family History   Adopted: Yes     Social History     Socioeconomic History    Marital status: Single     Spouse name: None    Number of

## 2024-05-08 ENCOUNTER — HOSPITAL ENCOUNTER (OUTPATIENT)
Dept: PHYSICAL THERAPY | Age: 49
Setting detail: THERAPIES SERIES
Discharge: HOME | End: 2024-05-08
Payer: COMMERCIAL

## 2024-05-08 PROCEDURE — 20560 NDL INSJ W/O NJX 1 OR 2 MUSC: CPT

## 2024-05-08 PROCEDURE — 97032 APPL MODALITY 1+ESTIM EA 15: CPT

## 2024-05-08 PROCEDURE — 97110 THERAPEUTIC EXERCISES: CPT

## 2024-05-08 NOTE — FLOWSHEET NOTE
[facility] - [facility] [id number], [place] [address] office: [phone] fax: [phone]    Physical Therapy: TREATMENT/PROGRESS NOTE   Patient: Ryan Magana (48 y.o. male)   Examination Date: 2024   :  1975 MRN: [id number]   Visit #: 5   Insurance Allowable Auth Needed   30 []Yes    [x]No    Insurance: Payor: [employer] / Plan: [employer] [place] MEDICAID / Product Type: *No Product type* /   Insurance ID: [id number] - (Medicaid Managed)  Secondary Insurance (if applicable):    Treatment Diagnosis:     ICD-10-CM    1. Chronic midline low back pain without sciatica  M54.50     G89.29       2. Lumbar radiculopathy, chronic  M54.16       3. Impaired range of motion of both hips  M25.651     M25.652       4. Weakness of trunk musculature  M62.81          Medical Diagnosis:  Chronic right-sided low back pain without sciatica [M54.50, G89.29]   Referring Physician: Arely Lu MD  PCP: Arely Lu MD     Plan of care signed (Y/N): N    Date of Patient follow up with Physician: No scheduled follow up     Progress Report/POC: NO  POC update due: (10 visits /OR AUTH LIMITS, whichever is less)  2024                                             Precautions/ Contra-indications:           Latex allergy:  NO  Pacemaker:    NO  Contraindications for Manipulation: None, unhealthy/ multiple comorbidities , and remote history of spinal fusion (relative)  Date of Surgery:   Other: History of Lumbar surgery x3 ( L5/S1 fusion,  L3/4 fusion,  L4/5 ectomy)    Red Flags:  None    C-SSRS Triggered by Intake questionnaire:   [x] No, Questionnaire did not trigger screening.   [] Yes, Patient intake triggered further evaluation      [] C-SSRS Screening completed  [] PCP notified via Plan of Care  [] Emergency services notified     Preferred Language for Healthcare:   [x] English       [] other:    SUBJECTIVE EXAMINATION     Patient stated

## 2024-05-13 ENCOUNTER — OFFICE VISIT (OUTPATIENT)
Dept: ORTHOPEDIC SURGERY | Age: 49
End: 2024-05-13
Payer: COMMERCIAL

## 2024-05-13 VITALS — RESPIRATION RATE: 12 BRPM | BODY MASS INDEX: 38.66 KG/M2 | HEIGHT: 69 IN | WEIGHT: 261 LBS

## 2024-05-13 DIAGNOSIS — M25.851 FEMOROACETABULAR IMPINGEMENT OF RIGHT HIP: Primary | ICD-10-CM

## 2024-05-13 RX ORDER — METHYLPREDNISOLONE ACETATE 40 MG/ML
80 INJECTION, SUSPENSION INTRA-ARTICULAR; INTRALESIONAL; INTRAMUSCULAR; SOFT TISSUE ONCE
Status: COMPLETED | OUTPATIENT
Start: 2024-05-13 | End: 2024-05-13

## 2024-05-13 RX ORDER — ROPIVACAINE HYDROCHLORIDE 5 MG/ML
14 INJECTION, SOLUTION EPIDURAL; INFILTRATION; PERINEURAL ONCE
Status: COMPLETED | OUTPATIENT
Start: 2024-05-13 | End: 2024-05-13

## 2024-05-13 RX ADMIN — METHYLPREDNISOLONE ACETATE 80 MG: 40 INJECTION, SUSPENSION INTRA-ARTICULAR; INTRALESIONAL; INTRAMUSCULAR; SOFT TISSUE at 08:04

## 2024-05-13 RX ADMIN — ROPIVACAINE HYDROCHLORIDE 14 ML: 5 INJECTION, SOLUTION EPIDURAL; INFILTRATION; PERINEURAL at 08:05

## 2024-05-13 NOTE — PROGRESS NOTES
5/13/24  7:41 AM        NDC: 80223-136-67   -   Ropivacaine 0.5 %    LOT: [id number]    COMMENT: RIGHT HIP INTRA-ARTICULAR CORTISONE INJECTION      NDC: 1454-5163-31   -   Depo Medrol 40mg    LOT: [id number]    COMMENT: RIGHT HIP INTRA-ARTICULAR CORTISONE INJECTION        
injection were less provocative.  There were no complications. The patient was advised to ice the hip  this evening and to avoid vigorous activities for the next 2 days.  They were advised to call us if there was any erythema, enduration, swelling or increasing pain.     Plan:  Giacomo is a candidate for intra-articular cortisone injection in the office today as described in the procedure note above.    Follow up in 6 weeks.     Future option for MRI      All the patient's questions were answered while in the clinic.  The patient is understanding of all instructions and agrees with the plan.       I spent 30 minutes on patient education and coordinating care today. This included time examining the patient, reviewing the patient's chart and relevant imaging, and chart documentation. This excluded any separately billed procedures.    Follow up in: Return in about 6 weeks (around 6/24/2024).    Hip Self assessment forms    Sincerely,  BIANCA Lopez    05/13/24  8:19 AM    This dictation was performed with a verbal recognition program (DRAGON) and it was checked for errors.  It is possible that there are still dictated errors within this office note.  If so, please bring any errors to my attention for an addendum.  All efforts were made to ensure that this office note is accurate.

## 2024-05-15 ENCOUNTER — APPOINTMENT (OUTPATIENT)
Dept: PHYSICAL THERAPY | Age: 49
End: 2024-05-15
Payer: COMMERCIAL

## 2024-05-17 ENCOUNTER — APPOINTMENT (OUTPATIENT)
Dept: PHYSICAL THERAPY | Age: 49
End: 2024-05-17
Payer: COMMERCIAL

## 2024-05-17 RX ORDER — METFORMIN HYDROCHLORIDE 500 MG/1
500 TABLET, EXTENDED RELEASE ORAL
Qty: 90 TABLET | Refills: 1 | Status: SHIPPED | OUTPATIENT
Start: 2024-05-17

## 2024-05-17 NOTE — TELEPHONE ENCOUNTER
Medication:   Requested Prescriptions     Pending Prescriptions Disp Refills    atorvastatin (LIPITOR) 20 MG tablet [Pharmacy Med Name: ATORVASTATIN 20 MG TABLET] 90 tablet 1     Sig: TAKE 1 TABLET BY MOUTH EVERY DAY     Last Filled:  4.25.24    Last appt: 5/7/2024   Next appt: Visit date not found    Last OARRS:        No data to display

## 2024-05-17 NOTE — TELEPHONE ENCOUNTER
Medication:   Requested Prescriptions     Pending Prescriptions Disp Refills    metFORMIN (GLUCOPHAGE-XR) 500 MG extended release tablet [Pharmacy Med Name: METFORMIN HCL  MG TABLET] 90 tablet 1     Sig: TAKE 1 TABLET BY MOUTH EVERY DAY WITH BREAKFAST     Last Filled:  04/25/2024    Last appt: 5/7/2024   Next appt: Visit date not found    Last Labs DM:   Lab Results   Component Value Date/Time    LABA1C 5.1 04/23/2024 07:42 AM

## 2024-05-20 ENCOUNTER — TELEPHONE (OUTPATIENT)
Dept: ORTHOPEDIC SURGERY | Age: 49
End: 2024-05-20

## 2024-05-20 ENCOUNTER — HOSPITAL ENCOUNTER (OUTPATIENT)
Dept: PHYSICAL THERAPY | Age: 49
Setting detail: THERAPIES SERIES
Discharge: HOME | End: 2024-05-20
Payer: COMMERCIAL

## 2024-05-20 DIAGNOSIS — M54.50 PAIN OF LUMBAR SPINE: Primary | ICD-10-CM

## 2024-05-20 PROCEDURE — 97140 MANUAL THERAPY 1/> REGIONS: CPT

## 2024-05-20 PROCEDURE — 97530 THERAPEUTIC ACTIVITIES: CPT

## 2024-05-20 PROCEDURE — 97110 THERAPEUTIC EXERCISES: CPT

## 2024-05-20 RX ORDER — ATORVASTATIN CALCIUM 20 MG/1
20 TABLET, FILM COATED ORAL DAILY
Qty: 90 TABLET | Refills: 1 | Status: SHIPPED | OUTPATIENT
Start: 2024-05-20

## 2024-05-20 NOTE — FLOWSHEET NOTE
directions   Piriformis stretch  30\" x3 Tried working in hooklying figure 4   Clamshell  3\" x20    TLJ extension mobs w/ OH flexion  1 x10 Performed over pillow roll   LTR  30\" x3ea    CC lifts  2 x10 Right rotation only   Standing wand assisted trunk rotations    Right rotation only                               Manual Intervention (33775)  13'  TIME            Lumbar MT  5' Gr 3  SL lumbar rotation mobs/stretching Bilaterally,    Hip MT  8' Gr 3-5 R hip belt assisted mobs w/ hip ER, IR and flexion. Use of 90/90 foot rest for support   STM  10'  Hypervolt to R posterolateral hip. Focused on path of sciatic nerve   Anterior hip capsule mobs  5'  Prone in figure 4 position   Dry Needling  10'     Attended E-stim  10'            NMR re-education (70531) resistance Sets/time Reps CUES NEEDED                                      Therapeutic Activity (90415)  20'  Sets/time     Continued to discuss objective findings, upcoming injection, symptoms with relationship to anatomical deficits  5'     Objective measurements and testing, discussion of findings, discussed follow up with Elizabeth gutierrez, test re-testing following injection and recommended follow up with Dr. Duque's offices   20'                            Modalities:    No modalities applied this session    5/20/2024.     Education  5/6/24: Continued to discuss PT finding with regard to current symptoms. Discussed follow up with Dr. Duque's office and MD suggested findings. Educated on how treatment to right hip caused replication in patients symptoms and worsened symptoms, therefore structures being treated likely have strong influence on symptoms manifestation. Further discussed patients career and relationship to symptoms with deficits from repetitive movement from his job. Addressed patient questions concerning MD suggested interventions.  Evaluation - Patient education regarding PT assessment and plan of care. Discussed possible courses of treatment which

## 2024-05-21 NOTE — TELEPHONE ENCOUNTER
S/w patient.  He is requesting to see a spine provider.  Referral placed and appointment scheduled.  All questions answered.

## 2024-05-22 ENCOUNTER — APPOINTMENT (OUTPATIENT)
Dept: PHYSICAL THERAPY | Age: 49
End: 2024-05-22
Payer: COMMERCIAL

## 2024-05-23 ENCOUNTER — OFFICE VISIT (OUTPATIENT)
Dept: ORTHOPEDIC SURGERY | Age: 49
End: 2024-05-23
Payer: COMMERCIAL

## 2024-05-23 VITALS — HEIGHT: 69 IN | WEIGHT: 261 LBS | BODY MASS INDEX: 38.66 KG/M2

## 2024-05-23 DIAGNOSIS — Z98.1 HISTORY OF LUMBAR FUSION: ICD-10-CM

## 2024-05-23 DIAGNOSIS — M54.16 LUMBAR RADICULOPATHY: Primary | ICD-10-CM

## 2024-05-23 PROCEDURE — 4004F PT TOBACCO SCREEN RCVD TLK: CPT | Performed by: PHYSICIAN ASSISTANT

## 2024-05-23 PROCEDURE — G8427 DOCREV CUR MEDS BY ELIG CLIN: HCPCS | Performed by: PHYSICIAN ASSISTANT

## 2024-05-23 PROCEDURE — 99214 OFFICE O/P EST MOD 30 MIN: CPT | Performed by: PHYSICIAN ASSISTANT

## 2024-05-23 PROCEDURE — G8417 CALC BMI ABV UP PARAM F/U: HCPCS | Performed by: PHYSICIAN ASSISTANT

## 2024-05-23 PROCEDURE — 3017F COLORECTAL CA SCREEN DOC REV: CPT | Performed by: PHYSICIAN ASSISTANT

## 2024-05-23 RX ORDER — PREGABALIN 75 MG/1
75 CAPSULE ORAL 2 TIMES DAILY
Qty: 60 CAPSULE | Refills: 0 | Status: SHIPPED | OUTPATIENT
Start: 2024-05-23 | End: 2024-06-22

## 2024-05-23 NOTE — PROGRESS NOTES
New Patient: LUMBAR SPINE    Referring Provider:  Ekaterina Duque MD    CHIEF COMPLAINT:    Chief Complaint   Patient presents with    Back Pain     Lumbar        HISTORY OF PRESENT ILLNESS:       Mr. Ryan Magana  is a pleasant 48 y.o. male here for evaluation regarding his LBP and right leg pain. He states his pain began after his last spinal surgery in 2015.  He states that he has had 3 spinal surgeries in 2010, 2012, and then 2015.  He has had fusion at L3-4 and L5-S1.  Present time he is complaining of 5-7/10 pain within his low back with 5-10/10 pain within the posterior lateral aspect of his right leg to his foot.  He states that the pain is intermittent to constant depending on weightbearing activity but does not interfere with his sleep at night.  He finds that his ability to stand and walk is less than 10 minutes and when he sits the pain gradually improves.  He does have numbness and tingling in both legs which followed his last surgery.  He does have a sense of weakness of the right leg.  He does find that sitting and lying down is better than standing and walking and he has had recent physical therapy for his right hip.    Pain Assessment  Location of Pain: Back  Location Modifiers: Other (Comment)  Severity of Pain: 7  Quality of Pain: Other (Comment)  Duration of Pain: Other (Comment)  Frequency of Pain: Other (Comment)]    Current/Past Treatment:   Physical Therapy: Recently for his right hip with some benefit  Chiropractic: None  Injection: None  Medications: Naprosyn    Past Medical History:   Past Medical History:   Diagnosis Date    Chronic back pain     Neuropathy     both feet; right leg        Past Surgical History:     Past Surgical History:   Procedure Laterality Date    APPENDECTOMY  1989    BACK SURGERY  4/2007    L5-S1 discectomy    BACK SURGERY  06/02/2014     BACK SURGERY  2010 2012 2016    SHOULDER ARTHROSCOPY Right 4/29/2022    RIGHT SHOULDER ARTHROSCOPY FOR DISTAL CLAVICLE

## 2024-05-30 ENCOUNTER — TELEPHONE (OUTPATIENT)
Dept: ORTHOPEDIC SURGERY | Age: 49
End: 2024-05-30

## 2024-05-30 NOTE — TELEPHONE ENCOUNTER
General Question     Subject: ASKING FOR TOBIN TO CALL BACK   Patient and /or Facility Request: Ryan Magana \"Giacomo\"   Contact Number: [phone]      NO ADDITIONAL INFO PROVIDED. PLEASE CALL @ THE # ABOVE.

## 2024-06-03 ENCOUNTER — HOSPITAL ENCOUNTER (OUTPATIENT)
Dept: PHYSICAL THERAPY | Age: 49
Setting detail: THERAPIES SERIES
End: 2024-06-03
Payer: COMMERCIAL

## 2024-06-04 ENCOUNTER — PATIENT MESSAGE (OUTPATIENT)
Dept: PRIMARY CARE CLINIC | Age: 49
End: 2024-06-04

## 2024-06-04 ENCOUNTER — TELEPHONE (OUTPATIENT)
Dept: PRIMARY CARE CLINIC | Age: 49
End: 2024-06-04

## 2024-06-04 NOTE — TELEPHONE ENCOUNTER
From: Ryan Magana  To: Dr. Arely Lu  Sent: 6/4/2024 9:30 AM EDT  Subject: MRI JUNE 20    they want me to get a physical before my MRI because they are putting me asleep. can you call me ,I thought we did a physical . [phone]

## 2024-06-04 NOTE — TELEPHONE ENCOUNTER
----- Message from Jan Panchal sent at 6/4/2024  9:35 AM EDT -----  Regarding: [facility] Appointment Request  [facility] Appointment Request    Patient needs appointment for [facility] Appointment Type: Annual Visit.    Reason for Appointment Request: No appointments available during search  Additional information: Pt wants to schedule an appt for his physical because he has an mri on june 20th supposed to have a physical appt, before his scheduled MRI, wants to shcedule as soon as possible  --------------------------------------------------------------------------------------------------------------------------    Relationship to Patient: Self     Call Back Information: OK to leave message on voicemail  Preferred Call Back Number: Phone [phone]

## 2024-06-07 ENCOUNTER — OFFICE VISIT (OUTPATIENT)
Dept: PRIMARY CARE CLINIC | Age: 49
End: 2024-06-07

## 2024-06-07 VITALS
DIASTOLIC BLOOD PRESSURE: 80 MMHG | OXYGEN SATURATION: 98 % | WEIGHT: 256.6 LBS | BODY MASS INDEX: 38 KG/M2 | HEART RATE: 101 BPM | SYSTOLIC BLOOD PRESSURE: 130 MMHG | HEIGHT: 69 IN

## 2024-06-07 DIAGNOSIS — M54.50 CHRONIC BILATERAL LOW BACK PAIN WITHOUT SCIATICA: ICD-10-CM

## 2024-06-07 DIAGNOSIS — E78.2 MIXED HYPERLIPIDEMIA: ICD-10-CM

## 2024-06-07 DIAGNOSIS — G89.29 CHRONIC BILATERAL LOW BACK PAIN WITHOUT SCIATICA: ICD-10-CM

## 2024-06-07 DIAGNOSIS — R73.9 HYPERGLYCEMIA: ICD-10-CM

## 2024-06-07 DIAGNOSIS — Z01.818 PRE-OP EXAMINATION: Primary | ICD-10-CM

## 2024-06-07 ASSESSMENT — ENCOUNTER SYMPTOMS
GASTROINTESTINAL NEGATIVE: 1
RESPIRATORY NEGATIVE: 1
BACK PAIN: 1
EYES NEGATIVE: 1
ALLERGIC/IMMUNOLOGIC NEGATIVE: 1

## 2024-06-07 NOTE — PROGRESS NOTES
SUBJECTIVE:  Ryan Magana is a 48 y.o. male who presents for evaluation for pre op for MRI for lower back, he needs to be sedated . The pain is described as constant  and is a few/10 in intensity. Onset was several months ago. Symptoms have been gradually worsening since. Aggravating factors:any weight bearing worsen the pain.  Alleviating factors: rest. Associated symptoms: pain tingling and numbness . The patient denies chest pain or SOB, no fever or chills, no ST or cough, no N/V/D.  According to patient he cannot be in close space.  Review of Systems   Constitutional: Negative.    HENT: Negative.     Eyes: Negative.    Respiratory: Negative.     Cardiovascular: Negative.    Gastrointestinal: Negative.    Endocrine: Negative.    Genitourinary: Negative.    Musculoskeletal:  Positive for arthralgias and back pain.   Allergic/Immunologic: Negative.    Neurological: Negative.    Hematological: Negative.    Psychiatric/Behavioral: Negative.     All other systems reviewed and are negative.     Past Medical History:   Diagnosis Date    Chronic back pain     History of lumbar fusion     Hyperlipidemia     Lumbar radiculopathy     Neuropathy     both feet; right leg      Patient Active Problem List    Diagnosis Date Noted    History of lumbar fusion 05/23/2024    Lumbar radiculopathy 05/23/2024    Herniated lumbar intervertebral disc 05/18/2016    Chronic pain due to trauma 05/18/2016      Past Surgical History:   Procedure Laterality Date    APPENDECTOMY  1989    BACK SURGERY  4/2007    L5-S1 discectomy    BACK SURGERY  06/02/2014     BACK SURGERY  2010 2012 2016    SHOULDER ARTHROSCOPY Right 4/29/2022    RIGHT SHOULDER ARTHROSCOPY FOR DISTAL CLAVICLE EXCISION, SUBACROMIAL DECOMPRESSION, EXTENSIVE DEBRIDEMENT, SWATHI DECOMPRESSION, INJECTION OF CORTICOSTEROID performed by Janee Kuo MD at [facility] OR    TENOTOMY Right 12/15/2022    RIGHT ELBOW TENEX-LOCAL performed by Jesus Maldonado MD at [facility] OR

## 2024-06-09 LAB — NONINV COLON CA DNA+OCC BLD SCRN STL QL: NEGATIVE

## 2024-06-11 ENCOUNTER — HOSPITAL ENCOUNTER (OUTPATIENT)
Dept: PHYSICAL THERAPY | Age: 49
Setting detail: THERAPIES SERIES
Discharge: HOME | End: 2024-06-11
Payer: COMMERCIAL

## 2024-06-11 PROCEDURE — 97530 THERAPEUTIC ACTIVITIES: CPT

## 2024-06-11 PROCEDURE — 97110 THERAPEUTIC EXERCISES: CPT

## 2024-06-11 PROCEDURE — 97140 MANUAL THERAPY 1/> REGIONS: CPT

## 2024-06-11 NOTE — PLAN OF CARE
and/or copied from initial evaluation, reassessments and prior notes for documentation efficiency.    Note: If patient does not return for scheduled/recommended follow up visits, this note will serve as a discharge from care along with the most recent update on progress.

## 2024-06-19 ENCOUNTER — APPOINTMENT (OUTPATIENT)
Dept: PHYSICAL THERAPY | Age: 49
End: 2024-06-19
Payer: COMMERCIAL

## 2024-07-02 ENCOUNTER — OFFICE VISIT (OUTPATIENT)
Dept: PRIMARY CARE CLINIC | Age: 49
End: 2024-07-02

## 2024-07-02 ENCOUNTER — TELEPHONE (OUTPATIENT)
Dept: ORTHOPEDIC SURGERY | Age: 49
End: 2024-07-02

## 2024-07-02 VITALS
WEIGHT: 254 LBS | SYSTOLIC BLOOD PRESSURE: 138 MMHG | DIASTOLIC BLOOD PRESSURE: 88 MMHG | OXYGEN SATURATION: 95 % | HEIGHT: 69 IN | HEART RATE: 102 BPM | BODY MASS INDEX: 37.62 KG/M2

## 2024-07-02 DIAGNOSIS — R73.9 HYPERGLYCEMIA: ICD-10-CM

## 2024-07-02 DIAGNOSIS — Z01.818 PRE-OP EXAMINATION: Primary | ICD-10-CM

## 2024-07-02 DIAGNOSIS — E78.2 MIXED HYPERLIPIDEMIA: ICD-10-CM

## 2024-07-02 DIAGNOSIS — G89.29 CHRONIC BILATERAL LOW BACK PAIN WITHOUT SCIATICA: ICD-10-CM

## 2024-07-02 DIAGNOSIS — M54.50 CHRONIC BILATERAL LOW BACK PAIN WITHOUT SCIATICA: ICD-10-CM

## 2024-07-02 ASSESSMENT — ENCOUNTER SYMPTOMS
RESPIRATORY NEGATIVE: 1
GASTROINTESTINAL NEGATIVE: 1
ALLERGIC/IMMUNOLOGIC NEGATIVE: 1
EYES NEGATIVE: 1
BACK PAIN: 1

## 2024-07-02 NOTE — PROGRESS NOTES
SUBJECTIVE:  Ryan Magana is a 48 y.o. male who presents for evaluation for pre op for MRI. The pain is described as constant and is 3/10 in intensity. Onset was several months ago. Symptoms have been gradually worsening since. Aggravating factors:any weight bearing.  Alleviating factors: none. Associated symptoms: pain and radiculopathy. The patient denies chest pain or SOB, no fever or chills or ST no cough, no N/V/D.    Review of Systems   Constitutional: Negative.    HENT: Negative.     Eyes: Negative.    Respiratory: Negative.     Cardiovascular: Negative.    Gastrointestinal: Negative.    Endocrine: Negative.    Genitourinary: Negative.    Musculoskeletal:  Positive for arthralgias and back pain.   Allergic/Immunologic: Negative.    Neurological:  Positive for numbness.   Hematological: Negative.    Psychiatric/Behavioral: Negative.     All other systems reviewed and are negative.     Past Medical History:   Diagnosis Date    Chronic back pain     History of lumbar fusion     Hyperlipidemia     Lumbar radiculopathy     Neuropathy     both feet; right leg      Patient Active Problem List    Diagnosis Date Noted    Hyperglycemia 06/07/2024    Mixed hyperlipidemia 06/07/2024    Chronic bilateral low back pain without sciatica 06/07/2024    History of lumbar fusion 05/23/2024    Lumbar radiculopathy 05/23/2024    Herniated lumbar intervertebral disc 05/18/2016    Chronic pain due to trauma 05/18/2016      Past Surgical History:   Procedure Laterality Date    APPENDECTOMY  1989    BACK SURGERY  4/2007    L5-S1 discectomy    BACK SURGERY  06/02/2014     BACK SURGERY  2010 2012 2016    SHOULDER ARTHROSCOPY Right 4/29/2022    RIGHT SHOULDER ARTHROSCOPY FOR DISTAL CLAVICLE EXCISION, SUBACROMIAL DECOMPRESSION, EXTENSIVE DEBRIDEMENT, SWATHI DECOMPRESSION, INJECTION OF CORTICOSTEROID performed by Janee Kuo MD at [facility] OR    TENOTOMY Right 12/15/2022    RIGHT ELBOW TENEX-LOCAL performed by Jesus Soliman

## 2024-07-02 NOTE — TELEPHONE ENCOUNTER
Other PATIENT CALLED TO REQUEST A CALLBACK. STATES THAT ITS REGARDING THE SCIATIC NERVE IN HIS LEG. PLS CALL TO ADVISE [phone]

## 2024-07-03 ENCOUNTER — OFFICE VISIT (OUTPATIENT)
Dept: ORTHOPEDIC SURGERY | Age: 49
End: 2024-07-03
Payer: COMMERCIAL

## 2024-07-03 VITALS — HEIGHT: 69 IN | BODY MASS INDEX: 37.62 KG/M2 | WEIGHT: 254 LBS

## 2024-07-03 DIAGNOSIS — M16.11 OSTEOARTHRITIS OF RIGHT HIP, UNSPECIFIED OSTEOARTHRITIS TYPE: Primary | ICD-10-CM

## 2024-07-03 PROCEDURE — 4004F PT TOBACCO SCREEN RCVD TLK: CPT | Performed by: ORTHOPAEDIC SURGERY

## 2024-07-03 PROCEDURE — 3017F COLORECTAL CA SCREEN DOC REV: CPT | Performed by: ORTHOPAEDIC SURGERY

## 2024-07-03 PROCEDURE — 99214 OFFICE O/P EST MOD 30 MIN: CPT | Performed by: ORTHOPAEDIC SURGERY

## 2024-07-03 PROCEDURE — G8417 CALC BMI ABV UP PARAM F/U: HCPCS | Performed by: ORTHOPAEDIC SURGERY

## 2024-07-03 PROCEDURE — G8427 DOCREV CUR MEDS BY ELIG CLIN: HCPCS | Performed by: ORTHOPAEDIC SURGERY

## 2024-07-03 NOTE — PROGRESS NOTES
within this office note.  If so, please bring any errors to my attention for an addendum.  All efforts were made to ensure that this office note is accurate.

## 2024-07-10 ENCOUNTER — ANESTHESIA EVENT (OUTPATIENT)
Dept: MRI IMAGING | Age: 49
End: 2024-07-10
Payer: COMMERCIAL

## 2024-07-10 ENCOUNTER — ANESTHESIA (OUTPATIENT)
Dept: MRI IMAGING | Age: 49
End: 2024-07-10
Payer: COMMERCIAL

## 2024-07-10 ENCOUNTER — HOSPITAL ENCOUNTER (OUTPATIENT)
Dept: MRI IMAGING | Age: 49
Discharge: HOME | End: 2024-07-10
Payer: COMMERCIAL

## 2024-07-10 VITALS
RESPIRATION RATE: 14 BRPM | BODY MASS INDEX: 37.62 KG/M2 | WEIGHT: 254 LBS | OXYGEN SATURATION: 96 % | HEIGHT: 69 IN | SYSTOLIC BLOOD PRESSURE: 131 MMHG | DIASTOLIC BLOOD PRESSURE: 84 MMHG | TEMPERATURE: 97.2 F | HEART RATE: 92 BPM

## 2024-07-10 DIAGNOSIS — M54.16 LUMBAR RADICULOPATHY: ICD-10-CM

## 2024-07-10 DIAGNOSIS — Z98.1 HISTORY OF LUMBAR FUSION: ICD-10-CM

## 2024-07-10 LAB
GLUCOSE BLD-MCNC: 96 MG/DL (ref 70–99)
PERFORMED ON: NORMAL

## 2024-07-10 PROCEDURE — 72158 MRI LUMBAR SPINE W/O & W/DYE: CPT

## 2024-07-10 PROCEDURE — A9576 INJ PROHANCE MULTIPACK: HCPCS | Performed by: INTERNAL MEDICINE

## 2024-07-10 PROCEDURE — 6360000004 HC RX CONTRAST MEDICATION: Performed by: INTERNAL MEDICINE

## 2024-07-10 PROCEDURE — 6370000000 HC RX 637 (ALT 250 FOR IP): Performed by: ANESTHESIOLOGY

## 2024-07-10 PROCEDURE — 2500000003 HC RX 250 WO HCPCS

## 2024-07-10 PROCEDURE — 2580000003 HC RX 258: Performed by: ANESTHESIOLOGY

## 2024-07-10 PROCEDURE — 6360000002 HC RX W HCPCS

## 2024-07-10 RX ORDER — NALOXONE HYDROCHLORIDE 0.4 MG/ML
INJECTION, SOLUTION INTRAMUSCULAR; INTRAVENOUS; SUBCUTANEOUS PRN
Status: DISCONTINUED | OUTPATIENT
Start: 2024-07-10 | End: 2024-07-11 | Stop reason: HOSPADM

## 2024-07-10 RX ORDER — IBUPROFEN 200 MG
800 TABLET ORAL ONCE
Status: COMPLETED | OUTPATIENT
Start: 2024-07-10 | End: 2024-07-10

## 2024-07-10 RX ORDER — PROPOFOL 10 MG/ML
INJECTION, EMULSION INTRAVENOUS PRN
Status: DISCONTINUED | OUTPATIENT
Start: 2024-07-10 | End: 2024-07-10 | Stop reason: SDUPTHER

## 2024-07-10 RX ORDER — MIDAZOLAM HYDROCHLORIDE 1 MG/ML
2 INJECTION INTRAMUSCULAR; INTRAVENOUS
Status: DISCONTINUED | OUTPATIENT
Start: 2024-07-10 | End: 2024-07-11 | Stop reason: HOSPADM

## 2024-07-10 RX ORDER — SODIUM CHLORIDE 0.9 % (FLUSH) 0.9 %
5-40 SYRINGE (ML) INJECTION EVERY 12 HOURS SCHEDULED
Status: DISCONTINUED | OUTPATIENT
Start: 2024-07-10 | End: 2024-07-11 | Stop reason: HOSPADM

## 2024-07-10 RX ORDER — FENTANYL CITRATE 50 UG/ML
50 INJECTION, SOLUTION INTRAMUSCULAR; INTRAVENOUS EVERY 5 MIN PRN
Status: DISCONTINUED | OUTPATIENT
Start: 2024-07-10 | End: 2024-07-11 | Stop reason: HOSPADM

## 2024-07-10 RX ORDER — SUCCINYLCHOLINE CHLORIDE 20 MG/ML
INJECTION INTRAMUSCULAR; INTRAVENOUS PRN
Status: DISCONTINUED | OUTPATIENT
Start: 2024-07-10 | End: 2024-07-10 | Stop reason: SDUPTHER

## 2024-07-10 RX ORDER — METOCLOPRAMIDE HYDROCHLORIDE 5 MG/ML
10 INJECTION INTRAMUSCULAR; INTRAVENOUS
Status: DISCONTINUED | OUTPATIENT
Start: 2024-07-10 | End: 2024-07-11 | Stop reason: HOSPADM

## 2024-07-10 RX ORDER — ONDANSETRON 2 MG/ML
4 INJECTION INTRAMUSCULAR; INTRAVENOUS
Status: DISCONTINUED | OUTPATIENT
Start: 2024-07-10 | End: 2024-07-11 | Stop reason: HOSPADM

## 2024-07-10 RX ORDER — SODIUM CHLORIDE 9 MG/ML
INJECTION, SOLUTION INTRAVENOUS PRN
Status: DISCONTINUED | OUTPATIENT
Start: 2024-07-10 | End: 2024-07-11 | Stop reason: HOSPADM

## 2024-07-10 RX ORDER — HYDROMORPHONE HYDROCHLORIDE 1 MG/ML
0.25 INJECTION, SOLUTION INTRAMUSCULAR; INTRAVENOUS; SUBCUTANEOUS EVERY 5 MIN PRN
Status: DISCONTINUED | OUTPATIENT
Start: 2024-07-10 | End: 2024-07-11 | Stop reason: HOSPADM

## 2024-07-10 RX ORDER — SODIUM CHLORIDE 0.9 % (FLUSH) 0.9 %
5-40 SYRINGE (ML) INJECTION PRN
Status: DISCONTINUED | OUTPATIENT
Start: 2024-07-10 | End: 2024-07-11 | Stop reason: HOSPADM

## 2024-07-10 RX ORDER — SODIUM CHLORIDE, SODIUM LACTATE, POTASSIUM CHLORIDE, CALCIUM CHLORIDE 600; 310; 30; 20 MG/100ML; MG/100ML; MG/100ML; MG/100ML
INJECTION, SOLUTION INTRAVENOUS CONTINUOUS
Status: DISCONTINUED | OUTPATIENT
Start: 2024-07-10 | End: 2024-07-11 | Stop reason: HOSPADM

## 2024-07-10 RX ORDER — LIDOCAINE HYDROCHLORIDE 20 MG/ML
INJECTION, SOLUTION EPIDURAL; INFILTRATION; INTRACAUDAL; PERINEURAL PRN
Status: DISCONTINUED | OUTPATIENT
Start: 2024-07-10 | End: 2024-07-10 | Stop reason: SDUPTHER

## 2024-07-10 RX ORDER — LABETALOL HYDROCHLORIDE 5 MG/ML
10 INJECTION, SOLUTION INTRAVENOUS
Status: DISCONTINUED | OUTPATIENT
Start: 2024-07-10 | End: 2024-07-11 | Stop reason: HOSPADM

## 2024-07-10 RX ADMIN — SODIUM CHLORIDE, POTASSIUM CHLORIDE, SODIUM LACTATE AND CALCIUM CHLORIDE: 600; 310; 30; 20 INJECTION, SOLUTION INTRAVENOUS at 11:43

## 2024-07-10 RX ADMIN — SUCCINYLCHOLINE CHLORIDE 180 MG: 20 INJECTION, SOLUTION INTRAMUSCULAR; INTRAVENOUS at 12:49

## 2024-07-10 RX ADMIN — GADOTERIDOL 20 ML: 279.3 INJECTION, SOLUTION INTRAVENOUS at 14:12

## 2024-07-10 RX ADMIN — LIDOCAINE HYDROCHLORIDE 100 MG: 20 INJECTION, SOLUTION EPIDURAL; INFILTRATION; INTRACAUDAL; PERINEURAL at 12:49

## 2024-07-10 RX ADMIN — PROPOFOL 200 MG: 10 INJECTION, EMULSION INTRAVENOUS at 12:49

## 2024-07-10 RX ADMIN — IBUPROFEN 800 MG: 200 TABLET, FILM COATED ORAL at 14:32

## 2024-07-10 RX ADMIN — PROPOFOL 100 MG: 10 INJECTION, EMULSION INTRAVENOUS at 13:05

## 2024-07-10 ASSESSMENT — LIFESTYLE VARIABLES: SMOKING_STATUS: 0

## 2024-07-10 ASSESSMENT — PAIN DESCRIPTION - ORIENTATION: ORIENTATION: INNER

## 2024-07-10 ASSESSMENT — PAIN - FUNCTIONAL ASSESSMENT
PAIN_FUNCTIONAL_ASSESSMENT: 0-10
PAIN_FUNCTIONAL_ASSESSMENT: 0-10
PAIN_FUNCTIONAL_ASSESSMENT: PREVENTS OR INTERFERES SOME ACTIVE ACTIVITIES AND ADLS
PAIN_FUNCTIONAL_ASSESSMENT: PREVENTS OR INTERFERES SOME ACTIVE ACTIVITIES AND ADLS

## 2024-07-10 ASSESSMENT — PAIN DESCRIPTION - DESCRIPTORS
DESCRIPTORS: ACHING
DESCRIPTORS: ACHING

## 2024-07-10 ASSESSMENT — PAIN SCALES - GENERAL: PAINLEVEL_OUTOF10: 3

## 2024-07-10 ASSESSMENT — PAIN DESCRIPTION - LOCATION: LOCATION: HEAD

## 2024-07-10 NOTE — PROGRESS NOTES
History  Chief Complaint   Patient presents with    Fall     Pt was tripped by dog and fell on right arm. Denies head strike, - LOC, - thinners. C/o right arm pain, back pain, and neck pain.      David is a 59 y.o. male who presents with low back pain after a fall today.  He reports he was walking his dog when the dog started to eat something he shouldn't.  The patient leaned over to move the dog and fell forward.  He has a long history of chronic back pain and reports that the pain is worse since the fall.  No new weakness.  No numbness.  No headache, no neck pain (triage note reported neck pain but patient denies and has no pain on exam).       History provided by:  Patient   used: No    Fall  Mechanism of injury: fall    Associated symptoms: back pain        Prior to Admission Medications   Prescriptions Last Dose Informant Patient Reported? Taking?   Caplyta 42 MG CAPS capsule   Yes No   Sig: Take 42 mg by mouth daily at bedtime   Continuous Blood Gluc  (FreeStyle Grace 14 Day [place]) STEVE   No No   Si Device by Device route 3 (three) times a day before meals   Continuous Blood Gluc  (FreeStyle Grace 2 [place]) STEVE   No No   Si Device by Device route 3 (three) times a day before meals   DULoxetine (CYMBALTA) 60 mg delayed release capsule   Yes No   Sig: Take 60 mg by mouth in the morning   Patient not taking: Reported on 2023   GLOBAL EASE INJECT PEN NEEDLES 31G X 8 MM MISC  Self Yes No   Sig: USE AS DIRECTED TO INJECT INSULIN DAILY   Incontinence Supply Disposable (INCONTINENCE BRIEF MEDIUM) MISC  Self No No   Sig: by Does not apply route as needed (fecal soiling)   Incontinence Supply Disposable (RA WIPES FLUSHABLE/MOIST) MISC  Self No No   Sig: by Does not apply route as needed (fecal soiling)   Insulin Pen Needle (B-D UF III MINI PEN NEEDLES) 31G X 5 MM MISC   No No   Sig: Inject 15 Units under the skin daily at bedtime   Misc. Devices (CANE) MISC  Self No No  PACU Transfer Note    Vitals:    07/10/24 1350   BP: 129/89   Pulse: 93   Resp: 16   Temp: 97.2 °F (36.2 °C)   SpO2: 95%       In: 600 [I.V.:600]  Out: -     Pain assessment:  none  Pain Level: 4    Report given to Receiving unit RN.    7/10/2024 1:54 PM          Sig: by Does not apply route daily Dx:  Frequent falls   UNKNOWN TO PATIENT  Self Yes No   acetaminophen (TYLENOL) 325 mg tablet   No No   Sig: Take 2 tablets (650 mg total) by mouth every 4 (four) hours as needed for mild pain   bacitracin-polymyxin b (POLYSPORIN) ointment   No No   Sig: Apply topically 2 (two) times a day   Patient not taking: Reported on 2023   clonazePAM (KlonoPIN) 0.5 mg tablet   No No   Sig: Take 1 tablet (0.5 mg total) by mouth 3 (three) times a day for 14 days   clonazePAM (KlonoPIN) 1 mg tablet   Yes No   Sig: Take 1 mg by mouth 2 (two) times a day as needed   divalproex sodium (DEPAKOTE ER) 500 mg 24 hr tablet   No No   Sig: Take 1 tablet (500 mg total) by mouth every 12 (twelve) hours   docusate sodium (COLACE) 100 mg capsule   No No   Sig: Take 1 capsule (100 mg total) by mouth 2 (two) times a day for 5 days   doxepin (SINEquan) 100 mg capsule   Yes No   Sig: Take 100 mg by mouth daily at bedtime   Patient not taking: Reported on 2023   ergocalciferol (ERGOCALCIFEROL) 1.25 MG (99277 UT) capsule   Yes No   Sig: Take 50,000 Units by mouth   fluticasone (FLONASE) 50 mcg/act nasal spray   No No   Si spray into each nostril daily   metFORMIN (GLUCOPHAGE) 850 mg tablet   No No   Sig: Take 1 tablet (850 mg total) by mouth daily with breakfast   Patient taking differently: Take 850 mg by mouth 2 (two) times a day with meals   methadone (DOLOPHINE) 5 mg tablet   Yes No   Sig: Take 165 mg by mouth daily   methocarbamol (ROBAXIN) 500 mg tablet   No No   Sig: Take 1 tablet (500 mg total) by mouth every 8 (eight) hours as needed for muscle spasms   zolpidem (AMBIEN) 10 mg tablet   Yes No   Sig: Take 10 mg by mouth daily at bedtime   Patient not taking: Reported on 2023      Facility-Administered Medications: None       Past Medical History:   Diagnosis Date    Anxiety     Depression     Diabetes mellitus (HCC)     Drug use     Hemorrhoids, internal 2019    Hypertension         Past Surgical History:   Procedure Laterality Date    FEMUR FRACTURE SURGERY Right     GASTRIC BYPASS  11/08/2011    Managed by: Braden Luna (General Surgery)    HERNIA REPAIR  02/13/2013    Incisional hernia repair Managed by: Braden Luna (General Surgery)    TONSILLECTOMY  2010       Family History   Problem Relation Age of Onset    Diabetes Mother     Hypertension Mother     Hypertension Father     Autoimmune disease Son      I have reviewed and agree with the history as documented.    E-Cigarette/Vaping    E-Cigarette Use Never User      E-Cigarette/Vaping Substances    Nicotine No     THC No     CBD No     Flavoring No     Other No     Unknown No      Social History     Tobacco Use    Smoking status: Never    Smokeless tobacco: Never   Vaping Use    Vaping status: Never Used   Substance Use Topics    Alcohol use: Never    Drug use: Yes     Comment: Methadone clinic       Review of Systems   Musculoskeletal:  Positive for back pain and myalgias.       Physical Exam  Physical Exam  Vitals and nursing note reviewed.   Constitutional:       General: He is in acute distress (mild).      Appearance: He is well-developed.   HENT:      Head: Normocephalic.      Right Ear: External ear normal.      Left Ear: External ear normal.      Nose: Nose normal.      Mouth/Throat:      Mouth: Mucous membranes are moist.      Pharynx: Oropharynx is clear.   Eyes:      General: Lids are normal.      Extraocular Movements: Extraocular movements intact.      Pupils: Pupils are equal, round, and reactive to light.   Pulmonary:      Effort: Pulmonary effort is normal. No respiratory distress.   Musculoskeletal:         General: Tenderness (mild lumbar tenderness) present. No deformity. Normal range of motion.      Cervical back: Normal range of motion and neck supple.   Skin:     General: Skin is warm and dry.   Neurological:      Mental Status: He is alert and oriented to person, place, and time.   Psychiatric:          Mood and Affect: Mood normal.         Vital Signs  ED Triage Vitals [12/22/23 1610]   Temperature Pulse Respirations Blood Pressure SpO2   98.3 °F (36.8 °C) 64 16 164/76 98 %      Temp Source Heart Rate Source Patient Position - Orthostatic VS BP Location FiO2 (%)   Oral Monitor Sitting Right arm --      Pain Score       --           Vitals:    12/22/23 1610   BP: 164/76   Pulse: 64   Patient Position - Orthostatic VS: Sitting         Visual Acuity      ED Medications  Medications   ketorolac (TORADOL) injection 15 mg (has no administration in time range)       Diagnostic Studies  Results Reviewed       None                   XR spine lumbar 2 or 3 views injury   ED Interpretation by Aroldo Scott MD (12/22 1939)   This film was interpreted independently by me.  T12, L1 compression abnormalities, appear similar to 4/10 imaging.                  Procedures  Procedures         ED Course  ED Course as of 12/22/23 1947   Fri Dec 22, 2023   1945 David originally asked for a muscle relaxant when he presented but reports that he has robaxin at home and that what he really wants is gabapentin.  He is also hungry and would like a snack (he has been here for several hours).                                              Medical Decision Making  Background: 59 y.o. male with lumbar spine pain after injury    Differential DX includes but is not limited to: fracture vs contusion vs sprain      Plan: imaging, symptom control      Amount and/or Complexity of Data Reviewed  Radiology: ordered and independent interpretation performed.    Risk  Prescription drug management.             Disposition  Final diagnoses:   Strain of lumbar region, initial encounter   Fall     Time reflects when diagnosis was documented in both MDM as applicable and the Disposition within this note       Time User Action Codes Description Comment    12/22/2023  7:25 PM Aroldo Scott Add [S39.012A] Strain of lumbar region, initial encounter      12/22/2023  7:39 PM Aroldo Scott Add [W19.XXXA] Fall           ED Disposition       ED Disposition   Discharge    Condition   Stable    Date/Time   Fri Dec 22, 2023  7:25 PM    Comment   David Skelton Jr. discharge to home/self care.                   Follow-up Information    None         Patient's Medications   Discharge Prescriptions    GABAPENTIN (NEURONTIN) 300 MG CAPSULE    Take 1 capsule (300 mg total) by mouth 3 (three) times a day For post-herpetic neuralgia: Take 1 tablet on day 1,  Then take 2 tablets on day 2, Then take 3 tablets on day 3 and every day after that as instructed by your doctor.       Start Date: 12/22/2023End Date: --       Order Dose: 300 mg       Quantity: 42 capsule    Refills: 0       No discharge procedures on file.    PDMP Review         Value Time User    PDMP Reviewed  Yes 9/14/2023  4:34 PM Hong Nelson PA-C            ED Provider  Electronically Signed by             Aroldo Scott MD  12/22/23 1941       Aroldo Scott MD  12/22/23 1947

## 2024-07-10 NOTE — ANESTHESIA POSTPROCEDURE EVALUATION
Department of Anesthesiology  Postprocedure Note    Patient: Ryan Magana  MRN: [id number]  YOB: 1975  Date of evaluation: 7/10/2024    Procedure Summary       Date: 07/10/24 Room / Location: [facility]    Anesthesia Start: 1241 Anesthesia Stop: 1341    Procedure: MRI LUMBAR SPINE W WO CONTRAST Diagnosis:       History of lumbar fusion      Lumbar radiculopathy      (s/p lumbar fusion , lumbar radciculopathy)    Scheduled Providers: Arthur Nava MD Responsible Provider: Arthur Nava MD    Anesthesia Type: general ASA Status: 3            Anesthesia Type: No value filed.    Geno Phase I: Geno Score: 10    Geno Phase II:      Anesthesia Post Evaluation    Patient location during evaluation: PACU  Patient participation: complete - patient participated  Level of consciousness: awake and alert  Pain score: 4  Airway patency: patent  Nausea & Vomiting: no nausea and no vomiting  Cardiovascular status: blood pressure returned to baseline  Respiratory status: acceptable  Hydration status: euvolemic  Pain management: adequate    No notable events documented.

## 2024-07-10 NOTE — ANESTHESIA PRE PROCEDURE
consumption: 07/09/24    BMI:   Wt Readings from Last 3 Encounters:   07/10/24 115.2 kg (254 lb)   07/03/24 115.2 kg (254 lb)   07/02/24 115.2 kg (254 lb)     Body mass index is 37.51 kg/m².    CBC:   Lab Results   Component Value Date/Time    WBC 6.6 04/23/2024 07:42 AM    RBC 5.51 04/23/2024 07:42 AM    HGB 16.4 04/23/2024 07:42 AM    HCT 47.6 04/23/2024 07:42 AM    MCV 86.3 04/23/2024 07:42 AM    RDW 13.8 04/23/2024 07:42 AM     04/23/2024 07:42 AM       CMP:   Lab Results   Component Value Date/Time     04/23/2024 07:42 AM    K 4.6 04/23/2024 07:42 AM    CL 97 04/23/2024 07:42 AM    CO2 23 04/23/2024 07:42 AM    BUN 10 04/23/2024 07:42 AM    CREATININE 0.8 04/23/2024 07:42 AM    GFRAA >60 04/04/2022 09:05 AM    LABGLOM >90 04/23/2024 07:42 AM    GLUCOSE 126 04/23/2024 07:42 AM    CALCIUM 9.3 04/23/2024 07:42 AM    BILITOT 0.3 04/23/2024 07:42 AM    ALKPHOS 83 04/23/2024 07:42 AM    AST 25 04/23/2024 07:42 AM    ALT 32 04/23/2024 07:42 AM       POC Tests: No results for input(s): \"POCGLU\", \"POCNA\", \"POCK\", \"POCCL\", \"POCBUN\", \"POCHEMO\", \"POCHCT\" in the last 72 hours.    Coags: No results found for: \"PROTIME\", \"INR\", \"APTT\"    HCG (If Applicable): No results found for: \"PREGTESTUR\", \"PREGSERUM\", \"HCG\", \"HCGQUANT\"     ABGs: No results found for: \"PHART\", \"PO2ART\", \"QLI3ODQ\", \"SAB7NJP\", \"BEART\", \"E1KNNZEX\"     Type & Screen (If Applicable):  No results found for: \"LABABO\"    Drug/Infectious Status (If Applicable):  No results found for: \"HIV\", \"HEPCAB\"    COVID-19 Screening (If Applicable): No results found for: \"COVID19\"        Anesthesia Evaluation    Airway: Mallampati: II  TM distance: >3 FB   Neck ROM: full  Mouth opening: > = 3 FB   Dental:    (+) upper dentures, lower dentures, poor dentition and partials      Pulmonary: breath sounds clear to auscultation      (-) COPD, asthma, sleep apnea and not a current smoker                           Cardiovascular:    (+) hyperlipidemia    (-)

## 2024-07-10 NOTE — PROGRESS NOTES
Ambulatory Surgery/Procedure Discharge Note    Vitals:    07/10/24 1406   BP: 131/84   Pulse: 92   Resp: 14   Temp: 97.2 °F (36.2 °C)   SpO2: 96%       In: 1440 [P.O.:240; I.V.:1200]  Out: -     Restroom use offered before discharge.  Yes    Pain assessment:  headaches  Pain Level: 3    Ibuprofen given to patient for headache before discharge. VSS. Patient tolerating all PO intake. Patient voided before discharge. Discharge instructions given to patient and patients friend. Patient is ready for discharge.    Patient discharged to home/self care. Patient discharged via wheel chair by transporter to waiting family/S.O.       7/10/2024 2:48 PM

## 2024-07-15 ENCOUNTER — OFFICE VISIT (OUTPATIENT)
Dept: ORTHOPEDIC SURGERY | Age: 49
End: 2024-07-15
Payer: COMMERCIAL

## 2024-07-15 VITALS — HEIGHT: 69 IN | BODY MASS INDEX: 37.62 KG/M2 | WEIGHT: 254 LBS

## 2024-07-15 DIAGNOSIS — M54.16 LUMBAR RADICULOPATHY: Primary | ICD-10-CM

## 2024-07-15 DIAGNOSIS — Z98.1 HISTORY OF LUMBAR FUSION: ICD-10-CM

## 2024-07-15 PROCEDURE — G8417 CALC BMI ABV UP PARAM F/U: HCPCS | Performed by: PHYSICIAN ASSISTANT

## 2024-07-15 PROCEDURE — 3017F COLORECTAL CA SCREEN DOC REV: CPT | Performed by: PHYSICIAN ASSISTANT

## 2024-07-15 PROCEDURE — 99214 OFFICE O/P EST MOD 30 MIN: CPT | Performed by: PHYSICIAN ASSISTANT

## 2024-07-15 PROCEDURE — G8427 DOCREV CUR MEDS BY ELIG CLIN: HCPCS | Performed by: PHYSICIAN ASSISTANT

## 2024-07-15 PROCEDURE — 4004F PT TOBACCO SCREEN RCVD TLK: CPT | Performed by: PHYSICIAN ASSISTANT

## 2024-07-15 RX ORDER — PREGABALIN 75 MG/1
75 CAPSULE ORAL 3 TIMES DAILY
Qty: 90 CAPSULE | Refills: 0 | Status: SHIPPED | OUTPATIENT
Start: 2024-07-15 | End: 2024-08-14

## 2024-07-15 NOTE — PROGRESS NOTES
lumbar spine that was obtained on 7/10/2024 was independently reviewed with the patient which shows at L3-4 moderate bilateral foraminal narrowing, at L4-5 central disc bulge resulting in moderate narrowing of the central canal and moderate to severe bilateral foraminal narrowing.  L5-S1 is normal.        X-rays: X-rays that were obtained on 4/23/2024 were independently reviewed with the patient which shows spinal fusion with instrumentation at L3-4 with spinal fusion with instrumentation at L5-S1.  There is considerable disc space narrowing at L4-5 with facet arthropathy noted.    Impression:   Status post multilevel spinal fusion  Right lumbar radiculopathy   Moderate to severe bilateral foraminal stenosis L4-5      Plan:      We discussed the diagnosis and treatment options including observation, oral steroids, physical therapy, epidural injections and additional imaging. He wishes to continue with Lyrica 75 mg 3 times a day and he was referred to [facility] further continue evaluation care for surgical intervention versus spinal injections.    Follow up -as needed    Old records were reviewed.    Total evaluation time to include patient education and coordination of care:31 minutes    Valdez Diez PA-C  Board certified by the [facility]  [facility]

## 2024-07-17 ENCOUNTER — TELEPHONE (OUTPATIENT)
Dept: PRIMARY CARE CLINIC | Age: 49
End: 2024-07-17

## 2024-07-17 NOTE — TELEPHONE ENCOUNTER
Patient called and requested Dr Lu to give referral to neuro surgery     The preference for the referral as follows     Rk Dominguez MD     To Specialty To Provider To Location/Place of Service To Department   Neurological Surgery        Patient requested to let him know when the referral order is placed    thanks

## 2024-07-18 DIAGNOSIS — M54.50 CHRONIC BILATERAL LOW BACK PAIN WITHOUT SCIATICA: ICD-10-CM

## 2024-07-18 DIAGNOSIS — G89.29 CHRONIC BILATERAL LOW BACK PAIN WITHOUT SCIATICA: ICD-10-CM

## 2024-07-18 DIAGNOSIS — M54.16 LUMBAR RADICULOPATHY: Primary | ICD-10-CM

## 2024-07-18 NOTE — PROGRESS NOTES
Patient was referred by Valdez Diez for neurosurgery, but according to insurance and Dr, referral needs to be from pt PCP   Pt does have history of Lumbar radiculopathy and Lumbar fusion

## 2024-07-23 DIAGNOSIS — E11.9 TYPE 2 DIABETES MELLITUS WITHOUT COMPLICATION, WITHOUT LONG-TERM CURRENT USE OF INSULIN (HCC): ICD-10-CM

## 2024-07-23 LAB
ALBUMIN SERPL-MCNC: 4.4 G/DL (ref 3.4–5)
ALP SERPL-CCNC: 87 U/L (ref 40–129)
ALT SERPL-CCNC: 28 U/L (ref 10–40)
ANION GAP SERPL CALCULATED.3IONS-SCNC: 12 MMOL/L (ref 3–16)
AST SERPL-CCNC: 19 U/L (ref 15–37)
BILIRUB DIRECT SERPL-MCNC: <0.2 MG/DL (ref 0–0.3)
BILIRUB INDIRECT SERPL-MCNC: NORMAL MG/DL (ref 0–1)
BILIRUB SERPL-MCNC: 0.4 MG/DL (ref 0–1)
BUN SERPL-MCNC: 9 MG/DL (ref 7–20)
CALCIUM SERPL-MCNC: 9.4 MG/DL (ref 8.3–10.6)
CHLORIDE SERPL-SCNC: 103 MMOL/L (ref 99–110)
CHOLEST SERPL-MCNC: 160 MG/DL (ref 0–199)
CO2 SERPL-SCNC: 24 MMOL/L (ref 21–32)
CREAT SERPL-MCNC: 0.8 MG/DL (ref 0.9–1.3)
EST. AVERAGE GLUCOSE BLD GHB EST-MCNC: 108.3 MG/DL
GFR SERPLBLD CREATININE-BSD FMLA CKD-EPI: >90 ML/MIN/{1.73_M2}
GLUCOSE SERPL-MCNC: 141 MG/DL (ref 70–99)
HBA1C MFR BLD: 5.4 %
HDLC SERPL-MCNC: 37 MG/DL (ref 40–60)
LDLC SERPL CALC-MCNC: 82 MG/DL
POTASSIUM SERPL-SCNC: 4.2 MMOL/L (ref 3.5–5.1)
PROT SERPL-MCNC: 7.2 G/DL (ref 6.4–8.2)
SODIUM SERPL-SCNC: 139 MMOL/L (ref 136–145)
TRIGL SERPL-MCNC: 205 MG/DL (ref 0–150)
VLDLC SERPL CALC-MCNC: 41 MG/DL

## 2024-07-30 ENCOUNTER — TELEPHONE (OUTPATIENT)
Dept: ORTHOPEDIC SURGERY | Age: 49
End: 2024-07-30

## 2024-07-31 ENCOUNTER — TELEPHONE (OUTPATIENT)
Dept: ORTHOPEDIC SURGERY | Age: 49
End: 2024-07-31

## 2024-07-31 DIAGNOSIS — Z01.818 PREOPERATIVE CLEARANCE: ICD-10-CM

## 2024-07-31 DIAGNOSIS — M16.11 OSTEOARTHRITIS OF RIGHT HIP, UNSPECIFIED OSTEOARTHRITIS TYPE: Primary | ICD-10-CM

## 2024-07-31 NOTE — TELEPHONE ENCOUNTER
S/w patient and scheduled sx for 9.6.24. Patient is requesting something for pain until he can have surgery.

## 2024-07-31 NOTE — TELEPHONE ENCOUNTER
General Question     Subject: SURGERY  Patient and /or Facility Request: Ryan Magana \"Giacomo\"   Contact Number: [phone]     PATIENT CALLED AND STATED HE HAS A FEW QUESTIONS PERTAINING TO THE PAPERWORK HE RECEIVED FOR HIS SURGERY    PLEASE CALL PATIENT BACK AT THE ABOVE NUMBER

## 2024-08-01 ENCOUNTER — TELEPHONE (OUTPATIENT)
Dept: ORTHOPEDIC SURGERY | Age: 49
End: 2024-08-01

## 2024-08-01 NOTE — TELEPHONE ENCOUNTER
S/W PATIENT; FORM FOR SHORT TERM DISABILITY; MEDICATION REQUEST FORWARDED TO PROVIDER IN PREVIOUS ENCOUNTER

## 2024-08-01 NOTE — TELEPHONE ENCOUNTER
General Question      Subject: SURGERY/REQ A CALL BACK/MEDICATION   Patient and /or Facility Request: Ryan Magana \"Giacomo\"   Contact Number: [phone]      PATIENT CALLED AND STATED HE HAS A FEW QUESTIONS PERTAINING TO THE PAPERWORK HE RECEIVED FOR HIS SURGERY...  PATIENT LOOKING FOR MEDICATION FOR HIS PAIN UNTIL SX.. PATIENT REQ A CALL BACK...    [facility] [address] [facility] [phone]..     PLEASE CALL PATIENT BACK AT THE ABOVE NUMBER

## 2024-08-02 ENCOUNTER — OFFICE VISIT (OUTPATIENT)
Dept: PRIMARY CARE CLINIC | Age: 49
End: 2024-08-02
Payer: COMMERCIAL

## 2024-08-02 ENCOUNTER — TELEPHONE (OUTPATIENT)
Dept: ORTHOPEDIC SURGERY | Age: 49
End: 2024-08-02

## 2024-08-02 VITALS
WEIGHT: 258 LBS | BODY MASS INDEX: 38.21 KG/M2 | DIASTOLIC BLOOD PRESSURE: 90 MMHG | SYSTOLIC BLOOD PRESSURE: 130 MMHG | HEIGHT: 69 IN | HEART RATE: 102 BPM | OXYGEN SATURATION: 97 % | TEMPERATURE: 97.6 F

## 2024-08-02 DIAGNOSIS — R73.9 HYPERGLYCEMIA: ICD-10-CM

## 2024-08-02 DIAGNOSIS — R03.0 PREHYPERTENSION: ICD-10-CM

## 2024-08-02 DIAGNOSIS — E78.2 MIXED HYPERLIPIDEMIA: Primary | ICD-10-CM

## 2024-08-02 PROCEDURE — 99214 OFFICE O/P EST MOD 30 MIN: CPT | Performed by: FAMILY MEDICINE

## 2024-08-02 PROCEDURE — G8427 DOCREV CUR MEDS BY ELIG CLIN: HCPCS | Performed by: FAMILY MEDICINE

## 2024-08-02 PROCEDURE — 4004F PT TOBACCO SCREEN RCVD TLK: CPT | Performed by: FAMILY MEDICINE

## 2024-08-02 PROCEDURE — 3017F COLORECTAL CA SCREEN DOC REV: CPT | Performed by: FAMILY MEDICINE

## 2024-08-02 PROCEDURE — G8417 CALC BMI ABV UP PARAM F/U: HCPCS | Performed by: FAMILY MEDICINE

## 2024-08-02 RX ORDER — ATORVASTATIN CALCIUM 20 MG/1
20 TABLET, FILM COATED ORAL DAILY
Qty: 90 TABLET | Refills: 1 | Status: SHIPPED | OUTPATIENT
Start: 2024-08-02

## 2024-08-02 ASSESSMENT — ENCOUNTER SYMPTOMS
BACK PAIN: 1
RESPIRATORY NEGATIVE: 1
EYES NEGATIVE: 1
GASTROINTESTINAL NEGATIVE: 1

## 2024-08-02 NOTE — PROGRESS NOTES
Arm, Position: Sitting, Cuff Size: Large Adult)   Pulse (!) 102   Temp 97.6 °F (36.4 °C) (Infrared)   Ht 1.753 m (5' 9.02\")   Wt 117 kg (258 lb)   SpO2 97%   BMI 38.08 kg/m²     Physical Exam  Vitals reviewed.   Constitutional:       Appearance: He is well-developed.   HENT:      Right Ear: Tympanic membrane normal.      Left Ear: Tympanic membrane normal.   Eyes:      General: No scleral icterus.     Conjunctiva/sclera: Conjunctivae normal.   Neck:      Thyroid: No thyromegaly.      Vascular: No carotid bruit or JVD.   Cardiovascular:      Rate and Rhythm: Normal rate and regular rhythm.      Heart sounds: Normal heart sounds. No murmur heard.  Pulmonary:      Effort: Pulmonary effort is normal. No respiratory distress.      Breath sounds: Normal breath sounds. No wheezing or rales.   Chest:      Chest wall: No tenderness.   Abdominal:      General: Bowel sounds are normal. There is no distension.      Palpations: Abdomen is soft. There is no mass.      Tenderness: There is no abdominal tenderness. There is no guarding or rebound.   Musculoskeletal:         General: No tenderness. Normal range of motion.      Cervical back: Normal range of motion and neck supple.   Skin:     Findings: No rash.   Neurological:      Mental Status: He is alert and oriented to person, place, and time.         ASSESSMENT:   Diagnosis Orders   1. Mixed hyperlipidemia        2. Hyperglycemia        3. Prehypertension              PLAN:  Reviewed recent blood work  Advised the patient about how important to lose weight and eat healthy,  Discussed his back pain/surgery  Monitor blood pressure at home DASH diet/low-sodium diet instructions given

## 2024-08-05 ENCOUNTER — TELEPHONE (OUTPATIENT)
Dept: ORTHOPEDIC SURGERY | Age: 49
End: 2024-08-05

## 2024-08-05 RX ORDER — NAPROXEN 500 MG/1
500 TABLET ORAL 2 TIMES DAILY WITH MEALS
Qty: 60 TABLET | Refills: 1 | Status: SHIPPED | OUTPATIENT
Start: 2024-08-05

## 2024-08-05 NOTE — TELEPHONE ENCOUNTER
Spoke to patient.   rX naproxen sent  Discussed multimodal analgesia including motion, ice, and NSAIDS    BIANCA Lopez

## 2024-08-05 NOTE — TELEPHONE ENCOUNTER
General Question     Subject: RETURN CALL  Patient and /or Facility Request: Ryan Magana \"Giacomo\"   Contact Number: [phone]     PATIENT CALLED RETURNING A CALL FROM JOSEPHINE DAVIS PERTAINING TO THE PAPERWORK HE RECEIVED FOR HIS SURGERY    PLEASE CALL PATIENT BACK AT THE ABOVE NUMBER

## 2024-08-12 DIAGNOSIS — Z01.818 PREOPERATIVE CLEARANCE: ICD-10-CM

## 2024-08-12 LAB
25(OH)D3 SERPL-MCNC: 16 NG/ML
ALBUMIN SERPL-MCNC: 4.7 G/DL (ref 3.4–5)
ALBUMIN/GLOB SERPL: 1.9 {RATIO} (ref 1.1–2.2)
ALP SERPL-CCNC: 84 U/L (ref 40–129)
ALT SERPL-CCNC: 28 U/L (ref 10–40)
ANION GAP SERPL CALCULATED.3IONS-SCNC: 14 MMOL/L (ref 3–16)
APTT BLD: 24.9 SEC (ref 22.1–36.4)
AST SERPL-CCNC: 22 U/L (ref 15–37)
BASOPHILS # BLD: 0 K/UL (ref 0–0.2)
BASOPHILS NFR BLD: 0.4 %
BILIRUB SERPL-MCNC: 0.3 MG/DL (ref 0–1)
BILIRUB UR QL STRIP.AUTO: NEGATIVE
BUN SERPL-MCNC: 15 MG/DL (ref 7–20)
CALCIUM SERPL-MCNC: 9.3 MG/DL (ref 8.3–10.6)
CHLORIDE SERPL-SCNC: 105 MMOL/L (ref 99–110)
CLARITY UR: CLEAR
CO2 SERPL-SCNC: 24 MMOL/L (ref 21–32)
COLOR UR: YELLOW
CREAT SERPL-MCNC: 0.8 MG/DL (ref 0.9–1.3)
DEPRECATED RDW RBC AUTO: 13.8 % (ref 12.4–15.4)
EOSINOPHIL # BLD: 0.2 K/UL (ref 0–0.6)
EOSINOPHIL NFR BLD: 2.3 %
EST. AVERAGE GLUCOSE BLD GHB EST-MCNC: 99.7 MG/DL
GFR SERPLBLD CREATININE-BSD FMLA CKD-EPI: >90 ML/MIN/{1.73_M2}
GLUCOSE SERPL-MCNC: 114 MG/DL (ref 70–99)
GLUCOSE UR STRIP.AUTO-MCNC: NEGATIVE MG/DL
HBA1C MFR BLD: 5.1 %
HCT VFR BLD AUTO: 49 % (ref 40.5–52.5)
HGB BLD-MCNC: 16.4 G/DL (ref 13.5–17.5)
HGB UR QL STRIP.AUTO: NEGATIVE
INR PPP: 0.97 (ref 0.85–1.15)
KETONES UR STRIP.AUTO-MCNC: NEGATIVE MG/DL
LEUKOCYTE ESTERASE UR QL STRIP.AUTO: NEGATIVE
LYMPHOCYTES # BLD: 1.3 K/UL (ref 1–5.1)
LYMPHOCYTES NFR BLD: 19.4 %
MCH RBC QN AUTO: 29.6 PG (ref 26–34)
MCHC RBC AUTO-ENTMCNC: 33.4 G/DL (ref 31–36)
MCV RBC AUTO: 88.6 FL (ref 80–100)
MONOCYTES # BLD: 0.4 K/UL (ref 0–1.3)
MONOCYTES NFR BLD: 6.4 %
NEUTROPHILS # BLD: 5 K/UL (ref 1.7–7.7)
NEUTROPHILS NFR BLD: 71.5 %
NITRITE UR QL STRIP.AUTO: NEGATIVE
PH UR STRIP.AUTO: 5.5 [PH] (ref 5–8)
PLATELET # BLD AUTO: 229 K/UL (ref 135–450)
PMV BLD AUTO: 8.6 FL (ref 5–10.5)
POTASSIUM SERPL-SCNC: 4.2 MMOL/L (ref 3.5–5.1)
PROT SERPL-MCNC: 7.2 G/DL (ref 6.4–8.2)
PROT UR STRIP.AUTO-MCNC: NEGATIVE MG/DL
PROTHROMBIN TIME: 13.1 SEC (ref 11.9–14.9)
RBC # BLD AUTO: 5.54 M/UL (ref 4.2–5.9)
SODIUM SERPL-SCNC: 143 MMOL/L (ref 136–145)
SP GR UR STRIP.AUTO: 1.02 (ref 1–1.03)
UA COMPLETE W REFLEX CULTURE PNL UR: NORMAL
UA DIPSTICK W REFLEX MICRO PNL UR: NORMAL
URN SPEC COLLECT METH UR: NORMAL
UROBILINOGEN UR STRIP-ACNC: 0.2 E.U./DL
WBC # BLD AUTO: 6.9 K/UL (ref 4–11)

## 2024-08-13 ENCOUNTER — PREP FOR PROCEDURE (OUTPATIENT)
Dept: ORTHOPEDIC SURGERY | Age: 49
End: 2024-08-13

## 2024-08-13 DIAGNOSIS — M16.11 PRIMARY OSTEOARTHRITIS OF RIGHT HIP: ICD-10-CM

## 2024-08-14 LAB — MRSA DNA SPEC QL NAA+PROBE: NORMAL

## 2024-08-16 ENCOUNTER — OFFICE VISIT (OUTPATIENT)
Dept: PRIMARY CARE CLINIC | Age: 49
End: 2024-08-16

## 2024-08-16 VITALS
HEIGHT: 69 IN | SYSTOLIC BLOOD PRESSURE: 130 MMHG | BODY MASS INDEX: 37.74 KG/M2 | WEIGHT: 254.8 LBS | DIASTOLIC BLOOD PRESSURE: 88 MMHG | HEART RATE: 117 BPM | OXYGEN SATURATION: 97 %

## 2024-08-16 DIAGNOSIS — Z01.818 PRE-OP EXAMINATION: Primary | ICD-10-CM

## 2024-08-16 DIAGNOSIS — R06.02 SOB (SHORTNESS OF BREATH): ICD-10-CM

## 2024-08-16 DIAGNOSIS — R73.9 HYPERGLYCEMIA: ICD-10-CM

## 2024-08-16 DIAGNOSIS — M25.551 RIGHT HIP PAIN: ICD-10-CM

## 2024-08-16 DIAGNOSIS — E78.2 MIXED HYPERLIPIDEMIA: ICD-10-CM

## 2024-08-16 DIAGNOSIS — R06.02 SHORTNESS OF BREATH: ICD-10-CM

## 2024-08-16 NOTE — PROGRESS NOTES
SUBJECTIVE:  Ryan Magana is a 48 y.o. male who presents for evaluation for pre op for right hip replacement . The pain is described as constant  and is 5/10 in intensity. Onset was several months ago. Symptoms have been gradually worsening since. Aggravating factors:any weight bearing.  Alleviating factors: rest. Associated symptoms: pain. The patient denies chest pain , sob with exersion , no fever or chills , no ST or cough, no N/V/D .    Review of Systems   Constitutional: Negative.    HENT: Negative.     Eyes: Negative.    Respiratory:  Positive for shortness of breath.    Cardiovascular: Negative.    Gastrointestinal: Negative.    Endocrine: Negative.    Musculoskeletal:  Positive for arthralgias.   Allergic/Immunologic: Negative.    Neurological: Negative.    Psychiatric/Behavioral: Negative.     All other systems reviewed and are negative.     Past Medical History:   Diagnosis Date    Chronic back pain     History of lumbar fusion     Hyperlipidemia     Lumbar radiculopathy     Neuropathy     both feet; right leg    Pre-diabetes       Patient Active Problem List    Diagnosis Date Noted    Primary osteoarthritis of right hip 08/13/2024    Hyperglycemia 06/07/2024    Mixed hyperlipidemia 06/07/2024    Chronic bilateral low back pain without sciatica 06/07/2024    History of lumbar fusion 05/23/2024    Lumbar radiculopathy 05/23/2024    Herniated lumbar intervertebral disc 05/18/2016    Chronic pain due to trauma 05/18/2016      Past Surgical History:   Procedure Laterality Date    APPENDECTOMY  1989    BACK SURGERY  4/2007    L5-S1 discectomy    BACK SURGERY  06/02/2014     BACK SURGERY  2010 2012 2016    SHOULDER ARTHROSCOPY Right 4/29/2022    RIGHT SHOULDER ARTHROSCOPY FOR DISTAL CLAVICLE EXCISION, SUBACROMIAL DECOMPRESSION, EXTENSIVE DEBRIDEMENT, SWATHI DECOMPRESSION, INJECTION OF CORTICOSTEROID performed by Janee Kuo MD at [facility] OR    TENOTOMY Right 12/15/2022    RIGHT ELBOW TENEX-LOCAL performed

## 2024-08-21 ENCOUNTER — TELEPHONE (OUTPATIENT)
Dept: ORTHOPEDIC SURGERY | Age: 49
End: 2024-08-21

## 2024-08-21 NOTE — TELEPHONE ENCOUNTER
Orthopedic Nurse Navigator Summary    Patient Name:  Ryan Magana  Anticipated Date of Surgery:  09/06/24  Attended Pre-op Education Class:  Video sent to patient email 08/20/24  PCP: Arely Lu MD  Date of PCP visit for H&P: 08/16/24  Is patient in a Pain Management program:  Review of Medical history reveals history of: Prediabetes, Hyperlipidemia, Chronic low back pain, Lumbar radiculopathy, H/O lumbar fusion, Neuropathy both feet and right leg    Critical Lab Values  - Hemoglobin (g/dL):  Date: 08/12/24 Value 16.4  - Hematocrit(%): Date: 08/12/24  Value 49.0  - HgbA1C:  Date: 08/12/24 Value 5.1  - Albumin:  Date: 08/12/24  Value 4.7  - BUN:  Date: 08/12/24  Value 15  - Creatinine:  Date: 08/12/24  Value 0.8    08/12/24 MRSA swab- negative    Coronary Artery Disease/HTN/CHF history: No  Does the patient see a Cardiologist: No  Date of most recent cardiac appt:  On any anticoagulation:  No    Diabetes History:  Prediabetes  Most recent HgbA1C: 5.1  Pulmonary:  COPD/Emphysema/Use of home oxygen: No  Alcohol use: No    BMI greater than 40 at time of scheduling:  No  Additional medical concerns:  Additional recommendations for above concerns:  Attended Pre-Hab program:    Anticipated Discharge Disposition:  Home with OPT  Who will be with patient at home following discharge:  cousin and his neighbor will be helping him  Equipment patient already has:  cane  Bedroom on first or second floor:  first  Bathroom on first or second floor:  first  Weight bearing status:  wbat  Pre-op ambulatory status: painful ambulation- uses cane at times  Number of entry steps:  1  Caregiver assistance:  full time    Marion Dutton RN  Date:   08/21/24

## 2024-08-27 RX ORDER — DEXAMETHASONE SODIUM PHOSPHATE 10 MG/ML
10 INJECTION, SOLUTION INTRAMUSCULAR; INTRAVENOUS ONCE
Status: CANCELLED | OUTPATIENT
Start: 2024-09-06 | End: 2024-08-27

## 2024-08-27 RX ORDER — GABAPENTIN 300 MG/1
300 CAPSULE ORAL ONCE
Status: CANCELLED | OUTPATIENT
Start: 2024-09-06 | End: 2024-08-27

## 2024-08-27 RX ORDER — SODIUM CHLORIDE 0.9 % (FLUSH) 0.9 %
5-40 SYRINGE (ML) INJECTION EVERY 12 HOURS SCHEDULED
Status: CANCELLED | OUTPATIENT
Start: 2024-09-06

## 2024-08-27 RX ORDER — SODIUM CHLORIDE 0.9 % (FLUSH) 0.9 %
5-40 SYRINGE (ML) INJECTION PRN
Status: CANCELLED | OUTPATIENT
Start: 2024-09-06

## 2024-08-27 RX ORDER — CELECOXIB 200 MG/1
400 CAPSULE ORAL ONCE
Status: CANCELLED | OUTPATIENT
Start: 2024-09-06 | End: 2024-08-27

## 2024-08-27 RX ORDER — OXYCODONE HCL 10 MG/1
10 TABLET, FILM COATED, EXTENDED RELEASE ORAL ONCE
Status: CANCELLED | OUTPATIENT
Start: 2024-09-06 | End: 2024-08-27

## 2024-08-27 RX ORDER — ACETAMINOPHEN 325 MG/1
1000 TABLET ORAL ONCE
Status: CANCELLED | OUTPATIENT
Start: 2024-09-06 | End: 2024-08-27

## 2024-08-27 RX ORDER — SODIUM CHLORIDE 9 MG/ML
INJECTION, SOLUTION INTRAVENOUS PRN
Status: CANCELLED | OUTPATIENT
Start: 2024-09-06

## 2024-08-27 RX ORDER — SODIUM CHLORIDE, SODIUM LACTATE, POTASSIUM CHLORIDE, CALCIUM CHLORIDE 600; 310; 30; 20 MG/100ML; MG/100ML; MG/100ML; MG/100ML
INJECTION, SOLUTION INTRAVENOUS CONTINUOUS
Status: CANCELLED | OUTPATIENT
Start: 2024-09-06

## 2024-08-28 ENCOUNTER — OFFICE VISIT (OUTPATIENT)
Dept: CARDIOLOGY CLINIC | Age: 49
End: 2024-08-28
Payer: COMMERCIAL

## 2024-08-28 ENCOUNTER — TELEPHONE (OUTPATIENT)
Dept: CARDIOLOGY CLINIC | Age: 49
End: 2024-08-28

## 2024-08-28 ENCOUNTER — TELEPHONE (OUTPATIENT)
Dept: ORTHOPEDIC SURGERY | Age: 49
End: 2024-08-28

## 2024-08-28 VITALS
DIASTOLIC BLOOD PRESSURE: 80 MMHG | HEART RATE: 130 BPM | OXYGEN SATURATION: 97 % | SYSTOLIC BLOOD PRESSURE: 110 MMHG | WEIGHT: 257 LBS | BODY MASS INDEX: 37.95 KG/M2

## 2024-08-28 DIAGNOSIS — I50.20 HFREF (HEART FAILURE WITH REDUCED EJECTION FRACTION) (HCC): ICD-10-CM

## 2024-08-28 DIAGNOSIS — Z01.818 PRE-OP EVALUATION: Primary | ICD-10-CM

## 2024-08-28 DIAGNOSIS — R06.02 SHORTNESS OF BREATH: ICD-10-CM

## 2024-08-28 DIAGNOSIS — R93.1 ABNORMAL ECHOCARDIOGRAM: Primary | ICD-10-CM

## 2024-08-28 PROCEDURE — 3017F COLORECTAL CA SCREEN DOC REV: CPT | Performed by: INTERNAL MEDICINE

## 2024-08-28 PROCEDURE — 99204 OFFICE O/P NEW MOD 45 MIN: CPT | Performed by: INTERNAL MEDICINE

## 2024-08-28 PROCEDURE — 4004F PT TOBACCO SCREEN RCVD TLK: CPT | Performed by: INTERNAL MEDICINE

## 2024-08-28 PROCEDURE — G8427 DOCREV CUR MEDS BY ELIG CLIN: HCPCS | Performed by: INTERNAL MEDICINE

## 2024-08-28 PROCEDURE — 93000 ELECTROCARDIOGRAM COMPLETE: CPT | Performed by: INTERNAL MEDICINE

## 2024-08-28 PROCEDURE — G8417 CALC BMI ABV UP PARAM F/U: HCPCS | Performed by: INTERNAL MEDICINE

## 2024-08-28 RX ORDER — METOPROLOL SUCCINATE 25 MG/1
25 TABLET, EXTENDED RELEASE ORAL DAILY
Qty: 30 TABLET | Refills: 5 | Status: SHIPPED | OUTPATIENT
Start: 2024-08-28

## 2024-08-28 RX ORDER — METHOCARBAMOL 750 MG/1
750 TABLET, FILM COATED ORAL 2 TIMES DAILY PRN
COMMUNITY
Start: 2024-08-20 | End: 2024-08-29

## 2024-08-28 RX ORDER — LOSARTAN POTASSIUM 25 MG/1
25 TABLET ORAL NIGHTLY
Qty: 30 TABLET | Refills: 5 | Status: SHIPPED | OUTPATIENT
Start: 2024-08-28

## 2024-08-28 RX ORDER — ATORVASTATIN CALCIUM 40 MG/1
40 TABLET, FILM COATED ORAL DAILY
Qty: 30 TABLET | Refills: 3 | Status: SHIPPED | OUTPATIENT
Start: 2024-08-28

## 2024-08-28 NOTE — TELEPHONE ENCOUNTER
S/W PATIENT HE WILL NEED CARDIAC CLEARANCE BEFORE PCP WILL CLEAR HIM FOR SURGERY. HE SHOULD REACH OUT TO DR WALKER'S OFFICE FOR NEXT STEPS.

## 2024-08-28 NOTE — TELEPHONE ENCOUNTER
Surgery and/or Procedure Scheduling     Contact Name: Ryan Magana \"Giacomo\"   Surgical/Procedure Request:   Patient Contact Number: [phone]     Pt ASKING FOR A CALL BACK ASAP REGARDING UPCOMING SX. PCP WANTS THE Pt TO SEE A CARDIOLOGIST AND WON'T GIVE THE Pt ANY INFORMATION ON WHY OR WHAT IS THE REASON TO SEE CARDIOLOGIST.     PLEASE CALL Pt TO DISCUSS WHAT HAPPENS NOW, WITH HIS UPCOMING SURGERY AND HOW HE GOES FORWARD.

## 2024-08-28 NOTE — PATIENT INSTRUCTIONS
Refer to Dr. Acosta to rule out sleep apnea  Obtain CT Calcium score for screening  Start Toprol 25 mg daily  Start losartan 25 mg nightly  Increase Lipitor to 40 mg nightly  Obtain thyroid lab

## 2024-08-28 NOTE — PROGRESS NOTES
or insignificant tricuspid regurgitation.    Consider the cardiac MRI for definitive EF assessment    Last CMR  (if available):    Last Coronary Artery Calcium Score:                 All questions and concerns were addressed to the patient/family. Alternatives to my treatment were discussed. The note was completed using EMR. Every effort was made to ensure accuracy; however, inadvertent computerized transcription errors may be present.    Thank you for allowing me to participate in the care of your patient.    I, Rodrigue Felton MD, personally performed the services prescribed in this documentation as scribed by Ms. Gail Collins RN in my presence and it is both accurate and complete.       Rodrigue Felton MD  [facility]  [address], [address], [facility] [address]  Tel   Fax

## 2024-08-29 ENCOUNTER — OFFICE VISIT (OUTPATIENT)
Dept: ORTHOPEDIC SURGERY | Age: 49
End: 2024-08-29

## 2024-08-29 ENCOUNTER — TELEPHONE (OUTPATIENT)
Dept: ORTHOPEDIC SURGERY | Age: 49
End: 2024-08-29

## 2024-08-29 VITALS — BODY MASS INDEX: 38.06 KG/M2 | WEIGHT: 257 LBS | HEIGHT: 69 IN

## 2024-08-29 DIAGNOSIS — M16.11 PRIMARY OSTEOARTHRITIS OF RIGHT HIP: Primary | ICD-10-CM

## 2024-08-29 RX ORDER — NAPROXEN 500 MG/1
500 TABLET ORAL 2 TIMES DAILY WITH MEALS
Qty: 28 TABLET | Refills: 0 | Status: SHIPPED | OUTPATIENT
Start: 2024-08-29 | End: 2024-08-29

## 2024-08-29 RX ORDER — CLINDAMYCIN HCL 300 MG
300 CAPSULE ORAL 3 TIMES DAILY
Qty: 9 CAPSULE | Refills: 0 | Status: SHIPPED | OUTPATIENT
Start: 2024-08-29 | End: 2024-08-29

## 2024-08-29 RX ORDER — HYDROCODONE BITARTRATE AND ACETAMINOPHEN 5; 325 MG/1; MG/1
1 TABLET ORAL EVERY 4 HOURS PRN
Qty: 20 TABLET | Refills: 0 | Status: SHIPPED | OUTPATIENT
Start: 2024-08-29 | End: 2024-08-29

## 2024-08-29 RX ORDER — SENNOSIDES 8.6 MG
1 TABLET ORAL 2 TIMES DAILY
Qty: 14 TABLET | Refills: 0 | Status: SHIPPED | OUTPATIENT
Start: 2024-08-29 | End: 2024-08-29

## 2024-08-29 RX ORDER — NAPROXEN 500 MG/1
500 TABLET ORAL 2 TIMES DAILY WITH MEALS
Qty: 28 TABLET | Refills: 0 | Status: SHIPPED | OUTPATIENT
Start: 2024-08-29 | End: 2024-09-12

## 2024-08-29 RX ORDER — SENNOSIDES 8.6 MG
1 TABLET ORAL 2 TIMES DAILY
Qty: 14 TABLET | Refills: 0 | Status: SHIPPED | OUTPATIENT
Start: 2024-08-29 | End: 2024-09-05

## 2024-08-29 RX ORDER — ERGOCALCIFEROL 1.25 MG/1
50000 CAPSULE, LIQUID FILLED ORAL WEEKLY
Qty: 12 CAPSULE | Refills: 1 | Status: SHIPPED | OUTPATIENT
Start: 2024-08-29

## 2024-08-29 RX ORDER — ERGOCALCIFEROL 1.25 MG/1
50000 CAPSULE, LIQUID FILLED ORAL WEEKLY
Qty: 12 CAPSULE | Refills: 1 | Status: SHIPPED | OUTPATIENT
Start: 2024-08-29 | End: 2024-08-29

## 2024-08-29 RX ORDER — HYDROCODONE BITARTRATE AND ACETAMINOPHEN 5; 325 MG/1; MG/1
1 TABLET ORAL EVERY 4 HOURS PRN
Qty: 20 TABLET | Refills: 0 | Status: SHIPPED | OUTPATIENT
Start: 2024-08-29 | End: 2024-09-03

## 2024-08-29 RX ORDER — MULTIVITAMIN WITH IRON
1 TABLET ORAL DAILY
COMMUNITY

## 2024-08-29 RX ORDER — CYCLOBENZAPRINE HCL 10 MG
10 TABLET ORAL 3 TIMES DAILY PRN
Qty: 21 TABLET | Refills: 0 | Status: SHIPPED | OUTPATIENT
Start: 2024-08-29 | End: 2024-09-05

## 2024-08-29 RX ORDER — CYCLOBENZAPRINE HCL 10 MG
10 TABLET ORAL 3 TIMES DAILY PRN
Qty: 21 TABLET | Refills: 0 | Status: SHIPPED | OUTPATIENT
Start: 2024-08-29 | End: 2024-08-29

## 2024-08-29 RX ORDER — CEPHALEXIN 500 MG/1
500 CAPSULE ORAL 4 TIMES DAILY
Qty: 12 CAPSULE | Refills: 0 | Status: CANCELLED | OUTPATIENT
Start: 2024-08-29 | End: 2024-09-01

## 2024-08-29 RX ORDER — ASPIRIN 81 MG/1
81 TABLET ORAL 2 TIMES DAILY
Qty: 56 TABLET | Refills: 0 | Status: SHIPPED | OUTPATIENT
Start: 2024-08-29 | End: 2024-09-26

## 2024-08-29 NOTE — PROGRESS NOTES
[facility] PRE-SURGICAL TESTING INSTRUCTIONS                      PRIOR TO PROCEDURE DATE:    1. PLEASE FOLLOW ANY INSTRUCTIONS GIVEN TO YOU PER YOUR SURGEON.      2. Arrange for someone to drive you home and be with you for the first 24 hours after discharge for your safety after your procedure for which you received sedation. Ensure it is someone we can share information with regarding your discharge.     NOTE: At this time ONLY 2 ADULTS may accompany you   One person ENCOURAGED to stay at hospital entire time if outpatient surgery      3. You must contact your surgeon for instructions IF:  You are taking any blood thinners, aspirin, anti-inflammatory or vitamins.  There is a change in your physical condition such as a cold, fever, rash, cuts, sores, or any other infection, especially near your surgical site.    4. Do not drink alcohol the day before or day of your procedure.  Do not use any recreational marijuana at least 24 hours or street drugs (heroin, cocaine) at minimum 5 days prior to your procedure.     5. A Pre-Surgical History and Physical MUST be completed WITHIN 30 DAYS OR LESS prior to your procedure.by your Physician or an Urgent Care        THE DAY OF YOUR PROCEDURE:  1.  Follow instructions for ARRIVAL TIME as DIRECTED BY YOUR SURGEON.     2. Enter the MAIN entrance from [facility] and follow the signs to the free Parking Garage or  Parking (offered free of charge 7 am-5pm).      3. Enter the Main Entrance of the hospital (do not enter from the lower level of the parking garage). Upon entrance, check in with the  at the surgical information desk on your LEFT.   Bring your insurance card and photo ID to register      4. DO NOT EAT ANYTHING 8 hours prior to arrival for surgery.  You may have up to 8 ounces of water 4 hours prior to your arrival for surgery.   NOTE: ALL Gastric, Bariatric & Bowel surgery patients - you MUST follow your surgeon's instructions regarding  bring it with you on the day of your procedure.    10. If you use oxygen at home, please bring your oxygen tank with you to hospital..     11. We recommend that valuable personal belongings such as cash, cell phones, e-tablets, or jewelry, be left at home during your stay. The hospital will not be responsible for valuables that are not secured in the hospital safe. However, if your insurance requires a co-pay, you may want to bring a method of payment, i.e., Check or credit card, if you wish to pay your co-pay the day of surgery.      12. If you are to stay overnight, you may bring a bag with personal items. Please have any large items you may need brought in by your family after your arrival to your hospital room.    13. If you have a Living Will or Durable Power of , please bring a copy on the day of your procedure.     How we keep you safe and work to prevent surgical site infections:   1. Health care workers should always check your ID bracelet to verify your name and birth date. You will be asked many times to state your name, date of birth, and allergies.    2. Health care workers should always clean their hands with soap or alcohol gel before providing care to you. It is okay to ask anyone if they cleaned their hands before they touch you.    3. You will be actively involved in verifying the type of procedure you are having and ensuring the correct surgical site. This will be confirmed multiple times prior to your procedure. Do NOT lilly your surgery site UNLESS instructed to by your surgeon.     4. When you are in the operating room, your surgical site will be cleansed with a special soap, and in most cases, you will be given an antibiotic before the surgery begins.      What to expect AFTER your procedure?  1. Immediately following your procedure, your will be taken to the PACU for the first phase of your recovery.  Your nurse will help you recover from any potential side effects of anesthesia, such

## 2024-08-29 NOTE — PROGRESS NOTES
Total Joint video emailed & reviewed to patient by ANDRÉS Lanier instructions reviewed to use x 5 days preop.  HARVEY screening done. TJ book, IS instructions, TJ video link, and fall contract placed on chart for DOS.

## 2024-08-29 NOTE — TELEPHONE ENCOUNTER
Prescription Refill     Medication Name:  HYDROCODONE  Pharmacy: [address]  Patient Contact Number:  [phone]     PT REQUEST REFILL GO TO [address] [facility] IS STILL ON BACK ORDER

## 2024-08-29 NOTE — PROGRESS NOTES
Total Joint Same Day Readiness Screen  PAT Questionnaire    Does patient have at least one day of 24 hr assist of capable caregiver at d/c?  [x] Yes = 0  [] No = 2      Was patient using an assistive device to walk prior to surgery?  [x] No = 0  [] Yes = 1    How many steps do you have to get to the floor where you plan to initially sleep and use the restroom? (At least 1/2 bath)  [x] 0-2 steps= 0 [] 3+ steps = 1    Has patient fallen in the last 3 months? If yes, how many times?  [x] 0 falls = 0 [] 1 fall = 1     [] 2+ falls = 2    Does patient have a hx of post-op nausea/vomiting?  [x] No = 0 [] Yes = 2    Other Factors    Age  [x] <70 = 0 [] 71-79 = 1  [] 80+ = 2    BMI  [] <30 = 0       [x]31-39 = 1    [] >40 = 2    Co-morbidities  [] 0 = 0           [] 1-2 = 1       [x] 3+ = 2    Sleep apnea  [x] No = 0         [] Yes = 1    Hx of prolonged emergence from general anesthesia  [x] No = 0         [] Yes = 1      Score: 3      Interpretation:  Red (10-16): Low probability of safe same day discharge  Yellow (6-9): Moderate probability of safe same day discharge  Green (0-5): High probability of safe same day discharge    Score completed by:  Shahrzad Vazquez PT  0100

## 2024-08-29 NOTE — TELEPHONE ENCOUNTER
General Question     Subject: REQUESTING CLARIFICATION ON HIS MEDICATION.  Patient and /or Facility Request: Ryan Magana \"Giacomo\"   Contact Number: [phone]

## 2024-08-29 NOTE — PROGRESS NOTES
Chief Complaint  Hip Pain (Preop right JUVENTINO)      History of Present Illness:  Ryan Magana is a pleasant 49 y.o. male here for pre op JUVENTINO sched for 9/6/2024. No setbacks, has underwent his pre op testing.       Medical History:  Patient's medications, allergies, past medical, surgical, social and family histories were reviewed and updated as appropriate.    Pain Assessment  Location of Pain: Hip  Location Modifiers: Right  Severity of Pain: 5  Quality of Pain: Sharp, Aching  Frequency of Pain: Intermittent  Aggravating Factors: Walking  Limiting Behavior: Yes  Relieving Factors: Rest  Result of Injury: No  Work-Related Injury: No  ROS: Review of systems reviewed from Patient History Form completed today and available in the patient's chart under the Media tab.      Pertinent items are noted in HPI  Review of systems reviewed from Patient History Form completed today and available in the patient's chart under the Media tab.       Vital Signs:  Ht 1.753 m (5' 9\")   Wt 116.6 kg (257 lb)   BMI 37.95 kg/m²         Neuro: Alert & oriented x 3,  normal,  no focal deficits noted. Normal affect.  Eyes: sclera clear  Ears: Normal external ear  Mouth:  No perioral lesions  Pulm: Respirations unlabored and regular  Pulse: Extremities well perfused. 2+ peripheral pulses.  Skin: Warm. No ulcerations.      Constitutional: The physical examination finds the patient to be well-developed and well-nourished.  The patient is alert and oriented x3 and was cooperative throughout the visit.    Hip Examination: right    Skin/Inspection: No skin lesions, cellulitis, or extreme edema in the lower extremities.     Standing/Walking: normal gait, negative Trendelenburg sign.      Supine/Side Lying Exam: Non tender around the major bony prominences  full range with pain  FADIR Positive  AYANA Negative  Resisted Abduction  5/5   Resisted Adduction  5/5   Resisted  Flexion  5/5     LEG LENGTHS EQUAL ON EXAM    Distal Neurovascular exam is  be sufficiently resolved with the use of this device.    Verbal and written instructions for the use of and application of this item were provided.  The patient was educated and fit by a healthcare professional with expert knowledge and specialization in brace application while under the direct supervision of the treating physician. They were instructed to contact the office immediately should the equipment result in increased pain, decreased sensation, increased swelling or worsening of the condition.       Plan:  Ryan Magana is a candidate for operative management to the  right hip next  with no contraindications seen based on pre operative blood work and today's assessment.     We believe patient is a candidate for a RIGHT total hip replacement, direct anterior approach GIRMA assisted.     Risks, benefits, advantages, disadvantages and potential complications as well as the anticipated postoperative course were discussed. We outlined the 85-95% success rate of the operation.  The 3-5% risk of needing future additional surgery in the event infection, dislocation, or hardware loosening. The risks of infection, dislocation, bleeding, nerve injury, perineal numbness,hip stiffness, hip instability, leg length discrepancy, rah-prosthetic fracture, DVT/PE, MI/CAD/stroke and the possibility of persistent pain and prolonged recovery. We also discussed the involved rehabilitation timelines, no driving for 4 weeks, the general trajectory of improvement after hip surgery (pain relief, activities of daily living, return to sport). The patient agreed to pursue this treatment option.  All questions were addressed to their satisfaction.    We discussed the surgical process, postoperative recovery, the medications to be taken, and wound care instructions,  crutches instructions and fitting.  Prescriptions were printed and given to the patient today.      no latex allergy  no adhesive allergy  yes -   medication allergy, see

## 2024-08-29 NOTE — TELEPHONE ENCOUNTER
S/W PATIENT; HYDROCODONE SENT TO [facility]; ALL MEDS GIVEN WILL BE STARTED AFTER SURGERY ACCEPT FOR VIT D WHICH CAN BE STARTED NOW

## 2024-08-30 ENCOUNTER — HOSPITAL ENCOUNTER (OUTPATIENT)
Dept: PHYSICAL THERAPY | Age: 49
Setting detail: THERAPIES SERIES
Discharge: HOME | End: 2024-08-30
Payer: COMMERCIAL

## 2024-08-30 ENCOUNTER — TELEPHONE (OUTPATIENT)
Dept: ORTHOPEDIC SURGERY | Age: 49
End: 2024-08-30

## 2024-08-30 DIAGNOSIS — M25.651 DECREASED RANGE OF RIGHT HIP MOVEMENT: Primary | ICD-10-CM

## 2024-08-30 DIAGNOSIS — R26.89 BALANCE PROBLEM: ICD-10-CM

## 2024-08-30 DIAGNOSIS — R29.898 HIP WEAKNESS: ICD-10-CM

## 2024-08-30 DIAGNOSIS — M54.41 CHRONIC RIGHT-SIDED LOW BACK PAIN WITH RIGHT-SIDED SCIATICA: ICD-10-CM

## 2024-08-30 DIAGNOSIS — G89.29 CHRONIC RIGHT-SIDED LOW BACK PAIN WITH RIGHT-SIDED SCIATICA: ICD-10-CM

## 2024-08-30 PROCEDURE — 97161 PT EVAL LOW COMPLEX 20 MIN: CPT

## 2024-08-30 PROCEDURE — 97530 THERAPEUTIC ACTIVITIES: CPT

## 2024-08-30 PROCEDURE — 97110 THERAPEUTIC EXERCISES: CPT

## 2024-08-30 NOTE — TELEPHONE ENCOUNTER
General Question     Subject: RT HIP INSURANCE/REQ A CALL BACK    Patient and /or Facility Request: Ryan Magana \"Giacomo\"   Contact Number: [phone]       PATIENT CALLED IN TO SEE IF HE CAN SPEAK TO SOMEONE IN THE OFFICE ABOUT HIS RT HIP SX INSURANCE ONLY COVER PARTIAL. THEY ARE NOT COVERING THE EQUIPMENT. PATIENT REQ A CALL BACK...     PLEASE ADVISE

## 2024-08-30 NOTE — PLAN OF CARE
[facility] - Outpatient Rehabilitation and Therapy [address], [place] [address] office: [phone] fax: [phone]     Physical Therapy Initial Evaluation Certification      Dear Arely Lu MD,    We had the pleasure of evaluating the following patient for physical therapy services at [facility].  A summary of our findings can be found in the initial assessment below.  This includes our plan of care.  If you have any questions or concerns regarding these findings, please do not hesitate to contact me at the office phone number listed above.  Thank you for the referral.     Physician Signature:_______________________________Date:__________________  By signing above (or electronic signature), therapist’s plan is approved by physician       Physical Therapy: TREATMENT/PROGRESS NOTE   Patient: Ryan Magana (49 y.o. male)   Examination Date: 2024   :  1975 MRN: [id number]   Visit #: 1   Insurance Allowable Auth Needed   30  Used  [x]Yes    []No    Insurance: Payor: [employer] / Plan: [employer] [place] MEDICAID / Product Type: *No Product type* /   Insurance ID: [id number] - (Medicaid Managed)  Secondary Insurance (if applicable):    Treatment Diagnosis:     ICD-10-CM    1. Decreased range of right hip movement  M25.651       2. Hip weakness  R29.898       3. Balance problem  R26.89       4. Chronic right-sided low back pain with right-sided sciatica  M54.41     G89.29          Medical Diagnosis:  Chronic right-sided low back pain without sciatica [M54.50, G89.29]   Referring Physician: Arely Lu MD  PCP: Arely Lu MD     Plan of care signed (Y/N): N    Date of Patient follow up with Physician: Surgery 24     Plan of Care Report: EVAL today  POC update due: (10 visits /OR AUTH LIMITS, whichever is less)  2024                                             Medical History:  Comorbidities:  Other Cardio/Pulmonary  or surgery.   (98673) THERAPEUTIC ACTIVITY - use of dynamic activities to improve functional performance. (Ex include squatting, ascending/descending stairs, walking, bending, lifting, catching, throwing, pushing, pulling, jumping.)  Direct, one on one contact, billed in 15-minute increments.    GOALS     GOALS:  Therapist goals for Patient:   Short Term Goals: To be achieved in: 1-2 visit(s)  1. Independent in HEP and progression per patient tolerance, in order to prevent re-injury.   [] Progressing: [x] Met: [] Not Met: [] Adjusted  2. All patient questions regarding expectations for rehab following upcoming surgery are answered.   [] Progressing: [x] Met: [] Not Met: [] Adjusted    Long Term Goals: long term goals to be determined at re-eval following upcoming surgery    TREATMENT PLAN     Frequency/Duration: 1 visit     Interventions:  Therapeutic Exercise (00724) including: strength training, ROM, and functional mobility  Therapeutic Activities (53137) including: functional mobility training and education.  Neuromuscular Re-education (46985) activation and proprioception, including postural re-education.    Manual Therapy (03766) as indicated to include: Passive Range of Motion, Gr I-IV mobilizations, Grade V Manipulation, Soft Tissue Mobilization, Dry Needling/IASTM, Trigger Point Release, and Myofascial Release  Modalities as needed that may include: Cryotherapy, Electrical Stimulation, and Vasoneumatic Compression  Patient education on joint protection, postural re-education, activity modification, and progression of HEP    Plan: POC initiated as per evaluation    Electronically Signed by Adan Bass PT  Date: 08/30/2024     Note: Portions of this note have been templated and/or copied from initial evaluation, reassessments and prior notes for documentation efficiency.    Note: If patient does not return for scheduled/recommended follow up visits, this note will serve as a discharge from care along with

## 2024-09-05 NOTE — DISCHARGE INSTRUCTIONS
Dr. Ekaterina Duque MD [facility]  Hip Preservation & Sports Medicine Surgeon   [facility]          POST-OPERATIVE INSTRUCTIONS:     Apply ice (over your dressing) for 4-6 weeks after surgery to help reduce swelling.    This can be applied to the affected area 20 minutes out of every hour while you are awake.     Leave your water proof bandage in place for 7 days. After 7 days you may change to a new, waterproof bandage. You should change your bandage sooner if it becomes wet.     Please take all of your post-operative medications as prescribed.  Please do not alter how you take these medications without contacting the physician.  If you have any questions and/or concerns about your post-operative medications, please call our office.    Please do not take any additional Tylenol while you are taking the Norco.  This medication already contains Tylenol and taking additional Tylenol may cause liver damage.    It is okay to use Tylenol once you are no longer taking the Norco.    It is common to feel drowsy while taking pain medication.  Do not drink alcohol or drive while taking pain medication.    Nausea is also a common side effect of using pain medication.  If this occurs, try taking your medication with food.  Also drink plenty of water while taking the pain medication. You may use an over the counter anti-nausea as needed.  If nausea becomes severe, please contact the office and a prescription for Zofran may be prescribed.    To optimize your pain control, you can take your pain medication as prescribed for 2 days, then gradually taper off over the next day as tolerable.  If you feel your pain is not well controlled or begins to worsen following your first post-operative visit, please contact our office.   You will also need take a daily aspirin.  This will help prevent blood clots.       Please keep your dressings/wounds dry at all times.  Do not swim in pools, lakes, etc. until instructed to do so by

## 2024-09-06 ENCOUNTER — APPOINTMENT (OUTPATIENT)
Dept: GENERAL RADIOLOGY | Age: 49
End: 2024-09-06
Attending: ORTHOPAEDIC SURGERY
Payer: COMMERCIAL

## 2024-09-06 ENCOUNTER — ANESTHESIA (OUTPATIENT)
Dept: OPERATING ROOM | Age: 49
End: 2024-09-06
Payer: COMMERCIAL

## 2024-09-06 ENCOUNTER — ANESTHESIA EVENT (OUTPATIENT)
Dept: OPERATING ROOM | Age: 49
End: 2024-09-06
Payer: COMMERCIAL

## 2024-09-06 ENCOUNTER — HOSPITAL ENCOUNTER (OUTPATIENT)
Age: 49
Setting detail: OBSERVATION
Discharge: HOME | End: 2024-09-07
Attending: ORTHOPAEDIC SURGERY | Admitting: ORTHOPAEDIC SURGERY
Payer: COMMERCIAL

## 2024-09-06 PROBLEM — Z96.641 STATUS POST TOTAL HIP REPLACEMENT, RIGHT: Status: ACTIVE | Noted: 2024-09-06

## 2024-09-06 LAB
ABO + RH BLD: NORMAL
ANION GAP SERPL CALCULATED.3IONS-SCNC: 14 MMOL/L (ref 3–16)
ANTIBODY SCREEN: NORMAL
BASOPHILS # BLD: 0 K/UL (ref 0–0.2)
BASOPHILS NFR BLD: 0 %
BUN SERPL-MCNC: 14 MG/DL (ref 7–20)
CALCIUM SERPL-MCNC: 8.8 MG/DL (ref 8.3–10.6)
CHLORIDE SERPL-SCNC: 102 MMOL/L (ref 99–110)
CO2 SERPL-SCNC: 22 MMOL/L (ref 21–32)
CREAT SERPL-MCNC: 1 MG/DL (ref 0.9–1.3)
DEPRECATED RDW RBC AUTO: 13.1 % (ref 12.4–15.4)
EOSINOPHIL # BLD: 0 K/UL (ref 0–0.6)
EOSINOPHIL NFR BLD: 0 %
GFR SERPLBLD CREATININE-BSD FMLA CKD-EPI: >90 ML/MIN/{1.73_M2}
GLUCOSE BLD-MCNC: 103 MG/DL (ref 70–99)
GLUCOSE SERPL-MCNC: 178 MG/DL (ref 70–99)
HCT VFR BLD AUTO: 42.7 % (ref 40.5–52.5)
HCT VFR BLD AUTO: 49.2 % (ref 40.5–52.5)
HGB BLD-MCNC: 14.2 G/DL (ref 13.5–17.5)
HGB BLD-MCNC: 15.5 G/DL (ref 13.5–17.5)
LYMPHOCYTES # BLD: 0.3 K/UL (ref 1–5.1)
LYMPHOCYTES NFR BLD: 2.2 %
MCH RBC QN AUTO: 29.3 PG (ref 26–34)
MCHC RBC AUTO-ENTMCNC: 33.3 G/DL (ref 31–36)
MCV RBC AUTO: 88.1 FL (ref 80–100)
MONOCYTES # BLD: 0.8 K/UL (ref 0–1.3)
MONOCYTES NFR BLD: 5 %
NEUTROPHILS # BLD: 14.1 K/UL (ref 1.7–7.7)
NEUTROPHILS NFR BLD: 92.8 %
PERFORMED ON: ABNORMAL
PLATELET # BLD AUTO: 237 K/UL (ref 135–450)
PMV BLD AUTO: 7.8 FL (ref 5–10.5)
POTASSIUM SERPL-SCNC: 4.7 MMOL/L (ref 3.5–5.1)
RBC # BLD AUTO: 4.85 M/UL (ref 4.2–5.9)
SODIUM SERPL-SCNC: 138 MMOL/L (ref 136–145)
TSH SERPL DL<=0.005 MIU/L-ACNC: 0.6 UIU/ML (ref 0.27–4.2)
WBC # BLD AUTO: 15.2 K/UL (ref 4–11)

## 2024-09-06 PROCEDURE — 2580000003 HC RX 258: Performed by: ORTHOPAEDIC SURGERY

## 2024-09-06 PROCEDURE — 85025 COMPLETE CBC W/AUTO DIFF WBC: CPT

## 2024-09-06 PROCEDURE — 86900 BLOOD TYPING SEROLOGIC ABO: CPT

## 2024-09-06 PROCEDURE — 6360000002 HC RX W HCPCS: Performed by: ORTHOPAEDIC SURGERY

## 2024-09-06 PROCEDURE — 86850 RBC ANTIBODY SCREEN: CPT

## 2024-09-06 PROCEDURE — A4217 STERILE WATER/SALINE, 500 ML: HCPCS | Performed by: ORTHOPAEDIC SURGERY

## 2024-09-06 PROCEDURE — 2500000003 HC RX 250 WO HCPCS: Performed by: ORTHOPAEDIC SURGERY

## 2024-09-06 PROCEDURE — 6370000000 HC RX 637 (ALT 250 FOR IP): Performed by: FAMILY MEDICINE

## 2024-09-06 PROCEDURE — 2720000010 HC SURG SUPPLY STERILE: Performed by: ORTHOPAEDIC SURGERY

## 2024-09-06 PROCEDURE — 2709999900 HC NON-CHARGEABLE SUPPLY: Performed by: ORTHOPAEDIC SURGERY

## 2024-09-06 PROCEDURE — 36415 COLL VENOUS BLD VENIPUNCTURE: CPT

## 2024-09-06 PROCEDURE — G0378 HOSPITAL OBSERVATION PER HR: HCPCS

## 2024-09-06 PROCEDURE — 2580000003 HC RX 258: Performed by: FAMILY MEDICINE

## 2024-09-06 PROCEDURE — 6360000002 HC RX W HCPCS: Performed by: ANESTHESIOLOGY

## 2024-09-06 PROCEDURE — 3700000001 HC ADD 15 MINUTES (ANESTHESIA): Performed by: ORTHOPAEDIC SURGERY

## 2024-09-06 PROCEDURE — 6360000002 HC RX W HCPCS

## 2024-09-06 PROCEDURE — 73501 X-RAY EXAM HIP UNI 1 VIEW: CPT

## 2024-09-06 PROCEDURE — 80048 BASIC METABOLIC PNL TOTAL CA: CPT

## 2024-09-06 PROCEDURE — C1776 JOINT DEVICE (IMPLANTABLE): HCPCS | Performed by: ORTHOPAEDIC SURGERY

## 2024-09-06 PROCEDURE — 85014 HEMATOCRIT: CPT

## 2024-09-06 PROCEDURE — 73502 X-RAY EXAM HIP UNI 2-3 VIEWS: CPT

## 2024-09-06 PROCEDURE — 85018 HEMOGLOBIN: CPT

## 2024-09-06 PROCEDURE — 3600000004 HC SURGERY LEVEL 4 BASE: Performed by: ORTHOPAEDIC SURGERY

## 2024-09-06 PROCEDURE — 6370000000 HC RX 637 (ALT 250 FOR IP): Performed by: ORTHOPAEDIC SURGERY

## 2024-09-06 PROCEDURE — 2500000003 HC RX 250 WO HCPCS

## 2024-09-06 PROCEDURE — 3700000000 HC ANESTHESIA ATTENDED CARE: Performed by: ORTHOPAEDIC SURGERY

## 2024-09-06 PROCEDURE — 84443 ASSAY THYROID STIM HORMONE: CPT

## 2024-09-06 PROCEDURE — 2580000003 HC RX 258

## 2024-09-06 PROCEDURE — 6370000000 HC RX 637 (ALT 250 FOR IP)

## 2024-09-06 PROCEDURE — 7100000000 HC PACU RECOVERY - FIRST 15 MIN: Performed by: ORTHOPAEDIC SURGERY

## 2024-09-06 PROCEDURE — 7100000001 HC PACU RECOVERY - ADDTL 15 MIN: Performed by: ORTHOPAEDIC SURGERY

## 2024-09-06 PROCEDURE — C1769 GUIDE WIRE: HCPCS | Performed by: ORTHOPAEDIC SURGERY

## 2024-09-06 PROCEDURE — 3600000014 HC SURGERY LEVEL 4 ADDTL 15MIN: Performed by: ORTHOPAEDIC SURGERY

## 2024-09-06 PROCEDURE — 86901 BLOOD TYPING SEROLOGIC RH(D): CPT

## 2024-09-06 DEVICE — HEAD FEM DIA36MM +2.5MM OFFSET HIP BIOLOX DELT CERAMIC TAPR: Type: IMPLANTABLE DEVICE | Site: HIP | Status: FUNCTIONAL

## 2024-09-06 DEVICE — SCREW BNE L30MM DIA6.5MM STD CANC HIP TI HMSPHR THRD DRVR: Type: IMPLANTABLE DEVICE | Site: HIP | Status: FUNCTIONAL

## 2024-09-06 DEVICE — IMPLANTABLE DEVICE: Type: IMPLANTABLE DEVICE | Site: HIP | Status: FUNCTIONAL

## 2024-09-06 DEVICE — SCREW BNE L25MM DIA6.5MM HEX LO PROF TRIDENT II: Type: IMPLANTABLE DEVICE | Site: HIP | Status: FUNCTIONAL

## 2024-09-06 DEVICE — COMPONENT TOT HIP CAPPED LNR POLYETH H2STRYKER] STRYKER CORP]: Type: IMPLANTABLE DEVICE | Site: HIP | Status: FUNCTIONAL

## 2024-09-06 DEVICE — SHELL ACET SZ F DIA58MM 5 CLUS H TRITANIUM PRESSFIT PRI: Type: IMPLANTABLE DEVICE | Site: HIP | Status: FUNCTIONAL

## 2024-09-06 DEVICE — INSERT ACET F 0 DEG 36 MM HIP X3 TRIDENT: Type: IMPLANTABLE DEVICE | Site: HIP | Status: FUNCTIONAL

## 2024-09-06 RX ORDER — ACETAMINOPHEN 325 MG/1
650 TABLET ORAL EVERY 6 HOURS
Status: DISCONTINUED | OUTPATIENT
Start: 2024-09-06 | End: 2024-09-07 | Stop reason: HOSPADM

## 2024-09-06 RX ORDER — FENTANYL CITRATE 50 UG/ML
50 INJECTION, SOLUTION INTRAMUSCULAR; INTRAVENOUS EVERY 5 MIN PRN
Status: DISCONTINUED | OUTPATIENT
Start: 2024-09-06 | End: 2024-09-06 | Stop reason: HOSPADM

## 2024-09-06 RX ORDER — DEXAMETHASONE SODIUM PHOSPHATE 10 MG/ML
10 INJECTION, SOLUTION INTRAMUSCULAR; INTRAVENOUS ONCE
Status: COMPLETED | OUTPATIENT
Start: 2024-09-06 | End: 2024-09-06

## 2024-09-06 RX ORDER — ROCURONIUM BROMIDE 10 MG/ML
INJECTION, SOLUTION INTRAVENOUS PRN
Status: DISCONTINUED | OUTPATIENT
Start: 2024-09-06 | End: 2024-09-06 | Stop reason: SDUPTHER

## 2024-09-06 RX ORDER — GABAPENTIN 300 MG/1
300 CAPSULE ORAL ONCE
Status: COMPLETED | OUTPATIENT
Start: 2024-09-06 | End: 2024-09-06

## 2024-09-06 RX ORDER — SODIUM CHLORIDE 9 MG/ML
INJECTION, SOLUTION INTRAVENOUS PRN
Status: DISCONTINUED | OUTPATIENT
Start: 2024-09-06 | End: 2024-09-07 | Stop reason: HOSPADM

## 2024-09-06 RX ORDER — ONDANSETRON 2 MG/ML
4 INJECTION INTRAMUSCULAR; INTRAVENOUS EVERY 6 HOURS PRN
Status: DISCONTINUED | OUTPATIENT
Start: 2024-09-06 | End: 2024-09-07 | Stop reason: HOSPADM

## 2024-09-06 RX ORDER — SODIUM CHLORIDE 0.9 % (FLUSH) 0.9 %
5-40 SYRINGE (ML) INJECTION PRN
Status: DISCONTINUED | OUTPATIENT
Start: 2024-09-06 | End: 2024-09-06 | Stop reason: HOSPADM

## 2024-09-06 RX ORDER — LABETALOL HYDROCHLORIDE 5 MG/ML
10 INJECTION, SOLUTION INTRAVENOUS
Status: DISCONTINUED | OUTPATIENT
Start: 2024-09-06 | End: 2024-09-06 | Stop reason: HOSPADM

## 2024-09-06 RX ORDER — ACETAMINOPHEN 325 MG/1
1000 TABLET ORAL ONCE
Status: COMPLETED | OUTPATIENT
Start: 2024-09-06 | End: 2024-09-06

## 2024-09-06 RX ORDER — METHOCARBAMOL 100 MG/ML
INJECTION, SOLUTION INTRAMUSCULAR; INTRAVENOUS PRN
Status: DISCONTINUED | OUTPATIENT
Start: 2024-09-06 | End: 2024-09-06 | Stop reason: SDUPTHER

## 2024-09-06 RX ORDER — SODIUM CHLORIDE 9 MG/ML
INJECTION, SOLUTION INTRAVENOUS PRN
Status: DISCONTINUED | OUTPATIENT
Start: 2024-09-06 | End: 2024-09-06 | Stop reason: HOSPADM

## 2024-09-06 RX ORDER — SODIUM CHLORIDE 9 MG/ML
INJECTION, SOLUTION INTRAVENOUS CONTINUOUS
Status: DISCONTINUED | OUTPATIENT
Start: 2024-09-06 | End: 2024-09-07 | Stop reason: HOSPADM

## 2024-09-06 RX ORDER — CELECOXIB 200 MG/1
400 CAPSULE ORAL ONCE
Status: COMPLETED | OUTPATIENT
Start: 2024-09-06 | End: 2024-09-06

## 2024-09-06 RX ORDER — MIDAZOLAM HYDROCHLORIDE 1 MG/ML
2 INJECTION INTRAMUSCULAR; INTRAVENOUS
Status: DISCONTINUED | OUTPATIENT
Start: 2024-09-06 | End: 2024-09-06 | Stop reason: HOSPADM

## 2024-09-06 RX ORDER — OXYCODONE HYDROCHLORIDE 5 MG/1
10 TABLET ORAL EVERY 4 HOURS PRN
Status: DISCONTINUED | OUTPATIENT
Start: 2024-09-06 | End: 2024-09-07 | Stop reason: HOSPADM

## 2024-09-06 RX ORDER — CLINDAMYCIN PHOSPHATE 900 MG/50ML
900 INJECTION, SOLUTION INTRAVENOUS
Status: COMPLETED | OUTPATIENT
Start: 2024-09-06 | End: 2024-09-06

## 2024-09-06 RX ORDER — MIDAZOLAM HYDROCHLORIDE 1 MG/ML
2 INJECTION INTRAMUSCULAR; INTRAVENOUS ONCE
Status: DISCONTINUED | OUTPATIENT
Start: 2024-09-06 | End: 2024-09-06 | Stop reason: HOSPADM

## 2024-09-06 RX ORDER — METOCLOPRAMIDE HYDROCHLORIDE 5 MG/ML
10 INJECTION INTRAMUSCULAR; INTRAVENOUS
Status: COMPLETED | OUTPATIENT
Start: 2024-09-06 | End: 2024-09-06

## 2024-09-06 RX ORDER — MORPHINE SULFATE 4 MG/ML
4 INJECTION INTRAVENOUS
Status: DISCONTINUED | OUTPATIENT
Start: 2024-09-06 | End: 2024-09-06 | Stop reason: SDUPTHER

## 2024-09-06 RX ORDER — SODIUM CHLORIDE 0.9 % (FLUSH) 0.9 %
5-40 SYRINGE (ML) INJECTION EVERY 12 HOURS SCHEDULED
Status: DISCONTINUED | OUTPATIENT
Start: 2024-09-06 | End: 2024-09-06 | Stop reason: HOSPADM

## 2024-09-06 RX ORDER — HYDROMORPHONE HYDROCHLORIDE 2 MG/ML
INJECTION, SOLUTION INTRAMUSCULAR; INTRAVENOUS; SUBCUTANEOUS PRN
Status: DISCONTINUED | OUTPATIENT
Start: 2024-09-06 | End: 2024-09-06 | Stop reason: SDUPTHER

## 2024-09-06 RX ORDER — LOSARTAN POTASSIUM 25 MG/1
25 TABLET ORAL NIGHTLY
Status: DISCONTINUED | OUTPATIENT
Start: 2024-09-06 | End: 2024-09-07 | Stop reason: HOSPADM

## 2024-09-06 RX ORDER — SENNA AND DOCUSATE SODIUM 50; 8.6 MG/1; MG/1
1 TABLET, FILM COATED ORAL 2 TIMES DAILY
Status: DISCONTINUED | OUTPATIENT
Start: 2024-09-06 | End: 2024-09-07 | Stop reason: HOSPADM

## 2024-09-06 RX ORDER — MORPHINE SULFATE 2 MG/ML
4 INJECTION, SOLUTION INTRAMUSCULAR; INTRAVENOUS
Status: DISCONTINUED | OUTPATIENT
Start: 2024-09-06 | End: 2024-09-07 | Stop reason: HOSPADM

## 2024-09-06 RX ORDER — METOPROLOL SUCCINATE 25 MG/1
25 TABLET, EXTENDED RELEASE ORAL DAILY
Status: DISCONTINUED | OUTPATIENT
Start: 2024-09-07 | End: 2024-09-07 | Stop reason: HOSPADM

## 2024-09-06 RX ORDER — BUPIVACAINE HYDROCHLORIDE 2.5 MG/ML
30 INJECTION, SOLUTION EPIDURAL; INFILTRATION; INTRACAUDAL ONCE
Status: DISCONTINUED | OUTPATIENT
Start: 2024-09-06 | End: 2024-09-06 | Stop reason: HOSPADM

## 2024-09-06 RX ORDER — OXYCODONE HYDROCHLORIDE 5 MG/1
5 TABLET ORAL EVERY 4 HOURS PRN
Status: DISCONTINUED | OUTPATIENT
Start: 2024-09-06 | End: 2024-09-07 | Stop reason: HOSPADM

## 2024-09-06 RX ORDER — LIDOCAINE HYDROCHLORIDE 20 MG/ML
INJECTION, SOLUTION INTRAVENOUS PRN
Status: DISCONTINUED | OUTPATIENT
Start: 2024-09-06 | End: 2024-09-06 | Stop reason: SDUPTHER

## 2024-09-06 RX ORDER — FENTANYL CITRATE 50 UG/ML
100 INJECTION, SOLUTION INTRAMUSCULAR; INTRAVENOUS ONCE
Status: DISCONTINUED | OUTPATIENT
Start: 2024-09-06 | End: 2024-09-06 | Stop reason: HOSPADM

## 2024-09-06 RX ORDER — DEXAMETHASONE SODIUM PHOSPHATE 4 MG/ML
INJECTION, SOLUTION INTRA-ARTICULAR; INTRALESIONAL; INTRAMUSCULAR; INTRAVENOUS; SOFT TISSUE PRN
Status: DISCONTINUED | OUTPATIENT
Start: 2024-09-06 | End: 2024-09-06 | Stop reason: SDUPTHER

## 2024-09-06 RX ORDER — ONDANSETRON 4 MG/1
4 TABLET, ORALLY DISINTEGRATING ORAL EVERY 8 HOURS PRN
Status: DISCONTINUED | OUTPATIENT
Start: 2024-09-06 | End: 2024-09-07 | Stop reason: HOSPADM

## 2024-09-06 RX ORDER — SODIUM CHLORIDE, SODIUM LACTATE, POTASSIUM CHLORIDE, CALCIUM CHLORIDE 600; 310; 30; 20 MG/100ML; MG/100ML; MG/100ML; MG/100ML
INJECTION, SOLUTION INTRAVENOUS CONTINUOUS
Status: DISCONTINUED | OUTPATIENT
Start: 2024-09-06 | End: 2024-09-06 | Stop reason: HOSPADM

## 2024-09-06 RX ORDER — SODIUM CHLORIDE 0.9 % (FLUSH) 0.9 %
5-40 SYRINGE (ML) INJECTION EVERY 12 HOURS SCHEDULED
Status: DISCONTINUED | OUTPATIENT
Start: 2024-09-06 | End: 2024-09-07 | Stop reason: HOSPADM

## 2024-09-06 RX ORDER — CLINDAMYCIN PHOSPHATE 900 MG/50ML
900 INJECTION, SOLUTION INTRAVENOUS EVERY 8 HOURS
Status: COMPLETED | OUTPATIENT
Start: 2024-09-06 | End: 2024-09-07

## 2024-09-06 RX ORDER — MAGNESIUM HYDROXIDE 1200 MG/15ML
LIQUID ORAL CONTINUOUS PRN
Status: DISCONTINUED | OUTPATIENT
Start: 2024-09-06 | End: 2024-09-06 | Stop reason: HOSPADM

## 2024-09-06 RX ORDER — 0.9 % SODIUM CHLORIDE 0.9 %
500 INTRAVENOUS SOLUTION INTRAVENOUS ONCE
Status: COMPLETED | OUTPATIENT
Start: 2024-09-06 | End: 2024-09-07

## 2024-09-06 RX ORDER — NAPROXEN 250 MG/1
500 TABLET ORAL 2 TIMES DAILY WITH MEALS
Status: DISCONTINUED | OUTPATIENT
Start: 2024-09-06 | End: 2024-09-07 | Stop reason: HOSPADM

## 2024-09-06 RX ORDER — MORPHINE SULFATE 4 MG/ML
2 INJECTION INTRAVENOUS
Status: DISCONTINUED | OUTPATIENT
Start: 2024-09-06 | End: 2024-09-06 | Stop reason: SDUPTHER

## 2024-09-06 RX ORDER — ONDANSETRON 2 MG/ML
INJECTION INTRAMUSCULAR; INTRAVENOUS PRN
Status: DISCONTINUED | OUTPATIENT
Start: 2024-09-06 | End: 2024-09-06 | Stop reason: SDUPTHER

## 2024-09-06 RX ORDER — SODIUM CHLORIDE 0.9 % (FLUSH) 0.9 %
5-40 SYRINGE (ML) INJECTION PRN
Status: DISCONTINUED | OUTPATIENT
Start: 2024-09-06 | End: 2024-09-07 | Stop reason: HOSPADM

## 2024-09-06 RX ORDER — LIDOCAINE HYDROCHLORIDE 10 MG/ML
1 INJECTION, SOLUTION EPIDURAL; INFILTRATION; INTRACAUDAL; PERINEURAL
Status: DISCONTINUED | OUTPATIENT
Start: 2024-09-06 | End: 2024-09-06 | Stop reason: HOSPADM

## 2024-09-06 RX ORDER — KETAMINE HCL IN NACL, ISO-OSM 20 MG/2 ML
SYRINGE (ML) INJECTION PRN
Status: DISCONTINUED | OUTPATIENT
Start: 2024-09-06 | End: 2024-09-06 | Stop reason: SDUPTHER

## 2024-09-06 RX ORDER — CYCLOBENZAPRINE HCL 10 MG
10 TABLET ORAL 3 TIMES DAILY PRN
Status: DISCONTINUED | OUTPATIENT
Start: 2024-09-06 | End: 2024-09-07 | Stop reason: HOSPADM

## 2024-09-06 RX ORDER — ONDANSETRON 2 MG/ML
4 INJECTION INTRAMUSCULAR; INTRAVENOUS
Status: DISCONTINUED | OUTPATIENT
Start: 2024-09-06 | End: 2024-09-06 | Stop reason: HOSPADM

## 2024-09-06 RX ORDER — NALOXONE HYDROCHLORIDE 0.4 MG/ML
INJECTION, SOLUTION INTRAMUSCULAR; INTRAVENOUS; SUBCUTANEOUS PRN
Status: DISCONTINUED | OUTPATIENT
Start: 2024-09-06 | End: 2024-09-06 | Stop reason: HOSPADM

## 2024-09-06 RX ORDER — OXYCODONE HCL 10 MG/1
10 TABLET, FILM COATED, EXTENDED RELEASE ORAL ONCE
Status: COMPLETED | OUTPATIENT
Start: 2024-09-06 | End: 2024-09-06

## 2024-09-06 RX ORDER — MORPHINE SULFATE 2 MG/ML
2 INJECTION, SOLUTION INTRAMUSCULAR; INTRAVENOUS
Status: DISCONTINUED | OUTPATIENT
Start: 2024-09-06 | End: 2024-09-07 | Stop reason: HOSPADM

## 2024-09-06 RX ORDER — PROPOFOL 10 MG/ML
INJECTION, EMULSION INTRAVENOUS PRN
Status: DISCONTINUED | OUTPATIENT
Start: 2024-09-06 | End: 2024-09-06 | Stop reason: SDUPTHER

## 2024-09-06 RX ORDER — HYDROMORPHONE HYDROCHLORIDE 1 MG/ML
0.25 INJECTION, SOLUTION INTRAMUSCULAR; INTRAVENOUS; SUBCUTANEOUS EVERY 5 MIN PRN
Status: DISCONTINUED | OUTPATIENT
Start: 2024-09-06 | End: 2024-09-06 | Stop reason: HOSPADM

## 2024-09-06 RX ADMIN — CLINDAMYCIN PHOSPHATE 900 MG: 900 INJECTION, SOLUTION INTRAVENOUS at 11:40

## 2024-09-06 RX ADMIN — OXYCODONE HYDROCHLORIDE 10 MG: 5 TABLET ORAL at 22:54

## 2024-09-06 RX ADMIN — HYDROMORPHONE HYDROCHLORIDE 0.5 MG: 2 INJECTION, SOLUTION INTRAMUSCULAR; INTRAVENOUS; SUBCUTANEOUS at 12:38

## 2024-09-06 RX ADMIN — ACETAMINOPHEN 975 MG: 325 TABLET ORAL at 10:05

## 2024-09-06 RX ADMIN — PHENYLEPHRINE HYDROCHLORIDE 100 MCG: 10 INJECTION INTRAVENOUS at 12:07

## 2024-09-06 RX ADMIN — CYCLOBENZAPRINE 10 MG: 10 TABLET, FILM COATED ORAL at 21:45

## 2024-09-06 RX ADMIN — SUGAMMADEX 300 MG: 100 INJECTION, SOLUTION INTRAVENOUS at 13:57

## 2024-09-06 RX ADMIN — SODIUM CHLORIDE: 9 INJECTION, SOLUTION INTRAVENOUS at 18:57

## 2024-09-06 RX ADMIN — OXYCODONE HYDROCHLORIDE 10 MG: 10 TABLET, FILM COATED, EXTENDED RELEASE ORAL at 10:05

## 2024-09-06 RX ADMIN — SUGAMMADEX 100 MG: 100 INJECTION, SOLUTION INTRAVENOUS at 14:19

## 2024-09-06 RX ADMIN — LOSARTAN POTASSIUM 25 MG: 25 TABLET, FILM COATED ORAL at 21:44

## 2024-09-06 RX ADMIN — HYDROMORPHONE HYDROCHLORIDE 0.25 MG: 1 INJECTION, SOLUTION INTRAMUSCULAR; INTRAVENOUS; SUBCUTANEOUS at 16:03

## 2024-09-06 RX ADMIN — SODIUM CHLORIDE, POTASSIUM CHLORIDE, SODIUM LACTATE AND CALCIUM CHLORIDE: 600; 310; 30; 20 INJECTION, SOLUTION INTRAVENOUS at 14:24

## 2024-09-06 RX ADMIN — DEXAMETHASONE SODIUM PHOSPHATE 4 MG: 4 INJECTION INTRA-ARTICULAR; INTRALESIONAL; INTRAMUSCULAR; INTRAVENOUS; SOFT TISSUE at 11:38

## 2024-09-06 RX ADMIN — SODIUM CHLORIDE, POTASSIUM CHLORIDE, SODIUM LACTATE AND CALCIUM CHLORIDE: 600; 310; 30; 20 INJECTION, SOLUTION INTRAVENOUS at 12:25

## 2024-09-06 RX ADMIN — HYDROMORPHONE HYDROCHLORIDE 0.5 MG: 2 INJECTION, SOLUTION INTRAMUSCULAR; INTRAVENOUS; SUBCUTANEOUS at 13:21

## 2024-09-06 RX ADMIN — SENNOSIDES AND DOCUSATE SODIUM 1 TABLET: 50; 8.6 TABLET ORAL at 19:53

## 2024-09-06 RX ADMIN — ROCURONIUM BROMIDE 20 MG: 10 INJECTION, SOLUTION INTRAVENOUS at 12:42

## 2024-09-06 RX ADMIN — TRANEXAMIC ACID 1000 MG: 100 INJECTION, SOLUTION INTRAVENOUS at 11:49

## 2024-09-06 RX ADMIN — ROCURONIUM BROMIDE 30 MG: 10 INJECTION, SOLUTION INTRAVENOUS at 12:16

## 2024-09-06 RX ADMIN — DEXAMETHASONE SODIUM PHOSPHATE 10 MG: 10 INJECTION, SOLUTION INTRAMUSCULAR; INTRAVENOUS at 10:05

## 2024-09-06 RX ADMIN — METOCLOPRAMIDE HYDROCHLORIDE 10 MG: 5 INJECTION INTRAMUSCULAR; INTRAVENOUS at 16:04

## 2024-09-06 RX ADMIN — Medication 20 MG: at 12:09

## 2024-09-06 RX ADMIN — METHOCARBAMOL 1000 MG: 100 INJECTION INTRAMUSCULAR; INTRAVENOUS at 13:23

## 2024-09-06 RX ADMIN — ONDANSETRON 4 MG: 2 INJECTION INTRAMUSCULAR; INTRAVENOUS at 11:38

## 2024-09-06 RX ADMIN — TRANEXAMIC ACID 1000 MG: 100 INJECTION, SOLUTION INTRAVENOUS at 15:23

## 2024-09-06 RX ADMIN — ROCURONIUM BROMIDE 30 MG: 10 INJECTION, SOLUTION INTRAVENOUS at 13:16

## 2024-09-06 RX ADMIN — PHENYLEPHRINE HYDROCHLORIDE 100 MCG: 10 INJECTION INTRAVENOUS at 11:55

## 2024-09-06 RX ADMIN — ROCURONIUM BROMIDE 50 MG: 10 INJECTION, SOLUTION INTRAVENOUS at 11:32

## 2024-09-06 RX ADMIN — ACETAMINOPHEN 650 MG: 325 TABLET ORAL at 21:44

## 2024-09-06 RX ADMIN — CLINDAMYCIN PHOSPHATE 900 MG: 900 INJECTION, SOLUTION INTRAVENOUS at 20:03

## 2024-09-06 RX ADMIN — SODIUM CHLORIDE 500 ML: 9 INJECTION, SOLUTION INTRAVENOUS at 21:47

## 2024-09-06 RX ADMIN — PROPOFOL 180 MG: 10 INJECTION, EMULSION INTRAVENOUS at 11:32

## 2024-09-06 RX ADMIN — HYDROMORPHONE HYDROCHLORIDE 0.5 MG: 2 INJECTION, SOLUTION INTRAMUSCULAR; INTRAVENOUS; SUBCUTANEOUS at 11:31

## 2024-09-06 RX ADMIN — LIDOCAINE HYDROCHLORIDE 100 MG: 20 INJECTION, SOLUTION INTRAVENOUS at 11:32

## 2024-09-06 RX ADMIN — OXYCODONE HYDROCHLORIDE 10 MG: 5 TABLET ORAL at 18:50

## 2024-09-06 RX ADMIN — CELECOXIB 400 MG: 200 CAPSULE ORAL at 10:04

## 2024-09-06 RX ADMIN — HYDROMORPHONE HYDROCHLORIDE 0.25 MG: 1 INJECTION, SOLUTION INTRAMUSCULAR; INTRAVENOUS; SUBCUTANEOUS at 16:35

## 2024-09-06 RX ADMIN — GABAPENTIN 300 MG: 300 CAPSULE ORAL at 10:05

## 2024-09-06 RX ADMIN — HYDROMORPHONE HYDROCHLORIDE 0.5 MG: 2 INJECTION, SOLUTION INTRAMUSCULAR; INTRAVENOUS; SUBCUTANEOUS at 12:21

## 2024-09-06 RX ADMIN — SODIUM CHLORIDE, POTASSIUM CHLORIDE, SODIUM LACTATE AND CALCIUM CHLORIDE: 600; 310; 30; 20 INJECTION, SOLUTION INTRAVENOUS at 09:52

## 2024-09-06 RX ADMIN — ROCURONIUM BROMIDE 20 MG: 10 INJECTION, SOLUTION INTRAVENOUS at 13:10

## 2024-09-06 ASSESSMENT — PAIN DESCRIPTION - DESCRIPTORS
DESCRIPTORS: ACHING
DESCRIPTORS: ACHING;DISCOMFORT
DESCRIPTORS: ACHING
DESCRIPTORS: ACHING;THROBBING
DESCRIPTORS: ACHING
DESCRIPTORS: ACHING

## 2024-09-06 ASSESSMENT — PAIN DESCRIPTION - FREQUENCY
FREQUENCY: CONTINUOUS
FREQUENCY: CONTINUOUS

## 2024-09-06 ASSESSMENT — PAIN DESCRIPTION - LOCATION
LOCATION: HIP

## 2024-09-06 ASSESSMENT — PAIN SCALES - GENERAL
PAINLEVEL_OUTOF10: 7
PAINLEVEL_OUTOF10: 5
PAINLEVEL_OUTOF10: 7
PAINLEVEL_OUTOF10: 3
PAINLEVEL_OUTOF10: 4
PAINLEVEL_OUTOF10: 6
PAINLEVEL_OUTOF10: 3
PAINLEVEL_OUTOF10: 7

## 2024-09-06 ASSESSMENT — PAIN DESCRIPTION - ORIENTATION
ORIENTATION: RIGHT

## 2024-09-06 ASSESSMENT — PAIN DESCRIPTION - PAIN TYPE
TYPE: SURGICAL PAIN
TYPE: SURGICAL PAIN

## 2024-09-06 ASSESSMENT — PAIN - FUNCTIONAL ASSESSMENT
PAIN_FUNCTIONAL_ASSESSMENT: 0-10
PAIN_FUNCTIONAL_ASSESSMENT: ACTIVITIES ARE NOT PREVENTED
PAIN_FUNCTIONAL_ASSESSMENT: PREVENTS OR INTERFERES SOME ACTIVE ACTIVITIES AND ADLS
PAIN_FUNCTIONAL_ASSESSMENT: ACTIVITIES ARE NOT PREVENTED

## 2024-09-06 ASSESSMENT — LIFESTYLE VARIABLES: SMOKING_STATUS: 0

## 2024-09-06 ASSESSMENT — PAIN DESCRIPTION - ONSET
ONSET: ON-GOING
ONSET: ON-GOING

## 2024-09-06 NOTE — OP NOTE
Operative Note      Patient: Ryan Magana  YOB: 1975  MRN: [id number]    Date of Procedure: 9/6/2024    Pre-Op Diagnosis Codes:      * Primary osteoarthritis of right hip [M16.11]    Post-Op Diagnosis: Same       Procedure(s):  RIGHT TOTAL HIP REPLACEMENT DIRECT ANTERIOR APPROACH, KALPESH GIRMA ROBOTIC ASSISTED HIP REPLACEMENT    Surgeon(s):  Ekaterina Duque MD    Assistant:   Surgical Assistant: Elizabteh Cohn PA Grant, Michael  Fellow: Jourdan Wheeler MD     Anesthesia: General + PENG block    Estimated Blood Loss (mL): 200 cc    Complications: None    Specimens:   * No specimens in log *    Implants:  * No implants in log *      Drains: * No LDAs found *    Findings:  Advanced hip OA    Detailed Description of Procedure:     Implant Record:  Trident II Tritanium Clusterhole Acetabular Shell: 58 mm  Trident X3 0deg Polyethylene Insert:  36 mm  In[employer]ia Hip Stem -   Standard Offset:  seize 6  Biolox delta Ceramic V40 Femoral Head: 36mm +  2.5 mm     Acetabular Low Profile Hex Screw : 6.5mm + 25 mm       Operative Report:  Indications: The patient is a 49 y.o. male with persistent right hip pain that interferes with daily activity.  The he has failed conservative treatment and after reviewing the risks, benefits and alternatives, he has elected to undergo a total hip arthroplasty.  I have reviewed the benefits and draw backs of an anterior approach and he is prepared to proceed, consent was obtained and all questions were answered to his satisfaction.    Description:   The patient was identified in the preoperative holding area and the correct site of the right hip was marked.  He was then brought to the operating room and kept on her hospital bed in the supine position and general anesthesia was administered.  Well-padded ski boots were placed on both feet to secure them into the [place] table.  He was then transferred to the operative table and placed against a well-padded perineal post with both  fascia muscle.  Carefully some the posterior capsule was released to improve exposure.  The canal was then opened proximally and using a hand rasp its direction verified.  The starting broach was then used with care taken to insure appropriate version relative to the posterior femoral cortex.  The femur was then broached up as per technique and the final broach showed excellent seating.  Its relationship to the calcar was flush.  A trial standard offset was placed as well as a trial  36 mm + 0 mm femoral ball.    The femoral support hook was lowered and the hip was brought back into the reduced position.  Fluoroscopic views were obtained of the AP pelvis and referencing the lesser trochanter on the contralateral hip and estimation of offset and limb length were made.  They were judged to be the best approximation to provide stability for the sizes. Leg lengths and offset were also verified with the tracking probe to pre-established bone landmarks on the GT and as well on the patella. It was deemed that a 36 mm + 2.5 mm femoral ball would restore offset and leg length as per pre-operative planning.    The hip was then dislocated again and placed back into the broaching position.  The trials were removed and the area was thoroughly irrigated.  The final femoral implant (   In[employer]ia Hip Stem  standard offset: size 6 )was then impacted into position and shown to be resting at the level of the calcar similar to the trials.  The final femoral head was also impacted into position and shown to be secured.  The femoral support hook was removed and the hip was brought into reduction again.  Fluoroscopic views showed similar limb length and offset relative to the trial reduction.  The joint shuck on the operative table was satisfactory.  The hip was brought through range of motion after removing the limb from the spar and brought to flexion and shown to be stable.  External  rotation with the hip extended was satisfactory and

## 2024-09-06 NOTE — PROGRESS NOTES
4 Eyes Skin Assessment     NAME:  Ryan Magana  YOB: 1975  MEDICAL RECORD NUMBER:  [id number]    The patient is being assessed for  Post-Op Surgical    I agree that at least one RN has performed a thorough Head to Toe Skin Assessment on the patient. ALL assessment sites listed below have been assessed.      Areas assessed by both nurses:    Head, Face, Ears, Shoulders, Back, Chest, Arms, Elbows, Hands, Sacrum. Buttock, Coccyx, Ischium, Legs. Feet and Heels, and Under Medical Devices         Does the Patient have a Wound? No noted wound(s)       Dash Prevention initiated by RN: No  Wound Care Orders initiated by RN: No    Pressure Injury (Stage 3,4, Unstageable, DTI, NWPT, and Complex wounds) if present, place Wound referral order by RN under : No    New Ostomies, if present place, Ostomy referral order under : No     Nurse 1 eSignature: Electronically signed by Parvin Leonard RN on 9/6/24 at 6:35 PM EDT    **SHARE this note so that the co-signing nurse can place an eSignature**    Nurse 2 eSignature: Electronically signed by Gail Owens RN on 9/6/24 at 6:51 PM EDT

## 2024-09-06 NOTE — H&P
H&P Update     An electronic and hard copy history and physical was reviewed in the patient's chart.  Date of Surgery Update: September 6, 2024  Ryan Magana was seen and examined.  Patient identified by surgeon; surgical site was confirmed by patient and surgeon.  ?  Signed By: Sincerely,    Ekaterina Duque MD [facility]  Orthopaedic Surgeon - Hip Preservation & Sports Medicine   [facility] [address], [address], [address]  Email: [email or web address]  Office: [phone]    09/06/24  9:42 AM     ?    ?  ?

## 2024-09-06 NOTE — PROGRESS NOTES
Pt arrived from OR on stretcher and 4 lpm NC.   Shows no s/s of pain or sob.  Report from CRNA and OR RN was negative except it was noted that pt. Had low Spo2 prior to surgery on RA.  RIGHT TOTAL HIP REPLACEMENT DIRECT ANTERIOR APPROACH, KALPESH RAYO ROBOTIC ASSISTED HIP REPLACEMENT - Right  Ekaterina Duque MD

## 2024-09-06 NOTE — PROGRESS NOTES
Procedure: peripheral block  MD: Dr. Elijah Crain performed.  Pt monitored closely on heart monitor, 2L NC, continuous pulse oximetry, EtCO2, and frequent BPs.   Pt remained alert and oriented x4. pt tolerated procedure well.

## 2024-09-06 NOTE — ANESTHESIA PRE PROCEDURE
Department of Anesthesiology  Preprocedure Note       Name:  Ryan Magana   Age:  49 y.o.  :  1975                                          MRN:  [id number]         Date:  2024      Surgeon: Surgeon(s):  Ekaterina Duque MD    Procedure: LUMBAR MRI    Medications prior to admission:   Prior to Admission medications    Medication Sig Start Date End Date Taking? Authorizing Provider   naproxen (NAPROSYN) 500 MG tablet Take 1 tablet by mouth 2 times daily (with meals) for 14 days 24 Yes Ekaterina Duque MD   Multiple Vitamin (MULTIVITAMIN) TABS tablet Take 1 tablet by mouth daily   Yes Provider, MD Olivier   aspirin 81 MG EC tablet Take 1 tablet by mouth 2 times daily for 28 days 24  Ekaterina Duque MD   vitamin D (ERGOCALCIFEROL) 1.25 MG (04866 UT) CAPS capsule Take 1 capsule by mouth once a week 24   Ekaterina Duque MD   metoprolol succinate (TOPROL XL) 25 MG extended release tablet Take 1 tablet by mouth daily 24   Rodrigue Felton MD   losartan (COZAAR) 25 MG tablet Take 1 tablet by mouth at bedtime 24   Rodrigue Felton MD   atorvastatin (LIPITOR) 40 MG tablet Take 1 tablet by mouth daily 24   Rodrigue Felton MD   pregabalin (LYRICA) 75 MG capsule Take 1 capsule by mouth 3 times daily for 30 days. Max Daily Amount: 225 mg 7/15/24 8/28/24  Valdez Diez PA-C   metFORMIN (GLUCOPHAGE-XR) 500 MG extended release tablet TAKE 1 TABLET BY MOUTH EVERY DAY WITH BREAKFAST 24   Issa Coombs MD       Current medications:    Current Facility-Administered Medications   Medication Dose Route Frequency Provider Last Rate Last Admin   • lidocaine PF 1 % injection 1 mL  1 mL IntraDERmal Once PRN Elijah Cao MD       • lactated ringers IV soln infusion   IntraVENous Continuous Elijah Cao MD       • sodium chloride flush 0.9 % injection 5-40 mL  5-40 mL IntraVENous 2 times per day Elijah Cao MD       • sodium

## 2024-09-06 NOTE — FLOWSHEET NOTE
Pt is resting.  Site clean dry and intact.   Supper was ordered for pt on floor.   OR RN  to come back in 10 minutes to observe site.

## 2024-09-06 NOTE — FLOWSHEET NOTE
BIANCA Johnson at bedside to place previna wound vac per Dr. Duque.   Site was noted to be \"oozy\" upon discharge from  OR

## 2024-09-06 NOTE — FLOWSHEET NOTE
Called BIANCA Johnson  to voice nurses on floor's concerns for amount of drainage from operative hip and previna.   Called OR team () to observe site.  It was decided to change dressing and place on hospital vac.   Saray Castro Christian, and Nimo replaced dressing and wound vac.  Calls were placed to Elizabeth during procedure.

## 2024-09-06 NOTE — FLOWSHEET NOTE
Pt transported on stretcher to room with belongings per this RN and transporters.  Bedside report given with update .  Voiced understanding.

## 2024-09-06 NOTE — PROGRESS NOTES
Patient admitted to room 5512. Report received from RN bedside. VSS on room air. Patient oriented to room and call light. Rights and responsibilities provided to patient, and welcome packet provided to patient. 4 eyes skin assessment completed with 2nd RN.

## 2024-09-06 NOTE — ANESTHESIA POSTPROCEDURE EVALUATION
Department of Anesthesiology  Postprocedure Note    Patient: Ryan Magana  MRN: [id number]  YOB: 1975  Date of evaluation: 9/6/2024    Procedure Summary       Date: 09/06/24 Room / Location: [place] [address] / [facility]    Anesthesia Start: 1119 Anesthesia Stop: 1454    Procedure: RIGHT TOTAL HIP REPLACEMENT DIRECT ANTERIOR APPROACH, KALPESH GIRMA ROBOTIC ASSISTED HIP REPLACEMENT (Right: Hip) Diagnosis:       Primary osteoarthritis of right hip      (Primary osteoarthritis of right hip [M16.11])    Surgeons: Ekaterina Duque MD Responsible Provider: Arthur Nava MD    Anesthesia Type: general ASA Status: 3            Anesthesia Type: No value filed.    Geno Phase I: Geno Score: 9    Geno Phase II:      Anesthesia Post Evaluation    Patient location during evaluation: PACU  Patient participation: complete - patient participated  Level of consciousness: awake  Airway patency: patent  Nausea & Vomiting: no nausea and no vomiting  Cardiovascular status: blood pressure returned to baseline and hemodynamically stable  Respiratory status: acceptable  Hydration status: euvolemic  Multimodal analgesia pain management approach  Pain management: adequate    No notable events documented.

## 2024-09-07 VITALS
HEIGHT: 69 IN | WEIGHT: 258.8 LBS | SYSTOLIC BLOOD PRESSURE: 114 MMHG | TEMPERATURE: 97.5 F | BODY MASS INDEX: 38.33 KG/M2 | DIASTOLIC BLOOD PRESSURE: 71 MMHG | OXYGEN SATURATION: 95 % | RESPIRATION RATE: 16 BRPM | HEART RATE: 113 BPM

## 2024-09-07 LAB
ANION GAP SERPL CALCULATED.3IONS-SCNC: 11 MMOL/L (ref 3–16)
BUN SERPL-MCNC: 18 MG/DL (ref 7–20)
CALCIUM SERPL-MCNC: 8.5 MG/DL (ref 8.3–10.6)
CHLORIDE SERPL-SCNC: 102 MMOL/L (ref 99–110)
CO2 SERPL-SCNC: 23 MMOL/L (ref 21–32)
CREAT SERPL-MCNC: 1.1 MG/DL (ref 0.9–1.3)
DEPRECATED RDW RBC AUTO: 13.3 % (ref 12.4–15.4)
EKG ATRIAL RATE: 83 BPM
EKG DIAGNOSIS: NORMAL
EKG P AXIS: 63 DEGREES
EKG P-R INTERVAL: 186 MS
EKG Q-T INTERVAL: 360 MS
EKG QRS DURATION: 84 MS
EKG QTC CALCULATION (BAZETT): 423 MS
EKG R AXIS: 14 DEGREES
EKG T AXIS: 18 DEGREES
EKG VENTRICULAR RATE: 83 BPM
GFR SERPLBLD CREATININE-BSD FMLA CKD-EPI: 81 ML/MIN/{1.73_M2}
GLUCOSE SERPL-MCNC: 123 MG/DL (ref 70–99)
HCT VFR BLD AUTO: 41.8 % (ref 40.5–52.5)
HGB BLD-MCNC: 13.6 G/DL (ref 13.5–17.5)
MCH RBC QN AUTO: 29 PG (ref 26–34)
MCHC RBC AUTO-ENTMCNC: 32.5 G/DL (ref 31–36)
MCV RBC AUTO: 89.5 FL (ref 80–100)
PLATELET # BLD AUTO: 241 K/UL (ref 135–450)
PMV BLD AUTO: 8.5 FL (ref 5–10.5)
POTASSIUM SERPL-SCNC: 4.6 MMOL/L (ref 3.5–5.1)
RBC # BLD AUTO: 4.68 M/UL (ref 4.2–5.9)
SODIUM SERPL-SCNC: 136 MMOL/L (ref 136–145)
WBC # BLD AUTO: 15.8 K/UL (ref 4–11)

## 2024-09-07 PROCEDURE — 6370000000 HC RX 637 (ALT 250 FOR IP): Performed by: NURSE PRACTITIONER

## 2024-09-07 PROCEDURE — 97165 OT EVAL LOW COMPLEX 30 MIN: CPT

## 2024-09-07 PROCEDURE — 85027 COMPLETE CBC AUTOMATED: CPT

## 2024-09-07 PROCEDURE — 6370000000 HC RX 637 (ALT 250 FOR IP)

## 2024-09-07 PROCEDURE — 80048 BASIC METABOLIC PNL TOTAL CA: CPT

## 2024-09-07 PROCEDURE — G0378 HOSPITAL OBSERVATION PER HR: HCPCS

## 2024-09-07 PROCEDURE — 93010 ELECTROCARDIOGRAM REPORT: CPT | Performed by: INTERNAL MEDICINE

## 2024-09-07 PROCEDURE — 36415 COLL VENOUS BLD VENIPUNCTURE: CPT

## 2024-09-07 PROCEDURE — 97162 PT EVAL MOD COMPLEX 30 MIN: CPT

## 2024-09-07 PROCEDURE — 6360000002 HC RX W HCPCS

## 2024-09-07 PROCEDURE — 97530 THERAPEUTIC ACTIVITIES: CPT

## 2024-09-07 PROCEDURE — 93005 ELECTROCARDIOGRAM TRACING: CPT | Performed by: FAMILY MEDICINE

## 2024-09-07 PROCEDURE — 97116 GAIT TRAINING THERAPY: CPT

## 2024-09-07 PROCEDURE — 2580000003 HC RX 258

## 2024-09-07 PROCEDURE — 97535 SELF CARE MNGMENT TRAINING: CPT

## 2024-09-07 RX ORDER — OXYCODONE HYDROCHLORIDE 5 MG/1
5 TABLET ORAL ONCE
Status: COMPLETED | OUTPATIENT
Start: 2024-09-07 | End: 2024-09-07

## 2024-09-07 RX ADMIN — CLINDAMYCIN PHOSPHATE 900 MG: 900 INJECTION, SOLUTION INTRAVENOUS at 04:04

## 2024-09-07 RX ADMIN — OXYCODONE HYDROCHLORIDE 5 MG: 5 TABLET ORAL at 11:19

## 2024-09-07 RX ADMIN — ACETAMINOPHEN 650 MG: 325 TABLET ORAL at 03:49

## 2024-09-07 RX ADMIN — SENNOSIDES AND DOCUSATE SODIUM 1 TABLET: 50; 8.6 TABLET ORAL at 09:33

## 2024-09-07 RX ADMIN — OXYCODONE HYDROCHLORIDE 10 MG: 5 TABLET ORAL at 03:48

## 2024-09-07 RX ADMIN — OXYCODONE HYDROCHLORIDE 5 MG: 5 TABLET ORAL at 09:42

## 2024-09-07 RX ADMIN — NAPROXEN 500 MG: 250 TABLET ORAL at 09:32

## 2024-09-07 RX ADMIN — WATER 2000 MG: 1 INJECTION INTRAMUSCULAR; INTRAVENOUS; SUBCUTANEOUS at 03:49

## 2024-09-07 RX ADMIN — ACETAMINOPHEN 650 MG: 325 TABLET ORAL at 09:32

## 2024-09-07 RX ADMIN — CYCLOBENZAPRINE 10 MG: 10 TABLET, FILM COATED ORAL at 10:55

## 2024-09-07 ASSESSMENT — PAIN SCALES - GENERAL
PAINLEVEL_OUTOF10: 6
PAINLEVEL_OUTOF10: 3
PAINLEVEL_OUTOF10: 7

## 2024-09-07 ASSESSMENT — PAIN - FUNCTIONAL ASSESSMENT
PAIN_FUNCTIONAL_ASSESSMENT: ACTIVITIES ARE NOT PREVENTED

## 2024-09-07 ASSESSMENT — PAIN DESCRIPTION - ORIENTATION
ORIENTATION: RIGHT

## 2024-09-07 ASSESSMENT — PAIN DESCRIPTION - FREQUENCY
FREQUENCY: CONTINUOUS
FREQUENCY: CONTINUOUS

## 2024-09-07 ASSESSMENT — PAIN DESCRIPTION - DESCRIPTORS
DESCRIPTORS: ACHING;THROBBING
DESCRIPTORS: ACHING
DESCRIPTORS: ACHING;THROBBING
DESCRIPTORS: ACHING;DISCOMFORT

## 2024-09-07 ASSESSMENT — PAIN DESCRIPTION - LOCATION
LOCATION: HIP

## 2024-09-07 ASSESSMENT — PAIN DESCRIPTION - PAIN TYPE
TYPE: SURGICAL PAIN
TYPE: SURGICAL PAIN

## 2024-09-07 ASSESSMENT — PAIN DESCRIPTION - ONSET
ONSET: ON-GOING
ONSET: ON-GOING

## 2024-09-07 NOTE — PROGRESS NOTES
A&Ox4. VSS on room air aside from known tachycardia at baseline. Regular diet, tolerates PO. Ambulates x1 walker. Wound vac in place to R hip, dressing intact this shift. Voids via BRP. Pain well controlled with ice and PRN medications per MAR. 500 cc fluid bolus administered per orders. Standard safety measures in place.

## 2024-09-07 NOTE — CARE COORDINATION
CM  confirmed  d/c home today  with  family  denies any  concerns  with home bound  d/c.   He will have  OP  PT  and  CM will provide  RW at  d/c  .     Patient  : Status Post Total Hip Replacement, Right     No New Rx noted at this time.     Patient to follow up out pt  as  instructed.     SEP    9 Post op 10:00 AM  Dr. Ekaterina Duque MD  [facility]  [address] [place] [address]  [phone]       Electronically signed by Lorrie Tejeda RN on 9/7/2024 at 10:52 AM         Lorrie Tejeda RN Case Manager  [facility] [place] [address]  [facility] [address] [phone]  Fax [phone] W/E Coverage

## 2024-09-07 NOTE — PROGRESS NOTES
Physical Therapy  Facility/Department: [facility] ORTHO/NEURO  Physical Therapy Initial Assessment/Discharge    Name: Ryan Magana  : 1975  MRN: [id number]  Date of Service: 2024    Discharge Recommendations:  Home with 24hr Assist  PT Equipment Recommendations  Equipment Needed: Yes  Mobility Devices: Walker  Walker: Rolling      Patient Diagnosis(es): JUVENTINO  Past Medical History:  has a past medical history of Arthritis, Chronic back pain, Diabetes mellitus (HCC), History of lumbar fusion, Hyperlipidemia, Hypertension, Lumbar radiculopathy, Neuropathy, and Pre-diabetes.  Past Surgical History:  has a past surgical history that includes Appendectomy (); back surgery (2007); back surgery (2014 ); back surgery (); Shoulder arthroscopy (Right, 2022); and tenotomy (Right, 12/15/2022).    Assessment  Assessment: pt presents from home with family, seen POD1 s/p JUVENTINO. Pt able to complete bed mobility, transfers, ambulation with RW, and curb step negotiation with SBA to SPV. Pt edu on hip precautions, reviewed JUVENTINO booklet with HEP. Pt plans to DC home today with support from his daughter and her mom. Pt appears steady with mobility and denies safety concerns for DC home. Pt has no further acute PT needs, will need RW for DC home.  Treatment Diagnosis: gait difficulty  Therapy Prognosis: Good  Decision Making: Medium Complexity  Requires PT Follow-Up: No  Activity Tolerance  Activity Tolerance: Patient tolerated evaluation without incident;Patient tolerated treatment well    Plan  Safety Devices  Type of Devices: Nurse notified, Call light within reach, Chair alarm in place, Left in chair, Gait belt    Restrictions  Position Activity Restriction  Other position/activity restrictions: FWBAT; Hip Flexion: Anterior Approach, no SLR     Subjective  General  Chart Reviewed: Yes  Patient assessed for rehabilitation services?: Yes  Additional Pertinent Hx: Pt to OR  for RIGHT TOTAL HIP REPLACEMENT

## 2024-09-07 NOTE — PROGRESS NOTES
V2.0    [facility] Progress Note      Name:  Ryan Magana /Age/Sex: 1975  (49 y.o. male)   MRN & CSN:  [id number] & [id number] Encounter Date/Time: 2024 9:43 AM EDT   Location:  [facility]- PCP: Arely Lu MD     Attending:Ekaterina Duque MD       Hospital Day: 2    Assessment and Recommendations   Ryan Magana is a 49 y.o. male with pmh of prediabetes, hypertension, hyperlipidemia who presented with Primary osteoarthritis of right hip.  Hospital staff consulted for medical management.      Plan:   Osteoarthritis of the right hip-orthopedics primary  Hypertension-continue metoprolol and losartan        Diet ADULT DIET; Regular   DVT Prophylaxis [] Lovenox, []  Heparin, [] SCDs, [] Ambulation,  [] Eliquis, [] Xarelto  [] Coumadin   Code Status Full Code   Disposition Per primary   Surrogate Decision Maker/ POA  Per EMR     Personally reviewed Lab Studies and Imaging   2024  CBC, TSH reviewed     Discussed management of the case with Dr. Duque who recommended transition to prevena plus.  I assisted the nursing staff with this    EKG interpreted personally and results demonstrated normal sinus rhythm with no ischemic changes        Subjective:     Chief Complaint: right hip pain    Ryan Magana is a 49 y.o. male who presents with a past medical history as detailed above.     On 2024 patient resting comfortably. Pain controlled      Review of Systems:      Pertinent positives and negatives discussed in HPI    Objective:     Intake/Output Summary (Last 24 hours) at 2024 0943  Last data filed at 2024 0406  Gross per 24 hour   Intake 2590 ml   Output --   Net 2590 ml      Vitals:   Vitals:    24 0342 24 0348 24 0418 24 0915   BP: 100/66   114/71   Pulse: 87   (!) 113   Resp: 16 17 16 16   Temp: 97.8 °F (36.6 °C)   97.5 °F (36.4 °C)   TempSrc: Oral   Oral   SpO2: 94%   95%   Weight:       Height:             Physical Exam:      General: NAD  ENT: neck

## 2024-09-07 NOTE — PLAN OF CARE
Problem: Pain  Goal: Verbalizes/displays adequate comfort level or baseline comfort level  Outcome: Progressing  Flowsheets (Taken 9/7/2024 0113)  Verbalizes/displays adequate comfort level or baseline comfort level:   Encourage patient to monitor pain and request assistance   Assess pain using appropriate pain scale   Administer analgesics based on type and severity of pain and evaluate response   Implement non-pharmacological measures as appropriate and evaluate response   Consider cultural and social influences on pain and pain management   Notify Licensed Independent Practitioner if interventions unsuccessful or patient reports new pain  Note: Pain being managed per MAR.      Problem: Safety - Adult  Goal: Free from fall injury  Outcome: Progressing  Flowsheets (Taken 9/7/2024 0113)  Free From Fall Injury:   Instruct family/caregiver on patient safety   Based on caregiver fall risk screen, instruct family/caregiver to ask for assistance with transferring infant if caregiver noted to have fall risk factors  Note: Standard safety measures in place.

## 2024-09-07 NOTE — PROGRESS NOTES
Occupational Therapy  Facility/Department: [facility] ORTHO/NEURO  Occupational Therapy Initial Assessment, Treatment and Discharge    Name: Ryan Magana  : 1975  MRN: [id number]  Date of Service: 2024    Discharge Recommendations:  24 hour supervision or assist  OT Equipment Recommendations  Equipment Needed: Yes  Other: reacher; recommend he uses his shower chair initially     Treatment Diagnosis: Impaired ADLs, mobility    Assessment  Performance deficits / Impairments: Decreased functional mobility ;Decreased ADL status  Assessment: Pt evaluated POD #1 following R JUVENTINO. Pt demonstrates mobility with RW with supervision and most ADLs with supervision. Pt did require min A for LB dressing. Educated pt on hip precautions and modifications to ADLs and use of AE to increase independence. Anticipate pt safe to d/c home with initial 24hr supervision. Pt has no additional skilled OT needs, will sign off.  Treatment Diagnosis: Impaired ADLs, mobility  Prognosis: Good  Decision Making: Low Complexity  REQUIRES OT FOLLOW-UP: No  Activity Tolerance  Activity Tolerance: Patient Tolerated treatment well     Plan  Occupational Therapy Plan  Times Per Week: d/c OT    Restrictions  Position Activity Restriction  Other position/activity restrictions: FWBAT; Hip Flexion: Anterior Approach, no SLR    Subjective  General  Chart Reviewed: Yes  Patient assessed for rehabilitation services?: Yes  Additional Pertinent Hx: Pt admitted  s/p RIGHT TOTAL HIP REPLACEMENT DIRECT ANTERIOR APPROACH, KALPESH GIRMA ROBOTIC ASSISTED HIP REPLACEMENT.  PMHx: CBP, neuropathy, DM, HTN, back surgeries  Family / Caregiver Present: No  Diagnosis: R hip OA  Subjective  Subjective: Pt seated EOB with RN upon OT entry, agreeable to evaluation. States plan to d/c home today. Pain: 4/10 R hip, recently medicated       Social/Functional History  Social/Functional History  Lives With: Other (comment) (bedridden stepdad)  Type of Home: House  Home

## 2024-09-07 NOTE — CONSULTS
Hospital Medicine Consult History & Physical    Date of Service: Pt seen/examined in consultation on 9/6/2024 at the request of Ekaterina Duque MD for medical management of general medical management    Chief Complaint: Status post total hip replacement    Presenting Admission History:   49 y.o. male who presented to [facility] with status post total hip replacement.  PMHx significant for prediabetes hypertension hyperlipidemia.    States pain is under control  Denies chest pain chest pressure abdominal pain nausea vomiting fevers or chills    Reviewed home medications    Assessment/Plan:    Current Principal Problem:  Primary osteoarthritis of right hip    Total hip replacement/osteoarthritis right hip  -Pain control  -Per primary    Tachycardia  -EKG    Hypertension  -Continue home metoprolol losartan (with parameters)    CXR: I have reviewed the CXR with the following interpretation: Not obtained  EKG:  I have reviewed the EKG with the following interpretation: Not obtained    Physical Exam Performed:  /69   Pulse (!) 124   Temp 98.3 °F (36.8 °C) (Oral)   Resp 16   Ht 1.753 m (5' 9.02\")   Wt 117.4 kg (258 lb 12.8 oz)   SpO2 93%   BMI 38.20 kg/m²   Physical Exam  Constitutional:       General: He is not in acute distress.     Appearance: Normal appearance. He is not ill-appearing, toxic-appearing or diaphoretic.   HENT:      Head: Normocephalic.      Mouth/Throat:      Mouth: Mucous membranes are dry.   Eyes:      Extraocular Movements: Extraocular movements intact.      Pupils: Pupils are equal, round, and reactive to light.   Cardiovascular:      Rate and Rhythm: Regular rhythm. Tachycardia present.      Heart sounds: No murmur heard.     No friction rub. No gallop.   Pulmonary:      Effort: Pulmonary effort is normal. No respiratory distress.      Breath sounds: No wheezing or rhonchi.   Abdominal:      General: Abdomen is flat. Bowel sounds are normal. There is no distension.

## 2024-09-07 NOTE — PROGRESS NOTES
[facility] Orthopedic Surgery  Progress Note        POD #  1 , s/p  JUVENTINO right side.    Pt comfortable, no c/o.  Drsg right PREVENA D/C/I,   Mild pain with right hip ROM, NVI    CBC:   Lab Results   Component Value Date/Time    WBC 15.2 09/06/2024 10:58 PM    RBC 4.85 09/06/2024 10:58 PM    HGB 14.2 09/06/2024 10:58 PM    HCT 42.7 09/06/2024 10:58 PM    MCV 88.1 09/06/2024 10:58 PM    MCH 29.3 09/06/2024 10:58 PM    MCHC 33.3 09/06/2024 10:58 PM    RDW 13.1 09/06/2024 10:58 PM     09/06/2024 10:58 PM    MPV 7.8 09/06/2024 10:58 PM     PT/INR:    Lab Results   Component Value Date/Time    PROTIME 13.1 08/12/2024 07:52 AM    INR 0.97 08/12/2024 07:52 AM     PTT:    Lab Results   Component Value Date/Time    APTT 24.9 08/12/2024 07:52 AM   [APTT    A/P: s/p JUVENTINO Direct Anterior, Robotic assisted.  - Stable, Prevena Dry today  - PT/OT, WBAT  - Ok to D/C to home  from ortho standpoint.  - F/U Dr Duque in 2 days.      Ekaterina Duque MD 9/7/2024 8:24 AM       Sincerely,    Ekaterina Duque MD [facility]  Orthopaedic Surgeon - Hip Preservation & Sports Medicine   [facility]   [address] [place] [address], [address], [address]  Email: [email or web address]  Office: [phone]    09/07/24  8:24 AM

## 2024-09-07 NOTE — PROGRESS NOTES
Reviewed dc instructions and medications with pt at bedside. All questions answered prior to discharge. IV has been removed. Last set of vitals are stable. Pt has follow up appointment scheduled.

## 2024-09-07 NOTE — DISCHARGE SUMMARY
[facility]  Discharge Summary    Date:  2024    Name:  Ryan Magana  Address:  [address] [address]    :  1975      Age:   49 y.o.    Medical Record Number:  [id number]  Admit Date: 2024  Date of Discharge:     PROCEDURE:   (Right) JUVENTINO, DAA, Robotic     REASON FOR ADMISSION:    Postoperative pain control, vital sign monitoring, physical therapy, and IV fluid resuscitation.  Post op wound care, change to prevena    Current Medications:     sodium chloride flush  5-40 mL IntraVENous 2 times per day    ceFAZolin (ANCEF) IVPB  2,000 mg IntraVENous Q8H    acetaminophen  650 mg Oral Q6H    sennosides-docusate sodium  1 tablet Oral BID    naproxen  500 mg Oral BID WC    losartan  25 mg Oral Nightly    metoprolol succinate  25 mg Oral Daily       Review of Systems:  Unchanged from preoperative H&P     Past Medical History:  Past Medical History:   Diagnosis Date    Arthritis     Chronic back pain     Diabetes mellitus (HCC)     History of lumbar fusion     Hyperlipidemia     Hypertension     Lumbar radiculopathy     Neuropathy     both feet; right leg    Pre-diabetes        Past Surgical History:  Past Surgical History:   Procedure Laterality Date    APPENDECTOMY      BACK SURGERY  2007    L5-S1 discectomy    BACK SURGERY  2014     BACK SURGERY      2012 2016    SHOULDER ARTHROSCOPY Right 2022    RIGHT SHOULDER ARTHROSCOPY FOR DISTAL CLAVICLE EXCISION, SUBACROMIAL DECOMPRESSION, EXTENSIVE DEBRIDEMENT, SWATHI DECOMPRESSION, INJECTION OF CORTICOSTEROID performed by Janee Kuo MD at [facility] OR    TENOTOMY Right 12/15/2022    RIGHT ELBOW TENEX-LOCAL performed by Jesus Maldonado MD at [facility] OR       Medications:  No current facility-administered medications on file prior to encounter.     Current Outpatient Medications on File Prior to Encounter   Medication Sig Dispense Refill    Multiple Vitamin (MULTIVITAMIN)

## 2024-09-09 ENCOUNTER — OFFICE VISIT (OUTPATIENT)
Dept: ORTHOPEDIC SURGERY | Age: 49
End: 2024-09-09

## 2024-09-09 ENCOUNTER — TELEPHONE (OUTPATIENT)
Dept: ORTHOPEDIC SURGERY | Age: 49
End: 2024-09-09

## 2024-09-09 VITALS — HEIGHT: 69 IN | BODY MASS INDEX: 38.36 KG/M2 | WEIGHT: 259 LBS

## 2024-09-09 DIAGNOSIS — Z96.641 STATUS POST HIP REPLACEMENT, RIGHT: Primary | ICD-10-CM

## 2024-09-09 DIAGNOSIS — M16.11 PRIMARY OSTEOARTHRITIS OF RIGHT HIP: ICD-10-CM

## 2024-09-09 PROCEDURE — 99024 POSTOP FOLLOW-UP VISIT: CPT | Performed by: ORTHOPAEDIC SURGERY

## 2024-09-09 RX ORDER — CLINDAMYCIN HCL 300 MG
300 CAPSULE ORAL 3 TIMES DAILY
Qty: 9 CAPSULE | Refills: 0 | Status: SHIPPED | OUTPATIENT
Start: 2024-09-09 | End: 2024-09-12

## 2024-09-10 ENCOUNTER — TELEPHONE (OUTPATIENT)
Dept: ORTHOPEDIC SURGERY | Age: 49
End: 2024-09-10

## 2024-09-10 ENCOUNTER — TELEPHONE (OUTPATIENT)
Dept: PRIMARY CARE CLINIC | Age: 49
End: 2024-09-10

## 2024-09-10 ENCOUNTER — HOSPITAL ENCOUNTER (OUTPATIENT)
Dept: PHYSICAL THERAPY | Age: 49
Setting detail: THERAPIES SERIES
Discharge: HOME | End: 2024-09-10
Attending: ORTHOPAEDIC SURGERY
Payer: COMMERCIAL

## 2024-09-10 PROCEDURE — 97530 THERAPEUTIC ACTIVITIES: CPT

## 2024-09-10 PROCEDURE — 97161 PT EVAL LOW COMPLEX 20 MIN: CPT

## 2024-09-10 PROCEDURE — 97140 MANUAL THERAPY 1/> REGIONS: CPT

## 2024-09-11 ENCOUNTER — TELEPHONE (OUTPATIENT)
Dept: PRIMARY CARE CLINIC | Age: 49
End: 2024-09-11

## 2024-09-11 ENCOUNTER — TELEPHONE (OUTPATIENT)
Dept: ORTHOPEDIC SURGERY | Age: 49
End: 2024-09-11

## 2024-09-11 DIAGNOSIS — Z96.641 STATUS POST HIP REPLACEMENT, RIGHT: Primary | ICD-10-CM

## 2024-09-11 RX ORDER — HYDROCODONE BITARTRATE AND ACETAMINOPHEN 5; 325 MG/1; MG/1
1 TABLET ORAL EVERY 4 HOURS PRN
Qty: 12 TABLET | Refills: 0 | Status: SHIPPED | OUTPATIENT
Start: 2024-09-11 | End: 2024-09-14

## 2024-09-12 ENCOUNTER — PATIENT MESSAGE (OUTPATIENT)
Dept: ORTHOPEDIC SURGERY | Age: 49
End: 2024-09-12

## 2024-09-12 ENCOUNTER — HOSPITAL ENCOUNTER (OUTPATIENT)
Dept: PHYSICAL THERAPY | Age: 49
Setting detail: THERAPIES SERIES
Discharge: HOME | End: 2024-09-12
Payer: COMMERCIAL

## 2024-09-12 DIAGNOSIS — Z96.641 STATUS POST HIP REPLACEMENT, RIGHT: Primary | ICD-10-CM

## 2024-09-12 PROCEDURE — 97016 VASOPNEUMATIC DEVICE THERAPY: CPT

## 2024-09-12 PROCEDURE — 97110 THERAPEUTIC EXERCISES: CPT

## 2024-09-12 PROCEDURE — 97140 MANUAL THERAPY 1/> REGIONS: CPT

## 2024-09-13 RX ORDER — HYDROCODONE BITARTRATE AND ACETAMINOPHEN 5; 325 MG/1; MG/1
1 TABLET ORAL EVERY 4 HOURS PRN
Qty: 12 TABLET | Refills: 0 | Status: SHIPPED | OUTPATIENT
Start: 2024-09-13 | End: 2024-09-16

## 2024-09-17 ENCOUNTER — HOSPITAL ENCOUNTER (OUTPATIENT)
Dept: PHYSICAL THERAPY | Age: 49
Setting detail: THERAPIES SERIES
Discharge: HOME | End: 2024-09-17
Payer: COMMERCIAL

## 2024-09-17 PROCEDURE — 97110 THERAPEUTIC EXERCISES: CPT

## 2024-09-17 PROCEDURE — 97140 MANUAL THERAPY 1/> REGIONS: CPT

## 2024-09-19 ENCOUNTER — APPOINTMENT (OUTPATIENT)
Dept: PHYSICAL THERAPY | Age: 49
End: 2024-09-19
Payer: COMMERCIAL

## 2024-09-20 ENCOUNTER — TELEPHONE (OUTPATIENT)
Dept: ORTHOPEDIC SURGERY | Age: 49
End: 2024-09-20

## 2024-09-23 ENCOUNTER — OFFICE VISIT (OUTPATIENT)
Dept: ORTHOPEDIC SURGERY | Age: 49
End: 2024-09-23

## 2024-09-23 VITALS — HEIGHT: 69 IN | BODY MASS INDEX: 38.36 KG/M2 | RESPIRATION RATE: 12 BRPM | WEIGHT: 259 LBS

## 2024-09-23 DIAGNOSIS — M16.11 PRIMARY OSTEOARTHRITIS OF RIGHT HIP: ICD-10-CM

## 2024-09-23 DIAGNOSIS — Z96.641 STATUS POST HIP REPLACEMENT, RIGHT: Primary | ICD-10-CM

## 2024-09-23 DIAGNOSIS — R21 RASH OF BODY: ICD-10-CM

## 2024-09-23 PROCEDURE — 99024 POSTOP FOLLOW-UP VISIT: CPT | Performed by: ORTHOPAEDIC SURGERY

## 2024-09-24 ENCOUNTER — OFFICE VISIT (OUTPATIENT)
Dept: PRIMARY CARE CLINIC | Age: 49
End: 2024-09-24
Payer: COMMERCIAL

## 2024-09-24 ENCOUNTER — APPOINTMENT (OUTPATIENT)
Dept: PHYSICAL THERAPY | Age: 49
End: 2024-09-24
Payer: COMMERCIAL

## 2024-09-24 VITALS
HEART RATE: 115 BPM | SYSTOLIC BLOOD PRESSURE: 126 MMHG | WEIGHT: 249.2 LBS | DIASTOLIC BLOOD PRESSURE: 82 MMHG | OXYGEN SATURATION: 98 % | BODY MASS INDEX: 36.8 KG/M2

## 2024-09-24 DIAGNOSIS — R73.9 HYPERGLYCEMIA: ICD-10-CM

## 2024-09-24 DIAGNOSIS — L50.9 HIVES: ICD-10-CM

## 2024-09-24 DIAGNOSIS — I51.89 COMBINED SYSTOLIC AND DIASTOLIC CARDIAC DYSFUNCTION: ICD-10-CM

## 2024-09-24 DIAGNOSIS — E78.2 MIXED HYPERLIPIDEMIA: ICD-10-CM

## 2024-09-24 DIAGNOSIS — T78.40XS ALLERGIC REACTION, SEQUELA: Primary | ICD-10-CM

## 2024-09-24 PROCEDURE — 4004F PT TOBACCO SCREEN RCVD TLK: CPT | Performed by: FAMILY MEDICINE

## 2024-09-24 PROCEDURE — G8427 DOCREV CUR MEDS BY ELIG CLIN: HCPCS | Performed by: FAMILY MEDICINE

## 2024-09-24 PROCEDURE — 99214 OFFICE O/P EST MOD 30 MIN: CPT | Performed by: FAMILY MEDICINE

## 2024-09-24 PROCEDURE — G8417 CALC BMI ABV UP PARAM F/U: HCPCS | Performed by: FAMILY MEDICINE

## 2024-09-24 PROCEDURE — 3017F COLORECTAL CA SCREEN DOC REV: CPT | Performed by: FAMILY MEDICINE

## 2024-09-24 RX ORDER — OMEPRAZOLE 40 MG/1
40 CAPSULE, DELAYED RELEASE ORAL
Qty: 30 CAPSULE | Refills: 2 | Status: SHIPPED | OUTPATIENT
Start: 2024-09-24

## 2024-09-24 RX ORDER — PREDNISONE 20 MG/1
40 TABLET ORAL DAILY
COMMUNITY
Start: 2024-09-21 | End: 2024-09-25

## 2024-09-24 RX ORDER — DIPHENHYDRAMINE HCL 25 MG
25 CAPSULE ORAL EVERY 6 HOURS PRN
COMMUNITY

## 2024-09-24 RX ORDER — PREDNISONE 20 MG/1
20 TABLET ORAL DAILY
Qty: 5 TABLET | Refills: 0 | Status: SHIPPED | OUTPATIENT
Start: 2024-09-24 | End: 2024-09-29

## 2024-09-24 ASSESSMENT — ENCOUNTER SYMPTOMS
GASTROINTESTINAL NEGATIVE: 1
EYES NEGATIVE: 1
RESPIRATORY NEGATIVE: 1

## 2024-09-26 ENCOUNTER — HOSPITAL ENCOUNTER (OUTPATIENT)
Dept: PHYSICAL THERAPY | Age: 49
Setting detail: THERAPIES SERIES
Discharge: HOME | End: 2024-09-26
Payer: COMMERCIAL

## 2024-09-26 PROCEDURE — 97110 THERAPEUTIC EXERCISES: CPT

## 2024-09-26 PROCEDURE — 97140 MANUAL THERAPY 1/> REGIONS: CPT

## 2024-10-01 ENCOUNTER — HOSPITAL ENCOUNTER (OUTPATIENT)
Dept: PHYSICAL THERAPY | Age: 49
Setting detail: THERAPIES SERIES
Discharge: HOME | End: 2024-10-01
Payer: COMMERCIAL

## 2024-10-01 PROCEDURE — 97110 THERAPEUTIC EXERCISES: CPT

## 2024-10-01 PROCEDURE — 97530 THERAPEUTIC ACTIVITIES: CPT

## 2024-10-01 NOTE — FLOWSHEET NOTE
[facility] - Outpatient Rehabilitation and Therapy [address], [place] [address] office: [phone] fax: [phone]    Physical Therapy: TREATMENT/PROGRESS NOTE   Patient: Ryan Magana (49 y.o. male)   Examination Date: 10/01/2024   :  1975 MRN: [id number]   Visit #: 6   Insurance Allowable Auth Needed   30  Used  [x]Yes    []No    Insurance: Payor: [employer] / Plan: [employer] [place] MEDICAID / Product Type: *No Product type* /   Insurance ID: [id number] - (Medicaid Managed)  Secondary Insurance (if applicable):    Treatment Diagnosis:     ICD-10-CM    1. Decreased range of right hip movement  M25.651       2. Hip weakness  R29.898       3. Balance problem  R26.89       4. Chronic right-sided low back pain with right-sided sciatica  M54.41     G89.29          Medical Diagnosis:  Osteoarthritis of right hip, unspecified osteoarthritis type [M16.11]   Referring Physician: Ekaterina Duque MD  PCP: Arely Lu MD     Plan of care signed (Y/N): N    Date of Patient follow up with Physician: 24     Plan of Care Report: NO  POC update due: (10 visits /OR AUTH LIMITS, whichever is less)  10/10/2024                                             Medical History:  Comorbidities:  Other Cardio/Pulmonary Conditions: Pre-CHF  Prior Surgeries: History of Lumbar surgery x3 ( L5/S1 fusion,  L3/4 fusion,  L4/5 ectomy)  Relevant Medical History: R JUVENTINO - 24                                         Precautions/ Contra-indications:           Latex allergy:  NO  Pacemaker:    NO  Contraindications for Manipulation: recent surgical history (relative) and remote history of spinal fusion (relative)  Date of Surgery: R JUVENTINO - 24    Red Flags:  None    Suicide Screening:   The patient did not verbalize a primary behavioral concern, suicidal ideation, suicidal intent, or demonstrate suicidal behaviors.    Preferred Language for Healthcare:   [x] English       []

## 2024-10-03 ENCOUNTER — HOSPITAL ENCOUNTER (OUTPATIENT)
Dept: PHYSICAL THERAPY | Age: 49
Setting detail: THERAPIES SERIES
Discharge: HOME | End: 2024-10-03
Payer: COMMERCIAL

## 2024-10-03 PROCEDURE — 97110 THERAPEUTIC EXERCISES: CPT

## 2024-10-03 PROCEDURE — 97140 MANUAL THERAPY 1/> REGIONS: CPT

## 2024-10-03 PROCEDURE — 97016 VASOPNEUMATIC DEVICE THERAPY: CPT

## 2024-10-03 NOTE — FLOWSHEET NOTE
External Rotation  - 2 x daily - 3 sets - 30 seconds hold  - Supine Bridge  - 1 x daily - 2 sets - 15 reps  - Prone Knee Flexion  - 1 x daily - 2 sets - 15 reps  - Prone Quad Stretch with Towel Roll and Strap  - 1 x daily - 2 x weekly - 3 sets - 10 reps  - Prone Quadriceps Set with Towel Roll  - 1 x daily - 3 sets - 10 reps  - Side Stepping with Resistance at Ankles  - 1 x daily - 3 sets - 10 reps      ASSESSMENT     Today's Assessment: HEP progressed. Focused more heavily on stretching and glute activation with emphasis on slight hip ext. Use of vaso to improve local edema still  Patient will continue to improve with guidance of skilled care following JUVENTINO.      Medical Necessity Documentation:  I certify that this patient meets the below criteria necessary for medical necessity for care and/or justification of therapy services:  The patient has functional impairments and/or activity limitations and would benefit from continued outpatient therapy services to address the deficits outlined in the patients goals    Return to Play: NA    Prognosis for POC: [x] Good [] Fair  [] Poor    Patient requires continued skilled intervention: [x] Yes  [] No      CHARGE CAPTURE     PT CHARGE GRID   CPT Code (TIMED) minutes # CPT Code (UNTIMED) #     Therex (15974)  30 2  EVAL:LOW (26791 - Typically 20 minutes face-to-face)     Neuromusc. Re-ed (08530)    Re-Eval (32574)     Manual (51976) 16 1  Estim Unattended (22463)     Ther. Act (14964) 5   Mech. Traction (13607)     Gait (13406)    Dry Needle 1-2 muscle (20560)     Aquatic Therex (06530)    Dry Needle 3+ muscle (20561)     Iontophoresis (61308)    VASO (54624) 1    Ultrasound (47292)    Group Therapy (42149)     Estim Attended (01787)    Canalith Repositioning (84608)     Physical Performance Test (72721)         Other:    Other:    Total Timed Code Tx Minutes 51 3  1     Total Treatment Minutes 66        Charge Justification:  (22517) THERAPEUTIC EXERCISE - Provided

## 2024-10-08 ENCOUNTER — HOSPITAL ENCOUNTER (OUTPATIENT)
Dept: PHYSICAL THERAPY | Age: 49
Setting detail: THERAPIES SERIES
Discharge: HOME | End: 2024-10-08
Payer: COMMERCIAL

## 2024-10-08 PROCEDURE — 97140 MANUAL THERAPY 1/> REGIONS: CPT

## 2024-10-08 PROCEDURE — 97110 THERAPEUTIC EXERCISES: CPT

## 2024-10-08 NOTE — FLOWSHEET NOTE
copied from initial evaluation, reassessments and prior notes for documentation efficiency.    Note: If patient does not return for scheduled/recommended follow up visits, this note will serve as a discharge from care along with the most recent update on progress.

## 2024-10-09 RX ORDER — METFORMIN HCL 500 MG
500 TABLET, EXTENDED RELEASE 24 HR ORAL
Qty: 60 TABLET | Refills: 0 | Status: SHIPPED | OUTPATIENT
Start: 2024-10-09

## 2024-10-09 RX ORDER — ATORVASTATIN CALCIUM 40 MG/1
40 TABLET, FILM COATED ORAL DAILY
Qty: 90 TABLET | Refills: 3 | Status: SHIPPED | OUTPATIENT
Start: 2024-10-09

## 2024-10-09 RX ORDER — METOPROLOL SUCCINATE 25 MG/1
25 TABLET, EXTENDED RELEASE ORAL DAILY
Qty: 90 TABLET | Refills: 3 | Status: SHIPPED | OUTPATIENT
Start: 2024-10-09

## 2024-10-09 RX ORDER — LOSARTAN POTASSIUM 25 MG/1
25 TABLET ORAL
Qty: 90 TABLET | Refills: 3 | Status: SHIPPED | OUTPATIENT
Start: 2024-10-09

## 2024-10-09 NOTE — TELEPHONE ENCOUNTER
Please call and schedule patient, Return in about 3 months (around 12/24/2024), or if symptoms worsen or fail to improve.

## 2024-10-09 NOTE — TELEPHONE ENCOUNTER
Requested Prescriptions     Pending Prescriptions Disp Refills    losartan (COZAAR) 25 MG tablet [Pharmacy Med Name: LOSARTAN POTASSIUM 25 MG TAB] 90 tablet 3     Sig: TAKE 1 TABLET BY MOUTH EVERYDAY AT BEDTIME    metoprolol succinate (TOPROL XL) 25 MG extended release tablet [Pharmacy Med Name: METOPROLOL SUCC ER 25 MG TAB] 90 tablet 3     Sig: TAKE 1 TABLET BY MOUTH EVERY DAY    atorvastatin (LIPITOR) 40 MG tablet [Pharmacy Med Name: ATORVASTATIN 40 MG TABLET] 90 tablet 3     Sig: TAKE 1 TABLET BY MOUTH EVERY DAY            Checked Correct Pharmacy: Yes    Any changes since last refill? No         Last Office Visit: 8/28/2024     Next Office Visit: 11/18/2024         Last Labs: 09.07.2024

## 2024-10-09 NOTE — TELEPHONE ENCOUNTER
Medication:   Requested Prescriptions     Pending Prescriptions Disp Refills    metFORMIN (GLUCOPHAGE-XR) 500 MG extended release tablet [Pharmacy Med Name: METFORMIN HCL  MG TABLET] 90 tablet 1     Sig: TAKE 1 TABLET BY MOUTH EVERY DAY WITH BREAKFAST     Last Filled:  05/17/2024    Last appt: 9/24/2024   Next appt: Visit date not found    Last Labs DM:   Lab Results   Component Value Date/Time    LABA1C 5.1 08/12/2024 07:52 AM

## 2024-10-10 ENCOUNTER — HOSPITAL ENCOUNTER (OUTPATIENT)
Dept: PHYSICAL THERAPY | Age: 49
Setting detail: THERAPIES SERIES
Discharge: HOME | End: 2024-10-10
Payer: COMMERCIAL

## 2024-10-10 PROCEDURE — 97140 MANUAL THERAPY 1/> REGIONS: CPT

## 2024-10-10 PROCEDURE — 97530 THERAPEUTIC ACTIVITIES: CPT

## 2024-10-10 PROCEDURE — 97110 THERAPEUTIC EXERCISES: CPT

## 2024-10-10 NOTE — FLOWSHEET NOTE
performed to prevent loss of range of motion, maintain or improve muscular strength or increase flexibility, following either an injury or surgery.   (06574) MANUAL THERAPY -  Manual therapy techniques, 1 or more regions, each 15 minutes (Mobilization/manipulation, manual lymphatic drainage, manual traction) for the purpose of modulating pain, promoting relaxation,  increasing ROM, reducing/eliminating soft tissue swelling/inflammation/restriction, improving soft tissue extensibility and allowing for proper ROM for normal function with self care, mobility, lifting and ambulation    GOALS     Patient stated goal: \"get better\"  [] Progressing: [] Met: [] Not Met: [] Adjusted    Therapist goals for Patient:   Short Term Goals: To be achieved in: 4-6 weeks  1. Independent in HEP and progression per patient tolerance, in order to prevent re-injury.   [] Progressing: [] Met: [] Not Met: [] Adjusted  2. Patient will have a decrease in pain to <2/10 to facilitate improvement in movement, function, and ADLs as indicated by Functional Deficits.  [] Progressing: [] Met: [] Not Met: [] Adjusted    Long Term Goals: To be achieved in: 6-10 weeks  1. Disability index score of 20% or less for the iHOT to assist with reaching prior level of function with activities such as traversing stairs.  [] Progressing: [] Met: [] Not Met: [] Adjusted  2. Patient will demonstrate passive hip flexion of 115 degrees to allow for him to don/doff his shoes.   [] Progressing: [] Met: [] Not Met: [] Adjusted  3. Patient will demonstrate increased Strength of hip flexion to 4/5 to allow for him to transfer in/out of a vehicle.   [] Progressing: [] Met: [] Not Met: [] Adjusted  4. Patient will be able to ambulate without need for AD.   [] Progressing: [] Met: [] Not Met: [] Adjusted  5. Patient will be able to ambulate for 1 mile without hip pain to demonstrates return to PLOF. (patient specific functional goal)    [] Progressing: [] Met: [] Not Met:

## 2024-10-14 DIAGNOSIS — T78.40XS ALLERGIC REACTION, SEQUELA: ICD-10-CM

## 2024-10-15 ENCOUNTER — HOSPITAL ENCOUNTER (OUTPATIENT)
Dept: PHYSICAL THERAPY | Age: 49
Setting detail: THERAPIES SERIES
End: 2024-10-15
Payer: COMMERCIAL

## 2024-10-15 LAB
ANION GAP SERPL CALCULATED.3IONS-SCNC: 12 MMOL/L (ref 3–16)
BASOPHILS # BLD: 0 K/UL (ref 0–0.2)
BASOPHILS NFR BLD: 0.8 %
BUN SERPL-MCNC: 7 MG/DL (ref 7–20)
CALCIUM SERPL-MCNC: 9.2 MG/DL (ref 8.3–10.6)
CHLORIDE SERPL-SCNC: 102 MMOL/L (ref 99–110)
CO2 SERPL-SCNC: 26 MMOL/L (ref 21–32)
CREAT SERPL-MCNC: 0.9 MG/DL (ref 0.9–1.3)
DEPRECATED RDW RBC AUTO: 14.5 % (ref 12.4–15.4)
EOSINOPHIL # BLD: 0.2 K/UL (ref 0–0.6)
EOSINOPHIL NFR BLD: 3.4 %
GFR SERPLBLD CREATININE-BSD FMLA CKD-EPI: >90 ML/MIN/{1.73_M2}
GLUCOSE SERPL-MCNC: 119 MG/DL (ref 70–99)
HCT VFR BLD AUTO: 42.6 % (ref 40.5–52.5)
HGB BLD-MCNC: 14 G/DL (ref 13.5–17.5)
LYMPHOCYTES # BLD: 1.1 K/UL (ref 1–5.1)
LYMPHOCYTES NFR BLD: 19.6 %
MCH RBC QN AUTO: 28.3 PG (ref 26–34)
MCHC RBC AUTO-ENTMCNC: 33 G/DL (ref 31–36)
MCV RBC AUTO: 85.9 FL (ref 80–100)
MONOCYTES # BLD: 0.3 K/UL (ref 0–1.3)
MONOCYTES NFR BLD: 5.4 %
NEUTROPHILS # BLD: 3.9 K/UL (ref 1.7–7.7)
NEUTROPHILS NFR BLD: 70.8 %
PLATELET # BLD AUTO: 298 K/UL (ref 135–450)
PMV BLD AUTO: 8.3 FL (ref 5–10.5)
POTASSIUM SERPL-SCNC: 4.7 MMOL/L (ref 3.5–5.1)
RBC # BLD AUTO: 4.96 M/UL (ref 4.2–5.9)
SODIUM SERPL-SCNC: 140 MMOL/L (ref 136–145)
WBC # BLD AUTO: 5.5 K/UL (ref 4–11)

## 2024-10-16 NOTE — TELEPHONE ENCOUNTER
Medication:   Requested Prescriptions     Pending Prescriptions Disp Refills    omeprazole (PRILOSEC) 40 MG delayed release capsule [Pharmacy Med Name: OMEPRAZOLE DR 40 MG CAPSULE] 90 capsule 1     Sig: TAKE 1 CAPSULE BY MOUTH EVERY DAY IN THE MORNING BEFORE BREAKFAST     Last Filled:  09/24/2024    Last appt: 9/24/2024   Next appt: Visit date not found    Last OARRS:        No data to display

## 2024-10-17 ENCOUNTER — HOSPITAL ENCOUNTER (OUTPATIENT)
Dept: PHYSICAL THERAPY | Age: 49
Setting detail: THERAPIES SERIES
Discharge: HOME | End: 2024-10-17
Payer: COMMERCIAL

## 2024-10-17 PROCEDURE — 97110 THERAPEUTIC EXERCISES: CPT

## 2024-10-17 PROCEDURE — 97140 MANUAL THERAPY 1/> REGIONS: CPT

## 2024-10-17 PROCEDURE — 97530 THERAPEUTIC ACTIVITIES: CPT

## 2024-10-17 RX ORDER — OMEPRAZOLE 40 MG/1
40 CAPSULE, DELAYED RELEASE ORAL
Qty: 90 CAPSULE | Refills: 1 | Status: SHIPPED | OUTPATIENT
Start: 2024-10-17

## 2024-10-17 NOTE — FLOWSHEET NOTE
without hip pain to demonstrates return to PLOF. (patient specific functional goal)    [] Progressing: [] Met: [] Not Met: [] Adjusted     Overall Progression Towards Functional goals/ Treatment Progress Update:  [x] Patient is progressing as expected towards functional goals listed.    [] Progression is slowed due to complexities/Impairments listed.  [] Progression has been slowed due to co-morbidities.  [] Plan just implemented, too soon (<30days) to assess goals progression   [] Goals require adjustment due to lack of progress  [] Patient is not progressing as expected and requires additional follow up with physician  [] Other:     TREATMENT PLAN     Frequency/Duration: 1-2x/week for 10 weeks for the following treatment interventions:    Interventions:  Therapeutic Exercise (95834) including: strength training, ROM, and functional mobility  Therapeutic Activities (66475) including: functional mobility training and education.  Neuromuscular Re-education (05419) activation and proprioception, including postural re-education.    Manual Therapy (13236) as indicated to include: Passive Range of Motion, Gr I-IV mobilizations, Grade V Manipulation, Soft Tissue Mobilization, Dry Needling/IASTM, Trigger Point Release, and Myofascial Release  Modalities as needed that may include: Cryotherapy, Electrical Stimulation, and Vasoneumatic Compression  Patient education on joint protection, postural re-education, activity modification, and progression of HEP    Plan: Continue to progress hip ROM and control edema    Electronically Signed by Adan Bass PT  Date: 10/17/2024     Note: Portions of this note have been templated and/or copied from initial evaluation, reassessments and prior notes for documentation efficiency.    Note: If patient does not return for scheduled/recommended follow up visits, this note will serve as a discharge from care along with the most recent update on progress.

## 2024-10-22 ENCOUNTER — HOSPITAL ENCOUNTER (OUTPATIENT)
Dept: PHYSICAL THERAPY | Age: 49
Setting detail: THERAPIES SERIES
Discharge: HOME | End: 2024-10-22
Payer: COMMERCIAL

## 2024-10-22 PROCEDURE — 97530 THERAPEUTIC ACTIVITIES: CPT

## 2024-10-22 PROCEDURE — 97140 MANUAL THERAPY 1/> REGIONS: CPT

## 2024-10-22 PROCEDURE — 97110 THERAPEUTIC EXERCISES: CPT

## 2024-10-22 NOTE — FLOWSHEET NOTE
other:    SUBJECTIVE EXAMINATION     Patient stated complaint: Patient reports on going anterior hip and groin pain/soreness     Test used Initial score  8/30/24 09/10/24 10/22/2024     Pain Summary VAS 5/10 5/10 5/10, soreness w/ pain   Functional questionnaire WOMAC 33/96 64/96    Other:                OBJECTIVE EXAMINATION     Observations:  10/22: Patient continues to have significant hypertonicity and sensitivity to all adductors.   9/17: Dressing changed performed this date. Patients wound/incision appears to be healing as expected. Patients wound/incision appears clean, dry and intact. No signs or symptoms indicating infection including: abnormal warmth/heat, streaking redness, pus/colored discharge, or odor      Test and Measurements:  10/22/24   Mvmt (norm) PROM L PROM R Notes       HIP Flex (120) 110 93 Pain    Abd (45)       ER (50) 58 55     IR (45) 15 10     Ext (20) 10 10        MMT L MMT R Notes       HIP  Flexion 4+ nt      Abduction 4 nt      ER        IR        Extension 4 nt    KNEE  Flexion 4+ nt      Extension 5 nt      ANKLE  DF 5 5      PF        Inversion        Eversion          Exercises/Interventions     Therapeutic Ex (72240)  15'  resistance Sets/time Reps Notes/Cues/Progressions   Recumbent bike  5'  Used for hip ROM and WIGGINS   Nico stretch  3' x2 Performed to 0-10 deg of hip ext   SKTC  30\" x3    Sciatic nerve glides  5\" x20    Hooklying fallout (ER stretch)  30\" x1    Supine hip IR stretch  30\" x3    Standing hip flexor stretch  30\" x3 Performed with glute squeeze while in staggered stance   Bridge 4\" 3\" x20           Prone quad stretch  40\" x3    Prone HS curls 7.5# 2 x15    Machine HS curls       Machine leg ext 10# 2 x10    MH abd 35# 2 x15    HR  1 x20    SL leg press 60# 2 x15                                       Manual Intervention (88536)  15'  TIME            Hip MT    PROM into all ROM    STM  15'  STM to quad and adductors                 NMR re-education (00865) resistance

## 2024-10-24 ENCOUNTER — APPOINTMENT (OUTPATIENT)
Dept: PHYSICAL THERAPY | Age: 49
End: 2024-10-24
Payer: COMMERCIAL

## 2024-10-28 ENCOUNTER — OFFICE VISIT (OUTPATIENT)
Dept: ORTHOPEDIC SURGERY | Age: 49
End: 2024-10-28

## 2024-10-28 VITALS — WEIGHT: 242 LBS | BODY MASS INDEX: 35.84 KG/M2 | HEIGHT: 69 IN

## 2024-10-28 DIAGNOSIS — Z96.641 STATUS POST HIP REPLACEMENT, RIGHT: Primary | ICD-10-CM

## 2024-10-28 DIAGNOSIS — M16.11 PRIMARY OSTEOARTHRITIS OF RIGHT HIP: ICD-10-CM

## 2024-10-28 PROCEDURE — 99024 POSTOP FOLLOW-UP VISIT: CPT | Performed by: ORTHOPAEDIC SURGERY

## 2024-10-28 RX ORDER — NAPROXEN 500 MG/1
500 TABLET ORAL 2 TIMES DAILY WITH MEALS
Qty: 60 TABLET | Refills: 1 | Status: SHIPPED | OUTPATIENT
Start: 2024-10-28

## 2024-10-29 ENCOUNTER — HOSPITAL ENCOUNTER (OUTPATIENT)
Dept: PHYSICAL THERAPY | Age: 49
Setting detail: THERAPIES SERIES
Discharge: HOME | End: 2024-10-29
Payer: COMMERCIAL

## 2024-10-29 PROCEDURE — 97110 THERAPEUTIC EXERCISES: CPT

## 2024-10-29 PROCEDURE — 97530 THERAPEUTIC ACTIVITIES: CPT

## 2024-10-29 NOTE — FLOWSHEET NOTE
[facility]    Physical Therapy Treatment Note/ Progress Report:     Date:  2023    Patient Name:  Timoteo Aponte    :  1975  MRN: [id number]  Medical Diagnosis:  Radiculopathy, lumbar region [M54.16]  Treatment Diagnosis:    ICD-10-CM    1. Chronic radicular pain of lower back  M54.16     G89.29      Insurance/Certification information:  PT Insurance Information: Jessie Maldonado / Ignacia Graff / Rita Kate / 30 VISITS / ALL CODES BILLABLE    Physician Information:  Cristobal Larson MD  Plan of care signed (Y/N): []  Yes [x]  No  Date sent: 23    Date of Patient follow up with Physician:      Progress Report: []  Yes  []  No     Functional Scale:    Date assessed:  ASAD  46% disability  23    Date Range for reporting period:  Beginnin23  Ending:      Progress report due (10 Rx/or 30 days whichever is less): 6/19/10     Recertification due (POC duration/ or 90 days whichever is less): 3/1/23     Visit # Insurance Allowable Auth required? Date Range   3 30 []  Yes[x]  No 1/10/23 - 4/10/23     Pain level:  5/10, 8/10 at worse     SUBJECTIVE:  Patient reports progressively worsening of lower back symptoms. States he really did not notice a difference in symptoms following last visit. States pain continues to be centralized to the lower back and is now voicing \"weakness\" of both of his hips. OBJECTIVE: Skilled care provided in flow sheet below; Observation: Patient with global tightness throughout the lower back and bilateral lower quarter. Noted muscular spasms when rolling in bed or when stretching lower back structures.  Left side stronger during step up, right side weaker and needs more cueing for glute recruitment  Test measurements:      RESTRICTIONS/PRECAUTIONS: Chronic lower back pain    Exercises/Interventions:   Therapeutic Ex  8' Wt / Rt sets/sec reps notes   Double knee to chest  30\" x3 Use of strap assistance   Supine sciatic nerve glides NV      Supine figure 4 stretch  45\" x1    Supine lower trunk rotation  45\" x1    Standing quad stretch off chair  45\" x1    Foot on chair hip stretch  45\" x1    SL QL stretch  30\" x1 Not very effective   Standing QL stretch  30\" x1 Difficulty with maintaining stretch   EOB HS stretch  45\" x1    Resisted side stepping Maroon 5 laps 12ft                                Therapeutic Activities                                                              Manual Intervention  12'               Prone PA       iASTM/STM  4'  Left sidelying QL distraction and release w/ right leg raise and ankle pumps   Lumbar Manip       SI Manip  2' Gr5 RLE LAD manip with prolonged distraction   Hip belt mobs  6' Gr 3-4 Bilateral hip belt mobs into flexion/ER/IR   Hip LA distraction              NMR re-education   12'       Supine BS, glute squeeze, pelvic tilt  1 x15    Modified deadbug  2 x10ea Contralateral leg on table straight   Tball isometric crunch  2 x10    Standing pelvic tilts w/ foot on step and rotation 8\" 10\" x10ea Cueing for glute recuitment   Half kneeling palloff press  2 x10 Focusing on resisting left rotation. Had to change to standing palloff press   FSU w/ glute cueing 8\" 1 x10 Use of PVC HHA, several cues for increased glute recruitment     Pt.  Education:  2/8/23: Patient continues to be educated on      Home Exercise Program:  1/17/23: Access Code: [email or web address]     Exercises  Supine Figure 4 Piriformis Stretch - 1 x daily - 7 x weekly - 3 sets - 45 seconds hold  Supine Lower Trunk Rotation - 1 x daily - 7 x weekly - 3 sets - 45 seconds hold  Standing Hip Flexor Stretch with Foot Elevated - 1 x daily - 7 x weekly - 3 sets - 45 seconds hold  Hip Flexor Stretch with Chair - 1 x daily - 7 x weekly - 3 sets - 45 seconds hold  Seated Table Hamstring Stretch - 1 x daily - 7 x weekly - 3 sets - 45 seconds hold    Therapeutic Exercise and NMR EXR  [] (56141) Provided verbal/tactile cueing for activities related to strengthening, flexibility, endurance, ROM  for improvements in proximal hip and core control with self care, mobility, lifting and ambulation.  [] (86152) Provided verbal/tactile cueing for activities related to improving balance, coordination, kinesthetic sense, posture, motor skill, proprioception  to assist with core control in self care, mobility, lifting, and ambulation. Therapeutic Activities:    [] (77045 or 58262) Provided verbal/tactile cueing for activities related to improving balance, coordination, kinesthetic sense, posture, motor skill, proprioception and motor activation to allow for proper function  with self care and ADLs  [] (35970) Provided training and instruction to the patient for proper core and proximal hip recruitment and positioning with ambulation re-education     Home Exercise Program:    [x] (08565) Reviewed/Progressed HEP activities related to strengthening, flexibility, endurance, ROM of core, proximal hip and LE for functional self-care, mobility, lifting and ambulation   [] (76861) Reviewed/Progressed HEP activities related to improving balance, coordination, kinesthetic sense, posture, motor skill, proprioception of core, proximal hip and LE for self care, mobility, lifting, and ambulation      Manual Treatments:  PROM / STM / Oscillations-Mobs:  G-I, II, III, IV (PA's, Inf., Post.)  [] (14736) Provided manual therapy to mobilize proximal hip and LS spine soft tissue/joints for the purpose of modulating pain, promoting relaxation,  increasing ROM, reducing/eliminating soft tissue swelling/inflammation/restriction, improving soft tissue extensibility and allowing for proper ROM for normal function with self care, mobility, lifting and ambulation.      Modalities:       Charges:  Timed Code Treatment Minutes: 32'   Total Treatment Minutes: 28'       [] EVAL (LOW) 717 1011 (typically 20 minutes face-to-face)  [] EVAL (MOD) 69214 (typically 30 minutes face-to-face)  [] EVAL (HIGH) 36319 (typically 45 minutes face-to-face)  [] RE-EVAL     [] DJ(22452) x     [] IONTO (41896)  [x] NMR (60121) x  1   [] VASO (50086)  [x] Manual (02705) x  1   [] Other:  [] TA (82289)x     [] Mech Traction (46503)  [] ES(attended) (01724)     [] ES (un) (85019):     GOALS:  Patient stated goal: \"improve lower back pain\"  [] Progressing: [] Met: [] Not Met: [] Adjusted     Therapist goals for Patient:   Short Term Goals: To be achieved in: 2 weeks  1. Independent in HEP and progression per patient tolerance, in order to prevent re-injury. [] Progressing: [] Met: [] Not Met: [] Adjusted  2. Patient will have a decrease in pain to facilitate improvement in movement, function, and ADLs as indicated by Functional Deficits. [] Progressing: [] Met: [] Not Met: [] Adjusted     Long Term Goals: To be achieved in: 6-12 weeks  1. Patient will reach ASAD functional score of less than or equal to 15/50 to assist with reaching prior level of function with activities such as sitting more than 1/2 hour. [] Progressing: [] Met: [] Not Met: [] Adjusted  2. Patient will demonstrate increased active forward flexion to hand to knees to demonstrates improve trunk control. [] Progressing: [] Met: [] Not Met: [] Adjusted  3. Patient will demonstrate an increase in Strength to good proximal hip and core activation to allow for proper functional mobility as indicated by patients Functional Deficits. [] Progressing: [] Met: [] Not Met: [] Adjusted  4. Patient will be able to walk > 1 mile to demonstrate symptom progression   [] Progressing: [] Met: [] Not Met: [] Adjusted  5. Patient able to reports 50% reduction in symptoms to improve quality of life.(patient specific functional goal)    [] Progressing: [] Met: [] Not Met: [] Adjusted       ASSESSMENT:  Patient reports increases in his bilateral lower back symptoms. Discussed his work and possible connection to repetitive tasks he is performing.   Discussed how previous fusions and subsequent hypermobility to L4/5 segments are largely influencing symptoms and importance of flexibility to prevent muscular guarding during movement. Continued to work on decreasing posterior chain facilitation followed up with self stretching and neuromuscular re-education. Noted left glute being stronger during step ups while the right side weaker and needs more cueing for glute recruitment. Patient remarkably fatigued at end of session. Patient will continue to benefit from skilled therapy to address pain, poor spinal stability, improve neuromuscular control and improve sitting and standing tolerance. Treatment/Activity Tolerance:  [x] Patient tolerated treatment well [] Patient limited by fatique  [] Patient limited by pain  [] Patient limited by other medical complications  [] Other:     Overall Progression Towards Functional goals/ Treatment Progress Update:  [x] Patient is progressing as expected towards functional goals listed. [] Progression is slowed due to complexities/Impairments listed. [] Progression has been slowed due to co-morbidities. [] Plan just implemented, too soon to assess goals progression <30days   [] Goals require adjustment due to lack of progress  [] Patient is not progressing as expected and requires additional follow up with physician  [] Other:    Prognosis for POC: [x] Good [] Fair  [] Poor    Patient requires continued skilled intervention: [x] Yes  [] No        PLAN: Begin PT focusing on: proximal hip mobilizations, LB mobs, LB core activation, proximal hip activation, and HEP     Frequency/Duration:  1-2 days per week for 6-12 Weeks:  Interventions:  [x] Continue per plan of care [] Alter current plan (see comments)  [x] Plan of care initiated [] Hold pending MD visit [] Discharge    Electronically signed by:  Gardenia Olson PT    Note: If patient does not return for scheduled/recommended follow up visits, this note will serve as a discharge from care along with the most recent update on progress. Opt out

## 2024-10-29 NOTE — PLAN OF CARE
additional follow up with physician  [] Other:     TREATMENT PLAN     Frequency/Duration: 1-2x/week for 10 weeks for the following treatment interventions:    Interventions:  Therapeutic Exercise (30188) including: strength training, ROM, and functional mobility  Therapeutic Activities (44141) including: functional mobility training and education.  Neuromuscular Re-education (67946) activation and proprioception, including postural re-education.    Manual Therapy (07308) as indicated to include: Passive Range of Motion, Gr I-IV mobilizations, Grade V Manipulation, Soft Tissue Mobilization, Dry Needling/IASTM, Trigger Point Release, and Myofascial Release  Modalities as needed that may include: Cryotherapy, Electrical Stimulation, and Vasoneumatic Compression  Patient education on joint protection, postural re-education, activity modification, and progression of HEP    Plan: Continue to progress hip ROM and control edema    Electronically Signed by Adan Bass PT  Date: 10/29/2024     Note: Portions of this note have been templated and/or copied from initial evaluation, reassessments and prior notes for documentation efficiency.    Note: If patient does not return for scheduled/recommended follow up visits, this note will serve as a discharge from care along with the most recent update on progress.

## 2024-11-01 ENCOUNTER — HOSPITAL ENCOUNTER (OUTPATIENT)
Dept: PHYSICAL THERAPY | Age: 49
Setting detail: THERAPIES SERIES
Discharge: HOME | End: 2024-11-01
Payer: COMMERCIAL

## 2024-11-01 PROCEDURE — 97110 THERAPEUTIC EXERCISES: CPT

## 2024-11-01 PROCEDURE — 97140 MANUAL THERAPY 1/> REGIONS: CPT

## 2024-11-01 NOTE — FLOWSHEET NOTE
[facility] - Outpatient Rehabilitation and Therapy [address], [place] [address] office: [phone] fax: [phone]    Physical Therapy: TREATMENT/PROGRESS NOTE   Patient: Ryan Magana (49 y.o. male)   Examination Date: 2024   :  1975 MRN: [id number]   Visit #: 13   Insurance Allowable Auth Needed   30  Used  [x]Yes    []No    Insurance: Payor: [employer] / Plan: [employer] [place] MEDICAID / Product Type: *No Product type* /   Insurance ID: [id number] - (Medicaid Managed)  Secondary Insurance (if applicable):    Treatment Diagnosis:     ICD-10-CM    1. Decreased range of right hip movement  M25.651       2. Hip weakness  R29.898       3. Balance problem  R26.89       4. Chronic right-sided low back pain with right-sided sciatica  M54.41     G89.29          Medical Diagnosis:  Osteoarthritis of right hip, unspecified osteoarthritis type [M16.11]   Referring Physician: Ekaterina Duque MD  PCP: Arely Lu MD     Plan of care signed (Y/N): Y    Date of Patient follow up with Physician: 10/29/24     Plan of Care Report: NO  POC update due: (10 visits /OR AUTH LIMITS, whichever is less)  2024                                             Medical History:  Comorbidities:  Other Cardio/Pulmonary Conditions: Pre-CHF  Prior Surgeries: History of Lumbar surgery x3 ( L5/S1 fusion,  L3/4 fusion,  L4/5 ectomy)  Relevant Medical History: R JUVENTINO - 24                                         Precautions/ Contra-indications:           Latex allergy:  NO  Pacemaker:    NO  Contraindications for Manipulation: recent surgical history (relative) and remote history of spinal fusion (relative)  Date of Surgery: R JUVENTINO - 24    Red Flags:  None    Suicide Screening:   The patient did not verbalize a primary behavioral concern, suicidal ideation, suicidal intent, or demonstrate suicidal behaviors.    Preferred Language for Healthcare:   [x] English       []

## 2024-11-05 ENCOUNTER — HOSPITAL ENCOUNTER (OUTPATIENT)
Dept: PHYSICAL THERAPY | Age: 49
Setting detail: THERAPIES SERIES
Discharge: HOME | End: 2024-11-05
Payer: COMMERCIAL

## 2024-11-05 PROCEDURE — 97110 THERAPEUTIC EXERCISES: CPT

## 2024-11-05 PROCEDURE — 97530 THERAPEUTIC ACTIVITIES: CPT

## 2024-11-05 NOTE — FLOWSHEET NOTE
(78607)         Other:    Other:    Total Timed Code Tx Minutes 43 3       Total Treatment Minutes 43        Charge Justification:  (72632) THERAPEUTIC EXERCISE - Provided verbal/tactile cueing for activities related to strengthening, flexibility, endurance, ROM performed to prevent loss of range of motion, maintain or improve muscular strength or increase flexibility, following either an injury or surgery.   (32478) THERAPEUTIC ACTIVITY - use of dynamic activities to improve functional performance. (Ex include squatting, ascending/descending stairs, walking, bending, lifting, catching, throwing, pushing, pulling, jumping.)  Direct, one on one contact, billed in 15-minute increments.    GOALS     Patient stated goal: \"get better\"  [x] Progressing: [] Met: [] Not Met: [] Adjusted    Therapist goals for Patient:   Short Term Goals: To be achieved in: 4-6 weeks  1. Independent in HEP and progression per patient tolerance, in order to prevent re-injury.   [] Progressing: [x] Met: [] Not Met: [] Adjusted  2. Patient will have a decrease in pain to <2/10 to facilitate improvement in movement, function, and ADLs as indicated by Functional Deficits.  [] Progressing: [x] Met: [] Not Met: [] Adjusted    Long Term Goals: To be achieved in: 6-10 weeks  1. Disability index score of 20% or less for the iHOT to assist with reaching prior level of function with activities such as traversing stairs.  [x] Progressing: [] Met: [] Not Met: [] Adjusted  2. Patient will demonstrate passive hip flexion of 115 degrees to allow for him to don/doff his shoes.   [x] Progressing: [] Met: [] Not Met: [] Adjusted  3. Patient will demonstrate increased Strength of hip flexion to 4/5 to allow for him to transfer in/out of a vehicle.   [x] Progressing: [] Met: [] Not Met: [] Adjusted  4. Patient will be able to ambulate without need for AD.   [x] Progressing: [] Met: [] Not Met: [] Adjusted  5. Patient will be able to ambulate for 1 mile without

## 2024-11-07 ENCOUNTER — HOSPITAL ENCOUNTER (OUTPATIENT)
Dept: PHYSICAL THERAPY | Age: 49
Setting detail: THERAPIES SERIES
Discharge: HOME | End: 2024-11-07
Payer: COMMERCIAL

## 2024-11-07 PROCEDURE — 97140 MANUAL THERAPY 1/> REGIONS: CPT

## 2024-11-07 PROCEDURE — 97110 THERAPEUTIC EXERCISES: CPT

## 2024-11-07 PROCEDURE — 97530 THERAPEUTIC ACTIVITIES: CPT

## 2024-11-07 NOTE — FLOWSHEET NOTE
[facility] - Outpatient Rehabilitation and Therapy [address], [place] [address] office: [phone] fax: [phone]    Physical Therapy: TREATMENT/PROGRESS NOTE   Patient: Ryan Magana (49 y.o. male)   Examination Date: 2024   :  1975 MRN: [id number]   Visit #: 15   Insurance Allowable Auth Needed   30  Used  [x]Yes    []No    Insurance: Payor: [employer] / Plan: [employer] [place] MEDICAID / Product Type: *No Product type* /   Insurance ID: [id number] - (Medicaid Managed)  Secondary Insurance (if applicable):    Treatment Diagnosis:     ICD-10-CM    1. Decreased range of right hip movement  M25.651       2. Hip weakness  R29.898       3. Balance problem  R26.89       4. Chronic right-sided low back pain with right-sided sciatica  M54.41     G89.29          Medical Diagnosis:  Osteoarthritis of right hip, unspecified osteoarthritis type [M16.11]   Referring Physician: Ekaterina Duque MD  PCP: Arely Lu MD     Plan of care signed (Y/N): Y    Date of Patient follow up with Physician: 24     Plan of Care Report: NO  POC update due: (10 visits /OR AUTH LIMITS, whichever is less)  2024                                             Medical History:  Comorbidities:  Other Cardio/Pulmonary Conditions: Pre-CHF  Prior Surgeries: History of Lumbar surgery x3 ( L5/S1 fusion,  L3/4 fusion,  L4/5 ectomy)  Relevant Medical History: R JUVENTINO - 24                                         Precautions/ Contra-indications:           Latex allergy:  NO  Pacemaker:    NO  Contraindications for Manipulation: recent surgical history (relative) and remote history of spinal fusion (relative)  Date of Surgery: R JUVENTINO - 24    Red Flags:  None    Suicide Screening:   The patient did not verbalize a primary behavioral concern, suicidal ideation, suicidal intent, or demonstrate suicidal behaviors.    Preferred Language for Healthcare:   [x] English       []

## 2024-11-12 ENCOUNTER — APPOINTMENT (OUTPATIENT)
Dept: PHYSICAL THERAPY | Age: 49
End: 2024-11-12
Payer: COMMERCIAL

## 2024-11-14 ENCOUNTER — APPOINTMENT (OUTPATIENT)
Dept: PHYSICAL THERAPY | Age: 49
End: 2024-11-14
Payer: COMMERCIAL

## 2024-12-18 ENCOUNTER — OFFICE VISIT (OUTPATIENT)
Dept: ORTHOPEDIC SURGERY | Age: 49
End: 2024-12-18

## 2024-12-18 VITALS — BODY MASS INDEX: 35.55 KG/M2 | HEIGHT: 69 IN | WEIGHT: 240 LBS

## 2024-12-18 DIAGNOSIS — M16.11 PRIMARY OSTEOARTHRITIS OF RIGHT HIP: ICD-10-CM

## 2024-12-18 DIAGNOSIS — Z96.641 STATUS POST HIP REPLACEMENT, RIGHT: Primary | ICD-10-CM

## 2024-12-18 PROCEDURE — 99024 POSTOP FOLLOW-UP VISIT: CPT

## 2024-12-18 NOTE — FLOWSHEET NOTE
[facility]    Physical Therapy  Cancellation/No-show Note  Patient Name:  Olga Lidia Reyna  :  1975   Date:  2023  Cancelled visits to date: 0  No-shows to date: 1    For today's appointment patient:  []  Cancelled  [x]  Rescheduled appointment  [x]  No-show     Reason given by patient:  []  Patient ill  []  Conflicting appointment  []  No transportation    []  Conflict with work  []  No reason given  [x]  Other:     Comments:  Forgot appointment, rescheduled for Friday. Phone call information:   [x]  Phone call made today to patient at _ time at number provided:      []  Patient answered, conversation as follows:    [x]  Patient did not answer, message left as follows:  []  Phone call not made today  [x]  Phone call not needed - pt contacted us to cancel and provided reason for cancellation. Electronically signed by:   Maria G Perez, PT, DPT
Chronic  - Continue atorvastatin  - Lipid profile in AM

## 2024-12-18 NOTE — PROGRESS NOTES
patient who is 3 months post Right JUVENTINO, DAA.   Having moderate severe myofascial pain to his rec fem  and psoas concerning for psoas tendonitis vs impingement on the cup    Impression:  Encounter Diagnoses   Name Primary?    Status post hip replacement, right Yes    Primary osteoarthritis of right hip        Office Procedures:  Orders Placed This Encounter   Procedures    XR HIP 2-3 VW W PELVIS RIGHT     ROOM 9     Standing Status:   Future     Number of Occurrences:   1     Standing Expiration Date:   12/16/2025       Treatment Plan:    Ryan Magana has had continued anterior hip pain following JUVENTINO 9/6/24.   We recommend Toradol Rx sent to pharmacy today via epic.   Follow up with Dr. Duque in 4 weeks to re- discuss and determine next steps in treatment. Could consider diagnostic psoas injection.     All of  her questions were fully answered today. We would like to see Ryan Magana back in 3 months for follow-up visit and new xrays.    Sincerely,  BIANCA Lopez    12/18/24  9:56 AM

## 2024-12-26 ENCOUNTER — TELEPHONE (OUTPATIENT)
Dept: ORTHOPEDIC SURGERY | Age: 49
End: 2024-12-26

## 2024-12-26 RX ORDER — KETOROLAC TROMETHAMINE 10 MG/1
10 TABLET, FILM COATED ORAL EVERY 6 HOURS PRN
Qty: 20 TABLET | Refills: 0 | Status: SHIPPED | OUTPATIENT
Start: 2024-12-26

## 2024-12-26 NOTE — TELEPHONE ENCOUNTER
Rx sent to pharmacy  Left patient vm.   Advised him to call back if he wishes to discuss BIANCA Carter

## 2024-12-26 NOTE — TELEPHONE ENCOUNTER
1)    Prescription Refill     Medication Name:  PAIN MED NEVER SENT IN FOR Pt     Pharmacy: [facility] [id number] - [address] - P [phone] - F [phone]     Patient Contact Number:  [phone]      2)     Pt ASKING FOR A CALL BACK. HE NEVER GOT PAIN MEDS  DISCUSSED AT 12/18/24 OV. Pt IS HAVING  ISSUES WITH THE SX HIP, AS WELL PLEASE CALL ASAP. Pt ASKING IF DSODDERS CAN CALL HIM?

## 2025-02-04 ENCOUNTER — TELEPHONE (OUTPATIENT)
Dept: ORTHOPEDIC SURGERY | Age: 50
End: 2025-02-04

## 2025-02-04 NOTE — TELEPHONE ENCOUNTER
Faxed all records for back and hips to [place] at [phone] attn:  Bijal and pushed images thru PACS

## 2025-02-05 ENCOUNTER — TELEPHONE (OUTPATIENT)
Dept: ORTHOPEDIC SURGERY | Age: 50
End: 2025-02-05

## 2025-02-18 ENCOUNTER — OFFICE VISIT (OUTPATIENT)
Dept: PRIMARY CARE CLINIC | Age: 50
End: 2025-02-18
Payer: COMMERCIAL

## 2025-02-18 VITALS
HEIGHT: 69 IN | BODY MASS INDEX: 37.65 KG/M2 | OXYGEN SATURATION: 98 % | TEMPERATURE: 97.1 F | DIASTOLIC BLOOD PRESSURE: 70 MMHG | SYSTOLIC BLOOD PRESSURE: 120 MMHG | WEIGHT: 254.2 LBS | HEART RATE: 94 BPM

## 2025-02-18 DIAGNOSIS — M54.50 CHRONIC BILATERAL LOW BACK PAIN WITHOUT SCIATICA: ICD-10-CM

## 2025-02-18 DIAGNOSIS — G89.21 CHRONIC PAIN DUE TO TRAUMA: ICD-10-CM

## 2025-02-18 DIAGNOSIS — M51.26 HERNIATED LUMBAR INTERVERTEBRAL DISC: ICD-10-CM

## 2025-02-18 DIAGNOSIS — I50.20 HFREF (HEART FAILURE WITH REDUCED EJECTION FRACTION) (HCC): ICD-10-CM

## 2025-02-18 DIAGNOSIS — Z01.818 PRE-OP EXAMINATION: Primary | ICD-10-CM

## 2025-02-18 DIAGNOSIS — G89.29 CHRONIC BILATERAL LOW BACK PAIN WITHOUT SCIATICA: ICD-10-CM

## 2025-02-18 DIAGNOSIS — R73.9 HYPERGLYCEMIA: ICD-10-CM

## 2025-02-18 DIAGNOSIS — Z01.818 PRE-OP EXAMINATION: ICD-10-CM

## 2025-02-18 DIAGNOSIS — Z96.641 STATUS POST TOTAL HIP REPLACEMENT, RIGHT: ICD-10-CM

## 2025-02-18 LAB
ANION GAP SERPL CALCULATED.3IONS-SCNC: 11 MMOL/L (ref 3–16)
BUN SERPL-MCNC: 12 MG/DL (ref 7–20)
CALCIUM SERPL-MCNC: 9.6 MG/DL (ref 8.3–10.6)
CHLORIDE SERPL-SCNC: 101 MMOL/L (ref 99–110)
CO2 SERPL-SCNC: 27 MMOL/L (ref 21–32)
CREAT SERPL-MCNC: 0.8 MG/DL (ref 0.9–1.3)
DEPRECATED RDW RBC AUTO: 15.8 % (ref 12.4–15.4)
GFR SERPLBLD CREATININE-BSD FMLA CKD-EPI: >90 ML/MIN/{1.73_M2}
GLUCOSE SERPL-MCNC: 103 MG/DL (ref 70–99)
HCT VFR BLD AUTO: 48.2 % (ref 40.5–52.5)
HGB BLD-MCNC: 15.5 G/DL (ref 13.5–17.5)
MCH RBC QN AUTO: 26.7 PG (ref 26–34)
MCHC RBC AUTO-ENTMCNC: 32.2 G/DL (ref 31–36)
MCV RBC AUTO: 82.8 FL (ref 80–100)
PLATELET # BLD AUTO: 242 K/UL (ref 135–450)
PMV BLD AUTO: 8.2 FL (ref 5–10.5)
POTASSIUM SERPL-SCNC: 4.7 MMOL/L (ref 3.5–5.1)
RBC # BLD AUTO: 5.81 M/UL (ref 4.2–5.9)
SODIUM SERPL-SCNC: 139 MMOL/L (ref 136–145)
TSH SERPL DL<=0.005 MIU/L-ACNC: 1.57 UIU/ML (ref 0.27–4.2)
WBC # BLD AUTO: 5.4 K/UL (ref 4–11)

## 2025-02-18 PROCEDURE — G8417 CALC BMI ABV UP PARAM F/U: HCPCS | Performed by: FAMILY MEDICINE

## 2025-02-18 PROCEDURE — 4004F PT TOBACCO SCREEN RCVD TLK: CPT | Performed by: FAMILY MEDICINE

## 2025-02-18 PROCEDURE — 3017F COLORECTAL CA SCREEN DOC REV: CPT | Performed by: FAMILY MEDICINE

## 2025-02-18 PROCEDURE — 93000 ELECTROCARDIOGRAM COMPLETE: CPT | Performed by: FAMILY MEDICINE

## 2025-02-18 PROCEDURE — G8427 DOCREV CUR MEDS BY ELIG CLIN: HCPCS | Performed by: FAMILY MEDICINE

## 2025-02-18 PROCEDURE — 99214 OFFICE O/P EST MOD 30 MIN: CPT | Performed by: FAMILY MEDICINE

## 2025-02-18 ASSESSMENT — ENCOUNTER SYMPTOMS
RESPIRATORY NEGATIVE: 1
BACK PAIN: 1
GASTROINTESTINAL NEGATIVE: 1
EYES NEGATIVE: 1
ALLERGIC/IMMUNOLOGIC NEGATIVE: 1

## 2025-02-18 ASSESSMENT — PATIENT HEALTH QUESTIONNAIRE - PHQ9
SUM OF ALL RESPONSES TO PHQ QUESTIONS 1-9: 0
SUM OF ALL RESPONSES TO PHQ QUESTIONS 1-9: 0
1. LITTLE INTEREST OR PLEASURE IN DOING THINGS: NOT AT ALL
SUM OF ALL RESPONSES TO PHQ9 QUESTIONS 1 & 2: 0
SUM OF ALL RESPONSES TO PHQ QUESTIONS 1-9: 0
2. FEELING DOWN, DEPRESSED OR HOPELESS: NOT AT ALL
SUM OF ALL RESPONSES TO PHQ QUESTIONS 1-9: 0

## 2025-02-18 NOTE — PROGRESS NOTES
SUBJECTIVE:  Ryan Magana is a 49 y.o. male who presents for evaluation for pre op for lumbar fusion. The pain is described as constant and is 5/10 in intensity. Onset was several years ago. Symptoms have been gradually worsening since. Aggravating factors:walking, sitting.  Alleviating factors: standing, laying down. Associated symptoms: pain numbness in LE. The patient denies chest pain or SOB, no fever or chills, no ST or cough, no N/V/D.    Review of Systems   Constitutional: Negative.    HENT: Negative.     Eyes: Negative.    Respiratory: Negative.     Cardiovascular: Negative.    Gastrointestinal: Negative.    Endocrine: Negative.    Genitourinary: Negative.    Musculoskeletal:  Positive for arthralgias and back pain.   Allergic/Immunologic: Negative.    Neurological: Negative.    Hematological: Negative.    Psychiatric/Behavioral: Negative.     All other systems reviewed and are negative.     Past Medical History:   Diagnosis Date    Arthritis     Chronic back pain     Diabetes mellitus (HCC)     History of lumbar fusion     Hyperlipidemia     Hypertension     Lumbar radiculopathy     Neuropathy     both feet; right leg    Pre-diabetes       Patient Active Problem List    Diagnosis Date Noted    Status post total hip replacement, right 09/06/2024    Primary osteoarthritis of right hip 08/13/2024    Hyperglycemia 06/07/2024    Mixed hyperlipidemia 06/07/2024    Chronic bilateral low back pain without sciatica 06/07/2024    History of lumbar fusion 05/23/2024    Lumbar radiculopathy 05/23/2024    Herniated lumbar intervertebral disc 05/18/2016    Chronic pain due to trauma 05/18/2016      Past Surgical History:   Procedure Laterality Date    APPENDECTOMY  1989    BACK SURGERY  04/2007    L5-S1 discectomy    BACK SURGERY  06/02/2014    BACK SURGERY  2010 2012 2016    SHOULDER ARTHROSCOPY Right 04/29/2022    RIGHT SHOULDER ARTHROSCOPY FOR DISTAL CLAVICLE EXCISION, SUBACROMIAL DECOMPRESSION, EXTENSIVE

## 2025-02-19 ENCOUNTER — TELEPHONE (OUTPATIENT)
Dept: PRIMARY CARE CLINIC | Age: 50
End: 2025-02-19

## 2025-02-19 LAB
EST. AVERAGE GLUCOSE BLD GHB EST-MCNC: 114 MG/DL
HBA1C MFR BLD: 5.6 %

## 2025-02-19 RX ORDER — METOPROLOL SUCCINATE 25 MG/1
25 TABLET, EXTENDED RELEASE ORAL DAILY
Qty: 90 TABLET | Refills: 2 | Status: SHIPPED | OUTPATIENT
Start: 2025-02-19

## 2025-02-19 RX ORDER — LOSARTAN POTASSIUM 25 MG/1
25 TABLET ORAL
Qty: 90 TABLET | Refills: 2 | Status: SHIPPED | OUTPATIENT
Start: 2025-02-19

## 2025-02-19 NOTE — TELEPHONE ENCOUNTER
Pt is asking about blood work results.  Specifically the elevated RDW. Also he was prescribed Vitamin D weekly by hip MD should he continue?  He has 4 pills left.

## 2025-02-19 NOTE — TELEPHONE ENCOUNTER
Requested Prescriptions     Pending Prescriptions Disp Refills    metoprolol succinate (TOPROL XL) 25 MG extended release tablet 90 tablet 3     Sig: Take 1 tablet by mouth daily    losartan (COZAAR) 25 MG tablet 90 tablet 3     Sig: Take 1 tablet by mouth nightly            Checked Correct Pharmacy: Yes    Any changes since last refill? No     Number: 90  Refills: 2    Last OV: 8/28/2024 Provider: JARED    Next OV: will call back to make it . Was supposed to follow up 3 months after 8/28

## 2025-02-20 NOTE — TELEPHONE ENCOUNTER
Blood work is good  It is not significantly elevated not to worry about  May need to check with the doctor who prescribed her vitamin D

## 2025-02-21 RX ORDER — ATORVASTATIN CALCIUM 40 MG/1
40 TABLET, FILM COATED ORAL DAILY
Qty: 90 TABLET | Refills: 0 | Status: SHIPPED | OUTPATIENT
Start: 2025-02-21

## 2025-02-21 NOTE — TELEPHONE ENCOUNTER
Medication:   Requested Prescriptions     Pending Prescriptions Disp Refills    atorvastatin (LIPITOR) 20 MG tablet [Pharmacy Med Name: ATORVASTATIN 20 MG TABLET] 90 tablet 1     Sig: TAKE 1 TABLET BY MOUTH EVERY DAY     Last Filled:  10/09/2024    Last appt: 2/18/2025   Next appt: Visit date not found    Last OARRS:        No data to display

## 2025-02-24 RX ORDER — ERGOCALCIFEROL 1.25 MG/1
50000 CAPSULE, LIQUID FILLED ORAL WEEKLY
Qty: 12 CAPSULE | Refills: 1 | OUTPATIENT
Start: 2025-02-24

## 2025-03-13 RX ORDER — METFORMIN HYDROCHLORIDE 500 MG/1
500 TABLET, EXTENDED RELEASE ORAL
Qty: 60 TABLET | Refills: 0 | Status: SHIPPED | OUTPATIENT
Start: 2025-03-13

## 2025-03-13 NOTE — TELEPHONE ENCOUNTER
Medication:   Requested Prescriptions     Pending Prescriptions Disp Refills    metFORMIN (GLUCOPHAGE-XR) 500 MG extended release tablet 60 tablet 0     Sig: Take 1 tablet by mouth daily (with breakfast)     Last Filled:  10/09/2024    Last appt: 2/18/2025   Next appt: Visit date not found    Last OARRS:        No data to display

## 2025-03-17 ENCOUNTER — APPOINTMENT (OUTPATIENT)
Dept: GENERAL RADIOLOGY | Age: 50
DRG: 299 | End: 2025-03-17
Attending: STUDENT IN AN ORGANIZED HEALTH CARE EDUCATION/TRAINING PROGRAM
Payer: COMMERCIAL

## 2025-03-17 ENCOUNTER — ANESTHESIA EVENT (OUTPATIENT)
Dept: OPERATING ROOM | Age: 50
DRG: 299 | End: 2025-03-17
Payer: COMMERCIAL

## 2025-03-17 ENCOUNTER — HOSPITAL ENCOUNTER (INPATIENT)
Age: 50
LOS: 3 days | Discharge: HOME | DRG: 299 | End: 2025-03-20
Attending: STUDENT IN AN ORGANIZED HEALTH CARE EDUCATION/TRAINING PROGRAM | Admitting: STUDENT IN AN ORGANIZED HEALTH CARE EDUCATION/TRAINING PROGRAM
Payer: COMMERCIAL

## 2025-03-17 ENCOUNTER — ANESTHESIA (OUTPATIENT)
Dept: OPERATING ROOM | Age: 50
DRG: 299 | End: 2025-03-17
Payer: COMMERCIAL

## 2025-03-17 DIAGNOSIS — Z98.1 S/P LUMBAR FUSION: Primary | ICD-10-CM

## 2025-03-17 PROBLEM — M48.062 LUMBAR STENOSIS WITH NEUROGENIC CLAUDICATION: Status: ACTIVE | Noted: 2025-03-17

## 2025-03-17 LAB
ABO/RH: NORMAL
ANTIBODY SCREEN: NORMAL
APTT BLD: 23.4 SEC (ref 22.1–36.4)
GLUCOSE BLD-MCNC: 109 MG/DL (ref 70–99)
GLUCOSE BLD-MCNC: 215 MG/DL (ref 70–99)
INR PPP: 0.86 (ref 0.85–1.15)
PERFORMED ON: ABNORMAL
PERFORMED ON: ABNORMAL
PROTHROMBIN TIME: 12 SEC (ref 11.9–14.9)

## 2025-03-17 PROCEDURE — 0SB20ZZ EXCISION OF LUMBAR VERTEBRAL DISC, OPEN APPROACH: ICD-10-PCS | Performed by: STUDENT IN AN ORGANIZED HEALTH CARE EDUCATION/TRAINING PROGRAM

## 2025-03-17 PROCEDURE — 6370000000 HC RX 637 (ALT 250 FOR IP): Performed by: PHYSICIAN ASSISTANT

## 2025-03-17 PROCEDURE — 6360000002 HC RX W HCPCS: Performed by: PHYSICIAN ASSISTANT

## 2025-03-17 PROCEDURE — 2580000003 HC RX 258: Performed by: STUDENT IN AN ORGANIZED HEALTH CARE EDUCATION/TRAINING PROGRAM

## 2025-03-17 PROCEDURE — 6370000000 HC RX 637 (ALT 250 FOR IP): Performed by: INTERNAL MEDICINE

## 2025-03-17 PROCEDURE — 7100000000 HC PACU RECOVERY - FIRST 15 MIN: Performed by: STUDENT IN AN ORGANIZED HEALTH CARE EDUCATION/TRAINING PROGRAM

## 2025-03-17 PROCEDURE — 0DNW0ZZ RELEASE PERITONEUM, OPEN APPROACH: ICD-10-PCS | Performed by: STUDENT IN AN ORGANIZED HEALTH CARE EDUCATION/TRAINING PROGRAM

## 2025-03-17 PROCEDURE — C1889 IMPLANT/INSERT DEVICE, NOC: HCPCS | Performed by: STUDENT IN AN ORGANIZED HEALTH CARE EDUCATION/TRAINING PROGRAM

## 2025-03-17 PROCEDURE — 2709999900 HC NON-CHARGEABLE SUPPLY: Performed by: STUDENT IN AN ORGANIZED HEALTH CARE EDUCATION/TRAINING PROGRAM

## 2025-03-17 PROCEDURE — 01NR0ZZ RELEASE SACRAL NERVE, OPEN APPROACH: ICD-10-PCS | Performed by: STUDENT IN AN ORGANIZED HEALTH CARE EDUCATION/TRAINING PROGRAM

## 2025-03-17 PROCEDURE — 86850 RBC ANTIBODY SCREEN: CPT

## 2025-03-17 PROCEDURE — 0SG00A0 FUSION OF LUMBAR VERTEBRAL JOINT WITH INTERBODY FUSION DEVICE, ANTERIOR APPROACH, ANTERIOR COLUMN, OPEN APPROACH: ICD-10-PCS | Performed by: STUDENT IN AN ORGANIZED HEALTH CARE EDUCATION/TRAINING PROGRAM

## 2025-03-17 PROCEDURE — 2500000003 HC RX 250 WO HCPCS

## 2025-03-17 PROCEDURE — 6360000002 HC RX W HCPCS

## 2025-03-17 PROCEDURE — 72100 X-RAY EXAM L-S SPINE 2/3 VWS: CPT

## 2025-03-17 PROCEDURE — 0SG1071 FUSION OF 2 OR MORE LUMBAR VERTEBRAL JOINTS WITH AUTOLOGOUS TISSUE SUBSTITUTE, POSTERIOR APPROACH, POSTERIOR COLUMN, OPEN APPROACH: ICD-10-PCS | Performed by: STUDENT IN AN ORGANIZED HEALTH CARE EDUCATION/TRAINING PROGRAM

## 2025-03-17 PROCEDURE — 0SG3071 FUSION OF LUMBOSACRAL JOINT WITH AUTOLOGOUS TISSUE SUBSTITUTE, POSTERIOR APPROACH, POSTERIOR COLUMN, OPEN APPROACH: ICD-10-PCS | Performed by: STUDENT IN AN ORGANIZED HEALTH CARE EDUCATION/TRAINING PROGRAM

## 2025-03-17 PROCEDURE — 3700000000 HC ANESTHESIA ATTENDED CARE: Performed by: STUDENT IN AN ORGANIZED HEALTH CARE EDUCATION/TRAINING PROGRAM

## 2025-03-17 PROCEDURE — 8E0WXBF COMPUTER ASSISTED PROCEDURE OF TRUNK REGION, WITH FLUOROSCOPY: ICD-10-PCS | Performed by: STUDENT IN AN ORGANIZED HEALTH CARE EDUCATION/TRAINING PROGRAM

## 2025-03-17 PROCEDURE — 1200000000 HC SEMI PRIVATE

## 2025-03-17 PROCEDURE — 01NB0ZZ RELEASE LUMBAR NERVE, OPEN APPROACH: ICD-10-PCS | Performed by: STUDENT IN AN ORGANIZED HEALTH CARE EDUCATION/TRAINING PROGRAM

## 2025-03-17 PROCEDURE — 0QP004Z REMOVAL OF INTERNAL FIXATION DEVICE FROM LUMBAR VERTEBRA, OPEN APPROACH: ICD-10-PCS | Performed by: STUDENT IN AN ORGANIZED HEALTH CARE EDUCATION/TRAINING PROGRAM

## 2025-03-17 PROCEDURE — 85730 THROMBOPLASTIN TIME PARTIAL: CPT

## 2025-03-17 PROCEDURE — 2500000003 HC RX 250 WO HCPCS: Performed by: STUDENT IN AN ORGANIZED HEALTH CARE EDUCATION/TRAINING PROGRAM

## 2025-03-17 PROCEDURE — 2500000003 HC RX 250 WO HCPCS: Performed by: PHYSICIAN ASSISTANT

## 2025-03-17 PROCEDURE — 6370000000 HC RX 637 (ALT 250 FOR IP): Performed by: ANESTHESIOLOGY

## 2025-03-17 PROCEDURE — 3700000001 HC ADD 15 MINUTES (ANESTHESIA): Performed by: STUDENT IN AN ORGANIZED HEALTH CARE EDUCATION/TRAINING PROGRAM

## 2025-03-17 PROCEDURE — 6360000002 HC RX W HCPCS: Performed by: STUDENT IN AN ORGANIZED HEALTH CARE EDUCATION/TRAINING PROGRAM

## 2025-03-17 PROCEDURE — 2580000003 HC RX 258: Performed by: ANESTHESIOLOGY

## 2025-03-17 PROCEDURE — 86900 BLOOD TYPING SEROLOGIC ABO: CPT

## 2025-03-17 PROCEDURE — 94150 VITAL CAPACITY TEST: CPT

## 2025-03-17 PROCEDURE — C1713 ANCHOR/SCREW BN/BN,TIS/BN: HCPCS | Performed by: STUDENT IN AN ORGANIZED HEALTH CARE EDUCATION/TRAINING PROGRAM

## 2025-03-17 PROCEDURE — 3E0U0GB INTRODUCTION OF RECOMBINANT BONE MORPHOGENETIC PROTEIN INTO JOINTS, OPEN APPROACH: ICD-10-PCS | Performed by: STUDENT IN AN ORGANIZED HEALTH CARE EDUCATION/TRAINING PROGRAM

## 2025-03-17 PROCEDURE — 3600000014 HC SURGERY LEVEL 4 ADDTL 15MIN: Performed by: STUDENT IN AN ORGANIZED HEALTH CARE EDUCATION/TRAINING PROGRAM

## 2025-03-17 PROCEDURE — 86901 BLOOD TYPING SEROLOGIC RH(D): CPT

## 2025-03-17 PROCEDURE — 85610 PROTHROMBIN TIME: CPT

## 2025-03-17 PROCEDURE — 2580000003 HC RX 258

## 2025-03-17 PROCEDURE — 2720000010 HC SURG SUPPLY STERILE: Performed by: STUDENT IN AN ORGANIZED HEALTH CARE EDUCATION/TRAINING PROGRAM

## 2025-03-17 PROCEDURE — 7100000001 HC PACU RECOVERY - ADDTL 15 MIN: Performed by: STUDENT IN AN ORGANIZED HEALTH CARE EDUCATION/TRAINING PROGRAM

## 2025-03-17 PROCEDURE — 3600000004 HC SURGERY LEVEL 4 BASE: Performed by: STUDENT IN AN ORGANIZED HEALTH CARE EDUCATION/TRAINING PROGRAM

## 2025-03-17 DEVICE — ROD 1553201100 5.5 TI CP4 NS CURV 100MM
Type: IMPLANTABLE DEVICE | Site: SPINE LUMBAR | Status: FUNCTIONAL
Brand: CD HORIZON® SPINAL SYSTEM

## 2025-03-17 DEVICE — DBM 7509141 MAGNIFUSE 1.75 X 10CM
Type: IMPLANTABLE DEVICE | Site: SPINE LUMBAR | Status: FUNCTIONAL
Brand: MAGNIFUSE® BONE GRAFT

## 2025-03-17 DEVICE — PUTTY T50106 GRFT DBF 6CC: Type: IMPLANTABLE DEVICE | Site: SPINE LUMBAR | Status: FUNCTIONAL

## 2025-03-17 DEVICE — DBM T44145 5CC ORTHOBLEND SMALL DEFGRAFT
Type: IMPLANTABLE DEVICE | Site: SPINE LUMBAR | Status: FUNCTIONAL
Brand: GRAFTON®AND GRAFTON PLUS®DEMINERALIZED BONE MATRIX (DBM)

## 2025-03-17 DEVICE — BONE GRAFT KIT 7510100 INFUSE X SMALL
Type: IMPLANTABLE DEVICE | Site: SPINE LUMBAR | Status: FUNCTIONAL
Brand: INFUSE® BONE GRAFT

## 2025-03-17 DEVICE — NANO SPACER 46261250 T 6DG 20W 12X9.9X50
Type: IMPLANTABLE DEVICE | Site: SPINE LUMBAR | Status: FUNCTIONAL
Brand: ANTERALIGN SPINAL SYSTEM WITH TITAN NANOLOCK SURFACE TECHNOLOGY

## 2025-03-17 RX ORDER — SODIUM CHLORIDE 9 MG/ML
INJECTION, SOLUTION INTRAVENOUS CONTINUOUS
Status: DISCONTINUED | OUTPATIENT
Start: 2025-03-17 | End: 2025-03-18

## 2025-03-17 RX ORDER — MIDAZOLAM HYDROCHLORIDE 1 MG/ML
INJECTION, SOLUTION INTRAMUSCULAR; INTRAVENOUS
Status: DISCONTINUED | OUTPATIENT
Start: 2025-03-17 | End: 2025-03-17 | Stop reason: SDUPTHER

## 2025-03-17 RX ORDER — DIPHENHYDRAMINE HYDROCHLORIDE 50 MG/ML
INJECTION, SOLUTION INTRAMUSCULAR; INTRAVENOUS
Status: DISCONTINUED | OUTPATIENT
Start: 2025-03-17 | End: 2025-03-17 | Stop reason: SDUPTHER

## 2025-03-17 RX ORDER — VANCOMYCIN HYDROCHLORIDE 1 G/20ML
INJECTION, POWDER, LYOPHILIZED, FOR SOLUTION INTRAVENOUS PRN
Status: DISCONTINUED | OUTPATIENT
Start: 2025-03-17 | End: 2025-03-17 | Stop reason: HOSPADM

## 2025-03-17 RX ORDER — LIDOCAINE HYDROCHLORIDE 20 MG/ML
INJECTION, SOLUTION INTRAVENOUS
Status: DISCONTINUED | OUTPATIENT
Start: 2025-03-17 | End: 2025-03-17 | Stop reason: SDUPTHER

## 2025-03-17 RX ORDER — METOPROLOL SUCCINATE 25 MG/1
25 TABLET, EXTENDED RELEASE ORAL DAILY
Status: DISCONTINUED | OUTPATIENT
Start: 2025-03-18 | End: 2025-03-20 | Stop reason: HOSPADM

## 2025-03-17 RX ORDER — BISACODYL 5 MG/1
5 TABLET, DELAYED RELEASE ORAL DAILY PRN
Status: DISCONTINUED | OUTPATIENT
Start: 2025-03-17 | End: 2025-03-20 | Stop reason: HOSPADM

## 2025-03-17 RX ORDER — DEXMEDETOMIDINE HYDROCHLORIDE 100 UG/ML
INJECTION, SOLUTION INTRAVENOUS
Status: DISCONTINUED | OUTPATIENT
Start: 2025-03-17 | End: 2025-03-17 | Stop reason: SDUPTHER

## 2025-03-17 RX ORDER — SUCCINYLCHOLINE/SOD CL,ISO/PF 200MG/10ML
SYRINGE (ML) INTRAVENOUS
Status: DISCONTINUED | OUTPATIENT
Start: 2025-03-17 | End: 2025-03-17 | Stop reason: SDUPTHER

## 2025-03-17 RX ORDER — OXYCODONE HYDROCHLORIDE 5 MG/1
10 TABLET ORAL EVERY 4 HOURS PRN
Status: DISCONTINUED | OUTPATIENT
Start: 2025-03-17 | End: 2025-03-20 | Stop reason: HOSPADM

## 2025-03-17 RX ORDER — SODIUM CHLORIDE 9 MG/ML
INJECTION, SOLUTION INTRAVENOUS PRN
Status: DISCONTINUED | OUTPATIENT
Start: 2025-03-17 | End: 2025-03-20 | Stop reason: HOSPADM

## 2025-03-17 RX ORDER — ATORVASTATIN CALCIUM 40 MG/1
40 TABLET, FILM COATED ORAL DAILY
Status: DISCONTINUED | OUTPATIENT
Start: 2025-03-17 | End: 2025-03-17

## 2025-03-17 RX ORDER — LIDOCAINE HYDROCHLORIDE 10 MG/ML
1 INJECTION, SOLUTION EPIDURAL; INFILTRATION; INTRACAUDAL; PERINEURAL
Status: DISCONTINUED | OUTPATIENT
Start: 2025-03-17 | End: 2025-03-17 | Stop reason: HOSPADM

## 2025-03-17 RX ORDER — SODIUM CHLORIDE 0.9 % (FLUSH) 0.9 %
5-40 SYRINGE (ML) INJECTION EVERY 12 HOURS SCHEDULED
Status: DISCONTINUED | OUTPATIENT
Start: 2025-03-17 | End: 2025-03-17 | Stop reason: HOSPADM

## 2025-03-17 RX ORDER — NALOXONE HYDROCHLORIDE 0.4 MG/ML
INJECTION, SOLUTION INTRAMUSCULAR; INTRAVENOUS; SUBCUTANEOUS PRN
Status: DISCONTINUED | OUTPATIENT
Start: 2025-03-17 | End: 2025-03-17 | Stop reason: HOSPADM

## 2025-03-17 RX ORDER — ONDANSETRON 4 MG/1
4 TABLET, ORALLY DISINTEGRATING ORAL EVERY 8 HOURS PRN
Status: DISCONTINUED | OUTPATIENT
Start: 2025-03-17 | End: 2025-03-20 | Stop reason: HOSPADM

## 2025-03-17 RX ORDER — LABETALOL HYDROCHLORIDE 5 MG/ML
10 INJECTION, SOLUTION INTRAVENOUS
Status: DISCONTINUED | OUTPATIENT
Start: 2025-03-17 | End: 2025-03-17 | Stop reason: HOSPADM

## 2025-03-17 RX ORDER — OXYCODONE HYDROCHLORIDE 5 MG/1
5 TABLET ORAL EVERY 4 HOURS PRN
Status: DISCONTINUED | OUTPATIENT
Start: 2025-03-17 | End: 2025-03-20 | Stop reason: HOSPADM

## 2025-03-17 RX ORDER — FENTANYL CITRATE 50 UG/ML
INJECTION, SOLUTION INTRAMUSCULAR; INTRAVENOUS
Status: DISCONTINUED | OUTPATIENT
Start: 2025-03-17 | End: 2025-03-17 | Stop reason: SDUPTHER

## 2025-03-17 RX ORDER — SODIUM CHLORIDE 0.9 % (FLUSH) 0.9 %
5-40 SYRINGE (ML) INJECTION PRN
Status: DISCONTINUED | OUTPATIENT
Start: 2025-03-17 | End: 2025-03-17 | Stop reason: HOSPADM

## 2025-03-17 RX ORDER — ENOXAPARIN SODIUM 100 MG/ML
30 INJECTION SUBCUTANEOUS 2 TIMES DAILY
Status: DISCONTINUED | OUTPATIENT
Start: 2025-03-18 | End: 2025-03-20 | Stop reason: HOSPADM

## 2025-03-17 RX ORDER — SODIUM CHLORIDE 0.9 % (FLUSH) 0.9 %
5-40 SYRINGE (ML) INJECTION PRN
Status: DISCONTINUED | OUTPATIENT
Start: 2025-03-17 | End: 2025-03-20 | Stop reason: HOSPADM

## 2025-03-17 RX ORDER — SODIUM CHLORIDE 0.9 % (FLUSH) 0.9 %
5-40 SYRINGE (ML) INJECTION EVERY 12 HOURS SCHEDULED
Status: DISCONTINUED | OUTPATIENT
Start: 2025-03-17 | End: 2025-03-20 | Stop reason: HOSPADM

## 2025-03-17 RX ORDER — INSULIN LISPRO 100 [IU]/ML
0-4 INJECTION, SOLUTION INTRAVENOUS; SUBCUTANEOUS
Status: DISCONTINUED | OUTPATIENT
Start: 2025-03-17 | End: 2025-03-20 | Stop reason: HOSPADM

## 2025-03-17 RX ORDER — METHOCARBAMOL 750 MG/1
750 TABLET, FILM COATED ORAL EVERY 8 HOURS PRN
Status: COMPLETED | OUTPATIENT
Start: 2025-03-17 | End: 2025-03-19

## 2025-03-17 RX ORDER — PROPOFOL 10 MG/ML
INJECTION, EMULSION INTRAVENOUS
Status: DISCONTINUED | OUTPATIENT
Start: 2025-03-17 | End: 2025-03-17 | Stop reason: SDUPTHER

## 2025-03-17 RX ORDER — GLUCAGON 1 MG/ML
1 KIT INJECTION PRN
Status: DISCONTINUED | OUTPATIENT
Start: 2025-03-17 | End: 2025-03-20 | Stop reason: HOSPADM

## 2025-03-17 RX ORDER — ONDANSETRON 2 MG/ML
INJECTION INTRAMUSCULAR; INTRAVENOUS
Status: DISCONTINUED | OUTPATIENT
Start: 2025-03-17 | End: 2025-03-17 | Stop reason: SDUPTHER

## 2025-03-17 RX ORDER — SODIUM CHLORIDE, SODIUM LACTATE, POTASSIUM CHLORIDE, CALCIUM CHLORIDE 600; 310; 30; 20 MG/100ML; MG/100ML; MG/100ML; MG/100ML
INJECTION, SOLUTION INTRAVENOUS CONTINUOUS
Status: DISCONTINUED | OUTPATIENT
Start: 2025-03-17 | End: 2025-03-17 | Stop reason: HOSPADM

## 2025-03-17 RX ORDER — LIDOCAINE HYDROCHLORIDE ANHYDROUS AND DEXTROSE MONOHYDRATE 5; 400 G/100ML; MG/100ML
INJECTION, SOLUTION INTRAVENOUS
Status: DISCONTINUED | OUTPATIENT
Start: 2025-03-17 | End: 2025-03-17 | Stop reason: SDUPTHER

## 2025-03-17 RX ORDER — SODIUM CHLORIDE 9 MG/ML
INJECTION, SOLUTION INTRAVENOUS PRN
Status: DISCONTINUED | OUTPATIENT
Start: 2025-03-17 | End: 2025-03-17 | Stop reason: HOSPADM

## 2025-03-17 RX ORDER — ONDANSETRON 2 MG/ML
4 INJECTION INTRAMUSCULAR; INTRAVENOUS EVERY 6 HOURS PRN
Status: DISCONTINUED | OUTPATIENT
Start: 2025-03-17 | End: 2025-03-20 | Stop reason: HOSPADM

## 2025-03-17 RX ORDER — SODIUM CHLORIDE, SODIUM LACTATE, POTASSIUM CHLORIDE, CALCIUM CHLORIDE 600; 310; 30; 20 MG/100ML; MG/100ML; MG/100ML; MG/100ML
INJECTION, SOLUTION INTRAVENOUS
Status: DISCONTINUED | OUTPATIENT
Start: 2025-03-17 | End: 2025-03-17 | Stop reason: SDUPTHER

## 2025-03-17 RX ORDER — DEXAMETHASONE SODIUM PHOSPHATE 4 MG/ML
INJECTION, SOLUTION INTRA-ARTICULAR; INTRALESIONAL; INTRAMUSCULAR; INTRAVENOUS; SOFT TISSUE
Status: DISCONTINUED | OUTPATIENT
Start: 2025-03-17 | End: 2025-03-17 | Stop reason: SDUPTHER

## 2025-03-17 RX ORDER — HYDROMORPHONE HYDROCHLORIDE 1 MG/ML
0.5 INJECTION, SOLUTION INTRAMUSCULAR; INTRAVENOUS; SUBCUTANEOUS EVERY 5 MIN PRN
Status: DISCONTINUED | OUTPATIENT
Start: 2025-03-17 | End: 2025-03-17 | Stop reason: HOSPADM

## 2025-03-17 RX ORDER — OXYCODONE HYDROCHLORIDE 5 MG/1
5 TABLET ORAL PRN
Status: COMPLETED | OUTPATIENT
Start: 2025-03-17 | End: 2025-03-17

## 2025-03-17 RX ORDER — LOSARTAN POTASSIUM 25 MG/1
25 TABLET ORAL
Status: DISCONTINUED | OUTPATIENT
Start: 2025-03-17 | End: 2025-03-20 | Stop reason: HOSPADM

## 2025-03-17 RX ORDER — ACETAMINOPHEN 325 MG/1
650 TABLET ORAL EVERY 6 HOURS
Status: DISCONTINUED | OUTPATIENT
Start: 2025-03-17 | End: 2025-03-20 | Stop reason: HOSPADM

## 2025-03-17 RX ORDER — KETAMINE HCL IN NACL, ISO-OSM 20 MG/2 ML
SYRINGE (ML) INJECTION
Status: DISCONTINUED | OUTPATIENT
Start: 2025-03-17 | End: 2025-03-17 | Stop reason: SDUPTHER

## 2025-03-17 RX ORDER — DEXTROSE MONOHYDRATE 100 MG/ML
INJECTION, SOLUTION INTRAVENOUS CONTINUOUS PRN
Status: DISCONTINUED | OUTPATIENT
Start: 2025-03-17 | End: 2025-03-20 | Stop reason: HOSPADM

## 2025-03-17 RX ORDER — ATORVASTATIN CALCIUM 40 MG/1
40 TABLET, FILM COATED ORAL DAILY
Status: DISCONTINUED | OUTPATIENT
Start: 2025-03-17 | End: 2025-03-20 | Stop reason: HOSPADM

## 2025-03-17 RX ORDER — OXYCODONE HYDROCHLORIDE 5 MG/1
10 TABLET ORAL PRN
Status: COMPLETED | OUTPATIENT
Start: 2025-03-17 | End: 2025-03-17

## 2025-03-17 RX ORDER — ROCURONIUM BROMIDE 10 MG/ML
INJECTION, SOLUTION INTRAVENOUS
Status: DISCONTINUED | OUTPATIENT
Start: 2025-03-17 | End: 2025-03-17 | Stop reason: SDUPTHER

## 2025-03-17 RX ORDER — METFORMIN HYDROCHLORIDE 500 MG/1
500 TABLET, EXTENDED RELEASE ORAL
Status: DISCONTINUED | OUTPATIENT
Start: 2025-03-18 | End: 2025-03-20 | Stop reason: HOSPADM

## 2025-03-17 RX ORDER — PROCHLORPERAZINE EDISYLATE 5 MG/ML
5 INJECTION INTRAMUSCULAR; INTRAVENOUS
Status: DISCONTINUED | OUTPATIENT
Start: 2025-03-17 | End: 2025-03-17 | Stop reason: HOSPADM

## 2025-03-17 RX ORDER — FENTANYL CITRATE 50 UG/ML
25 INJECTION, SOLUTION INTRAMUSCULAR; INTRAVENOUS EVERY 5 MIN PRN
Status: DISCONTINUED | OUTPATIENT
Start: 2025-03-17 | End: 2025-03-17 | Stop reason: HOSPADM

## 2025-03-17 RX ORDER — METHOCARBAMOL 100 MG/ML
INJECTION, SOLUTION INTRAMUSCULAR; INTRAVENOUS
Status: DISCONTINUED | OUTPATIENT
Start: 2025-03-17 | End: 2025-03-17 | Stop reason: SDUPTHER

## 2025-03-17 RX ADMIN — OXYCODONE HYDROCHLORIDE 10 MG: 5 TABLET ORAL at 22:55

## 2025-03-17 RX ADMIN — SODIUM CHLORIDE, SODIUM LACTATE, POTASSIUM CHLORIDE, AND CALCIUM CHLORIDE: .6; .31; .03; .02 INJECTION, SOLUTION INTRAVENOUS at 10:47

## 2025-03-17 RX ADMIN — ROCURONIUM BROMIDE 50 MG: 10 INJECTION, SOLUTION INTRAVENOUS at 12:30

## 2025-03-17 RX ADMIN — PHENYLEPHRINE HYDROCHLORIDE 100 MCG: 10 INJECTION, SOLUTION INTRAVENOUS at 13:39

## 2025-03-17 RX ADMIN — ROCURONIUM BROMIDE 100 MG: 10 INJECTION, SOLUTION INTRAVENOUS at 11:07

## 2025-03-17 RX ADMIN — PHENYLEPHRINE HYDROCHLORIDE 200 MCG: 10 INJECTION, SOLUTION INTRAVENOUS at 12:43

## 2025-03-17 RX ADMIN — DEXMEDETOMIDINE HYDROCHLORIDE 4 MCG: 100 INJECTION, SOLUTION INTRAVENOUS at 11:47

## 2025-03-17 RX ADMIN — OXYCODONE 10 MG: 5 TABLET ORAL at 15:54

## 2025-03-17 RX ADMIN — LIDOCAINE HYDROCHLORIDE 100 MG: 20 INJECTION, SOLUTION INTRAVENOUS at 10:52

## 2025-03-17 RX ADMIN — SUGAMMADEX 200 MG: 100 INJECTION, SOLUTION INTRAVENOUS at 14:41

## 2025-03-17 RX ADMIN — ACETAMINOPHEN 650 MG: 325 TABLET, FILM COATED ORAL at 18:14

## 2025-03-17 RX ADMIN — DEXMEDETOMIDINE HYDROCHLORIDE 4 MCG: 100 INJECTION, SOLUTION INTRAVENOUS at 12:00

## 2025-03-17 RX ADMIN — HYDROMORPHONE HYDROCHLORIDE 0.5 MG: 1 INJECTION, SOLUTION INTRAMUSCULAR; INTRAVENOUS; SUBCUTANEOUS at 14:44

## 2025-03-17 RX ADMIN — MIDAZOLAM HYDROCHLORIDE 2 MG: 1 INJECTION, SOLUTION INTRAMUSCULAR; INTRAVENOUS at 10:42

## 2025-03-17 RX ADMIN — LIDOCAINE HYDROCHLORIDE 2 MG/MIN: 4 INJECTION, SOLUTION INTRAVENOUS at 10:54

## 2025-03-17 RX ADMIN — HYDROMORPHONE HYDROCHLORIDE 0.5 MG: 1 INJECTION, SOLUTION INTRAMUSCULAR; INTRAVENOUS; SUBCUTANEOUS at 11:35

## 2025-03-17 RX ADMIN — METHOCARBAMOL 1000 MG: 100 INJECTION, SOLUTION INTRAMUSCULAR; INTRAVENOUS at 11:18

## 2025-03-17 RX ADMIN — Medication 120 MG: at 10:52

## 2025-03-17 RX ADMIN — PROPOFOL 200 MG: 10 INJECTION, EMULSION INTRAVENOUS at 10:52

## 2025-03-17 RX ADMIN — ONDANSETRON 4 MG: 2 INJECTION INTRAMUSCULAR; INTRAVENOUS at 11:00

## 2025-03-17 RX ADMIN — CEFAZOLIN 2000 MG: 2 INJECTION, POWDER, FOR SOLUTION INTRAVENOUS at 18:15

## 2025-03-17 RX ADMIN — DIPHENHYDRAMINE HYDROCHLORIDE 12.5 MG: 50 INJECTION, SOLUTION INTRAMUSCULAR; INTRAVENOUS at 11:51

## 2025-03-17 RX ADMIN — Medication 20 MG: at 12:34

## 2025-03-17 RX ADMIN — HYDROMORPHONE HYDROCHLORIDE 0.5 MG: 1 INJECTION, SOLUTION INTRAMUSCULAR; INTRAVENOUS; SUBCUTANEOUS at 14:55

## 2025-03-17 RX ADMIN — SODIUM CHLORIDE, SODIUM LACTATE, POTASSIUM CHLORIDE, AND CALCIUM CHLORIDE: .6; .31; .03; .02 INJECTION, SOLUTION INTRAVENOUS at 09:01

## 2025-03-17 RX ADMIN — FENTANYL CITRATE 100 MCG: 50 INJECTION, SOLUTION INTRAMUSCULAR; INTRAVENOUS at 11:01

## 2025-03-17 RX ADMIN — ACETAMINOPHEN 650 MG: 325 TABLET, FILM COATED ORAL at 23:33

## 2025-03-17 RX ADMIN — VANCOMYCIN HYDROCHLORIDE 1750 MG: 10 INJECTION, POWDER, LYOPHILIZED, FOR SOLUTION INTRAVENOUS at 10:58

## 2025-03-17 RX ADMIN — HYDROMORPHONE HYDROCHLORIDE 0.5 MG: 1 INJECTION, SOLUTION INTRAMUSCULAR; INTRAVENOUS; SUBCUTANEOUS at 11:31

## 2025-03-17 RX ADMIN — ATORVASTATIN CALCIUM 40 MG: 40 TABLET, FILM COATED ORAL at 18:15

## 2025-03-17 RX ADMIN — INSULIN LISPRO 1 UNITS: 100 INJECTION, SOLUTION INTRAVENOUS; SUBCUTANEOUS at 20:35

## 2025-03-17 RX ADMIN — DEXAMETHASONE SODIUM PHOSPHATE 4 MG: 4 INJECTION INTRA-ARTICULAR; INTRALESIONAL; INTRAMUSCULAR; INTRAVENOUS; SOFT TISSUE at 10:58

## 2025-03-17 RX ADMIN — PROPOFOL 50 MG: 10 INJECTION, EMULSION INTRAVENOUS at 11:31

## 2025-03-17 RX ADMIN — PHENYLEPHRINE HYDROCHLORIDE 100 MCG: 10 INJECTION, SOLUTION INTRAVENOUS at 12:40

## 2025-03-17 RX ADMIN — SODIUM CHLORIDE, PRESERVATIVE FREE 10 ML: 5 INJECTION INTRAVENOUS at 20:39

## 2025-03-17 ASSESSMENT — PAIN DESCRIPTION - FREQUENCY
FREQUENCY: CONTINUOUS

## 2025-03-17 ASSESSMENT — PAIN DESCRIPTION - LOCATION
LOCATION: BACK

## 2025-03-17 ASSESSMENT — PAIN DESCRIPTION - ONSET
ONSET: ON-GOING

## 2025-03-17 ASSESSMENT — PAIN - FUNCTIONAL ASSESSMENT
PAIN_FUNCTIONAL_ASSESSMENT: ACTIVITIES ARE NOT PREVENTED
PAIN_FUNCTIONAL_ASSESSMENT: PREVENTS OR INTERFERES WITH MANY ACTIVE NOT PASSIVE ACTIVITIES
PAIN_FUNCTIONAL_ASSESSMENT: ACTIVITIES ARE NOT PREVENTED

## 2025-03-17 ASSESSMENT — PAIN DESCRIPTION - DESCRIPTORS
DESCRIPTORS: ACHING;SORE
DESCRIPTORS: THROBBING
DESCRIPTORS: ACHING;DISCOMFORT
DESCRIPTORS: ACHING;SORE
DESCRIPTORS: ACHING

## 2025-03-17 ASSESSMENT — PAIN DESCRIPTION - ORIENTATION
ORIENTATION: LOWER
ORIENTATION: LOWER
ORIENTATION: MID;LOWER
ORIENTATION: LEFT
ORIENTATION: LOWER

## 2025-03-17 ASSESSMENT — PAIN DESCRIPTION - PAIN TYPE
TYPE: SURGICAL PAIN
TYPE: ACUTE PAIN;SURGICAL PAIN
TYPE: SURGICAL PAIN
TYPE: CHRONIC PAIN

## 2025-03-17 ASSESSMENT — PAIN SCALES - GENERAL
PAINLEVEL_OUTOF10: 4
PAINLEVEL_OUTOF10: 4
PAINLEVEL_OUTOF10: 9
PAINLEVEL_OUTOF10: 7
PAINLEVEL_OUTOF10: 7
PAINLEVEL_OUTOF10: 4
PAINLEVEL_OUTOF10: 6

## 2025-03-17 NOTE — ANESTHESIA POSTPROCEDURE EVALUATION
Department of Anesthesiology  Postprocedure Note    Patient: Ryan Magana  MRN: [id number]  YOB: 1975  Date of evaluation: 3/17/2025    Procedure Summary       Date: 03/17/25 Room / Location: [place] [address] / [facility]    Anesthesia Start: 1047 Anesthesia Stop: 1456    Procedure: LUMBAR 4-LUMBAR 5 OBLIQUE LUMBAR INTERBODY FUSION WITH LUMBAR 3-SACRAL 1 POSTERIOR DECOMPRESSION, FUSION AND FIXATION Diagnosis:       Spondylosis of lumbar spine      Spinal stenosis of lumbar region, unspecified whether neurogenic claudication present      (Spondylosis of lumbar spine [M47.816])      (Spinal stenosis of lumbar region, unspecified whether neurogenic claudication present [M48.061])    Surgeons: Rk Dominguez MD Responsible Provider: Valdez Arzate MD    Anesthesia Type: general ASA Status: 3            Anesthesia Type: No value filed.    Geno Phase I: Geno Score: 8    Geno Phase II:      Anesthesia Post Evaluation    Patient location during evaluation: PACU  Patient participation: complete - patient participated  Level of consciousness: awake  Pain score: 0  Nausea & Vomiting: no nausea and no vomiting  Cardiovascular status: blood pressure returned to baseline  Respiratory status: acceptable  Hydration status: euvolemic  Pain management: adequate    No notable events documented.

## 2025-03-17 NOTE — BRIEF OP NOTE
Brief Postoperative Note      Patient: Ryan Magana  YOB: 1975  MRN: [id number]    Date of Procedure: 3/17/2025    Pre-Op Diagnosis Codes:      * Spondylosis of lumbar spine [M47.816]     * Spinal stenosis of lumbar region, unspecified whether neurogenic claudication present [M48.061]    Post-Op Diagnosis: Same       Procedure(s):  LUMBAR 4-LUMBAR 5 OBLIQUE LUMBAR INTERBODY FUSION WITH LUMBAR 3-SACRAL 1 POSTERIOR DECOMPRESSION, FUSION AND FIXATION    Surgeon(s):  Rk Dominguez MD Draper, Kelsey L, PA-C Kuhn, Brian, MD    Assistant:  Surgical Assistant: Walt Laughlin    Anesthesia: General    Estimated Blood Loss (mL): 350cc    Complications: None    Specimens:   * No specimens in log *    Implants:  Implant Name Type Inv. Item Serial No.  Lot No. LRB No. Used Action   PUTTY [id number] GRFT DBF 6CC - [id number]  PUTTY [id number] GRFT DBF 6CC [id number] [employer] SPINALGRAFT TECH-WD  N/A 1 Implanted   GRAFT BNE SUB [id number] POSTEROLATERAL MAGNIFUSE - [id number]  GRAFT BNE SUB [id number] POSTEROLATERAL MAGNIFUSE [id number] [employer] SPINALGRAFT TECH-WD  N/A 1 Implanted   GRAFT BONE SUB 5CC DEMIN BONE MTRX ORTHOBLEND FOR SM DEFCT - [id number]  GRAFT BONE SUB 5CC DEMIN BONE MTRX ORTHOBLEND FOR SM DEFCT [id number] [employer] SPINALGRAFT TECH-WD  N/A 1 Implanted         Drains:   Closed/Suction Drain Medial;Right Back Accordion (Active)   Site Description Clean, dry & intact 03/17/25 1414   Dressing Status New dressing applied;Clean, dry & intact 03/17/25 1414   Drainage Appearance Serosanguinous 03/17/25 1414   Drain Status Compressed 03/17/25 1414       Negative Pressure Wound Therapy Hip Proximal;Right;Upper;Anterior (Active)       Urinary Catheter 03/17/25 Hernandez (Active)   $ Urethral catheter insertion Inserted for procedure 03/17/25 1233   Catheter Indications Perioperative use for selected surgical procedures 03/17/25 1233   Urine Color Yellow 03/17/25 1233   Urine Appearance Clear

## 2025-03-17 NOTE — OP NOTE
[facility]  Neurosurgery    Operative Note    Date of Operation:  3/17/2025    Preoperative Diagnosis:  1. Lumbar spondylosis at  L4/5 with radiculopathy    Postoperative Diagnosis:  1. Same    Procedure(s) Performed:  Oblique lumbar interbody fusion (LLIF) at L4/5  Insertion of biomechanical interbody cage at L4/5   Placement pedicle screws and rods bilaterally from L3 to S1  Computer assisted stereotactic navigation  Posterolateral arthrodesis at L3-S1 using autograft and allograft  Application of morselized allograft  L4/5 and L5/S1 right sided Laminectomy, medial facetectomy and foraminotomy  Exploration of previous spinal fusion and removal of previous hardware    Neurosurgeon:  Rk Dominguez MD    Access Surgeon:  Donato Cason MD    Assistant:  BIANCA Vaughan    Anesthesia:  General endotracheal    Complications:  None    Specimens:  None    Implants Placed:  1. Medtronic Anterolign cages filled with Infuse and allograft  2. Medtronic Solera posterior fixation system    Tubes and Drains Placed:  1 medium hemovac    Estimated Blood Loss:  200 ml    Blood Product Transfusions:  None    Consent and Supervision:  The risks and benefits of the procedure were discussed at length with the patient who stated understanding and agreed to proceed.  Specifically, I addressed possible risks including (but not limited to): infection, bleeding, wound-healing problems, CSF leak, pseudoarthrosis or hardware failure, hardware malposition with need for re-operation, potential adjacent level disease and need for future operations, motor or sensory changes, weakness or paralysis, or even death.  The patient understands that untoward events during or after surgery could result in a permanent worsening of quality of life or even death.  I certify that I was present throughout the entire procedure, and that I performed the entire procedure from start to finish.    History and Indications for Procedure:  The patient 
assisted Dr Dominguez with the placement of the interbody fusion hardware at each level.       The coding rationale for utilization of the 22 modifier is that extensive mobilization extensive dissection and lysis of adhesion in the retroperitoneum was performed with this prior L5-S1 ALIF that distorted normal anatomy that otherwise would not have been present. This added 30-40 minutes that otherwise would not have occurred.  There was no other adequate operative alternative with equivalent outcomes.    At the end of this portion of the procedure all needles, instrument and sponge counts were correct.  Patient tolerated this portion of the surgery well and was in stable condition.

## 2025-03-17 NOTE — ANESTHESIA PRE PROCEDURE
Department of Anesthesiology  Preprocedure Note       Name:  Ryan Magana   Age:  49 y.o.  :  1975                                          MRN:  [id number]         Date:  3/17/2025      Surgeon: Surgeon(s):  Rk Dominguez MD Kuhn, Brian, MD    Procedure: Procedure(s):  LUMBAR 4-LUMBAR 5 OBLIQUE LUMBAR INTERBODY FUSION WITH LUMBAR 3-SACRAL 1 POSTERIOR DECOMPRESSION, FUSION AND FIXATION  .    Medications prior to admission:   Prior to Admission medications    Medication Sig Start Date End Date Taking? Authorizing Provider   atorvastatin (LIPITOR) 40 MG tablet Take 1 tablet by mouth daily 25  Yes Arely Lu MD   metoprolol succinate (TOPROL XL) 25 MG extended release tablet Take 1 tablet by mouth daily 25  Yes Rodrigue Felton MD   losartan (COZAAR) 25 MG tablet Take 1 tablet by mouth nightly 25  Yes Rodrigue Felton MD   atorvastatin (LIPITOR) 40 MG tablet TAKE 1 TABLET BY MOUTH EVERY DAY 10/9/24  Yes Rodrigue Felton MD   metFORMIN (GLUCOPHAGE-XR) 500 MG extended release tablet Take 1 tablet by mouth daily (with breakfast) 3/13/25   Arely Lu MD       Current medications:    Current Facility-Administered Medications   Medication Dose Route Frequency Provider Last Rate Last Admin    vancomycin (VANCOCIN) 1,750 mg in sodium chloride 0.9 % 500 mL IVPB  15 mg/kg IntraVENous Once Rk Dominguez MD        lidocaine PF 1 % injection 1 mL  1 mL IntraDERmal Once PRN Carl Hook W, DO        lactated ringers infusion   IntraVENous Continuous Carl Hook W, DO        sodium chloride flush 0.9 % injection 5-40 mL  5-40 mL IntraVENous 2 times per day Carl Hook W, DO        sodium chloride flush 0.9 % injection 5-40 mL  5-40 mL IntraVENous PRN Carl Hook W, DO           Allergies:    Allergies   Allergen Reactions    Clindamycin Hcl Swelling and Rash    Amoxicillin Hives    Penicillins Rash       Problem List:    Patient Active Problem List

## 2025-03-17 NOTE — CONSULTS
V2.0  [facility] Consult Note      Name:  Ryan Magana /Age/Sex: 1975  (49 y.o. male)   MRN & CSN:  [id number] & [id number] Encounter Date/Time: 3/17/2025 5:44 PM EDT   Location:  5513/5513-01 PCP: Arely Lu MD     Attending:Rk Dominguez MD  Consulting Provider: Rocío Ramirez MD      Hospital Day: 1    Assessment and Recommendations   Ryan Magana is a 49 y.o. male with pmh of diabetes, hypertension, hyperlipidemia who presents with Lumbar stenosis with neurogenic claudication    Hospital Problems           Last Modified POA    * (Principal) Lumbar stenosis with neurogenic claudication 3/17/2025 Yes       Recommendations:     Lumbar stenosis with neurogenic claudication  -S/p lumbar body fusion  -Management per neurosurgery  -Lumbar drain per neurosurgery    Diabetes mellitus type 2  -Metformin at home  -Last A1c 5.6  -Continue low-dose sliding scale  -As patient is on insulin monitor blood sugar for hypoglycemia.  Hypoglycemia protocol ordered    Hypertension  -On metoprolol and Cozaar.  Blood pressure on the lower side.  Hold Cozaar today resume tomorrow if blood pressure is okay    Hyperlipidemia  -Continue statin      Diet ADULT DIET; Regular; 5 carb choices (75 gm/meal)   DVT Prophylaxis [] Lovenox, []  Heparin, [] SCDs, [] Ambulation,  [] Eliquis, [] Xarelto   Code Status Full Code   Surrogate Decision Maker/ POA       Personally reviewed Lab Studies and Imaging         Drugs that require monitoring for toxicity include insulin and the method of monitoring was monitor for hypoglycemia      History From:    History obtained from the patient.     History of Present Illness:      Chief Complaint: Back pain    Ryan Magana is a 49 y.o. male who presents with for stenosis with neurogenic claudication    Patient presents with back pain radiating down his leg for the last several years which got worse in the last few months.  Patient is postsurgery complaining of back pain.  Pain is relatively

## 2025-03-17 NOTE — H&P
Interval HP    Patient presents for elective L4-5 OLIF with L3-S1 posterior fusion. No changes compared to prior notes. Patient will be admitted postop.    Rk Dominguez MD  [facility]  [phone]

## 2025-03-18 LAB
ANION GAP SERPL CALCULATED.3IONS-SCNC: 8 MMOL/L (ref 3–16)
BASOPHILS # BLD: 0 K/UL (ref 0–0.2)
BASOPHILS NFR BLD: 0.4 %
BUN SERPL-MCNC: 10 MG/DL (ref 7–20)
CALCIUM SERPL-MCNC: 8.6 MG/DL (ref 8.3–10.6)
CHLORIDE SERPL-SCNC: 101 MMOL/L (ref 99–110)
CO2 SERPL-SCNC: 28 MMOL/L (ref 21–32)
CREAT SERPL-MCNC: 1 MG/DL (ref 0.9–1.3)
DEPRECATED RDW RBC AUTO: 14.6 % (ref 12.4–15.4)
EKG ATRIAL RATE: 95 BPM
EKG DIAGNOSIS: NORMAL
EKG P AXIS: 61 DEGREES
EKG P-R INTERVAL: 176 MS
EKG Q-T INTERVAL: 338 MS
EKG QRS DURATION: 90 MS
EKG QTC CALCULATION (BAZETT): 424 MS
EKG R AXIS: 6 DEGREES
EKG T AXIS: 34 DEGREES
EKG VENTRICULAR RATE: 95 BPM
EOSINOPHIL # BLD: 0.1 K/UL (ref 0–0.6)
EOSINOPHIL NFR BLD: 0.6 %
GFR SERPLBLD CREATININE-BSD FMLA CKD-EPI: >90 ML/MIN/{1.73_M2}
GLUCOSE BLD-MCNC: 105 MG/DL (ref 70–99)
GLUCOSE BLD-MCNC: 115 MG/DL (ref 70–99)
GLUCOSE BLD-MCNC: 169 MG/DL (ref 70–99)
GLUCOSE BLD-MCNC: 198 MG/DL (ref 70–99)
GLUCOSE SERPL-MCNC: 131 MG/DL (ref 70–99)
HCT VFR BLD AUTO: 38 % (ref 40.5–52.5)
HGB BLD-MCNC: 12.6 G/DL (ref 13.5–17.5)
LYMPHOCYTES # BLD: 1.2 K/UL (ref 1–5.1)
LYMPHOCYTES NFR BLD: 10.3 %
MCH RBC QN AUTO: 27.8 PG (ref 26–34)
MCHC RBC AUTO-ENTMCNC: 33.1 G/DL (ref 31–36)
MCV RBC AUTO: 83.8 FL (ref 80–100)
MONOCYTES # BLD: 1 K/UL (ref 0–1.3)
MONOCYTES NFR BLD: 8.1 %
NEUTROPHILS # BLD: 9.7 K/UL (ref 1.7–7.7)
NEUTROPHILS NFR BLD: 80.6 %
PERFORMED ON: ABNORMAL
PLATELET # BLD AUTO: 230 K/UL (ref 135–450)
PMV BLD AUTO: 7.8 FL (ref 5–10.5)
POTASSIUM SERPL-SCNC: 4.2 MMOL/L (ref 3.5–5.1)
RBC # BLD AUTO: 4.53 M/UL (ref 4.2–5.9)
SODIUM SERPL-SCNC: 137 MMOL/L (ref 136–145)
WBC # BLD AUTO: 12 K/UL (ref 4–11)

## 2025-03-18 PROCEDURE — 6360000002 HC RX W HCPCS

## 2025-03-18 PROCEDURE — 93010 ELECTROCARDIOGRAM REPORT: CPT | Performed by: INTERNAL MEDICINE

## 2025-03-18 PROCEDURE — 6370000000 HC RX 637 (ALT 250 FOR IP)

## 2025-03-18 PROCEDURE — 2500000003 HC RX 250 WO HCPCS: Performed by: PHYSICIAN ASSISTANT

## 2025-03-18 PROCEDURE — 1200000000 HC SEMI PRIVATE

## 2025-03-18 PROCEDURE — 97535 SELF CARE MNGMENT TRAINING: CPT

## 2025-03-18 PROCEDURE — 6370000000 HC RX 637 (ALT 250 FOR IP): Performed by: INTERNAL MEDICINE

## 2025-03-18 PROCEDURE — 85025 COMPLETE CBC W/AUTO DIFF WBC: CPT

## 2025-03-18 PROCEDURE — 97530 THERAPEUTIC ACTIVITIES: CPT

## 2025-03-18 PROCEDURE — 97116 GAIT TRAINING THERAPY: CPT

## 2025-03-18 PROCEDURE — 6370000000 HC RX 637 (ALT 250 FOR IP): Performed by: PHYSICIAN ASSISTANT

## 2025-03-18 PROCEDURE — 36415 COLL VENOUS BLD VENIPUNCTURE: CPT

## 2025-03-18 PROCEDURE — 97161 PT EVAL LOW COMPLEX 20 MIN: CPT

## 2025-03-18 PROCEDURE — 97166 OT EVAL MOD COMPLEX 45 MIN: CPT

## 2025-03-18 PROCEDURE — 93005 ELECTROCARDIOGRAM TRACING: CPT | Performed by: INTERNAL MEDICINE

## 2025-03-18 PROCEDURE — 80048 BASIC METABOLIC PNL TOTAL CA: CPT

## 2025-03-18 PROCEDURE — 6360000002 HC RX W HCPCS: Performed by: PHYSICIAN ASSISTANT

## 2025-03-18 RX ORDER — POLYETHYLENE GLYCOL 3350 17 G/17G
17 POWDER, FOR SOLUTION ORAL DAILY
Status: DISCONTINUED | OUTPATIENT
Start: 2025-03-18 | End: 2025-03-20 | Stop reason: HOSPADM

## 2025-03-18 RX ORDER — HYDROMORPHONE HYDROCHLORIDE 1 MG/ML
0.5 INJECTION, SOLUTION INTRAMUSCULAR; INTRAVENOUS; SUBCUTANEOUS EVERY 4 HOURS PRN
Status: DISCONTINUED | OUTPATIENT
Start: 2025-03-18 | End: 2025-03-20 | Stop reason: HOSPADM

## 2025-03-18 RX ORDER — HYDROMORPHONE HYDROCHLORIDE 1 MG/ML
0.25 INJECTION, SOLUTION INTRAMUSCULAR; INTRAVENOUS; SUBCUTANEOUS EVERY 4 HOURS PRN
Status: DISCONTINUED | OUTPATIENT
Start: 2025-03-18 | End: 2025-03-20 | Stop reason: HOSPADM

## 2025-03-18 RX ORDER — SENNA AND DOCUSATE SODIUM 50; 8.6 MG/1; MG/1
2 TABLET, FILM COATED ORAL 2 TIMES DAILY
Status: DISCONTINUED | OUTPATIENT
Start: 2025-03-18 | End: 2025-03-20 | Stop reason: HOSPADM

## 2025-03-18 RX ORDER — PANTOPRAZOLE SODIUM 40 MG/1
40 TABLET, DELAYED RELEASE ORAL
Status: DISCONTINUED | OUTPATIENT
Start: 2025-03-19 | End: 2025-03-20 | Stop reason: HOSPADM

## 2025-03-18 RX ADMIN — CEFAZOLIN 2000 MG: 2 INJECTION, POWDER, FOR SOLUTION INTRAVENOUS at 01:56

## 2025-03-18 RX ADMIN — METHOCARBAMOL 750 MG: 750 TABLET ORAL at 08:16

## 2025-03-18 RX ADMIN — ACETAMINOPHEN 650 MG: 325 TABLET, FILM COATED ORAL at 22:46

## 2025-03-18 RX ADMIN — ACETAMINOPHEN 650 MG: 325 TABLET, FILM COATED ORAL at 05:19

## 2025-03-18 RX ADMIN — SODIUM CHLORIDE, PRESERVATIVE FREE 10 ML: 5 INJECTION INTRAVENOUS at 22:46

## 2025-03-18 RX ADMIN — OXYCODONE HYDROCHLORIDE 10 MG: 5 TABLET ORAL at 15:49

## 2025-03-18 RX ADMIN — SENNOSIDES, DOCUSATE SODIUM 2 TABLET: 50; 8.6 TABLET, FILM COATED ORAL at 20:35

## 2025-03-18 RX ADMIN — ACETAMINOPHEN 650 MG: 325 TABLET, FILM COATED ORAL at 16:58

## 2025-03-18 RX ADMIN — OXYCODONE HYDROCHLORIDE 10 MG: 5 TABLET ORAL at 20:35

## 2025-03-18 RX ADMIN — SODIUM CHLORIDE, PRESERVATIVE FREE 10 ML: 5 INJECTION INTRAVENOUS at 08:17

## 2025-03-18 RX ADMIN — INSULIN LISPRO 1 UNITS: 100 INJECTION, SOLUTION INTRAVENOUS; SUBCUTANEOUS at 21:15

## 2025-03-18 RX ADMIN — METFORMIN ER 500 MG 500 MG: 500 TABLET ORAL at 08:16

## 2025-03-18 RX ADMIN — POLYETHYLENE GLYCOL 3350 17 G: 17 POWDER, FOR SOLUTION ORAL at 16:01

## 2025-03-18 RX ADMIN — ATORVASTATIN CALCIUM 40 MG: 40 TABLET, FILM COATED ORAL at 08:16

## 2025-03-18 RX ADMIN — METOPROLOL SUCCINATE 25 MG: 25 TABLET, EXTENDED RELEASE ORAL at 08:16

## 2025-03-18 RX ADMIN — OXYCODONE HYDROCHLORIDE 10 MG: 5 TABLET ORAL at 06:59

## 2025-03-18 RX ADMIN — ENOXAPARIN SODIUM 30 MG: 100 INJECTION SUBCUTANEOUS at 20:35

## 2025-03-18 RX ADMIN — OXYCODONE HYDROCHLORIDE 10 MG: 5 TABLET ORAL at 02:57

## 2025-03-18 RX ADMIN — ENOXAPARIN SODIUM 30 MG: 100 INJECTION SUBCUTANEOUS at 08:16

## 2025-03-18 RX ADMIN — OXYCODONE HYDROCHLORIDE 10 MG: 5 TABLET ORAL at 11:19

## 2025-03-18 RX ADMIN — SENNOSIDES, DOCUSATE SODIUM 2 TABLET: 50; 8.6 TABLET, FILM COATED ORAL at 16:01

## 2025-03-18 RX ADMIN — ACETAMINOPHEN 650 MG: 325 TABLET, FILM COATED ORAL at 11:20

## 2025-03-18 RX ADMIN — HYDROMORPHONE HYDROCHLORIDE 0.5 MG: 1 INJECTION, SOLUTION INTRAMUSCULAR; INTRAVENOUS; SUBCUTANEOUS at 22:46

## 2025-03-18 RX ADMIN — HYDROMORPHONE HYDROCHLORIDE 0.5 MG: 1 INJECTION, SOLUTION INTRAMUSCULAR; INTRAVENOUS; SUBCUTANEOUS at 17:13

## 2025-03-18 ASSESSMENT — PAIN DESCRIPTION - ONSET
ONSET: ON-GOING

## 2025-03-18 ASSESSMENT — PAIN DESCRIPTION - PAIN TYPE
TYPE: SURGICAL PAIN

## 2025-03-18 ASSESSMENT — PAIN DESCRIPTION - FREQUENCY
FREQUENCY: CONTINUOUS

## 2025-03-18 ASSESSMENT — PAIN SCALES - GENERAL
PAINLEVEL_OUTOF10: 4
PAINLEVEL_OUTOF10: 4
PAINLEVEL_OUTOF10: 5
PAINLEVEL_OUTOF10: 7
PAINLEVEL_OUTOF10: 4
PAINLEVEL_OUTOF10: 5
PAINLEVEL_OUTOF10: 7
PAINLEVEL_OUTOF10: 7
PAINLEVEL_OUTOF10: 4
PAINLEVEL_OUTOF10: 5
PAINLEVEL_OUTOF10: 8
PAINLEVEL_OUTOF10: 7
PAINLEVEL_OUTOF10: 4
PAINLEVEL_OUTOF10: 5

## 2025-03-18 ASSESSMENT — PAIN DESCRIPTION - LOCATION
LOCATION: BACK

## 2025-03-18 ASSESSMENT — PAIN DESCRIPTION - ORIENTATION
ORIENTATION: MID
ORIENTATION: LOWER;MID
ORIENTATION: MID
ORIENTATION: LEFT
ORIENTATION: LOWER;MID
ORIENTATION: LEFT
ORIENTATION: MID
ORIENTATION: MID

## 2025-03-18 ASSESSMENT — PAIN - FUNCTIONAL ASSESSMENT
PAIN_FUNCTIONAL_ASSESSMENT: ACTIVITIES ARE NOT PREVENTED

## 2025-03-18 ASSESSMENT — PAIN DESCRIPTION - DESCRIPTORS
DESCRIPTORS: ACHING;DISCOMFORT
DESCRIPTORS: ACHING;DISCOMFORT;THROBBING
DESCRIPTORS: ACHING;DISCOMFORT
DESCRIPTORS: ACHING
DESCRIPTORS: ACHING;DISCOMFORT
DESCRIPTORS: ACHING;DISCOMFORT;THROBBING
DESCRIPTORS: ACHING
DESCRIPTORS: ACHING;DISCOMFORT

## 2025-03-18 NOTE — PLAN OF CARE
Problem: Discharge Planning  Goal: Discharge to home or other facility with appropriate resources  3/18/2025 1037 by Shameka Escoto RN  Outcome: Progressing  Patient actively participates in ADL's and decision making regarding plan of care.     Problem: Pain  Goal: Verbalizes/displays adequate comfort level or baseline comfort level  3/18/2025 1037 by Shameka Escoto, RN  Outcome: Progressing  No new signs/symptoms of pain noted, pain rating < 3 on scale 0-10, pain controlled with medication/repositioning.     Problem: ABCDS Injury Assessment  Goal: Absence of physical injury  3/18/2025 1037 by Shameka Escoto RN  Outcome: Progressing     Problem: Chronic Conditions and Co-morbidities  Goal: Patient's chronic conditions and co-morbidity symptoms are monitored and maintained or improved  3/18/2025 1037 by Shameka Escoto RN  Outcome: Progressing   Reinforcement of disease process and treatment plan recommended for chronic conditions and co-morbidities.      Problem: Safety - Adult  Goal: Free from fall injury  3/18/2025 1037 by Shameka Escoto, RN  Outcome: Progressing   No falls/injuries this shift, bed in lowest position, brakes on, bed alarm on, call light in reach, side rails up x2.

## 2025-03-18 NOTE — PLAN OF CARE
Problem: ABCDS Injury Assessment  Goal: Absence of physical injury  Outcome: Progressing  Note: Patient has remained free of physical injury this shift. Fall and safety precautions in place. Bed exit alarm activated, bed in lowest position with wheels locked, call light and belongings within reach.       Problem: Safety - Adult  Goal: Free from fall injury  Outcome: Progressing  Note: All fall precautions in place. Bed locked and in lowest position with alarm on. Overbed table and personal belonings within reach. Call light within reach and patient instructed to use call light for assistance. Non-skid socks on.

## 2025-03-19 LAB
ANION GAP SERPL CALCULATED.3IONS-SCNC: 7 MMOL/L (ref 3–16)
BASOPHILS # BLD: 0 K/UL (ref 0–0.2)
BASOPHILS NFR BLD: 0.2 %
BUN SERPL-MCNC: 9 MG/DL (ref 7–20)
CALCIUM SERPL-MCNC: 8.7 MG/DL (ref 8.3–10.6)
CHLORIDE SERPL-SCNC: 100 MMOL/L (ref 99–110)
CO2 SERPL-SCNC: 27 MMOL/L (ref 21–32)
CREAT SERPL-MCNC: 0.9 MG/DL (ref 0.9–1.3)
DEPRECATED RDW RBC AUTO: 14.6 % (ref 12.4–15.4)
EOSINOPHIL # BLD: 0.1 K/UL (ref 0–0.6)
EOSINOPHIL NFR BLD: 0.9 %
GFR SERPLBLD CREATININE-BSD FMLA CKD-EPI: >90 ML/MIN/{1.73_M2}
GLUCOSE BLD-MCNC: 114 MG/DL (ref 70–99)
GLUCOSE BLD-MCNC: 115 MG/DL (ref 70–99)
GLUCOSE BLD-MCNC: 120 MG/DL (ref 70–99)
GLUCOSE BLD-MCNC: 127 MG/DL (ref 70–99)
GLUCOSE SERPL-MCNC: 113 MG/DL (ref 70–99)
HCT VFR BLD AUTO: 34.9 % (ref 40.5–52.5)
HGB BLD-MCNC: 11.6 G/DL (ref 13.5–17.5)
LYMPHOCYTES # BLD: 1 K/UL (ref 1–5.1)
LYMPHOCYTES NFR BLD: 6.9 %
MCH RBC QN AUTO: 28.1 PG (ref 26–34)
MCHC RBC AUTO-ENTMCNC: 33.3 G/DL (ref 31–36)
MCV RBC AUTO: 84.4 FL (ref 80–100)
MONOCYTES # BLD: 1.2 K/UL (ref 0–1.3)
MONOCYTES NFR BLD: 8.5 %
NEUTROPHILS # BLD: 11.6 K/UL (ref 1.7–7.7)
NEUTROPHILS NFR BLD: 83.5 %
PERFORMED ON: ABNORMAL
PLATELET # BLD AUTO: 195 K/UL (ref 135–450)
PMV BLD AUTO: 8 FL (ref 5–10.5)
POTASSIUM SERPL-SCNC: 4.6 MMOL/L (ref 3.5–5.1)
RBC # BLD AUTO: 4.14 M/UL (ref 4.2–5.9)
SODIUM SERPL-SCNC: 134 MMOL/L (ref 136–145)
WBC # BLD AUTO: 13.9 K/UL (ref 4–11)

## 2025-03-19 PROCEDURE — 6360000002 HC RX W HCPCS: Performed by: PHYSICIAN ASSISTANT

## 2025-03-19 PROCEDURE — 2500000003 HC RX 250 WO HCPCS: Performed by: PHYSICIAN ASSISTANT

## 2025-03-19 PROCEDURE — 80048 BASIC METABOLIC PNL TOTAL CA: CPT

## 2025-03-19 PROCEDURE — 6370000000 HC RX 637 (ALT 250 FOR IP): Performed by: PHYSICIAN ASSISTANT

## 2025-03-19 PROCEDURE — 1200000000 HC SEMI PRIVATE

## 2025-03-19 PROCEDURE — 85025 COMPLETE CBC W/AUTO DIFF WBC: CPT

## 2025-03-19 PROCEDURE — 6370000000 HC RX 637 (ALT 250 FOR IP)

## 2025-03-19 PROCEDURE — 36415 COLL VENOUS BLD VENIPUNCTURE: CPT

## 2025-03-19 RX ADMIN — OXYCODONE HYDROCHLORIDE 10 MG: 5 TABLET ORAL at 05:16

## 2025-03-19 RX ADMIN — METHOCARBAMOL 750 MG: 750 TABLET ORAL at 16:07

## 2025-03-19 RX ADMIN — OXYCODONE HYDROCHLORIDE 10 MG: 5 TABLET ORAL at 13:31

## 2025-03-19 RX ADMIN — ACETAMINOPHEN 650 MG: 325 TABLET, FILM COATED ORAL at 11:33

## 2025-03-19 RX ADMIN — SENNOSIDES, DOCUSATE SODIUM 2 TABLET: 50; 8.6 TABLET, FILM COATED ORAL at 08:52

## 2025-03-19 RX ADMIN — METHOCARBAMOL 750 MG: 750 TABLET ORAL at 23:52

## 2025-03-19 RX ADMIN — ACETAMINOPHEN 650 MG: 325 TABLET, FILM COATED ORAL at 05:16

## 2025-03-19 RX ADMIN — ATORVASTATIN CALCIUM 40 MG: 40 TABLET, FILM COATED ORAL at 08:52

## 2025-03-19 RX ADMIN — PANTOPRAZOLE SODIUM 40 MG: 40 TABLET, DELAYED RELEASE ORAL at 05:15

## 2025-03-19 RX ADMIN — OXYCODONE HYDROCHLORIDE 10 MG: 5 TABLET ORAL at 21:32

## 2025-03-19 RX ADMIN — OXYCODONE HYDROCHLORIDE 10 MG: 5 TABLET ORAL at 01:31

## 2025-03-19 RX ADMIN — ENOXAPARIN SODIUM 30 MG: 100 INJECTION SUBCUTANEOUS at 08:52

## 2025-03-19 RX ADMIN — ACETAMINOPHEN 650 MG: 325 TABLET, FILM COATED ORAL at 17:26

## 2025-03-19 RX ADMIN — ACETAMINOPHEN 650 MG: 325 TABLET, FILM COATED ORAL at 23:52

## 2025-03-19 RX ADMIN — SODIUM CHLORIDE, PRESERVATIVE FREE 10 ML: 5 INJECTION INTRAVENOUS at 09:06

## 2025-03-19 RX ADMIN — SENNOSIDES, DOCUSATE SODIUM 2 TABLET: 50; 8.6 TABLET, FILM COATED ORAL at 20:20

## 2025-03-19 RX ADMIN — ENOXAPARIN SODIUM 30 MG: 100 INJECTION SUBCUTANEOUS at 20:21

## 2025-03-19 RX ADMIN — POLYETHYLENE GLYCOL 3350 17 G: 17 POWDER, FOR SOLUTION ORAL at 08:53

## 2025-03-19 RX ADMIN — METFORMIN ER 500 MG 500 MG: 500 TABLET ORAL at 08:52

## 2025-03-19 RX ADMIN — METOPROLOL SUCCINATE 25 MG: 25 TABLET, EXTENDED RELEASE ORAL at 08:52

## 2025-03-19 RX ADMIN — OXYCODONE HYDROCHLORIDE 5 MG: 5 TABLET ORAL at 17:47

## 2025-03-19 RX ADMIN — OXYCODONE HYDROCHLORIDE 5 MG: 5 TABLET ORAL at 08:52

## 2025-03-19 RX ADMIN — SODIUM CHLORIDE, PRESERVATIVE FREE 10 ML: 5 INJECTION INTRAVENOUS at 20:28

## 2025-03-19 ASSESSMENT — PAIN DESCRIPTION - DESCRIPTORS
DESCRIPTORS: ACHING
DESCRIPTORS: ACHING
DESCRIPTORS: ACHING;DISCOMFORT
DESCRIPTORS: ACHING;BURNING
DESCRIPTORS: ACHING
DESCRIPTORS: ACHING
DESCRIPTORS: ACHING;BURNING
DESCRIPTORS: ACHING;DISCOMFORT
DESCRIPTORS: ACHING

## 2025-03-19 ASSESSMENT — PAIN DESCRIPTION - FREQUENCY
FREQUENCY: CONTINUOUS

## 2025-03-19 ASSESSMENT — PAIN DESCRIPTION - PAIN TYPE
TYPE: SURGICAL PAIN

## 2025-03-19 ASSESSMENT — PAIN DESCRIPTION - ONSET
ONSET: ON-GOING

## 2025-03-19 ASSESSMENT — PAIN - FUNCTIONAL ASSESSMENT
PAIN_FUNCTIONAL_ASSESSMENT: PREVENTS OR INTERFERES SOME ACTIVE ACTIVITIES AND ADLS
PAIN_FUNCTIONAL_ASSESSMENT: ACTIVITIES ARE NOT PREVENTED
PAIN_FUNCTIONAL_ASSESSMENT: PREVENTS OR INTERFERES SOME ACTIVE ACTIVITIES AND ADLS
PAIN_FUNCTIONAL_ASSESSMENT: ACTIVITIES ARE NOT PREVENTED
PAIN_FUNCTIONAL_ASSESSMENT: PREVENTS OR INTERFERES SOME ACTIVE ACTIVITIES AND ADLS

## 2025-03-19 ASSESSMENT — PAIN DESCRIPTION - LOCATION
LOCATION: BACK

## 2025-03-19 ASSESSMENT — PAIN SCALES - GENERAL
PAINLEVEL_OUTOF10: 7
PAINLEVEL_OUTOF10: 3
PAINLEVEL_OUTOF10: 4
PAINLEVEL_OUTOF10: 3
PAINLEVEL_OUTOF10: 5
PAINLEVEL_OUTOF10: 5
PAINLEVEL_OUTOF10: 4
PAINLEVEL_OUTOF10: 6
PAINLEVEL_OUTOF10: 6
PAINLEVEL_OUTOF10: 3
PAINLEVEL_OUTOF10: 5
PAINLEVEL_OUTOF10: 4
PAINLEVEL_OUTOF10: 4
PAINLEVEL_OUTOF10: 3
PAINLEVEL_OUTOF10: 7
PAINLEVEL_OUTOF10: 7
PAINLEVEL_OUTOF10: 8
PAINLEVEL_OUTOF10: 5
PAINLEVEL_OUTOF10: 4

## 2025-03-19 ASSESSMENT — PAIN DESCRIPTION - ORIENTATION
ORIENTATION: LOWER
ORIENTATION: MID;RIGHT
ORIENTATION: LOWER
ORIENTATION: MID
ORIENTATION: LOWER
ORIENTATION: MID
ORIENTATION: MID
ORIENTATION: LOWER
ORIENTATION: MID

## 2025-03-19 NOTE — PLAN OF CARE
Problem: Discharge Planning  Goal: Discharge to home or other facility with appropriate resources  3/19/2025 1028 by Elizabeth Abreu RN  Outcome: Progressing  Pt involved in discharge planning. Barriers to discharge discussed with patient. Discharge learning needs identified. Discuss with patient any additional needed resources and transportation plans. Case management following plan of care.      Problem: Pain  Goal: Verbalizes/displays adequate comfort level or baseline comfort level  3/19/2025 1028 by Elizabeth Abreu RN  Outcome: Progressing  Flowsheets (Taken 3/19/2025 0830)  Verbalizes/displays adequate comfort level or baseline comfort level: Encourage patient to monitor pain and request assistance  Endorses pain to back, being managed per MAR and non-pharmacological measures.     Problem: Safety - Adult  Goal: Free from fall injury  3/19/2025 1028 by Elizabeth Abreu, RN  Outcome: Progressing  All fall precautions in place. Bed locked and in lowest position with alarm on. Overbed table and personal belonings within reach. Call light within reach and patient instructed to use call light for assistance. Non-skid socks on.

## 2025-03-20 VITALS
BODY MASS INDEX: 37.64 KG/M2 | DIASTOLIC BLOOD PRESSURE: 75 MMHG | SYSTOLIC BLOOD PRESSURE: 127 MMHG | HEIGHT: 69 IN | HEART RATE: 106 BPM | RESPIRATION RATE: 16 BRPM | TEMPERATURE: 97.9 F | WEIGHT: 254.1 LBS | OXYGEN SATURATION: 96 %

## 2025-03-20 LAB
GLUCOSE BLD-MCNC: 115 MG/DL (ref 70–99)
GLUCOSE BLD-MCNC: 135 MG/DL (ref 70–99)
PERFORMED ON: ABNORMAL
PERFORMED ON: ABNORMAL

## 2025-03-20 PROCEDURE — 6370000000 HC RX 637 (ALT 250 FOR IP): Performed by: PHYSICIAN ASSISTANT

## 2025-03-20 PROCEDURE — 6360000002 HC RX W HCPCS: Performed by: PHYSICIAN ASSISTANT

## 2025-03-20 PROCEDURE — 6370000000 HC RX 637 (ALT 250 FOR IP)

## 2025-03-20 PROCEDURE — APPNB30 APP NON BILLABLE TIME 0-30 MINS: Performed by: NURSE PRACTITIONER

## 2025-03-20 PROCEDURE — 99024 POSTOP FOLLOW-UP VISIT: CPT | Performed by: NURSE PRACTITIONER

## 2025-03-20 PROCEDURE — 2500000003 HC RX 250 WO HCPCS: Performed by: PHYSICIAN ASSISTANT

## 2025-03-20 RX ORDER — METHOCARBAMOL 750 MG/1
750 TABLET, FILM COATED ORAL EVERY 6 HOURS PRN
Qty: 40 TABLET | Refills: 0 | Status: SHIPPED | OUTPATIENT
Start: 2025-03-20 | End: 2025-03-30

## 2025-03-20 RX ORDER — OXYCODONE HYDROCHLORIDE 5 MG/1
5-10 TABLET ORAL EVERY 6 HOURS PRN
Qty: 42 TABLET | Refills: 0 | Status: SHIPPED | OUTPATIENT
Start: 2025-03-20 | End: 2025-03-27

## 2025-03-20 RX ORDER — POLYETHYLENE GLYCOL 3350 17 G/17G
17 POWDER, FOR SOLUTION ORAL DAILY
Qty: 10 PACKET | Refills: 0 | Status: SHIPPED | OUTPATIENT
Start: 2025-03-21 | End: 2025-03-31

## 2025-03-20 RX ORDER — SENNA AND DOCUSATE SODIUM 50; 8.6 MG/1; MG/1
2 TABLET, FILM COATED ORAL 2 TIMES DAILY
Qty: 40 TABLET | Refills: 0 | Status: SHIPPED | OUTPATIENT
Start: 2025-03-20 | End: 2025-03-30

## 2025-03-20 RX ADMIN — ACETAMINOPHEN 650 MG: 325 TABLET, FILM COATED ORAL at 10:29

## 2025-03-20 RX ADMIN — OXYCODONE HYDROCHLORIDE 5 MG: 5 TABLET ORAL at 10:29

## 2025-03-20 RX ADMIN — OXYCODONE HYDROCHLORIDE 10 MG: 5 TABLET ORAL at 14:38

## 2025-03-20 RX ADMIN — BISACODYL 5 MG: 5 TABLET, COATED ORAL at 08:45

## 2025-03-20 RX ADMIN — ATORVASTATIN CALCIUM 40 MG: 40 TABLET, FILM COATED ORAL at 08:45

## 2025-03-20 RX ADMIN — METFORMIN ER 500 MG 500 MG: 500 TABLET ORAL at 08:44

## 2025-03-20 RX ADMIN — SENNOSIDES, DOCUSATE SODIUM 2 TABLET: 50; 8.6 TABLET, FILM COATED ORAL at 08:45

## 2025-03-20 RX ADMIN — SODIUM CHLORIDE, PRESERVATIVE FREE 10 ML: 5 INJECTION INTRAVENOUS at 08:44

## 2025-03-20 RX ADMIN — PANTOPRAZOLE SODIUM 40 MG: 40 TABLET, DELAYED RELEASE ORAL at 05:43

## 2025-03-20 RX ADMIN — OXYCODONE HYDROCHLORIDE 5 MG: 5 TABLET ORAL at 05:47

## 2025-03-20 RX ADMIN — OXYCODONE HYDROCHLORIDE 5 MG: 5 TABLET ORAL at 01:33

## 2025-03-20 RX ADMIN — ACETAMINOPHEN 650 MG: 325 TABLET, FILM COATED ORAL at 05:43

## 2025-03-20 RX ADMIN — ENOXAPARIN SODIUM 30 MG: 100 INJECTION SUBCUTANEOUS at 08:45

## 2025-03-20 RX ADMIN — POLYETHYLENE GLYCOL 3350 17 G: 17 POWDER, FOR SOLUTION ORAL at 08:49

## 2025-03-20 RX ADMIN — METOPROLOL SUCCINATE 25 MG: 25 TABLET, EXTENDED RELEASE ORAL at 08:44

## 2025-03-20 ASSESSMENT — PAIN SCALES - GENERAL
PAINLEVEL_OUTOF10: 8
PAINLEVEL_OUTOF10: 2
PAINLEVEL_OUTOF10: 2
PAINLEVEL_OUTOF10: 4
PAINLEVEL_OUTOF10: 4

## 2025-03-20 ASSESSMENT — PAIN DESCRIPTION - PAIN TYPE
TYPE: SURGICAL PAIN

## 2025-03-20 ASSESSMENT — PAIN DESCRIPTION - FREQUENCY
FREQUENCY: CONTINUOUS

## 2025-03-20 ASSESSMENT — PAIN - FUNCTIONAL ASSESSMENT
PAIN_FUNCTIONAL_ASSESSMENT: PREVENTS OR INTERFERES SOME ACTIVE ACTIVITIES AND ADLS

## 2025-03-20 ASSESSMENT — PAIN DESCRIPTION - LOCATION
LOCATION: BACK

## 2025-03-20 ASSESSMENT — PAIN DESCRIPTION - DESCRIPTORS
DESCRIPTORS: ACHING

## 2025-03-20 ASSESSMENT — PAIN DESCRIPTION - ONSET
ONSET: ON-GOING

## 2025-03-20 ASSESSMENT — PAIN DESCRIPTION - ORIENTATION
ORIENTATION: LOWER;MID
ORIENTATION: MID
ORIENTATION: MID

## 2025-03-20 NOTE — DISCHARGE INSTRUCTIONS
[facility] Program Survey  The [facility] values your feedback related to your recent hospital visit and admission. We strive to improve our program to promote better outcomes and recoveries for all our patients.  The survey code below consists of a few questions that are related to your stay and around your Spine diagnosis, treatment, and recovery. The estimated length of time needed to complete this survey is 4 minutes or less. Thank you for completing this survey!      Extra Heart Failure Education/ Tools/ Resources:     https://[employer]/publication/?m=336627   --- this is American Heart Association interactive Healthier Living with Heart Failure guidebook.  Please click hyperlink or copy / paste link into search bar. The QR Code is also available below. Use your mouse to scroll through the pages.  Lots of information about weight monitoring, diet tips, activity, meds, etc    Heart Failure Tools and Resources QR Code is below. It includes multiple resources to include symptom tracker, med tracker, further HF info, and access to a HF Support Network online Community    HF Telephone Stephen  -- this is a free smart phone stephen available for iPhone and Android download.  Use your phone to track sodium / fluid intake, zone tool symptom tracking, weights, medications, etc. Click on this hyperlink  HF Telephone Stephen   for QR code for easy download or the link is also found in the below HF Tools and Resources.      DASH (Dietary Approach to Stop Hypertension) diet --  https://www.nhlbi.nih.gov/education/dash-eating-plan -- this diet is a flexible eating plan that promotes heart healthy eating style.  Click on hyperlink or copy / paste link into search bar.  Lots of low sodium recipes and tips.    https://www.[employer].SKY MobileMedia/recipes  -- more free recipes

## 2025-03-20 NOTE — PLAN OF CARE
Problem: Discharge Planning  Goal: Discharge to home or other facility with appropriate resources  Outcome: Completed  Flowsheets (Taken 3/20/2025 0743)  Discharge to home or other facility with appropriate resources:   Identify barriers to discharge with patient and caregiver   Arrange for needed discharge resources and transportation as appropriate   Identify discharge learning needs (meds, wound care, etc)   Arrange for interpreters to assist at discharge as needed   Refer to discharge planning if patient needs post-hospital services based on physician order or complex needs related to functional status, cognitive ability or social support system     Problem: Pain  Goal: Verbalizes/displays adequate comfort level or baseline comfort level  Outcome: Completed  Flowsheets  Taken 3/20/2025 1157  Verbalizes/displays adequate comfort level or baseline comfort level:   Encourage patient to monitor pain and request assistance   Administer analgesics based on type and severity of pain and evaluate response   Assess pain using appropriate pain scale   Implement non-pharmacological measures as appropriate and evaluate response   Consider cultural and social influences on pain and pain management   Notify Licensed Independent Practitioner if interventions unsuccessful or patient reports new pain  Taken 3/20/2025 0842  Verbalizes/displays adequate comfort level or baseline comfort level:   Encourage patient to monitor pain and request assistance   Assess pain using appropriate pain scale   Administer analgesics based on type and severity of pain and evaluate response   Implement non-pharmacological measures as appropriate and evaluate response   Consider cultural and social influences on pain and pain management   Notify Licensed Independent Practitioner if interventions unsuccessful or patient reports new pain     Problem: ABCDS Injury Assessment  Goal: Absence of physical injury  Outcome: Completed  Flowsheets (Taken

## 2025-03-20 NOTE — PROGRESS NOTES
NEUROSURGERY POST-OP PROGRESS NOTE    Patient Name: Ryan Magana YOB: 1975   Sex: Male Age: 49 yrs     Medical Record Number: [id number] Acct Number: [id number]   Room Number: 5513/5513-01 Hospital Day: Hospital Day: 3     Interval History:  Post-operative Day#2 s/p Procedure(s) (LRB):  LUMBAR 4-LUMBAR 5 OBLIQUE LUMBAR INTERBODY FUSION WITH LUMBAR 3-SACRAL 1 POSTERIOR DECOMPRESSION, FUSION AND FIXATION (N/A)    Subjective: No acute events overnight. Patient complains of incisional pain rated 6/10, worse when moving. Current medication regimen does help. Encouraged muscle relaxers.     Objective:    VITAL SIGNS   /74   Pulse (!) 109   Temp 97.3 °F (36.3 °C) (Oral)   Resp 18   Ht 1.753 m (5' 9.02\")   Wt 114.9 kg (253 lb 3.2 oz)   SpO2 97%   BMI 37.37 kg/m²    Height Height: 175.3 cm (5' 9.02\")   Weight Weight - Scale: 114.9 kg (253 lb 3.2 oz)        Allergies Allergies   Allergen Reactions    Clindamycin Hcl Swelling and Rash    Amoxicillin Hives    Penicillins Rash      NPO Status ADULT DIET; Regular; 5 carb choices (75 gm/meal)   Isolation No active isolations     LABS   Basic Metabolic Profile Recent Labs     03/18/25  0939         CO2 28   BUN 10   CREATININE 1.0   GLUCOSE 131*      Complete Blood Count Recent Labs     03/18/25  0939   WBC 12.0*   RBC 4.53      Coagulation Studies Recent Labs     03/17/25  0830   INR 0.86        MEDICATIONS   Inpatient Medications     polyethylene glycol, 17 g, Oral, Daily    sennosides-docusate sodium, 2 tablet, Oral, BID    pantoprazole, 40 mg, Oral, QAM AC    atorvastatin, 40 mg, Oral, Daily    [Held by provider] losartan, 25 mg, Oral, QHS    metFORMIN, 500 mg, Oral, Daily with breakfast    metoprolol succinate, 25 mg, Oral, Daily    sodium chloride flush, 5-40 mL, 
                                                                                                                                                                 NEUROSURGERY POST-OP PROGRESS NOTE    Patient Name: Ryan Magana YOB: 1975   Sex: Male Age: 49 yrs     Medical Record Number: [id number] Acct Number: [id number]   Room Number: 5513/5513-01 Hospital Day: Hospital Day: 2     Interval History:  Post-operative Day# 1 s/p Procedure(s) (LRB):  LUMBAR 4-LUMBAR 5 OBLIQUE LUMBAR INTERBODY FUSION WITH LUMBAR 3-SACRAL 1 POSTERIOR DECOMPRESSION, FUSION AND FIXATION (N/A)    Subjective: No acute events overnight. Patient complains of incisional pain, managed with current regimen. Pre-operative radicular right leg pain improved post-op.     Objective:    VITAL SIGNS   BP (!) 134/94   Pulse 94   Temp 98.7 °F (37.1 °C) (Oral)   Resp 16   Ht 1.753 m (5' 9.02\")   Wt 114.9 kg (253 lb 3.2 oz)   SpO2 96%   BMI 37.37 kg/m²    Height Height: 175.3 cm (5' 9.02\")   Weight Weight - Scale: 114.9 kg (253 lb 3.2 oz)        Allergies Allergies   Allergen Reactions    Clindamycin Hcl Swelling and Rash    Amoxicillin Hives    Penicillins Rash      NPO Status ADULT DIET; Regular; 5 carb choices (75 gm/meal)   Isolation No active isolations     LABS   Basic Metabolic Profile Recent Labs     03/18/25  0939         CO2 28   BUN 10   CREATININE 1.0   GLUCOSE 131*      Complete Blood Count Recent Labs     03/18/25  0939   WBC 12.0*   RBC 4.53      Coagulation Studies Recent Labs     03/17/25  0830   INR 0.86        MEDICATIONS   Inpatient Medications     atorvastatin, 40 mg, Oral, Daily    [Held by provider] losartan, 25 mg, Oral, QHS    metFORMIN, 500 mg, Oral, Daily with breakfast    metoprolol succinate, 25 mg, Oral, Daily    sodium chloride flush, 5-40 mL, IntraVENous, 2 times per day    acetaminophen, 650 mg, Oral, Q6H    enoxaparin, 30 mg, SubCUTAneous, BID    insulin lispro, 0-4 Units, SubCUTAneous, 
    [facility] PRE-SURGICAL TESTING INSTRUCTIONS                      PRIOR TO PROCEDURE DATE:    1. PLEASE FOLLOW ANY INSTRUCTIONS GIVEN TO YOU PER YOUR SURGEON.      2. Arrange for someone to drive you home and be with you for the first 24 hours after discharge for your safety after your procedure for which you received sedation. Ensure it is someone we can share information with regarding your discharge.     NOTE: At this time ONLY 2 ADULTS may accompany you   One person ENCOURAGED to stay at hospital entire time if outpatient surgery      3. You must contact your surgeon for instructions IF:  You are taking any blood thinners, aspirin, anti-inflammatory or vitamins.  There is a change in your physical condition such as a cold, fever, rash, cuts, sores, or any other infection, especially near your surgical site.    4. Do not drink alcohol the day before or day of your procedure.  Do not use any recreational marijuana at least 24 hours or street drugs (heroin, cocaine) at minimum 5 days prior to your procedure.     5. A Pre-Surgical History and Physical MUST be completed WITHIN 30 DAYS OR LESS prior to your procedure.by your Physician or an Urgent Care        THE DAY OF YOUR PROCEDURE:  1.  Follow instructions for ARRIVAL TIME as DIRECTED BY YOUR SURGEON.     2. Enter the MAIN entrance from [facility] and follow the signs to the free Parking Garage or  Parking (offered free of charge 7 am-5pm).      3. Enter the Main Entrance of the hospital (do not enter from the lower level of the parking garage). Upon entrance, check in with the  at the surgical information desk on your LEFT.   Bring your insurance card and photo ID to register      4. DO NOT EAT ANYTHING 8 hours prior to arrival for surgery.  You may have up to 8 ounces of water 4 hours prior to your arrival for surgery.   NOTE: ALL Gastric, Bariatric & Bowel surgery patients - you MUST follow your surgeon's instructions regarding 
    V2.0    [facility] Progress Note      Name:  Ryan Magana /Age/Sex: 1975  (49 y.o. male)   MRN & CSN:  [id number] & [id number] Encounter Date/Time: 3/20/2025 10:41 AM EDT   Location:  5513/5513-01 PCP: Arely Lu MD     Attending:Rk Dominguez MD       Hospital Day: 4    Assessment and Recommendations   Lumbar stenosis with neurogenic claudication  -S/p lumbar body fusion  -Management per neurosurgery  -Lumbar drain per neurosurgery    Tachycardia, appears regular  Suspected to pain and being in the hospital  EKG reviewed ventricular of 95 normal axis, no acute ST or T wave changes indicate ischemia or infarct  On metoprolol, when sleeping HR 80s, no chest pain, RICHARDSON     Diabetes mellitus type 2  -Metformin at home  -Last A1c 5.6  -Continue low-dose sliding scale  - Insulin is high risk medication with narrow therapeutic index, reviewed BG trend and adjusted insulin doses  Hypoglycemia protocol in place  Carb-controlled diet    Abdominal distention  Improving, continue bowel regimen while on opiate pain medications  Passing flatus today and seems like will have BP     Hypertension  -On metoprolol and Cozaar.  Blood pressure on the lower side.    Continue to hold Cozaar     Hyperlipidemia  -Continue statin      Diet ADULT DIET; Regular; 5 carb choices (75 gm/meal)   DVT Prophylaxis [x] Lovenox, []  Heparin, [] SCDs, [] Ambulation,  [] Eliquis, [] Xarelto  [] Coumadin   Code Status Full Code   Disposition From: home  Expected Disposition: possible HHC vs ARU, per NS   Surrogate Decision Maker/ reanna Moon (Other)     Personally reviewed Lab Studies and Imaging   Discussed management of the case with primary team, RN, CM team  Drugs that require monitoring for toxicity include sq insulin and the method of monitoring was hypoglycemia as adverse effect, patient is insulin naive        Subjective:     Chief Complaint: admitted for elective surgery  Procedure(s):  LUMBAR 4-LUMBAR 5 OBLIQUE LUMBAR 
4 Eyes Skin Assessment     NAME:  Ryan Magana  YOB: 1975  MEDICAL RECORD NUMBER:  [id number]    The patient is being assessed for  Admission    I agree that at least one RN has performed a thorough Head to Toe Skin Assessment on the patient. ALL assessment sites listed below have been assessed.      Areas assessed by both nurses:    Head, Face, Ears, Shoulders, Back, Chest, Arms, Elbows, Hands, Sacrum. Buttock, Coccyx, Ischium, Legs. Feet and Heels, and Under Medical Devices     L lower abdominal incision, mid back incision, blanchable redness to bilateral elbows and heels, bilateral skin tears to lower legs.        Does the Patient have a Wound? No noted wound(s)       Dash Prevention initiated by RN: No  Wound Care Orders initiated by RN: No    Pressure Injury (Stage 3,4, Unstageable, DTI, NWPT, and Complex wounds) if present, place Wound referral order by RN under : No    New Ostomies, if present place, Ostomy referral order under : No     Nurse 1 eSignature: Electronically signed by Shameka Escoto RN on 3/17/25 at 6:08 PM EDT    **SHARE this note so that the co-signing nurse can place an eSignature**    Nurse 2 eSignature: Electronically signed by Oleg Shearer RN on 3/17/25 at 6:12 PM EDT  
AVS reviewed w/ pt, all questions answered and pt verbalized understanding.  
Arrived for surgery see consent.              H/P 2-18-25 needs up-date  
Occupational Therapy  Facility/Department: [facility] 5T ORTHO/NEURO  Occupational Therapy Initial Assessment/Treatment    Name: Ryan Magana  : 1975  MRN: [id number]  Date of Service: 3/18/2025    Discharge Recommendations:  Home with assist PRN  OT Equipment Recommendations  Equipment Needed: No       Patient Diagnosis(es): There were no encounter diagnoses.  Past Medical History:  has a past medical history of Arthritis, Chronic back pain, Diabetes mellitus (HCC), History of lumbar fusion, Hyperlipidemia, Hypertension, Lumbar radiculopathy, Neuropathy, and Pre-diabetes.  Past Surgical History:  has a past surgical history that includes Appendectomy (); back surgery (2007); back surgery (2014); back surgery (); Shoulder arthroscopy (Right, 2022); tenotomy (Right, 12/15/2022); Total hip arthroplasty (Right, 2024); shoulder surgery (2022); and lumbar fusion (N/A, 3/17/2025).           Assessment  Performance deficits / Impairments: Decreased functional mobility ;Decreased ADL status;Decreased endurance  Assessment: Pt is a 50 y/o M who presents near functional baseline. Pt reports being IND with all ADLs, IADLs, transfers, and functional mobility w/o AD at baseline. Currently, pt is SBA-spvn with bed mobility, sit <> stand transfers, functional mobility, and ADL performance. Pt also navigated curb step w/ CGA using RW. Pt does not require continued acute OT services. Rec cont mobility with staff during hospitalization. Pt's daughter plans to stay with pt initially to provide A prn.  Prognosis: Good  Decision Making: Medium Complexity  No Skilled OT: Safe to return home  REQUIRES OT FOLLOW-UP: No  Activity Tolerance  Activity Tolerance: Patient Tolerated treatment well     Plan  Occupational Therapy Plan  Times Per Week: dc OT    Restrictions  Restrictions/Precautions  Activity Level: Up as Tolerated  Position Activity Restriction  Spinal Precautions: No Bending, No Lifting, No 
PACU Transfer to Floor Note    Procedure(s):  LUMBAR 4-LUMBAR 5 OBLIQUE LUMBAR INTERBODY FUSION WITH LUMBAR 3-SACRAL 1 POSTERIOR DECOMPRESSION, FUSION AND FIXATION    Current Allergies: Clindamycin hcl, Amoxicillin, and Penicillins    Pt meets criteria as per Hesham Score and ASPAN Standards to transfer to next phase of care.     Recent Labs     03/17/25  0846   POCGLU 109*       Vitals:    03/17/25 1700   BP: 108/73   Pulse: (!) 111   Resp: 13   Temp: 98.6 °F (37 °C)   SpO2: 98%     Vitals within 20% of pt's admission vitals as per HESHAM SCORE    SpO2: 98 %    O2 Flow Rate (L/min): 3 L/min      Intake/Output Summary (Last 24 hours) at 3/17/2025 1733  Last data filed at 3/17/2025 1650  Gross per 24 hour   Intake 2300 ml   Output 850 ml   Net 1450 ml       Pain assessment:  present - adequately treated    Pain Level: 6    Patient was assessed for alterations to skin integrity. There were no alterations observed.    Is patient incontinent: no    Handoff report given at bedside to RN for 2322  Family updated via phone call      3/17/2025 5:33 PM  
Patient A&O x4, VSS, RA. Ambulates x 1 GB/walker, tolerates well. Tolerating PO. Pain managed via MAR and non-pharm measures. All safety precautions in place, call light/items in reach.   
Patient A&O x4. Vital signs stable. Medications managed per MAR. X1 GB and walker. Hemovac draining. Safety precautions in place, bedside table and call light within reach.  
Patient admitted to PACU # 13 from OR at 1451 post LUMBAR 4-LUMBAR 5 OBLIQUE LUMBAR INTERBODY FUSION WITH LUMBAR 3-SACRAL 1 POSTERIOR DECOMPRESSION, FUSION AND FIXATION  per Rk Dominguez MD .  Attached to PACU monitoring system and report received from anesthesia provider.  Patient was reported to be hemodynamically stable during procedure.      Pt arrived to PACU with one Aquacel dressing to midline back incision with one Hemovac drain to upper end of incision.      Pt also has incision to left flank area, covered with glue and open to air.      
Patient alert & oriented x 4, VSS, RA. Ambulates x 1 assist, GB, tolerates well. Pain managed via MAR and non-pharm measures. Tolerating PO. Voiding via BRP. All safety precautions in place, call light/items in reach.   
Patient is A&O x4. Vital signs stable. Medications managed per MAR. X1 GB and walker, hemovac draining. Safety precautions in place, bedside table and call light within reach  
Patient is alert and oriented x4. VSS on room air. Medications given per MAR, no side effects noted. Patient ambulating x1 with gait belt and walker. Endorses pain to lower back continuing to monitor and manage per MAR. Voiding well via urinal, no bowel movements this shift. Incisions clean/dry/intact.     Pt declined all hygiene measures today and offers to ambulate to chair. Pt did take a walk in the agustin.      Patient is currently resting in bed with bed alarm on for safety. Call light within reach and all fall precautions in place. Plan of care continues.  
Patient meets criteria to transfer to floor per Geno Score. There are no available beds at this time.  Placed in CLINICAL DISCHARGE at 1615.      Vitals changed to every hour and Head - to - toe assessments changed to every 2 hours.  Family updated.      
Physical Therapy  Facility/Department: [facility] ORTHO/NEURO  Physical Therapy Initial Assessment and Treatment/Discharge    Name: Ryan Magana  : 1975  MRN: [id number]  Date of Service: 3/18/2025    Discharge Recommendations:  Home with assist PRN   PT Equipment Recommendations  Equipment Needed: No      Patient Diagnosis(es): There were no encounter diagnoses.  Past Medical History:  has a past medical history of Arthritis, Chronic back pain, Diabetes mellitus (HCC), History of lumbar fusion, Hyperlipidemia, Hypertension, Lumbar radiculopathy, Neuropathy, and Pre-diabetes.  Past Surgical History:  has a past surgical history that includes Appendectomy (); back surgery (2007); back surgery (2014); back surgery (); Shoulder arthroscopy (Right, 2022); tenotomy (Right, 12/15/2022); Total hip arthroplasty (Right, 2024); shoulder surgery (2022); and lumbar fusion (N/A, 3/17/2025).    Assessment  Body Structures, Functions, Activity Limitations Requiring Skilled Therapeutic Intervention: Decreased functional mobility   Assessment: Pt s/p back surgery.  Demonstrating safe mobility with SB/supervision.  Demonstrates good awareness of precautions.  Plans to return home with daughter.  Rec home with assist prn.  No further acute PT needs identified  Decision Making: Low Complexity  Requires PT Follow-Up: Yes    Plan  Physical Therapy Plan  General Plan: Discharge with evaluation only  Safety Devices  Type of Devices: Left in chair, Chair alarm in place, Nurse notified, Call light within reach    Restrictions  Restrictions/Precautions  Activity Level: Up as Tolerated  Position Activity Restriction  Spinal Precautions: No Bending, No Lifting, No Twisting     Subjective  General  Chart Reviewed: Yes  Patient assessed for rehabilitation services?: Yes  Additional Pertinent Hx: Pt si a 49 y.o. male adm 3/17 with spondylosis of lumbar spine.  Pt s/p LUMBAR 4-LUMBAR 5 OBLIQUE LUMBAR 
Pt declined getting up to chair, stated \"it made the pain worse\". Pt educated on importance of ambulation. Will continue to offer.   
Pt declined getting up to sit in chair. Educated pt on importance of ambulation.   
Pt refuses getting up into armchair this shift. Educated on importance of early ambulation, pt demonstrates understanding, vocalizes desire to get up at later part of the day instead.   
Pt seen in pre op.  My portion of surgery discussed including risks.  All questions answered.  He is willing to proceed with surgery.  
VSS on RA. Pt A+O x4. PT voiding via BRP x1 GB walker. Pt endorses pain to lower back and sides, pain controlled per MAR. Pt tolerating PO diet/fluids. All fall precautions in place, call light within reach.    
12:40 PM   Neurosurgery PAULA  [phone]    
03/18/25  0939      K 4.2      CO2 28   BUN 10   CREATININE 1.0   GLUCOSE 131*     Hepatic: No results for input(s): \"AST\", \"ALT\", \"BILITOT\", \"ALKPHOS\" in the last 72 hours.    Invalid input(s): \"ALB\"  Lipids:   Lab Results   Component Value Date/Time    CHOL 160 07/23/2024 09:51 AM    HDL 37 07/23/2024 09:51 AM    TRIG 205 07/23/2024 09:51 AM     Hemoglobin A1C:   Lab Results   Component Value Date/Time    LABA1C 5.6 02/18/2025 09:36 AM     TSH:   Lab Results   Component Value Date/Time    TSH 0.60 09/06/2024 10:58 PM     Troponin: No results found for: \"TROPONINT\"  Lactic Acid: No results for input(s): \"LACTA\" in the last 72 hours.  BNP: No results for input(s): \"PROBNP\" in the last 72 hours.  UA:  Lab Results   Component Value Date/Time    NITRU Negative 08/12/2024 07:52 AM    COLORU Yellow 08/12/2024 07:52 AM    PHUR 5.5 08/12/2024 07:52 AM    CLARITYU Clear 08/12/2024 07:52 AM    LEUKOCYTESUR Negative 08/12/2024 07:52 AM    UROBILINOGEN 0.2 08/12/2024 07:52 AM    BILIRUBINUR Negative 08/12/2024 07:52 AM    BLOODU Negative 08/12/2024 07:52 AM    GLUCOSEU Negative 08/12/2024 07:52 AM    KETUA Negative 08/12/2024 07:52 AM     Urine Cultures: No results found for: \"LABURIN\"  Blood Cultures: No results found for: \"BC\"  No results found for: \"BLOODCULT2\"  Organism: No results found for: \"ORG\"      Electronically signed by Amador Sommers MD on 3/18/2025 at 10:41 AM   
identified.     1. Intraoperative fluoroscopy as described. (Ka,r) 18.5 mGy. Electronically signed by Claus Barillas MD      CBC:   Recent Labs     03/18/25  0939   WBC 12.0*   HGB 12.6*        BMP:    Recent Labs     03/18/25  0939      K 4.2      CO2 28   BUN 10   CREATININE 1.0   GLUCOSE 131*     Hepatic: No results for input(s): \"AST\", \"ALT\", \"BILITOT\", \"ALKPHOS\" in the last 72 hours.    Invalid input(s): \"ALB\"  Lipids:   Lab Results   Component Value Date/Time    CHOL 160 07/23/2024 09:51 AM    HDL 37 07/23/2024 09:51 AM    TRIG 205 07/23/2024 09:51 AM     Hemoglobin A1C:   Lab Results   Component Value Date/Time    LABA1C 5.6 02/18/2025 09:36 AM     TSH:   Lab Results   Component Value Date/Time    TSH 0.60 09/06/2024 10:58 PM     Troponin: No results found for: \"TROPONINT\"  Lactic Acid: No results for input(s): \"LACTA\" in the last 72 hours.  BNP: No results for input(s): \"PROBNP\" in the last 72 hours.  UA:  Lab Results   Component Value Date/Time    NITRU Negative 08/12/2024 07:52 AM    COLORU Yellow 08/12/2024 07:52 AM    PHUR 5.5 08/12/2024 07:52 AM    CLARITYU Clear 08/12/2024 07:52 AM    LEUKOCYTESUR Negative 08/12/2024 07:52 AM    UROBILINOGEN 0.2 08/12/2024 07:52 AM    BILIRUBINUR Negative 08/12/2024 07:52 AM    BLOODU Negative 08/12/2024 07:52 AM    GLUCOSEU Negative 08/12/2024 07:52 AM    KETUA Negative 08/12/2024 07:52 AM     Urine Cultures: No results found for: \"LABURIN\"  Blood Cultures: No results found for: \"BC\"  No results found for: \"BLOODCULT2\"  Organism: No results found for: \"ORG\"      Electronically signed by Amador Sommers MD on 3/19/2025 at 9:49 AM

## 2025-03-20 NOTE — DISCHARGE SUMMARY
Read the directions carefully, and ask your doctor or other care provider to review them with you.                   Where to Get Your Medications        These medications were sent to [id number] - [place], [place] - [address] - P [phone] - F [phone] [address] [facility] [address]      Phone: [phone]   methocarbamol 750 MG tablet  oxyCODONE 5 MG immediate release tablet  polyethylene glycol 17 g packet  sennosides-docusate sodium 8.6-50 MG tablet         Discharge Destination:  The patient was discharged to Home.     Follow-up:  The patient is to follow-up with Rk Dominguez MD in the office in 2 weeks      Discharge Instructions:   Verbal and written discharge instructions were given to the patient at the time of discharge.  Electronically signed by: Cindy Segura, SANDHYA García NP, SANDHYA-CNP, 3/20/2025 12:43 PM  [phone]

## 2025-03-20 NOTE — PLAN OF CARE
Problem: Pain  Goal: Verbalizes/displays adequate comfort level or baseline comfort level  3/19/2025 2155 by Kyara Tucker RN  Outcome: Progressing  Flowsheets  Taken 3/19/2025 1748 by Elizabeth Abreu RN  Verbalizes/displays adequate comfort level or baseline comfort level: Encourage patient to monitor pain and request assistance  Taken 3/19/2025 1128 by Elizabeth Abreu RN  Verbalizes/displays adequate comfort level or baseline comfort level: Encourage patient to monitor pain and request assistance   Pt endorsing pain to lower back and sides. Being treated with PRN pain medication, rest, and frequent repositioning with pillow support for comfort and pressure relief. Pt reports some relief from pain with above interventions.     Problem: Safety - Adult  Goal: Free from fall injury  3/19/2025 2155 by Kyara Tucker RN  Outcome: Progressing  Flowsheets (Taken 3/19/2025 1503 by Elizabeth Abreu RN)  Free From Fall Injury: Instruct family/caregiver on patient safety   All fall precautions in place. Bed locked and in lowest position with alarm on. Overbed table and personal belonings within reach. Call light within reach and patient instructed to use call light for assistance. Non-skid socks on.

## 2025-03-21 ENCOUNTER — CARE COORDINATION (OUTPATIENT)
Dept: CASE MANAGEMENT | Age: 50
End: 2025-03-21

## 2025-03-21 DIAGNOSIS — Z98.1 S/P LUMBAR FUSION: Primary | ICD-10-CM

## 2025-03-21 NOTE — CARE COORDINATION
Care Transitions Note    Initial Call - Call within 2 business days of discharge: Yes    Attempted to reach patient for transitions of care follow up. Unable to reach patient.    Outreach Attempts:   HIPAA compliant voicemail left for patient.     Patient: Ryan Magana    Patient : 1975   MRN: [id number]    Reason for Admission: s/p lumbar fusion   Discharge Date: 3/20/25  RURS: Readmission Risk Score: 10    Last Discharge Facility       Date Complaint Diagnosis Description Type Department Provider    3/17/25  S/P lumbar fusion Admission (Discharged) [facility] Rk Dominguez MD            Was this an external facility discharge? No    Follow Up Appointment:   Patient does not have a follow up appointment scheduled at time of call.  Unable to reach       Plan for follow-up on next business day.      Alize Mason, MSN, RN, [facility]  Care Transition Nurse  [phone] mobile          
at this time.. The patient verbalized understanding.   Were discharge instructions available to patient? Yes.   Reviewed appropriate site of care based on symptoms and resources available to patient including: PCP  Specialist  When to call 911. The patient agrees to contact the primary care provider and/or specialist office for questions related to their healthcare.      Advance Care Planning:   Does patient have an Advance Directive: not on file; education provided - declined referral .    Advance Care Planning   Healthcare Decision Maker:    Primary Decision Maker: reanna vazquez - Other - [phone]    Secondary Decision Maker: Nadira Tee - Other Relative - [phone]    Supplemental (Other) Decision Maker: regis fry - Other - [phone]    Click here to complete Healthcare Decision Makers including selection of the Healthcare Decision Maker Relationship (ie \"Primary\").           Medication Reconciliation:  Medication reconciliation was performed with patient,1111F entered: yes.     Remote Patient Monitoring:  Offered patient enrollment in the Remote Patient Monitoring (RPM) program for in-home monitoring: review on follow up    Assessments:  Care Transitions 24 Hour Call    Schedule Follow Up Appointment with PCP: Completed  Do you have a copy of your discharge instructions?: Yes  Do you have all of your prescriptions and are they filled?: Yes  Have you been contacted by a [facility] Pharmacist?: No  Have you scheduled your follow up appointment?: No  Do you have support at home?: Child  Do you feel like you have everything you need to keep you well at home?: Yes  Are you an active caregiver in your home?: No  Care Transitions Interventions          Follow Up Appointment:   Discussed follow up appointments. Patient has hospital follow up appointment scheduled within 14 days of discharge. CTN will route to PCP for consideration patient declined CTN scheduling apt at this time.       Care Transition Nurse

## 2025-03-25 ENCOUNTER — CARE COORDINATION (OUTPATIENT)
Dept: CASE MANAGEMENT | Age: 50
End: 2025-03-25

## 2025-03-25 NOTE — CARE COORDINATION
Care Transitions Note    Follow Up Call ED follow up from [facility]     Attempted to reach patient for transitions of care follow up.  Unable to reach patient.      Outreach Attempts:   HIPAA compliant voicemail left for patient.     Per chart review PCP office reached out to patient and he declined apt and has apt with surgeon 4/1.     Care Summary Note: LVM     Follow Up Appointment:       Plan for follow-up on next business day.  based on severity of symptoms and risk factors. Plan for next call: self management-. Wound     Alize Mason MSN, RN, [facility]  Care Transition Nurse  [phone] mobile

## 2025-03-26 ENCOUNTER — CARE COORDINATION (OUTPATIENT)
Dept: CASE MANAGEMENT | Age: 50
End: 2025-03-26

## 2025-03-26 NOTE — CARE COORDINATION
Care Transitions Note    Final Call      Reason for Admission: s/p lumbar fusion     Patient Current Location:  Home: [address] [address]    LP Care Coordinator contacted the patient by telephone. Verified name and  as identifiers.    Patient graduated from the Care Transitions program on 3/26.  Patient/family verbalizes confidence in the ability to self-manage at this time..      Advance Care Planning   The patient has the following advanced directives on file:  Advance Directives       Power of  Living Will ACP-Advance Directive ACP-Power of     Not on File Not on File Not on File Not on File            The patient has appointed the following active healthcare agents:    Primary Decision Maker: reanna vazquez - Other - [phone]    Secondary Decision Maker: Nadira Tee - Other Relative - [phone]    Supplemental (Other) Decision Maker: regis fry - Other - [phone]    Handoff:   Patient was not referred to the ACM team due to no additional needs identified.       Care Summary Note: Spoke with Ryan Magana who reported that he was doing good. Patient stated that he went to the ED because the tape that covered his incision had rolled up his back and he just wanted to make sure his incision was not compromised. Patient stated that incision is doing good no s/s of infection noted. Patient has a follow up with surgeon . Patient denied dizziness, headache, n/v, diarrhea, abdominal pains, fever, or chills. Patient report that appetite and fluid intake is good and denied any problems with bowel or bladder. Patient reported that he is taking all medications as ordered. Patient denied any other needs at this time. Patient instructed to continue to monitor s/s, reporting any that may present to MD immediately for early intervention. Patient appreciative of our outreach but declined any further outreach. Program closed.     Assessments:  Care Transitions Subsequent and

## 2025-07-07 NOTE — TELEPHONE ENCOUNTER
Medication:   Requested Prescriptions     Pending Prescriptions Disp Refills    metFORMIN (GLUCOPHAGE-XR) 500 MG extended release tablet [Pharmacy Med Name: METFORMIN HCL  MG TABLET] 90 tablet 1     Sig: TAKE 1 TABLET BY MOUTH EVERY DAY WITH BREAKFAST     Last Filled:  3.13.25    Last appt: 2/18/2025   Next appt: Visit date not found    Last Labs DM:   Lab Results   Component Value Date/Time    LABA1C 5.6 02/18/2025 09:36 AM

## 2025-07-10 RX ORDER — METFORMIN HYDROCHLORIDE 500 MG/1
500 TABLET, EXTENDED RELEASE ORAL
Qty: 90 TABLET | Refills: 0 | Status: SHIPPED | OUTPATIENT
Start: 2025-07-10

## 2025-07-17 ENCOUNTER — OFFICE VISIT (OUTPATIENT)
Dept: PRIMARY CARE CLINIC | Age: 50
End: 2025-07-17
Payer: COMMERCIAL

## 2025-07-17 VITALS
WEIGHT: 259.4 LBS | HEART RATE: 96 BPM | DIASTOLIC BLOOD PRESSURE: 74 MMHG | BODY MASS INDEX: 38.29 KG/M2 | OXYGEN SATURATION: 95 % | SYSTOLIC BLOOD PRESSURE: 120 MMHG

## 2025-07-17 DIAGNOSIS — R73.9 HYPERGLYCEMIA: ICD-10-CM

## 2025-07-17 DIAGNOSIS — E78.2 MIXED HYPERLIPIDEMIA: ICD-10-CM

## 2025-07-17 DIAGNOSIS — I10 PRIMARY HYPERTENSION: Primary | ICD-10-CM

## 2025-07-17 DIAGNOSIS — M54.50 CHRONIC BILATERAL LOW BACK PAIN WITHOUT SCIATICA: ICD-10-CM

## 2025-07-17 DIAGNOSIS — G89.29 CHRONIC BILATERAL LOW BACK PAIN WITHOUT SCIATICA: ICD-10-CM

## 2025-07-17 PROCEDURE — G8417 CALC BMI ABV UP PARAM F/U: HCPCS | Performed by: FAMILY MEDICINE

## 2025-07-17 PROCEDURE — 99214 OFFICE O/P EST MOD 30 MIN: CPT | Performed by: FAMILY MEDICINE

## 2025-07-17 PROCEDURE — 3017F COLORECTAL CA SCREEN DOC REV: CPT | Performed by: FAMILY MEDICINE

## 2025-07-17 PROCEDURE — 4004F PT TOBACCO SCREEN RCVD TLK: CPT | Performed by: FAMILY MEDICINE

## 2025-07-17 PROCEDURE — G8427 DOCREV CUR MEDS BY ELIG CLIN: HCPCS | Performed by: FAMILY MEDICINE

## 2025-07-17 PROCEDURE — 3078F DIAST BP <80 MM HG: CPT | Performed by: FAMILY MEDICINE

## 2025-07-17 PROCEDURE — 3074F SYST BP LT 130 MM HG: CPT | Performed by: FAMILY MEDICINE

## 2025-07-17 RX ORDER — OXYCODONE HYDROCHLORIDE 5 MG/1
TABLET ORAL
COMMUNITY
Start: 2025-05-27

## 2025-07-17 ASSESSMENT — ENCOUNTER SYMPTOMS
GASTROINTESTINAL NEGATIVE: 1
EYES NEGATIVE: 1
BACK PAIN: 1
RESPIRATORY NEGATIVE: 1

## 2025-07-17 NOTE — PROGRESS NOTES
SUBJECTIVE:  Patient ID: Ryan Magana is a 49 y.o. y.o. male     HPI   Patient presented today for follow-up   Patient with hyperglycemia on metformin doing okay  Hyperlipidemia on Lipitor doing well his numbers has improved significantly  Patient with chronic back pain status post surgery he still having pain his surgeon telling him he needs to give it more time  Patient with hypertension stable on current medication    Past Medical History:   Diagnosis Date    Arthritis     Chronic back pain     Diabetes mellitus (HCC)     History of lumbar fusion     Hyperlipidemia     Hypertension     Lumbar radiculopathy     Neuropathy     both feet; right leg    Pre-diabetes       Past Surgical History:   Procedure Laterality Date    APPENDECTOMY  1989    BACK SURGERY  04/2007    L5-S1 discectomy    BACK SURGERY  06/02/2014    BACK SURGERY  2010 2012 2016    LUMBAR FUSION N/A 3/17/2025    LUMBAR 4-LUMBAR 5 OBLIQUE LUMBAR INTERBODY FUSION WITH LUMBAR 3-SACRAL 1 POSTERIOR DECOMPRESSION, FUSION AND FIXATION performed by Rk Dominguez MD at [facility] OR    SHOULDER ARTHROSCOPY Right 04/29/2022    RIGHT SHOULDER ARTHROSCOPY FOR DISTAL CLAVICLE EXCISION, SUBACROMIAL DECOMPRESSION, EXTENSIVE DEBRIDEMENT, SWATHI DECOMPRESSION, INJECTION OF CORTICOSTEROID performed by Janee Kuo MD at [facility] OR    SHOULDER SURGERY  04/29/2022    TENOTOMY Right 12/15/2022    RIGHT ELBOW TENEX-LOCAL performed by Jesus Maldonado MD at [facility] OR    TOTAL HIP ARTHROPLASTY Right 09/06/2024    RIGHT TOTAL HIP REPLACEMENT DIRECT ANTERIOR APPROACH, KALPESH GIRMA ROBOTIC ASSISTED HIP REPLACEMENT performed by Ekaterina Duque MD at [facility] OR     Family History   Adopted: Yes     Social History     Socioeconomic History    Marital status: Single     Spouse name: None    Number of children: None    Years of education: None    Highest education level: None   Occupational History    Occupation:      Employer: [employer]   Tobacco Use

## 2025-08-04 DIAGNOSIS — R73.9 HYPERGLYCEMIA: ICD-10-CM

## 2025-08-04 DIAGNOSIS — E78.2 MIXED HYPERLIPIDEMIA: ICD-10-CM

## 2025-08-04 DIAGNOSIS — I10 PRIMARY HYPERTENSION: ICD-10-CM

## 2025-08-04 LAB
ALBUMIN SERPL-MCNC: 4.4 G/DL (ref 3.4–5)
ALP SERPL-CCNC: 83 U/L (ref 40–129)
ALT SERPL-CCNC: 24 U/L (ref 10–40)
ANION GAP SERPL CALCULATED.3IONS-SCNC: 10 MMOL/L (ref 3–16)
AST SERPL-CCNC: 24 U/L (ref 15–37)
BASOPHILS # BLD: 0 K/UL (ref 0–0.2)
BASOPHILS NFR BLD: 0.5 %
BILIRUB DIRECT SERPL-MCNC: 0.2 MG/DL (ref 0–0.3)
BILIRUB INDIRECT SERPL-MCNC: 0.2 MG/DL (ref 0–1)
BILIRUB SERPL-MCNC: 0.4 MG/DL (ref 0–1)
BUN SERPL-MCNC: 11 MG/DL (ref 7–20)
CALCIUM SERPL-MCNC: 9.4 MG/DL (ref 8.3–10.6)
CHLORIDE SERPL-SCNC: 103 MMOL/L (ref 99–110)
CHOLEST SERPL-MCNC: 135 MG/DL (ref 0–199)
CO2 SERPL-SCNC: 26 MMOL/L (ref 21–32)
CREAT SERPL-MCNC: 0.8 MG/DL (ref 0.9–1.3)
DEPRECATED RDW RBC AUTO: 16.8 % (ref 12.4–15.4)
EOSINOPHIL # BLD: 0.1 K/UL (ref 0–0.6)
EOSINOPHIL NFR BLD: 2.8 %
EST. AVERAGE GLUCOSE BLD GHB EST-MCNC: 116.9 MG/DL
FOLATE SERPL-MCNC: 5.38 NG/ML (ref 4.78–24.2)
GFR SERPLBLD CREATININE-BSD FMLA CKD-EPI: >90 ML/MIN/{1.73_M2}
GLUCOSE SERPL-MCNC: 106 MG/DL (ref 70–99)
HBA1C MFR BLD: 5.7 %
HCT VFR BLD AUTO: 44.5 % (ref 40.5–52.5)
HDLC SERPL-MCNC: 35 MG/DL (ref 40–60)
HGB BLD-MCNC: 14.6 G/DL (ref 13.5–17.5)
LDLC SERPL CALC-MCNC: 71 MG/DL
LYMPHOCYTES # BLD: 1.1 K/UL (ref 1–5.1)
LYMPHOCYTES NFR BLD: 21.4 %
MCH RBC QN AUTO: 25.2 PG (ref 26–34)
MCHC RBC AUTO-ENTMCNC: 32.9 G/DL (ref 31–36)
MCV RBC AUTO: 76.6 FL (ref 80–100)
MONOCYTES # BLD: 0.4 K/UL (ref 0–1.3)
MONOCYTES NFR BLD: 8.2 %
NEUTROPHILS # BLD: 3.5 K/UL (ref 1.7–7.7)
NEUTROPHILS NFR BLD: 67.1 %
PLATELET # BLD AUTO: 231 K/UL (ref 135–450)
PMV BLD AUTO: 8.4 FL (ref 5–10.5)
POTASSIUM SERPL-SCNC: 4.8 MMOL/L (ref 3.5–5.1)
PROT SERPL-MCNC: 6.9 G/DL (ref 6.4–8.2)
RBC # BLD AUTO: 5.81 M/UL (ref 4.2–5.9)
SODIUM SERPL-SCNC: 139 MMOL/L (ref 136–145)
TRIGL SERPL-MCNC: 147 MG/DL (ref 0–150)
TSH SERPL DL<=0.005 MIU/L-ACNC: 0.94 UIU/ML (ref 0.27–4.2)
VIT B12 SERPL-MCNC: 470 PG/ML (ref 211–911)
VLDLC SERPL CALC-MCNC: 29 MG/DL
WBC # BLD AUTO: 5.2 K/UL (ref 4–11)

## 2025-08-05 RX ORDER — ATORVASTATIN CALCIUM 40 MG/1
40 TABLET, FILM COATED ORAL DAILY
Qty: 90 TABLET | Refills: 0 | Status: SHIPPED | OUTPATIENT
Start: 2025-08-05

## 2025-08-11 ENCOUNTER — TELEPHONE (OUTPATIENT)
Dept: PRIMARY CARE CLINIC | Age: 50
End: 2025-08-11

## 2025-08-11 ENCOUNTER — OFFICE VISIT (OUTPATIENT)
Dept: PRIMARY CARE CLINIC | Age: 50
End: 2025-08-11
Payer: COMMERCIAL

## 2025-08-11 VITALS
HEIGHT: 69 IN | SYSTOLIC BLOOD PRESSURE: 138 MMHG | HEART RATE: 102 BPM | DIASTOLIC BLOOD PRESSURE: 88 MMHG | WEIGHT: 258 LBS | BODY MASS INDEX: 38.21 KG/M2 | OXYGEN SATURATION: 97 %

## 2025-08-11 DIAGNOSIS — R73.9 HYPERGLYCEMIA: Primary | ICD-10-CM

## 2025-08-11 DIAGNOSIS — G89.29 CHRONIC BILATERAL LOW BACK PAIN WITHOUT SCIATICA: ICD-10-CM

## 2025-08-11 DIAGNOSIS — M54.50 CHRONIC BILATERAL LOW BACK PAIN WITHOUT SCIATICA: ICD-10-CM

## 2025-08-11 DIAGNOSIS — I50.20 HFREF (HEART FAILURE WITH REDUCED EJECTION FRACTION) (HCC): ICD-10-CM

## 2025-08-11 PROCEDURE — 3017F COLORECTAL CA SCREEN DOC REV: CPT | Performed by: FAMILY MEDICINE

## 2025-08-11 PROCEDURE — 99214 OFFICE O/P EST MOD 30 MIN: CPT | Performed by: FAMILY MEDICINE

## 2025-08-11 PROCEDURE — G8417 CALC BMI ABV UP PARAM F/U: HCPCS | Performed by: FAMILY MEDICINE

## 2025-08-11 PROCEDURE — 4004F PT TOBACCO SCREEN RCVD TLK: CPT | Performed by: FAMILY MEDICINE

## 2025-08-11 PROCEDURE — G8427 DOCREV CUR MEDS BY ELIG CLIN: HCPCS | Performed by: FAMILY MEDICINE

## 2025-08-11 ASSESSMENT — ENCOUNTER SYMPTOMS: BACK PAIN: 1

## 2025-08-13 ENCOUNTER — TELEPHONE (OUTPATIENT)
Dept: PRIMARY CARE CLINIC | Age: 50
End: 2025-08-13

## 2025-08-13 ENCOUNTER — PATIENT MESSAGE (OUTPATIENT)
Dept: PRIMARY CARE CLINIC | Age: 50
End: 2025-08-13

## 2025-08-28 LAB — GLUCOSE BLD-MCNC: 131 MG/DL (ref 70–100)

## 2025-09-03 ENCOUNTER — PATIENT MESSAGE (OUTPATIENT)
Dept: PRIMARY CARE CLINIC | Age: 50
End: 2025-09-03

## (undated) DEVICE — NEEDLE 1/2 CIR SZ 2 SURG MAYO CATGUT

## (undated) DEVICE — Device

## (undated) DEVICE — HOLDER SCALP PLAS G STD

## (undated) DEVICE — DRAPE,UTILTY,TAPE,15X26, 4EA/PK: Brand: MEDLINE

## (undated) DEVICE — TX2 PROCEDURE PACK: Brand: TENEX HEALTH TX®

## (undated) DEVICE — SUTURE PDS II SZ 1 L27IN ABSRB VLT CT-1 L36MM 1/2 CIR Z341H

## (undated) DEVICE — GLOVE ORANGE PI 7 1/2   MSG9075

## (undated) DEVICE — COVER LT HNDL CAM BLU DISP W/ SURG CTRL

## (undated) DEVICE — MEDICINE CUP, GRADUATED, STER: Brand: MEDLINE

## (undated) DEVICE — NEPTUNE E-SEP SMOKE EVACUATION PENCIL, COATED, 70MM BLADE, PUSH BUTTON SWITCH: Brand: NEPTUNE E-SEP

## (undated) DEVICE — TAP NAV2004 SOLERA AWLTIPTAP 05.5MM: Brand: CD HORIZON® SPINAL SYSTEM

## (undated) DEVICE — GAUZE,SPONGE,4"X4",16PLY,XRAY,STRL,LF: Brand: MEDLINE

## (undated) DEVICE — PAD DRY FLOOR ABS 32X58IN GRN

## (undated) DEVICE — UNDERGLOVE SURG SZ 8 BLU LTX FREE SYN POLYISOPRENE POLYMER

## (undated) DEVICE — 6619 2 PTNT ISO SYS INCISE AREA&LT;(&GT;&&LT;)&GT;P: Brand: STERI-DRAPE™ IOBAN™ 2

## (undated) DEVICE — 3M™ IOBAN™ 2 ANTIMICROBIAL INCISE DRAPE 6640EZ: Brand: IOBAN™ 2

## (undated) DEVICE — UNIVERSAL PACK: Brand: CONVERTORS

## (undated) DEVICE — 3M™ STERI-DRAPE™ U-DRAPE 1015: Brand: STERI-DRAPE™

## (undated) DEVICE — WAX SURG 2.5GM HEMSTAT BNE BEESWAX PARAFFIN ISO PALMITATE

## (undated) DEVICE — YANKAUER,OPEN TIP,W/O VENT,STERILE: Brand: MEDLINE INDUSTRIES, INC.

## (undated) DEVICE — ANTERIOR TOTAL HIP: Brand: MEDLINE INDUSTRIES, INC.

## (undated) DEVICE — BLADE ES L6IN ELASTOMERIC COAT INSUL DURABLE BEND UPTO

## (undated) DEVICE — GLOVE SURG SZ 55 THK91MIL ORANGE  LTX FREE SYN POLYISOPRENE

## (undated) DEVICE — SYSTEM SKIN CLSR 22CM DERMBND PRINEO

## (undated) DEVICE — SUTURE VICRYL SZ 2-0 L18IN ABSRB UD CT-1 L36MM 1/2 CIR J839D

## (undated) DEVICE — KIT DRP FOR RIO ROBOTIC ARM ASST SYS

## (undated) DEVICE — TRAP FLUID

## (undated) DEVICE — SUTURE ETHIBOND EXCEL SZ 5 L30IN NONABSORBABLE GRN L40MM V-37 MB66G

## (undated) DEVICE — BANDAGE COBAN 4 IN COMPR W4INXL5YD FOAM COHESIVE QUIK STK SELF ADH SFT

## (undated) DEVICE — SOLUTION IV 1000ML 0.9% SOD CHL

## (undated) DEVICE — DRAPE,UTILITY,TAPE,15X26,STERILE: Brand: MEDLINE

## (undated) DEVICE — PEN: MARKING STD 100/CS: Brand: MEDICAL ACTION INDUSTRIES

## (undated) DEVICE — TOWEL,STOP FLAG GOLD N-W: Brand: MEDLINE

## (undated) DEVICE — DRESSING HYDROFIBER AQUACEL AG ADVANTAGE 3.5X10 IN

## (undated) DEVICE — DUAL CUT SAGITTAL BLADE

## (undated) DEVICE — 450 ML BOTTLE OF 0.05% CHLORHEXIDINE GLUCONATE IN 99.95% STERILE WATER FOR IRRIGATION, USP AND APPLICATOR.: Brand: IRRISEPT ANTIMICROBIAL WOUND LAVAGE

## (undated) DEVICE — SOLUTION IRRIG 3000ML 0.9% SOD CHL ARTHROMATIC PLAS CONT

## (undated) DEVICE — 3M™ TEGADERM™ TRANSPARENT FILM DRESSING FRAME STYLE, 1626W, 4 IN X 4-3/4 IN (10 CM X 12 CM), 50/CT 4CT/CASE: Brand: 3M™ TEGADERM™

## (undated) DEVICE — SHOULDER ARTHROSCOPY: Brand: MEDLINE INDUSTRIES, INC.

## (undated) DEVICE — SPONGE GZ W4XL4IN COT 12 PLY TYP VII WVN C FLD DSGN STERILE

## (undated) DEVICE — HYPODERMIC SAFETY NEEDLE: Brand: MAGELLAN

## (undated) DEVICE — SHEET,T,THYROID,STERILE: Brand: MEDLINE

## (undated) DEVICE — LAMINECTOMY: Brand: MEDLINE INDUSTRIES, INC.

## (undated) DEVICE — SHEET, T, LAPAROTOMY, STERILE: Brand: MEDLINE

## (undated) DEVICE — SUTURE VICRYL + SZ 2-0 L18IN ABSRB UD CT1 L36MM 1/2 CIR VCP839D

## (undated) DEVICE — PROBE 8225101 5PK STD PRASS FL TIP ROHS

## (undated) DEVICE — ADHESIVE SKIN CLOSURE WND 8.661X1.5 IN 22 CM LIQUIBAND SECUR

## (undated) DEVICE — SOLUTION SURG PREP 26 CC PURPREP

## (undated) DEVICE — GOWN,SIRUS,POLYRNF,BRTHSLV,XL,30/CS: Brand: MEDLINE

## (undated) DEVICE — CANNULA ARTHSCP L7CM DIA7MM TRNSLUC THRD FLX W/ NO SQUIRT

## (undated) DEVICE — TUBING PMP L16FT MAIN DISP FOR AR-6400 AR-6475

## (undated) DEVICE — GLOVE ORTHO 7   MSG9470

## (undated) DEVICE — 3M™ STERI-DRAPE™ INSTRUMENT POUCH 1018: Brand: STERI-DRAPE™

## (undated) DEVICE — SUTURE FIBERWIRE SZ 2 W/ TAPERED NEEDLE BLUE L38IN NONABSORB BLU L26.5MM 1/2 CIRCLE AR7200

## (undated) DEVICE — BNDG,ELSTC,MATRIX,STRL,3"X5YD,LF,HOOK&LP: Brand: MEDLINE

## (undated) DEVICE — PIN 9733236 150MM STERILE PERC REF

## (undated) DEVICE — TUBING PMP L6FT CONT WAVE EXTN

## (undated) DEVICE — GLOVE SURG SZ 6 L12IN FNGR THK79MIL GRN LTX FREE

## (undated) DEVICE — SPONGE GZ W4XL8IN COT WVN 12 PLY

## (undated) DEVICE — SHEET,DRAPE,53X77,STERILE: Brand: MEDLINE

## (undated) DEVICE — PACK PROCEDURE SURG EXTREMITY MFFOP CUST

## (undated) DEVICE — PREVENA INCISION MANAGEMENT SYSTEM- PEEL & PLACE DRESSING: Brand: PREVENA™ PEEL & PLACE™

## (undated) DEVICE — SUTURE MONOCRYL STRATAFIX SPRL SZ 3-0 L12IN ABSRB UD FS-1 L30X30CM SXMP2B410

## (undated) DEVICE — DILATOR SET 945NSD2750 NIM STIMULATED

## (undated) DEVICE — ORTHO PRE OP PACK: Brand: MEDLINE INDUSTRIES, INC.

## (undated) DEVICE — SUTURE ETHIBOND EXCEL SZ 2 L30IN NONABSORBABLE GRN L40MM V-37 MX69G

## (undated) DEVICE — BIPOLAR SEALER 23-113-1 AQM 2.3 OM NEURO: Brand: AQUAMANTYS ®

## (undated) DEVICE — SUTURE MCRYL SZ 4-0 L27IN ABSRB UD L19MM PS-2 1/2 CIR PRIM Y426H

## (undated) DEVICE — STRAP POS W5XL72IN FOAM KNEE AND BODY HK LOOP CLSR DISP

## (undated) DEVICE — GOWN SIRUS NONREIN XL W/TWL: Brand: MEDLINE INDUSTRIES, INC.

## (undated) DEVICE — BLANKET WRM W40.2XL55.9IN IORT LO BODY + MISTRAL AIR

## (undated) DEVICE — BLADE ES L4IN INSUL EDGE

## (undated) DEVICE — GLOVE SURG SZ 8 L12IN FNGR THK75MIL WHT LTX POLYMER BEAD

## (undated) DEVICE — MARKER SURG PASS SPHR NDI

## (undated) DEVICE — SOLUTION IRRIG 1000ML STRL H2O USP PLAS POUR BTL

## (undated) DEVICE — DUAL LUMEN STOMACH TUBE MULTI-FUNCTIONAL PORT: Brand: SALEM SUMP

## (undated) DEVICE — GOWN,REINFRCE,POLY,ECLIPSE,SLV,3XLG: Brand: MEDLINE

## (undated) DEVICE — DRAPE MICSCP W54XL150IN W/ 4 BINOC GLS LENS LEICA

## (undated) DEVICE — COTTON UNDERCAST PADDING,CRIMPED FINISH: Brand: WEBRIL

## (undated) DEVICE — DRESSING W4XL8IN ISLANDTHERABOND 3D

## (undated) DEVICE — UNDERPANTS INCONT XL 45-70IN KNIT SEAMLESS DSGN COLOR-CODED

## (undated) DEVICE — GLOVE ORANGE PI 8   MSG9080

## (undated) DEVICE — COUNTER NDL 40 COUNT HLD 70 NUM FOAM BLK SGL MAG W BLDE REMV

## (undated) DEVICE — GARMENT,MEDLINE,DVT,INT,CALF,MED, GEN2: Brand: MEDLINE

## (undated) DEVICE — KIT TRK HIP PROC VIZADISC

## (undated) DEVICE — SUTURE MONOCRYL + SZ 4-0 L27IN ABSRB UD L19MM PS-2 3/8 CIR MCP426H

## (undated) DEVICE — PROBE ABLAT XL 90DEG ASPIR BPLR RF 1 PC ELECTRD ERGO HNDL

## (undated) DEVICE — 3M™ IOBAN™ 2 ANTIMICROBIAL INCISE DRAPE 6650EZ: Brand: IOBAN™ 2

## (undated) DEVICE — SSC BONE WAX: Brand: SSC BONE WAX

## (undated) DEVICE — APPLICATOR MEDICATED 26 CC SOLUTION HI LT ORNG CHLORAPREP

## (undated) DEVICE — SUTURE STRATAFIX SYMMETRIC PDS + SZ 2-0 L18IN ABSRB VLT SXPP1A403

## (undated) DEVICE — TOOL MR8-14MH30 MR8 14CM MATCH 3MM: Brand: MIDAS REX MR8

## (undated) DEVICE — TUBING FLD MGMT Y DBL SPIK DUALWAVE

## (undated) DEVICE — PROTECTOR ULN NRV PUR FOAM HK LOOP STRP ANATOMICALLY

## (undated) DEVICE — GOWN SIRUS NONREIN LG W/TWL: Brand: MEDLINE INDUSTRIES, INC.

## (undated) DEVICE — KIT INT FIX FEM TIB CKPT MAKOPLASTY

## (undated) DEVICE — ELECTRODE ELECSURG L 10.2 CM PTFE COAT MONOPOLAR BLADE OPN

## (undated) DEVICE — BLADE,CARBON-STEEL,11,STRL,DISPOSABLE,TB: Brand: MEDLINE

## (undated) DEVICE — MARKER SURG SKIN UTIL BLK REG TIP NONSMEARING W/ 6IN RUL

## (undated) DEVICE — GLOVE SURG SZ 7 L12IN FNGR THK79MIL GRN LTX FREE

## (undated) DEVICE — KIT POS W/ FOAM ARM CRADL SHEARGUARD CHST PD CVR FOR SPNL

## (undated) DEVICE — SUTURE ABSORBABLE MONOFILAMENT 1 CTX 36 CM 48 MM VIO PDS +

## (undated) DEVICE — SUTURE STRATAFIX SYMMETRIC PDS + SZ 1 L18IN ABSRB VLT L48MM SXPP1A400

## (undated) DEVICE — LIQUIBAND RAPID ADHESIVE 36/CS 0.8ML: Brand: MEDLINE

## (undated) DEVICE — Device: Brand: BOOT LINER, DISPOSABLE

## (undated) DEVICE — TOWEL,OR,DSP,ST,BLUE,STD,4/PK,20PK/CS: Brand: MEDLINE

## (undated) DEVICE — BIPOLAR SEALER 23-112-1 AQM 6.0: Brand: AQUAMANTYS ®

## (undated) DEVICE — SUTURE VCRL SZ 3-0 L27IN ABSRB UD L26MM CT-2 1/2 CIR J232H

## (undated) DEVICE — GLOVE SURG SZ 55 CRM LTX FREE POLYISOPRENE POLYMER BEAD ANTI

## (undated) DEVICE — 1010 S-DRAPE TOWEL DRAPE 10/BX: Brand: STERI-DRAPE™

## (undated) DEVICE — SENSOR PLSE OXMTR AD CBL L3FT ADH TRANSMISSIVE

## (undated) DEVICE — DISSECTOR LAP DIA5MM BLNT TIP ENDOPATH

## (undated) DEVICE — STRIP,CLOSURE,WOUND,MEDI-STRIP,1/2X4: Brand: MEDLINE

## (undated) DEVICE — SOLUTION IV IRRIG LACTATED RINGERS 3000ML 2B7487

## (undated) DEVICE — 3M™ STERI-DRAPE™ INCISE DRAPE 1050 (60CM X 45CM): Brand: STERI-DRAPE™

## (undated) DEVICE — SUTURE VICRYL + SZ 0 L18IN ABSRB UD L36MM CT-1 1/2 CIR VCP840D

## (undated) DEVICE — PIN BNE FIX TEMP L140MM DIA4MM MAKO

## (undated) DEVICE — BLADE,CARBON-STEEL,10,STRL,DISPOSABLE,TB: Brand: MEDLINE

## (undated) DEVICE — BLANKET WRM W29.9XL79.1IN UP BODY FORC AIR MISTRAL-AIR

## (undated) DEVICE — C-ARMOR C-ARM EQUIPMENT COVERS CLEAR STERILE UNIVERSAL FIT 12 PER CASE: Brand: C-ARMOR

## (undated) DEVICE — GUIDE WIRE, BALL-TIPPED, STERILE

## (undated) DEVICE — AGENT HEMOSTATIC SURGIFLOW MATRIX KIT W/THROMBIN

## (undated) DEVICE — SHEET, ORTHO, SPLIT, STERILE: Brand: MEDLINE

## (undated) DEVICE — LOTION PREP REMV 4OZ IODO CLR TINC OF BENZ DURAPREP

## (undated) DEVICE — SOLUTION IV 250ML 0.9% SOD CHL PH 5 INJ USP VIAFLX PLAS

## (undated) DEVICE — SOURCE LT 2 STR TIP SCINTILLANT

## (undated) DEVICE — SOLUTION IV 500ML 0.9% SOD CHL PH 5 INJ USP VIAFLX PLAS